# Patient Record
Sex: FEMALE | Race: WHITE | NOT HISPANIC OR LATINO | Employment: FULL TIME | URBAN - METROPOLITAN AREA
[De-identification: names, ages, dates, MRNs, and addresses within clinical notes are randomized per-mention and may not be internally consistent; named-entity substitution may affect disease eponyms.]

---

## 2017-05-30 ENCOUNTER — ALLSCRIPTS OFFICE VISIT (OUTPATIENT)
Dept: OTHER | Facility: OTHER | Age: 57
End: 2017-05-30

## 2017-07-12 ENCOUNTER — APPOINTMENT (OUTPATIENT)
Dept: LAB | Facility: MEDICAL CENTER | Age: 57
End: 2017-07-12
Payer: COMMERCIAL

## 2017-07-12 DIAGNOSIS — F90.0 ATTENTION-DEFICIT HYPERACTIVITY DISORDER, PREDOMINANTLY INATTENTIVE TYPE: ICD-10-CM

## 2017-07-12 DIAGNOSIS — E03.9 HYPOTHYROIDISM: ICD-10-CM

## 2017-07-12 DIAGNOSIS — E83.52 HYPERCALCEMIA: ICD-10-CM

## 2017-07-12 DIAGNOSIS — E04.1 NONTOXIC SINGLE THYROID NODULE: ICD-10-CM

## 2017-07-12 DIAGNOSIS — Z12.31 ENCOUNTER FOR SCREENING MAMMOGRAM FOR MALIGNANT NEOPLASM OF BREAST: ICD-10-CM

## 2017-07-12 DIAGNOSIS — E78.00 PURE HYPERCHOLESTEROLEMIA: ICD-10-CM

## 2017-07-12 LAB
ALBUMIN SERPL BCP-MCNC: 4.2 G/DL (ref 3.5–5)
ALP SERPL-CCNC: 85 U/L (ref 46–116)
ALT SERPL W P-5'-P-CCNC: 63 U/L (ref 12–78)
ANION GAP SERPL CALCULATED.3IONS-SCNC: 9 MMOL/L (ref 4–13)
AST SERPL W P-5'-P-CCNC: 37 U/L (ref 5–45)
BILIRUB SERPL-MCNC: 0.57 MG/DL (ref 0.2–1)
BUN SERPL-MCNC: 15 MG/DL (ref 5–25)
CALCIUM ALBUM COR SERPL-MCNC: 10.3 MG/DL (ref 8.3–10.1)
CALCIUM SERPL-MCNC: 10.5 MG/DL (ref 8.3–10.1)
CHLORIDE SERPL-SCNC: 106 MMOL/L (ref 100–108)
CHOLEST SERPL-MCNC: 253 MG/DL (ref 50–200)
CO2 SERPL-SCNC: 26 MMOL/L (ref 21–32)
CREAT SERPL-MCNC: 0.79 MG/DL (ref 0.6–1.3)
ERYTHROCYTE [DISTWIDTH] IN BLOOD BY AUTOMATED COUNT: 13.9 % (ref 11.6–15.1)
GFR SERPL CREATININE-BSD FRML MDRD: >60 ML/MIN/1.73SQ M
GLUCOSE P FAST SERPL-MCNC: 82 MG/DL (ref 65–99)
HCT VFR BLD AUTO: 43.1 % (ref 34.8–46.1)
HDLC SERPL-MCNC: 61 MG/DL (ref 40–60)
HGB BLD-MCNC: 15.1 G/DL (ref 11.5–15.4)
LDLC SERPL CALC-MCNC: 154 MG/DL (ref 0–100)
MCH RBC QN AUTO: 31.4 PG (ref 26.8–34.3)
MCHC RBC AUTO-ENTMCNC: 35 G/DL (ref 31.4–37.4)
MCV RBC AUTO: 90 FL (ref 82–98)
PLATELET # BLD AUTO: 331 THOUSANDS/UL (ref 149–390)
PMV BLD AUTO: 11.6 FL (ref 8.9–12.7)
POTASSIUM SERPL-SCNC: 4 MMOL/L (ref 3.5–5.3)
PROT SERPL-MCNC: 7.9 G/DL (ref 6.4–8.2)
RBC # BLD AUTO: 4.81 MILLION/UL (ref 3.81–5.12)
SODIUM SERPL-SCNC: 141 MMOL/L (ref 136–145)
T3 SERPL-MCNC: 0.8 NG/ML (ref 0.6–1.8)
T4 FREE SERPL-MCNC: 0.7 NG/DL (ref 0.76–1.46)
TRIGL SERPL-MCNC: 192 MG/DL
TSH SERPL DL<=0.05 MIU/L-ACNC: 10.5 UIU/ML (ref 0.36–3.74)
WBC # BLD AUTO: 6.39 THOUSAND/UL (ref 4.31–10.16)

## 2017-07-12 PROCEDURE — 84439 ASSAY OF FREE THYROXINE: CPT

## 2017-07-12 PROCEDURE — 80061 LIPID PANEL: CPT

## 2017-07-12 PROCEDURE — 80053 COMPREHEN METABOLIC PANEL: CPT

## 2017-07-12 PROCEDURE — 84443 ASSAY THYROID STIM HORMONE: CPT

## 2017-07-12 PROCEDURE — 85027 COMPLETE CBC AUTOMATED: CPT

## 2017-07-12 PROCEDURE — 36415 COLL VENOUS BLD VENIPUNCTURE: CPT

## 2017-07-12 PROCEDURE — 84480 ASSAY TRIIODOTHYRONINE (T3): CPT

## 2017-07-18 ENCOUNTER — TRANSCRIBE ORDERS (OUTPATIENT)
Dept: ADMINISTRATIVE | Facility: HOSPITAL | Age: 57
End: 2017-07-18

## 2017-07-18 DIAGNOSIS — R06.83 SNORING: Primary | ICD-10-CM

## 2017-07-19 ENCOUNTER — ALLSCRIPTS OFFICE VISIT (OUTPATIENT)
Dept: OTHER | Facility: OTHER | Age: 57
End: 2017-07-19

## 2017-07-25 ENCOUNTER — ALLSCRIPTS OFFICE VISIT (OUTPATIENT)
Dept: OTHER | Facility: OTHER | Age: 57
End: 2017-07-25

## 2017-08-17 ENCOUNTER — HOSPITAL ENCOUNTER (OUTPATIENT)
Dept: RADIOLOGY | Facility: MEDICAL CENTER | Age: 57
Discharge: HOME | End: 2017-08-17
Payer: COMMERCIAL

## 2017-08-17 DIAGNOSIS — Z12.31 ENCOUNTER FOR SCREENING MAMMOGRAM FOR MALIGNANT NEOPLASM OF BREAST: ICD-10-CM

## 2017-08-17 PROCEDURE — G0202 SCR MAMMO BI INCL CAD: HCPCS

## 2017-08-17 PROCEDURE — 77063 BREAST TOMOSYNTHESIS BI: CPT

## 2017-08-30 DIAGNOSIS — E83.52 HYPERCALCEMIA: ICD-10-CM

## 2017-08-30 DIAGNOSIS — E21.3 HYPERPARATHYROIDISM (HCC): ICD-10-CM

## 2017-08-30 DIAGNOSIS — E03.9 HYPOTHYROIDISM: ICD-10-CM

## 2017-08-30 DIAGNOSIS — E55.9 VITAMIN D DEFICIENCY: ICD-10-CM

## 2017-09-13 ENCOUNTER — APPOINTMENT (OUTPATIENT)
Dept: LAB | Age: 57
End: 2017-09-13
Payer: COMMERCIAL

## 2017-09-13 ENCOUNTER — TRANSCRIBE ORDERS (OUTPATIENT)
Dept: ADMINISTRATIVE | Age: 57
End: 2017-09-13

## 2017-09-13 DIAGNOSIS — E83.52 HYPERCALCEMIA: ICD-10-CM

## 2017-09-13 DIAGNOSIS — E03.9 HYPOTHYROIDISM: ICD-10-CM

## 2017-09-13 LAB
25(OH)D3 SERPL-MCNC: 18.5 NG/ML (ref 30–100)
ALBUMIN SERPL BCP-MCNC: 3.9 G/DL (ref 3.5–5)
CA-I BLD-SCNC: 1.44 MMOL/L (ref 1.12–1.32)
CALCIUM ALBUM COR SERPL-MCNC: 10.9 MG/DL (ref 8.3–10.1)
CALCIUM SERPL-MCNC: 10.8 MD/DL (ref 8.3–10.1)
CALCIUM SERPL-MCNC: 10.8 MG/DL (ref 8.3–10.1)
PTH-INTACT SERPL-MCNC: 110 PG/ML (ref 14–72)
TSH SERPL DL<=0.05 MIU/L-ACNC: 3.64 UIU/ML (ref 0.36–3.74)

## 2017-09-13 PROCEDURE — 82310 ASSAY OF CALCIUM: CPT

## 2017-09-13 PROCEDURE — 82040 ASSAY OF SERUM ALBUMIN: CPT

## 2017-09-13 PROCEDURE — 82330 ASSAY OF CALCIUM: CPT

## 2017-09-13 PROCEDURE — 84443 ASSAY THYROID STIM HORMONE: CPT

## 2017-09-13 PROCEDURE — 82306 VITAMIN D 25 HYDROXY: CPT

## 2017-09-13 PROCEDURE — 83970 ASSAY OF PARATHORMONE: CPT

## 2017-09-13 PROCEDURE — 36415 COLL VENOUS BLD VENIPUNCTURE: CPT

## 2017-09-15 ENCOUNTER — ALLSCRIPTS OFFICE VISIT (OUTPATIENT)
Dept: OTHER | Facility: OTHER | Age: 57
End: 2017-09-15

## 2017-09-22 ENCOUNTER — HOSPITAL ENCOUNTER (OUTPATIENT)
Dept: SLEEP CENTER | Facility: CLINIC | Age: 57
Discharge: HOME | End: 2017-09-22
Payer: COMMERCIAL

## 2017-09-22 DIAGNOSIS — R06.83 SNORING: ICD-10-CM

## 2017-09-22 DIAGNOSIS — G47.33 OSA (OBSTRUCTIVE SLEEP APNEA): ICD-10-CM

## 2017-09-22 PROCEDURE — 95810 POLYSOM 6/> YRS 4/> PARAM: CPT

## 2017-10-24 ENCOUNTER — TRANSCRIBE ORDERS (OUTPATIENT)
Dept: SLEEP CENTER | Facility: CLINIC | Age: 57
End: 2017-10-24

## 2017-10-24 DIAGNOSIS — G47.33 OSA (OBSTRUCTIVE SLEEP APNEA): Primary | ICD-10-CM

## 2017-11-13 ENCOUNTER — APPOINTMENT (OUTPATIENT)
Dept: LAB | Facility: HOSPITAL | Age: 57
End: 2017-11-13
Attending: INTERNAL MEDICINE
Payer: COMMERCIAL

## 2017-11-13 ENCOUNTER — ALLSCRIPTS OFFICE VISIT (OUTPATIENT)
Dept: OTHER | Facility: OTHER | Age: 57
End: 2017-11-13

## 2017-11-13 DIAGNOSIS — E21.3 HYPERPARATHYROIDISM (HCC): ICD-10-CM

## 2017-11-13 LAB
ALBUMIN SERPL BCP-MCNC: 4.1 G/DL (ref 3.5–5)
ALBUMIN SERPL BCP-MCNC: 4.3 G/DL (ref 3.5–5)
CALCIUM ALBUM COR SERPL-MCNC: 11.1 MG/DL (ref 8.3–10.1)
CALCIUM SERPL-MCNC: 11.3 MG/DL (ref 8.3–10.1)
CALCIUM SERPL-MCNC: 11.3 MG/DL (ref 8.3–10.1)
PHOSPHATE SERPL-MCNC: 2.6 MG/DL (ref 2.7–4.5)
PTH-INTACT SERPL-MCNC: 106.5 PG/ML (ref 14–72)

## 2017-11-13 PROCEDURE — 82310 ASSAY OF CALCIUM: CPT

## 2017-11-13 PROCEDURE — 36415 COLL VENOUS BLD VENIPUNCTURE: CPT

## 2017-11-13 PROCEDURE — 84100 ASSAY OF PHOSPHORUS: CPT

## 2017-11-13 PROCEDURE — 82040 ASSAY OF SERUM ALBUMIN: CPT

## 2017-11-13 PROCEDURE — 83970 ASSAY OF PARATHORMONE: CPT

## 2017-11-14 ENCOUNTER — GENERIC CONVERSION - ENCOUNTER (OUTPATIENT)
Dept: OTHER | Facility: OTHER | Age: 57
End: 2017-11-14

## 2017-11-14 NOTE — CONSULTS
Assessment    1  Hyperparathyroidism (252 00) (E21 3)   2  Right thyroid nodule (241 0) (E04 1)   3  Vitamin D deficiency (268 9) (E55 9)   4  Hypothyroidism (244 9) (E03 9)    Plan  Hyperparathyroidism    · (1) ALBUMIN; Status:Active; Requested for:13Nov2017;    Perform:[facility]; WPF:[id number]; Ordered;Ordered By:Mita Castellano;   · (1) CALCIUM, URINE 24HR; Status:Active; Requested for:13Nov2017;    Perform:[facility]; ANB:37TOO0276; Ordered;Ordered By:Mita Castellano;   · (1) CALCIUM; Status:Active; Requested for:13Nov2017;    Perform:[facility]; DPE:89IHY2226; Ordered;Ordered By:Mita Castellano;   · (1) PHOSPHORUS; Status:Active; Requested for:13Nov2017;    Perform:[facility]; WKM:42HEI4033; Ordered;Ordered By:Mita Castellano;   · (1) PTH N-TERMINAL (INTACT); Status:Active; Requested for:13Nov2017;    Perform:[facility]; NMK:21AQP5303; Ordered;Ordered By:Mita Castellano;   · Follow-up visit in 2 months Evaluation and Treatment  Follow-up  Status: Hold For -Scheduling  Requested for: 89FEH6563   Ordered; Hyperparathyroidism; Ordered By: Shannan Carson Performed:  Due: 95QKX1775   · Follow-Up With Advanced Practitioner Evaluation and Treatment  Follow-up  Status: HoldFor - Scheduling  Requested for: 82STO8316   Ordered; Hyperparathyroidism; Ordered By: Shannan Carson Performed:  Due: 74DZH5684  Hyperparathyroidism, Right thyroid nodule    · US THYROID; Status:Active; Requested for:64Ebn4653;    Perform:[facility]; OUK:49JUY1121; Ordered;Right thyroid nodule; Ordered By:Mita Castellano; Discussion/Summary  Discussion Summary:   1  Hyperparathyroidism with worsening hypercalcemia-I have ordered an intact PTH, calcium, albumin, phosphorus and 24 urine calcium  I suspect she will likely need a sestamibi scan   I have encouraged her to have  Severe vitamin-D deficiency-at this time, I have asked her to stop taking ergo calciferol as sudden increases in vitamin-D can worsen calcium levels  I did ask her to start taking lower dose of vitamin-D at 1000 units per day  Hypothyroidism-continue thyroid hormone replacement  Thyroid nodule-she does have a slip for ultrasound of the thyroid  Counseling Documentation With Imm: The patient was counseled regarding diagnostic results,-- instructions for management,-- impressions  Medication SE Review and Pt Understands Tx: The treatment plan was reviewed with the patient/guardian  The patient/guardian understands and agrees with the treatment plan      Chief Complaint  Chief Complaint Free Text Note Form: Consult      History of Present Illness  Thyroid Nodule: The patient is being seen for an initial evaluation of a thyroid nodule  Nodule characteristics: 1 5 cm in size, located in the right lobe, cold  The patient is currently asymptomatic  Hyperparathyroidism, Primary: The patient is being seen for a consultation regarding primary hyperparathyroidism  Disease type: undetermined  Disease involvement includes hypercalcemia  The patient is not currently being treated for this problem  Symptoms:  polyuria,-- polydipsia,-- fatigue-- and-- depression  Onset was gradual 4 year(s) ago  The symptoms occur constantly  She describes this as moderate in severity-- and-- worsening  No relieving factors are noted  Vitamin D Deficiency: The patient is being seen for an initial evaluation of vitamin D deficiency  Disease type: vitamin D deficiency  Current treatment includes vitamin D2 (ergocalciferol)  The patient is currently asymptomatic  Review of Systems  Endo Adult ROS Female New Patient:  Constitutional/General: no change in ring size,-- no change in shoe size,-- no chills,-- no dizziness,-- no fainting,-- fatigue,-- no fever,-- no forgetfulness,-- headache,-- no loss of sleep,-- no recent weight loss,-- no nervousness,-- no numbness,-- no temperature intolerance,-- no excessive sweating-- and-- no weight gain    Muscle/Joint/Bone: neck pain-- and-- shoulder pain, but-- no arm pain,-- no back pain,-- no hip pain,-- no leg pain,-- no foot pain,-- no hand pain,-- no arm weakness,-- no back weakness,-- no hip weakness,-- no leg weakness,-- no foot weakness,-- no neck weakness,-- no hand weakness,-- no shoulder weakness,-- no arm numbness,-- no back numbness,-- no hip numbness,-- no leg numbness,-- no foot numbness,-- no neck numbness,-- no hand numbness-- and-- no shoulder numbness  Gastrointestinal: diarrhea, but-- no constipation,-- no excessive hunger,-- no excessive thirst,-- no nausea,-- no poor appetite,-- no rectal bleeding,-- no stomach pain,-- no vomiting-- and-- no vomiting blood  Cardiovascular: no chest pain,-- no hypertension,-- no irregular heart beat,-- no hypotension,-- no poor circulation,-- no rapid heart beat-- and-- no ankle swelling  Eye/Ear/Nose/Throat: sinus problems, but-- no bleeding gums,-- no blurred vision,-- no difficulty swallowing,-- no double vision,-- no gritty eyes,-- no hoarseness,-- no hearing loss,-- no persistent cough,-- not seeing flashes-- and-- not seeing halos  Skin: no easy bruising,-- no hives,-- no itching,-- no change in a mole,-- no rashes,-- no scar-- and-- no slow healing sores  Genitourinary no blood in urine,-- no frequent urination,-- no night time urination-- and-- no painful urination  Genitourinary - Reproductive normal period,-- no bleeding between periods,-- no breast lump,-- no hot flashes,-- no nipple discharge,-- no vaginal discharge,-- not pregnant-- and-- no children  date of LMP is not applicable  LMP intervals not applicable  the interval length of the last menstruation is not applicable   ROS Reviewed:   ROS reviewed  Active Problems    1  ADHD, predominantly inattentive type (314 00) (F90 0)   2  Allergic (995 3) (T78 40XA)   3  Depression (311) (F32 9)   4  GERD without esophagitis (530 81) (K21 9)   5  Hypercalcemia (275 42) (E83 52)   6   Hypercholesteremia (272 0) (E78 00)   7  Hyperparathyroidism (252 00) (E21 3)   8  Hypertension, essential, benign (401 1) (I10)   9  Hypothyroidism (244 9) (E03 9)   10  Lumbar strain (847 2) (S39 012A)   11  Obstructive sleep apnea (327 23) (G47 33)   12  PPD screening test (V74 1) (Z11 1)   13  Right thyroid nodule (241 0) (E04 1)   14  Snoring (786 09) (R06 83)   15  Vitamin D deficiency (268 9) (E55 9)    Past Medical History  1  History of Abrasion of axilla (912 0) (S40 819A)   2  History of Acute maxillary sinusitis (461 0) (J01 00)   3  History of Acute pain of both knees (338 19,719 46) (M25 561,M25 562)   4  History of Acute pharyngitis (462) (J02 9)   5  History of Acute URI (465 9) (J06 9)   6  History of Acute UTI (599 0) (N39 0)   7  Denied: History of Carrier Of STD   8  History of Fracture of plateau of left tibia (823 00) (S82 142A)   9  History of acute sinusitis (V12 69) (Z87 09)   10  History of allergic rhinitis (V12 69) (Z87 09)   11  History of asthma (V12 69) (Z87 09)   12  History of headache (V13 89) (Z87 898)   13  History of insect bite (V15 59) (Z87 828)   14  History of urinary frequency (V13 09) (Z87 898)   15  History of Left ankle sprain (845 00) (S93 402A)   16  History of Left knee pain (719 46) (M25 562)   17  Denied: History of Mental Status Change   18  History of Pain in joint of left shoulder (719 41) (M25 512)   19  History of Vaginal bleeding, abnormal (623 8) (N93 9)  Active Problems And Past Medical History Reviewed: The active problems and past medical history were reviewed and updated today  Surgical History  1  History of Tonsillectomy With Adenoidectomy  Surgical History Reviewed: The surgical history was reviewed and updated today  Family History  Mother    1  Family history of glaucoma (V19 11) (Z83 511)   2  Family history of rickets (V17 89) (Z82 69)   3  Family history of stroke (V17 1) (Z82 3)  Father    4  Family history of neuropathy (V17 2) (Z82 0)  Brother    5   Family history of Paget's bone disease  Family History    6  Family history of Benign essential HTN   7  Family history of cardiac disorder (V17 49) (Z82 49)  Family History Reviewed: The family history was reviewed and updated today  Social History     · Being A Social Drinker   · Caffeine Use   · Marital History - Single   · Never A Smoker   · Never Used Drugs   · Occupation:   · Travel history  Social History Reviewed: The social history was reviewed and updated today  Current Meds   1  Advair Diskus 100-50 MCG/DOSE Inhalation Aerosol Powder Breath Activated; INHALE 1 PUFF TWICE DAILY; Therapy: 00BIR8861 to (Last SS:37YAA5760)  Requested for: 07LWU1683 Ordered   2  Amphetamine-Dextroamphetamine 5 MG Oral Tablet; TAKE 1 1/2 tab in the morning and 1 tab in evening; Therapy: 97Wjs2372 to (Evaluate:18Nov2017); Last Rx:69Pvr5174 Ordered   3  Cetirizine HCl - 10 MG Oral Tablet; take 2 tablet daily; Therapy: (Recorded:02Gtq4238) to Recorded   4  Cyclobenzaprine HCl - 7 5 MG Oral Tablet; Take 1 tablet twice daily as needed; Therapy: 45KSD2540 to (78 220 82 17)  Requested for: 44Ber1713 Recorded   5  Levothyroxine Sodium 50 MCG Oral Tablet; TAKE 1 TABLET DAILY; Therapy: 22RWS5601 to (Evaluate:15Jan2018)  Requested for: 34UOX8164; Last Rx:75Drq4654 Ordered   6  Multiple Vitamin TABS; TAKE 1 TABLET DAILY; Therapy: (Recorded:42Xbf7550) to Recorded   7  Nasacort AQ 55 MCG/ACT AERS; INSTILL 2 SQUIRT Daily in am; Therapy: (Recorded:16Iob7007) to Recorded   8  Venlafaxine HCl  MG Oral Capsule Extended Release 24 Hour; TAKE 1 CAPSULE DAILY  Requested for: 47BHV3484; Last Rx:42Cin1590 Ordered   9  Vitamin D (Ergocalciferol) 38591 UNIT Oral Capsule; TAKE 1 CAPSULE WEEKLY; Therapy: 89Zbi3298 to (Last Rx:98Spr6612)  Requested for: 17Bet2826 Ordered  Medication List Reviewed: The medication list was reviewed and updated today  Allergies  1  Bactrim TABS   2  Compazine TABS   3   Erythromycin TABS    Vitals  Vital Signs    Recorded: 41IOJ2335 09:22AM   Heart Rate 88   Systolic 585   Diastolic 78   Height 5 ft 1 5 in   Weight 160 lb 4 96 oz   BMI Calculated 29 8   BSA Calculated 1 73       Physical Exam   Constitutional  General appearance: No acute distress, well appearing and well nourished  Eyes  Conjunctiva and lids: No swelling, erythema, or discharge  Pupils: Equal, round and reactive to light  The sclera are anicteric  Extraocular movements are intact  Ears, Nose, Mouth, and Throat  External inspection of ears, nose and lips: Normal    Oropharynx: Normal with no erythema, edema, exudate or lesions  Exam of Head: The head is atraumatic and normocephalic  Neck: Abnormal  -- Right upper pole thyroid nodule approximately 1 5 cm in size  Pulmonary  Auscultation of lungs: Clear to auscultation bilaterally with normal chest expansion  Cardiovascular  Auscultation of heart: Normal rate and rhythm with no murmurs, gallops or rubs  Examination of pulses: Dorsalis pedal pulses are +2 and equal bilaterally  Examination of carotids: No bruit   Abdomen  Abdomen: Abdomen is soft, non-tender with normal bowel sounds  Lymphatic  Palpation of lymph nodes: No supraclavicular or suboccipital lymphadenopathy  Musculoskeletal  Inspection/palpation of joints, bones, and muscles: Muscle bulk and tone is normal    Skin  Skin and subcutaneous tissue: Normal skin temperature and color  Neurologic  Reflexes: 2+ and symmetric  Motor Strength: Strength is 5/5 bilaterally  Psychiatric  Orientation to person, place and time: Normal    Mood and affect: Affect and attention span are normal        Results/Data  Office Record Review: I have reviewed the office records and my summary is as follows: Notes from the primary care provided talks about the hyperparathyroidism, hypothyroidism and thyroid nodule   (1) VITAMIN D 25-HYDROXY 94Qwi7260 09:40AM [facility] Order Number: [id number] Test Name Result Flag Reference   VIT D 25-HYDROX 18 5 ng/mL L 30 0-100 0     This assay is a certified procedure of the CDC Vitamin D Standardization Certification Program (VDSCP)   Deficiency <20ng/ml  Insufficiency 20-30ng/ml  Sufficient  ng/ml   *Patients undergoing fluorescein dye angiography may retain small amounts of fluorescein in the body for 48-72 hours post procedure  Samples containing fluorescein can produce falsely elevated Vitamin D values  If the patient had this procedure, a specimen should be resubmitted post fluorescein clearance  (1) PTH N-TERMINAL (INTACT) 25Fzs2794 09:40AM [facility] Order Number: [id number]     Test Name Result Flag Reference   PARATHYROID HORMONE INTACT 110 0 pg/mL H 14 0-72 0     (1) CALCIUM 98Fgw9072 09:40AM [facility] Order Number: [id number]     Test Name Result Flag Reference   CALCIUM 10 8 mg/dL H 8 3-10 1   CORRECTED CALCIUM 10 9 mg/dL H 8 3-10 1   ALBUMIN 3 9 g/dL  3 5-5 0   CALCIUM FOR CA/ALB 10 8 md/dL H 8 3-10 1     (1) CALCIUM IONIZED 83Erx6417 09:40AM [facility] Order Number: [id number]     Test Name Result Flag Reference   CALCIUM IONIZED 1 44 mmol/L H 1 12-1 32     (1) TSH 99Dus3541 09:40AM [facility] Order Number: [id number]     Test Name Result Flag Reference   TSH 3 640 uIU/mL  0 358-3 740     Patients undergoing fluorescein dye angiography may retain small amounts of fluorescein in the body for 48-72 hours post procedure  Samples containing fluorescein can produce falsely depressed TSH values  If the patient had this procedure,a specimen should be resubmitted post fluorescein clearance       The recommended reference ranges for TSH during pregnancy are as follows: First trimester 0 1 to 2 5 uIU/mL Second trimester  0 2 to 3 0 uIU/mL Third trimester 0 3 to 3 0 uIU/m       Signatures   Electronically signed by : SETH Palumbo ; Nov 13 2017  9:48AM EST (Author)

## 2017-12-04 ENCOUNTER — TRANSCRIBE ORDERS (OUTPATIENT)
Dept: ADMINISTRATIVE | Facility: HOSPITAL | Age: 57
End: 2017-12-04

## 2017-12-04 ENCOUNTER — GENERIC CONVERSION - ENCOUNTER (OUTPATIENT)
Dept: OTHER | Facility: OTHER | Age: 57
End: 2017-12-04

## 2017-12-04 ENCOUNTER — APPOINTMENT (OUTPATIENT)
Dept: LAB | Facility: MEDICAL CENTER | Age: 57
End: 2017-12-04
Payer: COMMERCIAL

## 2017-12-04 DIAGNOSIS — E21.3 HYPERPARATHYROIDISM (HCC): Primary | ICD-10-CM

## 2017-12-04 LAB
CALCIUM 24H UR-MCNC: 261.3 MG/24 HRS (ref 42–353)
SPECIMEN VOL UR: 1300 ML

## 2017-12-04 PROCEDURE — 82340 ASSAY OF CALCIUM IN URINE: CPT | Performed by: INTERNAL MEDICINE

## 2017-12-11 ENCOUNTER — TRANSCRIBE ORDERS (OUTPATIENT)
Dept: SLEEP CENTER | Facility: CLINIC | Age: 57
End: 2017-12-11

## 2017-12-11 ENCOUNTER — HOSPITAL ENCOUNTER (OUTPATIENT)
Dept: SLEEP CENTER | Facility: CLINIC | Age: 57
Discharge: HOME | End: 2017-12-11
Payer: COMMERCIAL

## 2017-12-11 DIAGNOSIS — G47.33 OSA (OBSTRUCTIVE SLEEP APNEA): Primary | ICD-10-CM

## 2017-12-11 NOTE — PROGRESS NOTES
Consultation - [facility] Peng  56 y o  female  :1960  HWI:[id number]    Physician Requesting Consult: Wilma EDWARD     Reason for Consult : At your kind request I saw this patient for initial sleep evaluation today  A diagnostic sleep study was undertaken and patient is here to review results and treatment options  The study demonstrated an AHI of 5 4 per hour  Minimum oxygen saturation was 85% and she spent 72 2% of time asleep with saturations less than 90%  Mild-to-moderate intensity snoring was noted  There were also periodic limb movements of sleep for an index of 14 per hour and frequent spontaneous arousals for an index of 47 per hour  Medications, Past, Family and Social Histories were reviewed  Comorbidities include:  ADHD, depression, hypothyroidism, hyperparathyroidism, is scoliosis, environmental allergies and asthma  Herewith findings in summary  Full details are available from our records  HPI:  She reports snoring of around 5 years duration that is loud enough to disturb others  She sleeps alone and is not aware of breathing difficulties during sleep or modifying factors  She reported no restless leg symptoms  She tosses and turns and sleep talks but reported no other features of parasomnia  Sleep Routine:  She is spending 7 hours in bed  She typically falls asleep within 10 minutes  Sleep is interrupted 4 apart times a night  She awakens with the aid of an alarm and is never rested  She has excessive date this rated herself at her computer or whenever sedentary  Habits:  She has never smoked  , she uses alcohol occasionally  ,  has no drug history on file  , she uses limited caffeine  ROS:  She has had approximately 20 lb weight gain in the past year and a half  She feels allergies and asthma are controlled  A 10 point review of systems was otherwise unremarkable  Physical Exam: Salient findings on exam, are a body mass index 28 6   Vitals are stable  Mental state    appears normal   Craniofacial anatomy is normal  Neck Circumference is 37 cm  There is excess fatty tissue and neck is thick  There are no abnormal neck masses  Nasal airway is patent  Mucous membranes appeared normal  The oral airway is crowded  Base of tongue is at Modified Mallampati class III  She has asymmetry of her spine and torso  Heart sounds are regular, there are no murmurs, no JVD or pedal edema  Respiratory effort is normal  Air entry is good bilaterally  There are no wheezes or rales  She has truncal obesity  The rest of her exam was unremarkable  Impression:   1  Mild-to-moderate obstructive sleep apnea  2  Sleep-related hypoxia  3  Periodic limb movements of sleep  4  Severe sleep fragmentation   5  Hypersomnolence secondary to the above  6  Sleep talking  7  Overweight  8  Comorbidities as outlined above    Plan:   1  I reviewed results of the Sleep study with the patient  2  With respect to above conditions, I counseled on pathophysiology, diagnosis, treatment options, risks and benefits; inter-relationship and effects on symptoms and comorbidities; risks of no treatment; costs and insurance aspects  3  I also advised on weight reduction  4  Patient elected positive airway pressure therapy and is to be scheduled for a titration study  5   She may need review of her psychiatric medications  5  Follow-up to be scheduled after the study to review results and to initiate therapy      Sincerely,    Mendoza Freire MD  Board Certified Specialist

## 2018-01-10 NOTE — RESULT NOTES
Discussion/Summary   Appears to primary hyperparathyroidism  Await 24 hour urine collection       Verified Results  (1) PHOSPHORUS 04GND8180 04:49PM [facility] Order Number: [id number]     Test Name Result Flag Reference   PHOSPHORUS 2 6 mg/dL L 2 7-4 5     (1) PTH N-TERMINAL (INTACT) 00IPE2277 04:49PM [facility] Order Number: [id number]     Test Name Result Flag Reference   PARATHYROID HORMONE INTACT 106 5 pg/mL H 14 0-72 0     (1) ALBUMIN 62SFF5027 04:49PM [facility] Order Number: [id number]     Test Name Result Flag Reference   ALBUMIN 4 1 g/dL  3 5-5 0     (1) CALCIUM 92EVG3698 04:49PM [facility] Order Number: [id number]     Test Name Result Flag Reference   CALCIUM 11 3 mg/dL H 8 3-10 1   CORRECTED CALCIUM 11 1 mg/dL H 8 3-10 1   ALBUMIN 4 3 g/dL  3 5-5 0   CALCIUM FOR CA/ALB 11 3 mg/dL H 8 3-10 1

## 2018-01-11 ENCOUNTER — TRANSCRIBE ORDERS (OUTPATIENT)
Dept: ADMINISTRATIVE | Facility: HOSPITAL | Age: 58
End: 2018-01-11

## 2018-01-11 DIAGNOSIS — Z86.39 HISTORY OF UNDERACTIVE THYROID: Primary | ICD-10-CM

## 2018-01-11 NOTE — RESULT NOTES
Verified Results  (1) URINE CULTURE 59Sbj8037 02:32PM [facility] No   [facility] Order Number: [id number]     Test Name Result Flag Reference   CLINICAL REPORT (Report)     Test:        Urine culture  Specimen Source:  Urine, Unspecified Source  Specimen Type:   Urine  Specimen Date:   8/25/2016 2:32 PM  Result Date:    8/26/2016 6:25 PM  Result Status:   Final result  Resulting Lab:   [facility] [address] [place] [address]            Tel: [phone]      CULTURE                                       ------------------                                   No Growth <1000 cfu/mL       Discussion/Summary   LVM with results to d/c abx since no true UTI  F/u with PCP if no improvement

## 2018-01-11 NOTE — PROGRESS NOTES
Assessment    1  Screening for hyperlipidemia (V77 91) (Z13 220)   2  Encounter for preventive health examination (V70 0) (Z00 00)   3  Screening for malignant neoplasm of breast (V76 10) (Z12 39)    Plan  Health Maintenance    · (1) CBC/PLT/DIFF; Status:Active; Requested HRK:[id number];    · (1) COMPREHENSIVE METABOLIC PANEL; Status:Active; Requested CKS:[id number];   Hypothyroidism    · (1) TSH WITH FT4 REFLEX; Status:Active; Requested EYV:[id number];   Screening for hyperlipidemia    · (1) LIPID PANEL, FASTING; Status:Active; Requested OLJ:48RZG0478;   Screening for malignant neoplasm of breast    · * MAMMO SCREENING BILATERAL W CAD; Status:Hold For - Scheduling; Requested  ZSH:88UPX5202;   Screening for malignant neoplasm of colon    · COLONOSCOPY; Status:Active; Requested VRW:90GTO3779;   Vaginal bleeding, abnormal    · *1 - [facility] (Obstetrics/ Gynecology) Physician Referral  Consult  Status:  Hold For - Scheduling  Requested for: 17NZR0228  Care Summary provided  : Yes    Discussion/Summary  health maintenance visit Currently, she eats a poor diet and has an inadequate exercise regimen  the risks and benefits of cervical cancer screening were discussed needs to f/u for PAP Breast cancer screening: the risks and benefits of breast cancer screening were discussed and mammogram has been ordered  Colorectal cancer screening: the risks and benefits of colorectal cancer screening were discussed and colonoscopy has been ordered  Patient discussion: discussed with the patient  As for GERD, continue taking tums as that is relieving your heart burn  As for B/L knee pain  You state pain is only with bending  Recommend OTC antiinflammatory medication as needed  As for irregular, post menopausal vaginal bleeidng - referral to GYN  Additionally, pt is over due for: labs, mammo, PAP, colonscopy - slips given and pt to f/u in 1 month     Possible side effects of new medications were reviewed with the patient/guardian today  The patient was counseled regarding instructions for management, prognosis, patient and family education, risks and benefits of treatment options, importance of compliance with treatment  Chief Complaint    1  Heartburn   2  Knee Pain  Pt is here for her yearly physical  No recent labs  History of Present Illness  HM, Adult Female: The patient is being seen for a health maintenance and Last Pap > 7 years ago evaluation  The last health maintenance visit was 1 year(s) ago  Social History: Household members include Brother  She is unmarried  Work status: working full time and occupation: Day Care Worker  The patient has never smoked cigarettes  General Health: The patient's health since the last visit is described as good  She does not have regular dental visits  She complains of vision problems  Vision care includes previous LASIK eye surgery  She denies hearing loss  Immunizations status: up to date  Lifestyle:  She does not have a healthy diet  She has weight concerns  She does not exercise regularly  She does not use tobacco  She consumes alcohol  (social)   She denies drug use  Reproductive health: the patient is postmenopausal   irregular post menopausal vaginal bleeding  Screening: Cervical cancer screening includes a pap smear performed > 7 years  Breast cancer screening includes a mammogram performed > 5 years  Colorectal cancer screening includes no previous colonoscopy  She hasn't been previously screened for colorectal cancer  Metabolic screening includes uncertain timing of her last lipid profile, uncertain timing of her last glucose screening, uncertain timing of her last thyroid function test and no previous DEXA  Cardiovascular risk factors: obesity and sedentary lifestyle, but no hypertension, no diabetes, no stress, no tobacco use and no illicit drug use     HPI: Pt here for annual exam  Pt c/o B/L knee pain, GERD and irregular post menopausal vaginal bleeding      Review of Systems    Constitutional: no fever, not feeling poorly, no chills and not feeling tired  Eyes: no eye pain, no eyesight problems, eyes not red and no purulent discharge from the eyes  ENT: no earache, no nosebleeds, no sore throat, no hearing loss and no nasal discharge  Cardiovascular: the heart rate was not slow, no chest pain, no intermittent leg claudication, the heart rate was not fast, no palpitations and no lower extremity edema  Respiratory: no shortness of breath, no cough, no orthopnea, no wheezing and no shortness of breath during exertion  Gastrointestinal: no abdominal pain, no nausea, no vomiting, no constipation, no diarrhea and no blood in stools  Genitourinary: unexplained vaginal bleeding, but no dysuria, no pelvic pain and no vaginal discharge  Musculoskeletal: no joint swelling, no limb pain, no myalgias, no joint stiffness and no limb swelling    The patient presents with complaints of arthralgias (B/L knee pain)  Integumentary: no rashes, no skin lesions and no skin wound  Neurological: no headache, no numbness, no tingling, no confusion, no dizziness and no limb weakness  Psychiatric: not suicidal, no anxiety and no depression  Active Problems    1  ADHD, predominantly inattentive type (314 00) (F90 0)   2  Depression (311) (F32 9)   3   Hypothyroidism (244 9) (E03 9)    Past Medical History    · History of Abrasion of axilla (912 0) (S40 819A)   · History of Acute maxillary sinusitis (461 0) (J01 00)   · History of Acute pharyngitis (462) (J02 9)   · History of Acute URI (465 9) (J06 9)   · History of Asthma (493 90) (J45 909)   · History of Asthma (493 90) (J45 909)   · Denied: History of Carrier Of STD   · History of Encounter for PPD test (V74 1) (Z11 1)   · History of Encounter for screening mammogram for malignant neoplasm of breast  (V76 12) (Z12 31)   · History of Encounter for screening mammogram for malignant neoplasm of breast  (V76 12) (Z12 31)   · History of Fracture of plateau of left tibia (823 00) (G82 232Y)   · History of allergic rhinitis (V12 69) (Z87 09)   · History of headache (V13 89) (O46 435)   · History of insect bite (V15 59) (B93 236)   · History of Left ankle sprain (845 00) (S93 402A)   · History of Left knee pain (719 46) (M25 562)   · Denied: History of Mental Status Change   · History of Need for immunization against influenza (V04 81) (Z23)   · History of Other screening mammogram (V76 12) (Z12 31)   · History of Pain in joint of left shoulder (719 41) (M25 512)   · History of Screening for colon cancer (V76 51) (Z12 11)    Surgical History    · History of Tonsillectomy With Adenoidectomy    Family History  Mother    · Family history of Glaucoma (V19 11)   · Family history of Stroke Syndrome (V17 1)  Father    · Family history of neuropathy (V17 2) (Z82 0)   · Family history of Prostate Cancer (V16 42)  Brother    · Family history of Prostate Cancer (V16 42)  Family History    · Family history of Heart Disease (V17 49)   · Family history of Hypertension (V17 49)    Social History    · Being A Social Drinker   · DRINKS ALCOHOL VERY INFREQUENCY   · Caffeine Use   · SHE ADMITS TO CONSUMING CAFFEINE VIA TEA ( 3 SERVINGS PER DAY)   · Marital History - Single   · Never A Smoker   · Never Used Drugs   · Occupation:   · UMEMPLOYED   · Travel history   · Pt was in [facility] July/Aug 2014    Current Meds   1  Amphetamine-Dextroamphetamine 5 MG Oral Tablet; TAKE 1 TABLET TWICE DAILY; Therapy: 21Apr2015 to (Evaluate:43Qhw0969); Last MY:11MZS2295 Ordered   2  Cetirizine HCl - 10 MG Oral Tablet; TAKE 1 TABLET AT BEDTIME; Therapy: (Recorded:57Emv9049) to Recorded   3  Multiple Vitamin Oral Tablet; TAKE 1 TABLET DAILY; Therapy: (Recorded:97Sdm6339) to Recorded   4  Nasacort AQ 55 MCG/ACT AERS; INSTILL 2 SQUIRT Daily in am;   Therapy: (Recorded:93Nno8177) to Recorded   5   Symbicort 80-4 5 MCG/ACT Inhalation Aerosol; INHALE 2 PUFFS TWICE DAILY  RINSE   MOUTH AFTER USE; Therapy: 06ACL0122 to (Last Rx:29Apr2016)  Requested for: 29Apr2016 Ordered   6  Venlafaxine HCl  MG Oral Capsule Extended Release 24 Hour; TAKE 1   CAPSULE DAILY  Requested for: 68OSJ8980; Last Rx:28Mhr1613 Ordered    Allergies    1  Bactrim TABS   2  Compazine TABS   3  Erythromycin TABS    Vitals   Recorded: 06Jun2016 04:40PM   Temperature 97 7 F, Tympanic   Heart Rate 83   Systolic 112, LUE, Sitting   Diastolic 88, LUE, Sitting   Height 5 ft 0 5 in   Weight 155 lb    BMI Calculated 29 77   BSA Calculated 1 69   O2 Saturation 98     Physical Exam    Constitutional   General appearance: No acute distress, well appearing and well nourished  Head and Face   Head and face: Normal     Palpation of the face and sinuses: No sinus tenderness  Eyes   Conjunctiva and lids: No swelling, erythema or discharge  Pupils and irises: Equal, round, reactive to light  Ears, Nose, Mouth, and Throat   External inspection of ears and nose: Normal     Otoscopic examination: Tympanic membranes translucent with normal light reflex  Canals patent without erythema  Gross hearing intact  Nasal mucosa, septum, and turbinates: Normal without edema or erythema  Lips, teeth, and gums: Normal, good dentition  Oropharynx: Normal with no erythema, edema, exudate or lesions  MMM  Neck   Neck: Supple, symmetric, trachea midline, no masses  Thyroid: Normal, no thyromegaly  Pulmonary   Respiratory effort: No increased work of breathing or signs of respiratory distress  Auscultation of lungs: Clear to auscultation  No rhonchi or wheezing  Cardiovascular   Auscultation of heart: Normal rate and rhythm, normal S1 and S2, no murmurs  Examination of extremities for edema and/or varicosities: Normal     Abdomen   Abdomen: Non-tender, no masses  soft, + BS  Liver and spleen: No hepatomegaly or splenomegaly     Lymphatic   Palpation of lymph nodes in neck: No lymphadenopathy  Musculoskeletal   Gait and station: Normal     Joints, bones, and muscles: Normal     Range of motion: Normal   B/L knee without swelling, no ecchymosis  No crepitus  Full ROM  No TTP  Stability: Normal     Muscle strength/tone: Normal     Skin   Skin and subcutaneous tissue: Normal without rashes or lesions  Palpation of skin and subcutaneous tissue: Normal turgor  Neurologic   Cranial nerves: Cranial nerves II-XII intact  Cortical function: Normal mental status  Sensation: No sensory loss  Coordination: Normal finger to nose and heel to shin  Psychiatric   Judgment and insight: Normal   pleasant, cooperative  Orientation to person, place, and time: Normal     Recent and remote memory: Intact  Mood and affect: Normal        Health Management  History of Other screening mammogram   Digital Bilateral Screening Mammogram With CAD; every 1 year; Next Due: 31Ohc1249; Overdue  History of Screening for colon cancer   COLONOSCOPY; every 10 years; Next Due: 23Fqp8259; Overdue    Future Appointments    Date/Time Provider Specialty Site   07/06/2016 05:30 PM Raj Nayak [facility] PRIMARY CARE     Signatures   Electronically signed by :  Raj Fields [facility]; Jun 6 2016  5:31PM EST                       (Author)    Electronically signed by : Keren Gee MD; Jun 7 2016  1:49PM EST

## 2018-01-12 VITALS
BODY MASS INDEX: 29.5 KG/M2 | HEIGHT: 62 IN | HEART RATE: 88 BPM | DIASTOLIC BLOOD PRESSURE: 78 MMHG | SYSTOLIC BLOOD PRESSURE: 122 MMHG | WEIGHT: 160.31 LBS

## 2018-01-12 NOTE — MISCELLANEOUS
DEAR BACILIO,     WE HAVE ATTEMPTED TO REACH YOU SEVERAL TIMES WITH NO SUCCESS  PLEASE CONTACT THE OFFICE AT YOUR EARLIEST CONVENIENCE      [facility]      Electronically signed by:Lula Black   Apr 18 2016  9:03AM EST

## 2018-01-13 VITALS
BODY MASS INDEX: 30.02 KG/M2 | SYSTOLIC BLOOD PRESSURE: 134 MMHG | DIASTOLIC BLOOD PRESSURE: 88 MMHG | TEMPERATURE: 98.1 F | OXYGEN SATURATION: 95 % | HEIGHT: 62 IN | HEART RATE: 92 BPM | WEIGHT: 163.13 LBS

## 2018-01-14 VITALS
WEIGHT: 165.13 LBS | DIASTOLIC BLOOD PRESSURE: 82 MMHG | HEART RATE: 90 BPM | OXYGEN SATURATION: 96 % | HEIGHT: 61 IN | TEMPERATURE: 98.4 F | BODY MASS INDEX: 31.18 KG/M2 | SYSTOLIC BLOOD PRESSURE: 135 MMHG

## 2018-01-15 ENCOUNTER — ALLSCRIPTS OFFICE VISIT (OUTPATIENT)
Dept: OTHER | Facility: OTHER | Age: 58
End: 2018-01-15

## 2018-01-15 VITALS
HEART RATE: 86 BPM | WEIGHT: 164 LBS | HEIGHT: 62 IN | TEMPERATURE: 97.9 F | SYSTOLIC BLOOD PRESSURE: 124 MMHG | DIASTOLIC BLOOD PRESSURE: 82 MMHG | BODY MASS INDEX: 30.18 KG/M2 | OXYGEN SATURATION: 96 %

## 2018-01-15 NOTE — RESULT NOTES
Discussion/Summary   APT SCHED THIS WEEK TO DISCUSS     Verified Results  (1) VITAMIN D 25-HYDROXY 36Jwq8697 09:40AM [facility] Order Number: [id number]     Test Name Result Flag Reference   VIT D 25-HYDROX 18 5 ng/mL L 30 0-100 0   This assay is a certified procedure of the CDC Vitamin D Standardization Certification Program (VDSCP)     Deficiency <20ng/ml   Insufficiency 20-30ng/ml   Sufficient  ng/ml     *Patients undergoing fluorescein dye angiography may retain small amounts of fluorescein in the body for 48-72 hours post procedure  Samples containing fluorescein can produce falsely elevated Vitamin D values  If the patient had this procedure, a specimen should be resubmitted post fluorescein clearance  (1) PTH N-TERMINAL (INTACT) 92Ojh5408 09:40AM [facility] Order Number: [id number]     Test Name Result Flag Reference   PARATHYROID HORMONE INTACT 110 0 pg/mL H 14 0-72 0     (1) CALCIUM 56Cut5602 09:40AM [facility] Order Number: [id number]     Test Name Result Flag Reference   CALCIUM 10 8 mg/dL H 8 3-10 1   CORRECTED CALCIUM 10 9 mg/dL H 8 3-10 1   ALBUMIN 3 9 g/dL  3 5-5 0   CALCIUM FOR CA/ALB 10 8 md/dL H 8 3-10 1     (1) CALCIUM IONIZED 38Dul7243 09:40AM [facility] Order Number: [id number]     Test Name Result Flag Reference   CALCIUM IONIZED 1 44 mmol/L H 1 12-1 32     (1) TSH 05Vyr6426 09:40AM [facility] Order Number: [id number]     Test Name Result Flag Reference   TSH 3 640 uIU/mL  0 358-3 740   Patients undergoing fluorescein dye angiography may retain small amounts of fluorescein in the body for 48-72 hours post procedure  Samples containing fluorescein can produce falsely depressed TSH values  If the patient had this procedure,a specimen should be resubmitted post fluorescein clearance            The recommended reference ranges for TSH during pregnancy are as follows:  First trimester 0 1 to 2 5 uIU/mL  Second trimester  0 2 to 3 0 uIU/mL  Third trimester 0 3 to 3 0 uIU/m

## 2018-01-15 NOTE — PROGRESS NOTES
Assessment    1  Screening for hyperlipidemia (V77 91) (Z13 220)   2  Encounter for preventive health examination (V70 0) (Z00 00)   3  Screening for malignant neoplasm of breast (V76 10) (Z12 39)   4  Vaginal bleeding, abnormal (623 8) (N93 9)   5  GERD without esophagitis (530 81) (K21 9)    Plan  Health Maintenance    · (1) CBC/PLT/DIFF; Status:Active; Requested [id number];    · (1) COMPREHENSIVE METABOLIC PANEL; Status:Active; Requested TRA:57RPQ8825;   Hypothyroidism    · (1) TSH WITH FT4 REFLEX; Status:Active; Requested JET:[id number];   Screening for hyperlipidemia    · (1) LIPID PANEL, FASTING; Status:Active; Requested ZIM:83XVR8030;   Screening for malignant neoplasm of breast    · * MAMMO SCREENING BILATERAL W CAD; Status:Hold For - Scheduling; Requested  SON:13WVR3850;   Screening for malignant neoplasm of colon    · COLONOSCOPY; Status:Active; Requested MKE:84JGU2784;   Vaginal bleeding, abnormal    · *1 - [facility] (Obstetrics/ Gynecology) Physician Referral  Consult  Status:  Hold For - Scheduling  Requested for: 48ZYS2916  Care Summary provided  : Yes    Discussion/Summary    Pt with intermittent vaginal bleeding approx once a year for the past 4-5 years  However states she is menopausal for 4-5 years  She is over due for her PAP > 7 years  She denies vaginal bleeding @ this time  Recommend f/u with GYN for eval of vaginal bleeding and PAP  As for GERD, improves with antacids  Continue taking as needed  Try and track foods that aggrevate  As for B/L knee pain  It is only associated with bending then the pain is resolved on its own  exam is unremarkable  recommend OTC antiinflammatory  Will be updating annual testing as well  The patient was counseled regarding instructions for management, risk factor reductions, prognosis, patient and family education, impressions, risks and benefits of treatment options, importance of compliance with treatment     Possible side effects of new medications were reviewed with the patient/guardian today  The treatment plan was reviewed with the patient/guardian  The patient/guardian understands and agrees with the treatment plan      Chief Complaint    1  Heartburn   2  Knee Pain  Pt is here today c/o heartburn and bilateral knee pain      History of Present Illness  HPI: Pt here for annual exam with multiple c/o  Pt c/o increasing heartburn, however relieved with tums  Additionally c/o B/L knee pain with bneding  Pain is relieved after she stands to normal posture  Additionally c/o post menopausal vaginal bleeding  Pt states she is menopausal for 4-5 years, however states every year she has some light bleeding  She no longer has menopausal symptoms of hot flashes or hormonal    Dysfunctional Uterine Bleeding: The patient is being seen for an initial evaluation of dysfunctional uterine bleeding  Symptoms:  no irregular bleeding  The patient is currently asymptomatic  The patient is currently experiencing symptoms  The patient describes the bleeding as spotting  Onset was gradual  The symptoms occur intermittently and occasionally  She describes this as mild and unchanged  No exacerbating factors are noted  No relieving factors are noted  No associated symptoms are reported  The patient is not currently being treated for this problem  Pertinent medical history:  no hormone therapy  She is postmenopausal    Heartburn:   Ashwin Foster presents with complaints of gradual onset of occasional episodes of mild heartburn, described as burning, non-radiating  Episodes months ago  Symptoms are improved by antacids  Symptoms are worsening  Risk Factors: no smoking and no alcohol use  Associated symptoms include acid taste in the mouth, but no chest discomfort, no dysphagia, no weight loss, no pain, no regurgitation, no sore throat, no odynophagia, no nausea, no vomiting, no hematemesis, no melena and no diarrhea     Knee Pain:   Ashwin Foster presents with complaints of gradual onset of intermittent episodes of mild bilateral knee pain, described as aching, non-radiating  Episodes months ago  Symptoms are unchanged  Associated symptoms include no swelling, no warmth, no redness, no ecchymosis, no stiffness, no decreased range of motion, no locking, no instability, no difficulty bearing weight, no difficulty ambulating, no fever and no chills  Review of Systems    Constitutional: no fever, not feeling poorly, no chills and not feeling tired  ENT: no earache, no nosebleeds, no sore throat, no hearing loss and no nasal discharge  Cardiovascular: the heart rate was not slow, no chest pain, no intermittent leg claudication, the heart rate was not fast, no palpitations and no lower extremity edema  Respiratory: no shortness of breath, no cough, no wheezing and no shortness of breath during exertion  Breasts: no breast pain, no breast swelling, no reddening of the breast, no breast lump and no breast itching  Gastrointestinal: no abdominal pain, no nausea, no vomiting, no constipation and no diarrhea  Genitourinary: unexplained vaginal bleeding, but no dysuria, no pelvic pain and no vaginal discharge  Musculoskeletal: arthralgias, but no joint swelling, no limb pain, no myalgias and no limb swelling  Integumentary: no rashes and no skin lesions  Neurological: no headache, no numbness, no confusion and no dizziness  Active Problems    1  ADHD, predominantly inattentive type (314 00) (F90 0)   2  Depression (311) (F32 9)   3  Hypothyroidism (244 9) (E03 9)    Past Medical History  Active Problems And Past Medical History Reviewed: The active problems and past medical history were reviewed and updated today  Surgical History  Surgical History Reviewed: The surgical history was reviewed and updated today         Social History    · Being A Social Drinker   · DRINKS ALCOHOL VERY INFREQUENCY   · Caffeine Use   · SHE ADMITS TO CONSUMING CAFFEINE VIA TEA ( 3 SERVINGS PER DAY)   · Marital History - Single   · Never A Smoker   · Never Used Drugs   · Occupation:   · [employer]   · Travel history   · Pt was in [facility] July/Aug 2014  The social history was reviewed and updated today  The social history was reviewed and is unchanged  Family History  Family History Reviewed: The family history was reviewed and updated today  Current Meds   1  Amphetamine-Dextroamphetamine 5 MG Oral Tablet; TAKE 1 TABLET TWICE DAILY; Therapy: 21Apr2015 to (Evaluate:19Jun2016); Last EM:[id number] Ordered   2  Cetirizine HCl - 10 MG Oral Tablet; TAKE 1 TABLET AT BEDTIME; Therapy: (Recorded:21May2015) to Recorded   3  Multiple Vitamin Oral Tablet; TAKE 1 TABLET DAILY; Therapy: (Recorded:58Ugm7682) to Recorded   4  Nasacort AQ 55 MCG/ACT AERS; INSTILL 2 SQUIRT Daily in am;   Therapy: (Recorded:16Apr2015) to Recorded   5  Symbicort 80-4 5 MCG/ACT Inhalation Aerosol; INHALE 2 PUFFS TWICE DAILY  RINSE   MOUTH AFTER USE; Therapy: 19OYQ4761 to (Last Rx:29Apr2016)  Requested for: 29Apr2016 Ordered   6  Venlafaxine HCl  MG Oral Capsule Extended Release 24 Hour; TAKE 1 CAPSULE   DAILY  Requested for: 13BOY3868; Last Rx:24Lar1705 Ordered    The medication list was reviewed and updated today  Allergies    1  Bactrim TABS   2  Compazine TABS   3  Erythromycin TABS    Vitals   Recorded: 06Jun2016 04:40PM   Temperature 97 7 F, Tympanic   Heart Rate 83   Systolic 091, LUE, Sitting   Diastolic 88, LUE, Sitting   Height 5 ft 0 5 in   Weight 155 lb    BMI Calculated 29 77   BSA Calculated 1 69   O2 Saturation 98     Physical Exam    Constitutional   General appearance: No acute distress, well appearing and well nourished  Eyes   Conjunctiva and lids: No swelling, erythema or discharge  Pupils and irises: Equal, round and reactive to light      Ears, Nose, Mouth, and Throat   External inspection of ears and nose: Normal     Otoscopic examination: Tympanic membranes translucent with normal light reflex  Canals patent without erythema  Nasal mucosa, septum, and turbinates: Normal without edema or erythema  Oropharynx: Normal with no erythema, edema, exudate or lesions  MMM  Pulmonary   Respiratory effort: No increased work of breathing or signs of respiratory distress  Auscultation of lungs: Clear to auscultation  No rhonchi or wheezing  Cardiovascular   Auscultation of heart: Normal rate and rhythm, normal S1 and S2, without murmurs  Examination of extremities for edema and/or varicosities: Normal     Abdomen   Abdomen: Non-tender, no masses  soft, + BS  Lymphatic   Palpation of lymph nodes in neck: No lymphadenopathy  Musculoskeletal   Gait and station: Normal     Digits and nails: Normal without clubbing or cyanosis  Inspection/palpation of joints, bones, and muscles: Normal   B/L knee without swelling  No ecchymosis, no TTP  No crepitus  Full ROM  Skin   Skin and subcutaneous tissue: Normal without rashes or lesions  Neurologic   Cranial nerves: Cranial nerves 2-12 intact  Sensation: No sensory loss  Psychiatric   Orientation to person, place, and time: Normal     Mood and affect: Normal          Future Appointments    Date/Time Provider Specialty Site   07/06/2016 05:30 PM Douglas Alpers, 10 [facility] [address] [facility] PRIMARY CARE     Signatures   Electronically signed by :  Raj Lua [facility]; Jun 6 2016  5:42PM EST                       (Author)    Electronically signed by : Catherine Enriquez MD; Jun 7 2016  1:49PM EST

## 2018-01-16 NOTE — PROGRESS NOTES
Assessment    1  ADHD, predominantly inattentive type (314 00) (F90 0)    Plan  PMH: Encounter for screening mammogram for malignant neoplasm of breast    · * MAMMO SCREENING BILATERAL W CAD; Status:Hold For - Scheduling; Requested for:28Apr2016;    PMH: History of allergic rhinitis    · Symbicort 80-4 5 MCG/ACT Inhalation Aerosol; INHALE 2 PUFFS TWICE DAILY  RINSE  MOUTH AFTER USE    Discussion/Summary  Discussion Summary:   As for your ADHD you symptoms are under control, continue taking your medication as prescribed  You just refilled your medication and are not due @ this time  You are also due for an annual exam  Please schedule this @ f/u  Counseling Documentation With Imm: The patient was counseled regarding diagnostic results, instructions for management, risk factor reductions, prognosis, patient and family education, risks and benefits of treatment options, importance of compliance with treatment  Medication SE Review and Pt Understands Tx: Possible side effects of new medications were reviewed with the patient/guardian today  Chief Complaint  Chief Complaint Free Text Note Form: PT  presents for follow up for ADHD  PT  needs Med refills      History of Present Illness  HPI: Pt here for f/u ADHD  She is doing well, with no c/o  She does not need med refill as was just refilled 4/13/16   ADHD (Follow-Up): The patient's ADHD subtype is the combined type  Her ADHD has been well controlled since the last visit  Comorbid Illnesses: learning disability  She has no comorbid illnesses  She has had no significant interval events  Target Symptoms:   1  Hyperactive behavior has resolved  2  Impulsive behavior has resolved  3  Difficulty concentrating has resolved  Medications: Adderall  The patient takes her medications all of the time  Medication side effects include no anorexia, no headache, no irritability, no problems sleeping, no abdominal pain and no nausea        Review of Systems  Complete-Female:   Constitutional: no fever, not feeling poorly, no chills and not feeling tired  Eyes: no eye pain and no eyesight problems  ENT: no earache, no nosebleeds, no sore throat and no nasal discharge  Cardiovascular: the heart rate was not slow, no chest pain, no intermittent leg claudication, the heart rate was not fast, no palpitations and no lower extremity edema  Respiratory: no shortness of breath, no cough, no wheezing and no shortness of breath during exertion  Gastrointestinal: no abdominal pain, no nausea and no vomiting  Musculoskeletal: no arthralgias and no myalgias  Integumentary: no rashes and no skin lesions  Neurological: no headache, no numbness, no tingling, no confusion and no dizziness  Psychiatric: not suicidal, no anxiety, no sleep disturbances and no depression  Active Problems    1  ADHD, predominantly inattentive type (314 00) (F90 0)   2  Depression (311) (F32 9)   3  Hypothyroidism (244 9) (E03 9)    Past Medical History    1  History of Abrasion of axilla (912 0) (S40 819A)   2  History of Acute maxillary sinusitis (461 0) (J01 00)   3  History of Acute pharyngitis (462) (J02 9)   4  History of Acute URI (465 9) (J06 9)   5  History of Asthma (493 90) (J45 909)   6  History of Asthma (493 90) (J45 909)   7  Denied: History of Carrier Of STD   8  History of Encounter for PPD test (V74 1) (Z11 1)   9  History of Encounter for screening mammogram for malignant neoplasm of breast (V76 12) (Z12 31)   10  History of Encounter for screening mammogram for malignant neoplasm of breast (V76 12) (Z12 31)   11  History of Fracture of plateau of left tibia (823 00) (S82 142A)   12  History of allergic rhinitis (V12 69) (Z87 09)   13  History of headache (V13 89) (Z87 898)   14  History of insect bite (V15 59) (Z87 828)   15  History of Left ankle sprain (845 00) (S93 402A)   16  History of Left knee pain (719 46) (M25 562)   17   Denied: History of Mental Status Change   18  History of Need for immunization against influenza (V04 81) (Z23)   19  History of Other screening mammogram (V76 12) (Z12 31)   20  History of Pain in joint of left shoulder (719 41) (M25 512)   21  History of Screening for colon cancer (V76 51) (Z12 11)  Active Problems And Past Medical History Reviewed: The active problems and past medical history were reviewed and updated today  Surgical History    1  History of Tonsillectomy With Adenoidectomy  Surgical History Reviewed: The surgical history was reviewed and updated today  Family History  Mother    1  Family history of Glaucoma (V19 11)   2  Family history of Stroke Syndrome (V17 1)  Father    3  Family history of Prostate Cancer (V16 42)  Family History    4  Family history of Heart Disease (V17 49)   5  Family history of Hypertension (V17 49)  Family History Reviewed: The family history was reviewed and updated today  Social History    · Being A Social Drinker   · Caffeine Use   · Marital History - Single   · Never A Smoker   · Never Used Drugs   · Occupation:   · Travel history  Social History Reviewed: The social history was reviewed and updated today  The social history was reviewed and is unchanged  Current Meds   1  Amphetamine-Dextroamphetamine 5 MG Oral Tablet; TAKE 1 TABLET TWICE DAILY; Therapy: 21Apr2015 to (Evaluate:05Vgv1511); Last Rx:13Apr2016 Ordered   2  Cetirizine HCl - 10 MG Oral Tablet; TAKE 1 TABLET AT BEDTIME; Therapy: (Recorded:50Ame2484) to Recorded   3  Multiple Vitamin Oral Tablet; TAKE 1 TABLET DAILY; Therapy: (Recorded:23Uuv1218) to Recorded   4  Nasacort AQ 55 MCG/ACT AERS; INSTILL 2 SQUIRT Daily in am;   Therapy: (Recorded:68Nac5526) to Recorded   5  Symbicort 80-4 5 MCG/ACT Inhalation Aerosol; INHALE 2 PUFFS TWICE DAILY  RINSE MOUTH AFTER   USE; Therapy: 95LGT9876 to (Last Rx:20Apr2015)  Requested for: 20Apr2015 Ordered   6   Venlafaxine HCl  MG Oral Capsule Extended Release 24 Hour; TAKE 1 CAPSULE DAILY    Requested for: 21Apr2016; Last Rx:21Apr2016 Ordered  Medication List Reviewed: The medication list was reviewed and updated today  Allergies    1  Bactrim TABS   2  Compazine TABS   3  Erythromycin TABS    Vitals  Vital Signs [Data Includes: Current Encounter]    Recorded: 29Apr2016 04:46PM   Temperature 97 4 F, Tympanic   Heart Rate 82   Systolic 293, LUE, Sitting   Diastolic 90, LUE, Sitting   Height 5 ft 10 in   Weight 155 lb 6 oz   BMI Calculated 22 29   BSA Calculated 1 87   O2 Saturation 98     Physical Exam    Constitutional   General appearance: No acute distress, well appearing and well nourished  Pleasant, cooperative  Eyes   Conjunctiva and lids: No swelling, erythema or discharge  Pupils and irises: Equal, round and reactive to light  Ears, Nose, Mouth, and Throat   External inspection of ears and nose: Normal     Otoscopic examination: Tympanic membranes translucent with normal light reflex  Canals patent without erythema  Nasal mucosa, septum, and turbinates: Normal without edema or erythema  Oropharynx: Normal with no erythema, edema, exudate or lesions  MMM  Pulmonary   Respiratory effort: No increased work of breathing or signs of respiratory distress  Auscultation of lungs: Clear to auscultation  No rhonchi or wheezing  Cardiovascular   Auscultation of heart: Normal rate and rhythm, normal S1 and S2, without murmurs  Examination of extremities for edema and/or varicosities: Normal     Musculoskeletal   Gait and station: Normal     Digits and nails: Normal without clubbing or cyanosis  Inspection/palpation of joints, bones, and muscles: Normal     Skin   Skin and subcutaneous tissue: Normal without rashes or lesions      Psychiatric   Orientation to person, place, and time: Normal     Mood and affect: Normal          Health Management  History of Other screening mammogram   Digital Bilateral Screening Mammogram With CAD; every 1 year; Next Due: 43Qzz0263; Overdue  History of Screening for colon cancer   COLONOSCOPY; every 10 years; Next Due: 13Zos7749; Overdue    Signatures   Electronically signed by : [employer], 10 Aldo ; Apr 29 2016  5:09PM EST                       (Author)    Electronically signed by : Robert Hayes DO;  Apr 29 2016  6:01PM EST

## 2018-01-16 NOTE — PROGRESS NOTES
Assessment   1  ADHD, predominantly inattentive type (314 00) (F90 0)   2  Hypothyroidism (244 9) (E03 9)   3  Hyperparathyroidism (252 00) (E21 3)   4  Depression (311) (F32 9)   5  Vitamin D deficiency (268 9) (E55 9)   6  Right thyroid nodule (241 0) (E04 1)    Plan   Hypothyroidism    · Levothyroxine Sodium 50 MCG Oral Tablet; TAKE 1 TABLET DAILY  Lumbar strain    · Cyclobenzaprine HCl - 7 5 MG Oral Tablet  PMH: History of asthma    · Advair Diskus 100-50 MCG/DOSE Inhalation Aerosol Powder Breath Activated; INHALE 1    PUFF TWICE DAILY    Discussion/Summary   Discussion Summary:    No changes are made to the patient's current medications  We will defer any follow-up blood testing to Endocrinology regarding her hyper parathyroidism  Levothyroxine an Advair Diskus were renewed  Cyclobenzaprine was discontinued  A follow-up exam is recommended in 6 months  Chief Complaint   Chief Complaint Free Text Note Form: Pt is here for a f/u appt   had BW done through Dr Diane Ga  states that she is having an u/s of her thyroid and a dexa scan this wk  will also be having a NM parathyroid scan on 1/29   states that she is starting to have a lot of dry skin patches on her face, and is wondering if it is her meds  She would like to know what she can do to remedy that  Chief Complaint Chronic Condition [facility]: Patient is here today for follow up of chronic conditions described in HPI  History of Present Illness   HPI: Patient presents for a follow-up visit for hypothyroidism, hyperparathyroidism with associated mild hypercalcemia and vitamin-D deficiency, mild obstructive sleep apnea discovered on polysomnography with associated limb movement disorder, depression and ADHD  General she feels well and denies any specific problems   She has a battery of testing scheduled, which are primarily screening examinations; Pap smear, colonoscopy and follow-up CPAP titration study along with sestamibi scan of her parathyroid glands and thyroid ultrasound  Review of Systems   PHQ-9 Depression Scale: Over the past 2 weeks, how often have you been bothered by the following problems? 1 ) Little interest or pleasure in doing things? Not at all       2 ) Feeling down, depressed or hopeless? Not at all       3 ) Trouble falling asleep or sleeping too much? Not at all       4 ) Feeling tired or having little energy? Not at all       5 ) Poor appetite or overeating? Half the days or more  6 ) Feeling bad about yourself, or that you are a failure, or have let yourself or your family down? Not at all       7 ) Trouble concentrating on things, such as reading a newspaper or watching television? Not at all  How difficult have these problems made it for you to do your work, take care of things at home, or get along with people? Not at all  Score 2             Complete-Female:      Constitutional: No fever, no chills, feels well, no tiredness, no recent weight gain or weight loss  Eyes: No complaints of eye pain, no red eyes, no eyesight problems, no discharge, no dry eyes, no itching of eyes  ENT: no complaints of earache, no loss of hearing, no nose bleeds, no nasal discharge, no sore throat, no hoarseness  Cardiovascular: No complaints of slow heart rate, no fast heart rate, no chest pain, no palpitations, no leg claudication, no lower extremity edema  Respiratory: No complaints of shortness of breath, no wheezing, no cough, no SOB on exertion, no orthopnea, no PND  Gastrointestinal: No complaints of abdominal pain, no constipation, no nausea or vomiting, no diarrhea, no bloody stools  Genitourinary: No complaints of dysuria, no incontinence, no pelvic pain, no dysmenorrhea, no vaginal discharge or bleeding  Musculoskeletal: No complaints of arthralgias, no myalgias, no joint swelling or stiffness, no limb pain or swelling        Integumentary: No complaints of skin rash or lesions, no itching, no skin wounds, no breast pain or lump  Neurological: No complaints of headache, no confusion, no convulsions, no numbness, no dizziness or fainting, no tingling, no limb weakness, no difficulty walking  Psychiatric: Not suicidal, no sleep disturbance, no anxiety or depression, no change in personality, no emotional problems  Endocrine: No complaints of proptosis, no hot flashes, no muscle weakness, no deepening of the voice, no feelings of weakness  Hematologic/Lymphatic: No complaints of swollen glands, no swollen glands in the neck, does not bleed easily, does not bruise easily  Active Problems   1  ADHD, predominantly inattentive type (314 00) (F90 0)   2  Allergic (995 3) (T78 40XA)   3  Depression (311) (F32 9)   4  GERD without esophagitis (530 81) (K21 9)   5  Hypercalcemia (275 42) (E83 52)   6  Hypercholesteremia (272 0) (E78 00)   7  Hyperparathyroidism (252 00) (E21 3)   8  Hypertension, essential, benign (401 1) (I10)   9  Hypothyroidism (244 9) (E03 9)   10  Lumbar strain (847 2) (S39 012A)   11  Obstructive sleep apnea (327 23) (G47 33)   12  PPD screening test (V74 1) (Z11 1)   13  Right thyroid nodule (241 0) (E04 1)   14  Snoring (786 09) (R06 83)   15  Vitamin D deficiency (268 9) (E55 9)    Past Medical History   1  History of Abrasion of axilla (912 0) (S40 819A)   2  History of Acute maxillary sinusitis (461 0) (J01 00)   3  History of Acute pain of both knees (338 19,719 46) (M25 561,M25 562)   4  History of Acute pharyngitis (462) (J02 9)   5  History of Acute URI (465 9) (J06 9)   6  History of Acute UTI (599 0) (N39 0)   7  Denied: History of Carrier Of STD   8  History of Fracture of plateau of left tibia (823 00) (S82 142A)   9  History of acute sinusitis (V12 69) (Z87 09)   10  History of allergic rhinitis (V12 69) (Z87 09)   11  History of asthma (V12 69) (Z87 09)   12  History of headache (V13 89) (Z87 898)   13   History of insect bite (V15 59) (Z87 828)   14  History of urinary frequency (V13 09) (Z87 898)   15  History of Left ankle sprain (845 00) (S93 402A)   16  History of Left knee pain (719 46) (M25 562)   17  Denied: History of Mental Status Change   18  History of Pain in joint of left shoulder (719 41) (M25 512)   19  History of Vaginal bleeding, abnormal (623 8) (N93 9)  Active Problems And Past Medical History Reviewed: The active problems and past medical history were reviewed and updated today  Surgical History   1  History of Tonsillectomy With Adenoidectomy    Family History   Mother    1  Family history of glaucoma (V19 11) (Z83 511)   2  Family history of rickets (V17 89) (Z82 69)   3  Family history of stroke (V17 1) (Z82 3)  Father    4  Family history of neuropathy (V17 2) (Z82 0)  Brother    5  Family history of Paget's bone disease  Family History    6  Family history of Benign essential HTN   7  Family history of cardiac disorder (V17 49) (Z80 55)    Social History    · Being A Social Drinker   · Caffeine Use   · Marital History - Single   · Never A Smoker   · Never Used Drugs   · Occupation:   · Travel history  Social History Reviewed: The social history was reviewed and updated today  The social history was reviewed and is unchanged  Current Meds    1  Advair Diskus 100-50 MCG/DOSE Inhalation Aerosol Powder Breath Activated; INHALE 1 PUFF     TWICE DAILY; Therapy: 19VVO3757 to (Last FU:32ZVT4558)  Requested for: 05ZOO8564 Ordered   2  Amphetamine-Dextroamphetamine 5 MG Oral Tablet; TAKE 1 1/2 tab in the morning and 1 tab in     evening; Therapy: 21Apr2015 to (Evaluate:00Lld6071); Last Rx:09Jan2018 Ordered   3  Cetirizine HCl - 10 MG Oral Tablet; take 2 tablet daily; Therapy: (Recorded:50Wya4466) to Recorded   4  Cyclobenzaprine HCl - 7 5 MG Oral Tablet; Take 1 tablet twice daily as needed; Therapy: 49MGI5762 to (Chris Bhandari)  Requested for: 61Zpu5498 Recorded   5   Levothyroxine Sodium 50 MCG Oral Tablet; TAKE 1 TABLET DAILY; Therapy: 48PIH3334 to (Evaluate:15Jan2018)  Requested for: 26RHZ1596; Last Rx:44Psf7109;     Status: ACTIVE - Renewal Denied Ordered   6  Multiple Vitamin TABS; TAKE 1 TABLET DAILY; Therapy: (Recorded:45Etn3486) to Recorded   7  Nasacort AQ 55 MCG/ACT AERS; INSTILL 2 SQUIRT Daily in am;     Therapy: (Recorded:16Apr2015) to Recorded   8  Venlafaxine HCl  MG Oral Capsule Extended Release 24 Hour; TAKE 1 CAPSULE DAILY      Requested for: 12SGM4972; Last Rx:27Nov2017; Status: ACTIVE - Renewal Denied Ordered   9  Vitamin D-3 1000 UNIT Oral Capsule; TAKE 1000 UNIT Daily; Therapy: 10WKC8875 to Recorded  Medication List Reviewed: The medication list was reviewed and updated today  Allergies   1  Bactrim TABS   2  Compazine TABS   3  Erythromycin TABS    Vitals   Vital Signs    Recorded: 30ZMN5579 09:02AM   Temperature 98 2 F, Oral   Heart Rate 90   Systolic 767, RUE, Sitting   Diastolic 90, RUE, Sitting   Height 5 ft 0 5 in   Weight 156 lb 8 oz   BMI Calculated 30 06   BSA Calculated 1 69   O2 Saturation 98, RA     Physical Exam        Constitutional      General appearance: No acute distress, well appearing and well nourished  Eyes      Conjunctiva and lids: No swelling, erythema or discharge  Pupils and irises: Equal, round and reactive to light  Ears, Nose, Mouth, and Throat      External inspection of ears and nose: Normal        Pulmonary      Respiratory effort: No increased work of breathing or signs of respiratory distress  Auscultation of lungs: Clear to auscultation  Cardiovascular      Palpation of heart: Normal PMI, no thrills  Auscultation of heart: Normal rate and rhythm, normal S1 and S2, without murmurs  Examination of extremities for edema and/or varicosities: Normal        Carotid pulses: Normal        Abdomen      Abdomen: Non-tender, no masses         Lymphatic      Palpation of lymph nodes in neck: No lymphadenopathy  -- Suspicion of a right thyroid nodule  Musculoskeletal      Gait and station: Normal        Digits and nails: Normal without clubbing or cyanosis  Inspection/palpation of joints, bones, and muscles: Normal        Skin      Skin and subcutaneous tissue: Normal without rashes or lesions  Neurologic Grossly, no focal neurological       Psychiatric      Orientation to person, place, and time: Normal        Mood and affect: Normal           Results/Data   (1) CALCIUM, URINE 24HR 80HLI0281 02:51PM Lenchofatuma Suárez      Test Name Result Flag Reference   24 HR URINE VOLUME 1300 mL     CALCIUM 24 HOUR URINE 261 3 mg/24 hrs        (1) PHOSPHORUS [id number] 04:49PM Myna Cord Order Number: [id number]      Test Name Result Flag Reference   PHOSPHORUS 2 6 mg/dL L 2 7-4 5      (1) PTH N-TERMINAL (INTACT) 32AMU7883 04:49PM Myna Cord Order Number: [id number]      Test Name Result Flag Reference   PARATHYROID HORMONE INTACT 106 5 pg/mL H 14 0-72 0      (1) ALBUMIN [id number] 04:49PM M[facility] Cord Order Number: [id number]      Test Name Result Flag Reference   ALBUMIN 4 1 g/dL  3 5-5 0      (1) CALCIUM [id number] 04:49PM M[facility] Cord Order Number: [id number]      Test Name Result Flag Reference   CALCIUM 11 3 mg/dL H 8 3-10 1   CORRECTED CALCIUM 11 1 mg/dL H 8 3-10 1   ALBUMIN 4 3 g/dL  3 5-5 0   CALCIUM FOR CA/ALB 11 3 mg/dL H 8 3-10 1      Health Management   History of Other screening mammogram   Digital Bilateral Screening Mammogram With CAD; every 1 year; Next Due: 01Rus7700; Overdue  History of Screening for colon cancer   COLONOSCOPY; every 10 years; Next Due: 45Yfq0452;  Overdue    Future Appointments      Date/Time Provider Specialty Site   01/15/2018 03:00 PM Oleksandr Hernández, [facility]     Signatures    Electronically signed by : Jigna Murrieta MD; Phong 15 2018 10:05AM EST (Author)

## 2018-01-18 ENCOUNTER — HOSPITAL ENCOUNTER (OUTPATIENT)
Dept: RADIOLOGY | Age: 58
Discharge: HOME | End: 2018-01-18
Payer: COMMERCIAL

## 2018-01-18 ENCOUNTER — APPOINTMENT (OUTPATIENT)
Dept: RADIOLOGY | Age: 58
End: 2018-01-18
Payer: COMMERCIAL

## 2018-01-18 DIAGNOSIS — E04.1 NONTOXIC SINGLE THYROID NODULE: ICD-10-CM

## 2018-01-18 DIAGNOSIS — E21.3 HYPERPARATHYROIDISM (HCC): ICD-10-CM

## 2018-01-18 PROCEDURE — 76536 US EXAM OF HEAD AND NECK: CPT

## 2018-01-18 NOTE — MISCELLANEOUS
Message  Return to work or school:   Jose Crowley is under my professional care  She was seen in my office on 8/9/2016   She is able to return to work on  8/10/2016 - pt has been sick for the past few days            Signatures   Electronically signed by : CHEYANNE Chua; Aug  9 2016  6:37PM EST                       (Author)    Electronically signed by : CHEYANNE Chua;  Aug  9 2016  9:46PM EST

## 2018-01-22 ENCOUNTER — GENERIC CONVERSION - ENCOUNTER (OUTPATIENT)
Dept: OTHER | Facility: OTHER | Age: 58
End: 2018-01-22

## 2018-01-22 VITALS
HEART RATE: 93 BPM | SYSTOLIC BLOOD PRESSURE: 142 MMHG | OXYGEN SATURATION: 97 % | BODY MASS INDEX: 29.83 KG/M2 | DIASTOLIC BLOOD PRESSURE: 90 MMHG | HEIGHT: 62 IN | TEMPERATURE: 98.3 F | WEIGHT: 162.13 LBS

## 2018-01-23 VITALS
TEMPERATURE: 98.2 F | BODY MASS INDEX: 29.55 KG/M2 | HEIGHT: 61 IN | DIASTOLIC BLOOD PRESSURE: 90 MMHG | WEIGHT: 156.5 LBS | HEART RATE: 90 BPM | SYSTOLIC BLOOD PRESSURE: 142 MMHG | OXYGEN SATURATION: 98 %

## 2018-01-23 NOTE — RESULT NOTES
Discussion/Summary   Normal 24 urine calcium  Would recommend doing a sestamibi scan  Verified Results  (1) CALCIUM, URINE 24HR [id number] 02:51PM Mita Castellano     Test Name Result Flag Reference   24 HR URINE VOLUME 1300 mL     CALCIUM 24 HOUR URINE 261 3 mg/24 hrs         Plan  Hyperparathyroidism    · (1) CALCIUM, URINE 24HR; Status:Active;  Requested for:60Eaz6516;

## 2018-01-23 NOTE — MISCELLANEOUS
Provider Comments  Provider Comments:   PATIENT NO SHOWED FOR 1- APPT  Signatures   Electronically signed by :  Mustapha Brar; Phong 15 2018  3:13PM EST                       (Author)

## 2018-01-24 NOTE — RESULT NOTES
Discussion/Summary   Less than 1 5 cm thyroid nodule was present  Repeat ultrasound in one year  Verified Results  US THYROID [id number] 04:47PM Ines Lezama Order Number: [id number]    - Patient Instructions: To schedule this appointment, please contact Central Scheduling at [phone]  Test Name Result Flag Reference   US THYROID (Report)     THYROID ULTRASOUND     INDICATION: E04 1: Nontoxic single thyroid nodule   E21 3: Hyperparathyroidism, unspecified  History taken directly from the electronic ordering system  COMPARISON: None  TECHNIQUE:  Ultrasound of the thyroid was performed with a high frequency linear transducer in transverse and sagittal planes including volumetric imaging sweeps as well as traditional still imaging technique  FINDINGS: Heterogeneous echotexture  Right lobe: 4 8 x 1 5 x 1 7 cm  Left lobe: 3 9 x 1 8 x 1 8 cm  Isthmus: 0 4 cm  Nodule #1   LEFT posterior midgland nodule measuring 1 4 x 0 8 x 1 cm  No priors for comparison  COMPOSITION: 2 points, solid or almost completely solid   ECHOGENICITY: 2 points, hypoechoic  SHAPE: 0 points, wider-than-tall  MARGIN: 0 points, smooth  ECHOGENIC FOCI: 0 points, none or large comet-tail artifacts  TI-RADS Score: TR 4 (4-6 points), Moderately suspicious  FNA if > 1 5 cm  Follow if > 1cm  Solid nodule inferior to the right thyroid measuring 1 3 x 0 7 x 0 8 cm may represent a lymph node or possibly parathyroid   IMPRESSION:     1  Solid nodule inferior to the right thyroid measuring 1 3 x 0 7 x 0 8 cm may represent a lymph node or possibly parathyroid   If there is a clinical concern for a parathyroid adenoma, a nuclear medicine parathyroid scan may be considered  2  Ultrasound follow-up in 12 months may be considered for the left mid posterior thyroid nodule  Reference: ACR Thyroid Imaging, Reporting and Data System (TI-RADS):  White Paper of the [employer] Committee   J AM Mario Radiol 2195;70:153-584  (additional recommendations based on American Thyroid Association 2015 guidelines )       Workstation performed: [id number]     Signed by:   Milka Harp MD   1/19/18

## 2018-01-25 ENCOUNTER — HOSPITAL ENCOUNTER (OUTPATIENT)
Dept: RADIOLOGY | Age: 58
Discharge: HOME | End: 2018-01-25
Payer: COMMERCIAL

## 2018-01-25 DIAGNOSIS — E55.9 VITAMIN D DEFICIENCY: ICD-10-CM

## 2018-01-25 DIAGNOSIS — E21.3 HYPERPARATHYROIDISM (HCC): ICD-10-CM

## 2018-01-25 PROCEDURE — 77080 DXA BONE DENSITY AXIAL: CPT

## 2018-01-26 PROBLEM — E04.1 RIGHT THYROID NODULE: Status: ACTIVE | Noted: 2017-07-19

## 2018-01-26 PROBLEM — M85.89 OSTEOPENIA OF MULTIPLE SITES: Status: ACTIVE | Noted: 2018-01-26

## 2018-01-26 PROBLEM — I10 HYPERTENSION, ESSENTIAL, BENIGN: Status: ACTIVE | Noted: 2017-07-19

## 2018-01-26 PROBLEM — E83.52 HYPERCALCEMIA: Status: ACTIVE | Noted: 2017-07-19

## 2018-01-26 PROBLEM — E21.3 HYPERPARATHYROIDISM (HCC): Status: ACTIVE | Noted: 2017-09-14

## 2018-01-26 PROBLEM — S39.012A LUMBAR STRAIN: Status: ACTIVE | Noted: 2017-07-25

## 2018-01-26 PROBLEM — R06.83 SNORING: Status: ACTIVE | Noted: 2017-05-30

## 2018-01-26 PROBLEM — G47.33 OBSTRUCTIVE SLEEP APNEA: Status: ACTIVE | Noted: 2017-10-12

## 2018-01-26 PROBLEM — E55.9 VITAMIN D DEFICIENCY: Status: ACTIVE | Noted: 2017-09-14

## 2018-01-29 ENCOUNTER — HOSPITAL ENCOUNTER (OUTPATIENT)
Dept: RADIOLOGY | Age: 58
Discharge: HOME | End: 2018-01-29
Payer: COMMERCIAL

## 2018-01-29 DIAGNOSIS — Z86.39 HISTORY OF UNDERACTIVE THYROID: ICD-10-CM

## 2018-01-29 PROCEDURE — 78071 PARATHYRD PLANAR W/WO SUBTRJ: CPT

## 2018-01-29 PROCEDURE — A9500 TC99M SESTAMIBI: HCPCS

## 2018-01-30 NOTE — PROGRESS NOTES
Please call the patient regarding her abnormal result  Has an overactive parathyroid gland  Refer to Dr Eulalia Powell for possible surgical intervention

## 2018-02-09 ENCOUNTER — HOSPITAL ENCOUNTER (OUTPATIENT)
Dept: SLEEP CENTER | Facility: CLINIC | Age: 58
Discharge: HOME | End: 2018-02-09
Payer: COMMERCIAL

## 2018-02-09 DIAGNOSIS — G47.33 OSA (OBSTRUCTIVE SLEEP APNEA): ICD-10-CM

## 2018-02-09 PROCEDURE — 95811 POLYSOM 6/>YRS CPAP 4/> PARM: CPT

## 2018-02-10 NOTE — PROGRESS NOTES
Sleep Study Documentation    Pre-Sleep Study       Sleep testing procedure explained to patient:YES    Patient napped prior to study:NO    Caffeine:Dayshift worker after 12PM   Caffeine use:YES- soda  12 to 26 ounces    Alcohol:Dayshift workers after 5PM: Alcohol use:NO    Typical day for patient:YES       Study Documentation    Treatment   Optimal PAP pressure: 9 cm   Leak:None  Snore:Eliminated  REM Obtained:no  Minimum SaO2 92%  Baseline SaO2 95%  PAP mask tried (list all)ResMed Airfit N20 nasal mask, Resmed Airfit P10 nasal pillows   PAP mask choice (final)ResMed Airfit N20 Nasal Mask size medium   Mode of Therapy:CPAP          Post-Sleep Study    Medication used at bedtime or during sleep study:NO    Patient reports time it took to fall asleep:less than 20 minutes    Patient reports waking up during study:3 or more times  Patient reports returning to sleep without difficulty  Patient reports sleeping 4 to 6 hours without dreaming  Patient reports sleep during study:worse than usual    Patient rated sleepiness: Very sleepy or tired    PAP treatment:yes: Post PAP treatment patient reports feeling unsure if a change is noted and unsure if would wear PAP mask at home

## 2018-02-12 ENCOUNTER — TELEPHONE (OUTPATIENT)
Dept: SLEEP CENTER | Facility: CLINIC | Age: 58
End: 2018-02-12

## 2018-02-12 DIAGNOSIS — G47.33 OSA (OBSTRUCTIVE SLEEP APNEA): Primary | ICD-10-CM

## 2018-02-12 NOTE — TELEPHONE ENCOUNTER
Spoke with patient and scheduled follow up with CPAP set up 4/2 @ 12:45  Instructed to bring mask    RX to [place] [id number]

## 2018-02-13 ENCOUNTER — OFFICE VISIT (OUTPATIENT)
Dept: INTERNAL MEDICINE CLINIC | Facility: CLINIC | Age: 58
End: 2018-02-13
Payer: COMMERCIAL

## 2018-02-13 VITALS
TEMPERATURE: 98.2 F | OXYGEN SATURATION: 97 % | WEIGHT: 160.6 LBS | SYSTOLIC BLOOD PRESSURE: 134 MMHG | HEART RATE: 94 BPM | BODY MASS INDEX: 31.53 KG/M2 | HEIGHT: 60 IN | DIASTOLIC BLOOD PRESSURE: 82 MMHG

## 2018-02-13 DIAGNOSIS — Z12.11 SCREENING FOR COLON CANCER: ICD-10-CM

## 2018-02-13 DIAGNOSIS — F90.0 ADHD, PREDOMINANTLY INATTENTIVE TYPE: Primary | ICD-10-CM

## 2018-02-13 DIAGNOSIS — Z01.419 ENCOUNTER FOR GYNECOLOGICAL EXAMINATION WITHOUT ABNORMAL FINDING: ICD-10-CM

## 2018-02-13 PROBLEM — Z12.12 SCREENING FOR COLORECTAL CANCER: Status: ACTIVE | Noted: 2018-02-13

## 2018-02-13 LAB — SL AMB POCT FECES OCC BLD: NEGATIVE

## 2018-02-13 PROCEDURE — 82270 OCCULT BLOOD FECES: CPT | Performed by: NURSE PRACTITIONER

## 2018-02-13 PROCEDURE — 99396 PREV VISIT EST AGE 40-64: CPT | Performed by: NURSE PRACTITIONER

## 2018-02-13 RX ORDER — BIOTIN 1 MG
1 TABLET ORAL DAILY
COMMUNITY
Start: 2018-01-15

## 2018-02-13 RX ORDER — DEXTROAMPHETAMINE SACCHARATE, AMPHETAMINE ASPARTATE, DEXTROAMPHETAMINE SULFATE AND AMPHETAMINE SULFATE 1.25; 1.25; 1.25; 1.25 MG/1; MG/1; MG/1; MG/1
TABLET ORAL
Qty: 75 TABLET | Refills: 0 | Status: SHIPPED | OUTPATIENT
Start: 2018-02-13 | End: 2018-03-21 | Stop reason: SDUPTHER

## 2018-02-13 RX ORDER — LEVOTHYROXINE SODIUM 0.05 MG/1
1 TABLET ORAL DAILY
COMMUNITY
Start: 2017-07-19 | End: 2018-07-09 | Stop reason: SDUPTHER

## 2018-02-13 RX ORDER — TRIAMCINOLONE ACETONIDE 55 UG/1
1 SPRAY, METERED NASAL DAILY
COMMUNITY

## 2018-02-13 RX ORDER — DEXTROAMPHETAMINE SACCHARATE, AMPHETAMINE ASPARTATE, DEXTROAMPHETAMINE SULFATE AND AMPHETAMINE SULFATE 1.25; 1.25; 1.25; 1.25 MG/1; MG/1; MG/1; MG/1
TABLET ORAL
COMMUNITY
Start: 2015-04-21 | End: 2018-02-13 | Stop reason: SDUPTHER

## 2018-02-13 RX ORDER — VENLAFAXINE HYDROCHLORIDE 150 MG/1
1 CAPSULE, EXTENDED RELEASE ORAL DAILY
COMMUNITY
End: 2018-02-23 | Stop reason: SDUPTHER

## 2018-02-13 RX ORDER — CETIRIZINE HYDROCHLORIDE 10 MG/1
10 TABLET ORAL 2 TIMES DAILY
COMMUNITY

## 2018-02-13 NOTE — PROGRESS NOTES
Routine Gyn Exam  Patient here for routine exam  Current complaints: Complains of "hot flashes" for the past 6 years  Unsure if this is related to her thyroid, parathyroid or menopause  Gynecologic History  LMP about 3-4 years ago  Contraception: postmenopausal  Last Pap: ~10 years or more  Results were: normal  Last mammogram: 8/17/2017  Results were: normal    Obstetric History  OB History   No data available   Nulliparous     History of abnormal Pap smear: no  Family history of uterine or ovarian cancer: no  Regular self breast exam: yes  History of abnormal mammogram: no  Family history of breast cancer: no  History of abnormal lipids: yes   Menstrual History:  OB History     No data available         Menarche age: 17       The following portions of the patient's history were reviewed and updated as appropriate: allergies, current medications, past family history, past medical history, past social history, past surgical history and problem list     Ilene Coronado is a 56 y o  female who presents for annual exam  The patient reports that there is not domestic violence in her life  Past stopped menstruating about 3-4 years ago  Not sexually active        Review of Systems  Respiratory: negative for cough, wheezing and shortness of breath  Cardiovascular: negative for chest pain, chest pressure/discomfort, dyspnea, fatigue, irregular heart beat, lower extremity edema, palpitations and syncope  Genitourinary:negative for vaginal discharge, dysuria, frequency, hematuria and urinary incontinence  Integument/breast: negative for breast lump, breast tenderness, nipple discharge, rash and skin color change  Behavioral/Psych: negative for anxiety and depression     +hot flashes     Past Medical History:   Diagnosis Date    ADHD     Allergic     Asthma     Depression     Disease of thyroid gland     Scoliosis      Past Surgical History:   Procedure Laterality Date    TONSILLECTOMY       Social History   Substance Use Topics    Smoking status: Never Smoker    Smokeless tobacco: Never Used    Alcohol use Yes      Comment: social     History reviewed  No pertinent family history  Erythromycin; Prochlorperazine; Sulfa antibiotics; and Sulfamethoxazole-trimethoprim  Current Outpatient Prescriptions   Medication Sig Dispense Refill    amphetamine-dextroamphetamine (ADDERALL) 5 MG tablet Take by mouth      cetirizine (ZyrTEC) 10 mg tablet Take 2 tablets by mouth daily      Cholecalciferol (VITAMIN D3) 1000 units CAPS Take by mouth Daily      fluticasone-salmeterol (ADVAIR DISKUS) 100-50 mcg/dose Inhale 1 puff 2 (two) times a day      levothyroxine 50 mcg tablet Take 1 tablet by mouth daily      Multiple Vitamin-Folic Acid TABS Take 1 tablet by mouth daily      Triamcinolone Acetonide (NASACORT AQ) 55 MCG/ACT AERO into each nostril Daily      venlafaxine (EFFEXOR-XR) 150 mg 24 hr capsule Take 1 capsule by mouth daily       No current facility-administered medications for this visit  Objective     /82 (BP Location: Left arm, Patient Position: Sitting, Cuff Size: Adult)   Pulse 94   Temp 98 2 °F (36 8 °C) (Oral)   Ht 5' 0 25" (1 53 m)   Wt 72 8 kg (160 lb 9 6 oz)   SpO2 97%   BMI 31 11 kg/m²     General:   alert and oriented, in no acute distress, cooperative, appears stated age and cooperative   Heart: regular rate and rhythm, S1, S2 normal, no murmur, click, rub or gallop   Lungs: clear to auscultation bilaterally   Abdomen: soft, non-tender, without masses or organomegaly   Vulva: normal   Vagina: normal mucosa   Cervix: no bleeding following Pap, no cervical motion tenderness and no lesions   Uterus: normal size, non-tender, normal shape and consistency   Adnexa: normal adnexa and no mass, fullness, tenderness   Breasts: breasts appear normal, no suspicious masses, no skin or nipple changes or axillary nodes    Rectal exam: negative without mass, lesions or tenderness, stool guaiac negative  Medical assistant Génesis French in room throughout exam           Assessment     Healthy female exam       Plan   Await pap smear results  Breast self exam technique reviewed and patient encouraged to perform self-exam monthly  Discussed healthy lifestyle modifications  Follow up as needed  Education reviewed: self breast exams  Contraception: post menopausal status  Mammogram up to date  Due August 2018  Follow up in: 1 year       Colonoscopy ordered - never done

## 2018-02-13 NOTE — TELEPHONE ENCOUNTER
Prescription refill request for Adderall aborted as patient has an appointment today in the [facility] office  I contacted her to request a refill at that time

## 2018-02-14 PROCEDURE — G0145 SCR C/V CYTO,THINLAYER,RESCR: HCPCS | Performed by: NURSE PRACTITIONER

## 2018-02-19 ENCOUNTER — TELEPHONE (OUTPATIENT)
Dept: ENDOCRINOLOGY | Facility: CLINIC | Age: 58
End: 2018-02-19

## 2018-02-19 LAB
LAB AP GYN PRIMARY INTERPRETATION: NORMAL
Lab: NORMAL

## 2018-02-19 NOTE — TELEPHONE ENCOUNTER
Left message with test results and that Dr Ginger Carrillo is referring her to Shawna BRIGGS  [phone]  For surgical intervention

## 2018-02-19 NOTE — TELEPHONE ENCOUNTER
----- Message from Caro Puga MD sent at 1/30/2018  7:38 AM EST -----  Please call the patient regarding her abnormal result  Has an overactive parathyroid gland  Refer to Dr Saulo Moffett for possible surgical intervention

## 2018-02-23 DIAGNOSIS — F32.A DEPRESSION, UNSPECIFIED DEPRESSION TYPE: Primary | ICD-10-CM

## 2018-02-23 RX ORDER — VENLAFAXINE HYDROCHLORIDE 150 MG/1
150 CAPSULE, EXTENDED RELEASE ORAL DAILY
Qty: 90 CAPSULE | Refills: 0 | Status: SHIPPED | OUTPATIENT
Start: 2018-02-23 | End: 2018-05-29 | Stop reason: SDUPTHER

## 2018-03-19 ENCOUNTER — TELEPHONE (OUTPATIENT)
Dept: INTERNAL MEDICINE CLINIC | Facility: CLINIC | Age: 58
End: 2018-03-19

## 2018-03-19 NOTE — TELEPHONE ENCOUNTER
Pt had US thyroid on 1/18/18  Dr Jiles Bumpers wanted pt to f/u in 1 year  I called pt to schedule US in 1 year  Pt stated that Dr Jiles Bumpers referred her to Dr Shelly Rodríguez and pt may need possible surgery  Pt stated that Dr Tanisha Atkinson  Is deciding and she does not need to to schedule f/u at this time

## 2018-03-21 DIAGNOSIS — F90.0 ADHD, PREDOMINANTLY INATTENTIVE TYPE: ICD-10-CM

## 2018-03-21 RX ORDER — DEXTROAMPHETAMINE SACCHARATE, AMPHETAMINE ASPARTATE, DEXTROAMPHETAMINE SULFATE AND AMPHETAMINE SULFATE 1.25; 1.25; 1.25; 1.25 MG/1; MG/1; MG/1; MG/1
TABLET ORAL
Qty: 75 TABLET | Refills: 0 | Status: SHIPPED | OUTPATIENT
Start: 2018-03-21 | End: 2018-03-23 | Stop reason: SDUPTHER

## 2018-03-23 DIAGNOSIS — F90.0 ADHD, PREDOMINANTLY INATTENTIVE TYPE: ICD-10-CM

## 2018-03-23 RX ORDER — DEXTROAMPHETAMINE SACCHARATE, AMPHETAMINE ASPARTATE, DEXTROAMPHETAMINE SULFATE AND AMPHETAMINE SULFATE 1.25; 1.25; 1.25; 1.25 MG/1; MG/1; MG/1; MG/1
TABLET ORAL
Qty: 75 TABLET | Refills: 0 | Status: SHIPPED | OUTPATIENT
Start: 2018-03-23 | End: 2018-05-03 | Stop reason: SDUPTHER

## 2018-03-29 PROBLEM — T78.40XA ALLERGIC: Status: ACTIVE | Noted: 2017-07-25

## 2018-03-29 RX ORDER — DIPHENOXYLATE HYDROCHLORIDE AND ATROPINE SULFATE 2.5; .025 MG/1; MG/1
1 TABLET ORAL DAILY
COMMUNITY

## 2018-03-29 RX ORDER — BUDESONIDE AND FORMOTEROL FUMARATE DIHYDRATE 80; 4.5 UG/1; UG/1
2 AEROSOL RESPIRATORY (INHALATION) DAILY
COMMUNITY
End: 2018-05-07

## 2018-04-02 ENCOUNTER — OFFICE VISIT (OUTPATIENT)
Dept: SLEEP CENTER | Facility: CLINIC | Age: 58
End: 2018-04-02

## 2018-04-02 ENCOUNTER — HOSPITAL ENCOUNTER (OUTPATIENT)
Dept: SLEEP CENTER | Facility: CLINIC | Age: 58
Discharge: HOME | End: 2018-04-02
Payer: COMMERCIAL

## 2018-04-02 ENCOUNTER — OFFICE VISIT (OUTPATIENT)
Dept: LAB | Age: 58
End: 2018-04-02
Payer: COMMERCIAL

## 2018-04-02 ENCOUNTER — APPOINTMENT (OUTPATIENT)
Dept: LAB | Age: 58
End: 2018-04-02
Payer: COMMERCIAL

## 2018-04-02 ENCOUNTER — OFFICE VISIT (OUTPATIENT)
Dept: SURGICAL ONCOLOGY | Facility: CLINIC | Age: 58
End: 2018-04-02
Payer: COMMERCIAL

## 2018-04-02 ENCOUNTER — APPOINTMENT (OUTPATIENT)
Dept: RADIOLOGY | Age: 58
End: 2018-04-02
Payer: COMMERCIAL

## 2018-04-02 VITALS
SYSTOLIC BLOOD PRESSURE: 120 MMHG | BODY MASS INDEX: 31.25 KG/M2 | DIASTOLIC BLOOD PRESSURE: 90 MMHG | WEIGHT: 159.2 LBS | HEART RATE: 89 BPM | HEIGHT: 60 IN

## 2018-04-02 VITALS
RESPIRATION RATE: 12 BRPM | DIASTOLIC BLOOD PRESSURE: 88 MMHG | TEMPERATURE: 98.2 F | HEART RATE: 92 BPM | WEIGHT: 160 LBS | SYSTOLIC BLOOD PRESSURE: 130 MMHG | HEIGHT: 60 IN | BODY MASS INDEX: 31.41 KG/M2

## 2018-04-02 DIAGNOSIS — E21.3 HYPERPARATHYROIDISM (HCC): ICD-10-CM

## 2018-04-02 DIAGNOSIS — J31.0 CHRONIC RHINITIS: ICD-10-CM

## 2018-04-02 DIAGNOSIS — F32.0 MILD SINGLE CURRENT EPISODE OF MAJOR DEPRESSIVE DISORDER (HCC): ICD-10-CM

## 2018-04-02 DIAGNOSIS — E66.9 OBESITY (BMI 30-39.9): ICD-10-CM

## 2018-04-02 DIAGNOSIS — I10 HYPERTENSION, ESSENTIAL, BENIGN: ICD-10-CM

## 2018-04-02 DIAGNOSIS — E21.3 HYPERPARATHYROIDISM (HCC): Primary | ICD-10-CM

## 2018-04-02 DIAGNOSIS — F90.0 ADHD, PREDOMINANTLY INATTENTIVE TYPE: ICD-10-CM

## 2018-04-02 DIAGNOSIS — G47.33 OSA (OBSTRUCTIVE SLEEP APNEA): Primary | ICD-10-CM

## 2018-04-02 LAB
ALBUMIN SERPL BCP-MCNC: 4.1 G/DL (ref 3.5–5)
ANION GAP SERPL CALCULATED.3IONS-SCNC: 5 MMOL/L (ref 4–13)
APTT PPP: 27 SECONDS (ref 23–35)
ATRIAL RATE: 70 BPM
BUN SERPL-MCNC: 17 MG/DL (ref 5–25)
CALCIUM ALBUM COR SERPL-MCNC: 10.9 MG/DL (ref 8.3–10.1)
CALCIUM SERPL-MCNC: 11 MG/DL (ref 8.3–10.1)
CALCIUM SERPL-MCNC: 11 MG/DL (ref 8.3–10.1)
CHLORIDE SERPL-SCNC: 107 MMOL/L (ref 100–108)
CO2 SERPL-SCNC: 31 MMOL/L (ref 21–32)
CREAT SERPL-MCNC: 0.84 MG/DL (ref 0.6–1.3)
ERYTHROCYTE [DISTWIDTH] IN BLOOD BY AUTOMATED COUNT: 13.7 % (ref 11.6–15.1)
GFR SERPL CREATININE-BSD FRML MDRD: 78 ML/MIN/1.73SQ M
GLUCOSE SERPL-MCNC: 98 MG/DL (ref 65–140)
HCT VFR BLD AUTO: 39.6 % (ref 34.8–46.1)
HGB BLD-MCNC: 14.1 G/DL (ref 11.5–15.4)
INR PPP: 0.97 (ref 0.86–1.16)
MCH RBC QN AUTO: 31 PG (ref 26.8–34.3)
MCHC RBC AUTO-ENTMCNC: 35.6 G/DL (ref 31.4–37.4)
MCV RBC AUTO: 87 FL (ref 82–98)
P AXIS: 36 DEGREES
PLATELET # BLD AUTO: 265 THOUSANDS/UL (ref 149–390)
PMV BLD AUTO: 11.3 FL (ref 8.9–12.7)
POTASSIUM SERPL-SCNC: 3.5 MMOL/L (ref 3.5–5.3)
PR INTERVAL: 130 MS
PROTHROMBIN TIME: 12.9 SECONDS (ref 12.1–14.4)
QRS AXIS: 19 DEGREES
QRSD INTERVAL: 96 MS
QT INTERVAL: 408 MS
QTC INTERVAL: 440 MS
RBC # BLD AUTO: 4.55 MILLION/UL (ref 3.81–5.12)
SODIUM SERPL-SCNC: 143 MMOL/L (ref 136–145)
T WAVE AXIS: 21 DEGREES
VENTRICULAR RATE: 70 BPM
WBC # BLD AUTO: 7.43 THOUSAND/UL (ref 4.31–10.16)

## 2018-04-02 PROCEDURE — 80048 BASIC METABOLIC PNL TOTAL CA: CPT

## 2018-04-02 PROCEDURE — 36415 COLL VENOUS BLD VENIPUNCTURE: CPT

## 2018-04-02 PROCEDURE — 85730 THROMBOPLASTIN TIME PARTIAL: CPT

## 2018-04-02 PROCEDURE — 93005 ELECTROCARDIOGRAM TRACING: CPT

## 2018-04-02 PROCEDURE — 99245 OFF/OP CONSLTJ NEW/EST HI 55: CPT | Performed by: SURGERY

## 2018-04-02 PROCEDURE — 82040 ASSAY OF SERUM ALBUMIN: CPT

## 2018-04-02 PROCEDURE — 71046 X-RAY EXAM CHEST 2 VIEWS: CPT

## 2018-04-02 PROCEDURE — 85027 COMPLETE CBC AUTOMATED: CPT

## 2018-04-02 PROCEDURE — 93010 ELECTROCARDIOGRAM REPORT: CPT | Performed by: INTERNAL MEDICINE

## 2018-04-02 PROCEDURE — 85610 PROTHROMBIN TIME: CPT

## 2018-04-02 PROCEDURE — 82310 ASSAY OF CALCIUM: CPT

## 2018-04-02 RX ORDER — TRAMADOL HYDROCHLORIDE 50 MG/1
50 TABLET ORAL EVERY 6 HOURS PRN
Status: CANCELLED | OUTPATIENT
Start: 2018-04-02

## 2018-04-02 NOTE — LETTER
April 2, 2018     Alana Crouch MD  [address]    Patient: Edgardo Hu   YOB: 1960   Date of Visit: 4/2/2018       Dear Dr Alex Tyson:    Thank you for referring Edgardo Hu to me for evaluation  Below are my notes for this consultation  If you have questions, please do not hesitate to call me  I look forward to following your patient along with you  Sincerely,        Mihai Meadows MD        CC: MD Kareem Brooks Cea, CRNP Amber Lanning Pea, MD  4/2/2018  9:17 AM  Sign at close encounter               Surgical Oncology Follow Up       [facility] [address]  [address]  [place] [id number] [phone]    Edgardo Hu  1960  [id number]  [id number] [facility] [place]  [address]  [place] [address] [place] [address]  [phone]    No chief complaint on file  Assessment/Plan:    No problem-specific Assessment & Plan notes found for this encounter  Diagnoses and all orders for this visit:    Hyperparathyroidism ([facility] [address] )  -     Case request operating room: PARATHYROIDECTOMY, MINIMALLY INVASIVE with intraop pth monitoring; Standing  -     Basic metabolic panel; Future  -     APTT; Future  -     CBC and Platelet; Future  -     Protime-INR; Future  -     EKG 12 lead; Future  -     XR chest pa & lateral; Future  -     Case request operating room: PARATHYROIDECTOMY, MINIMALLY INVASIVE with intraop pth monitoring    Other orders  -     budesonide-formoterol (SYMBICORT) 80-4 5 MCG/ACT inhaler;  Inhale 2 puffs  -     multivitamin (THERAGRAN) TABS; Take 1 tablet by mouth  -     Incentive spirometry; Standing  -     Insert and maintain IV line; Standing  -     Void On-Call to O R ; Standing  -     Place sequential compression device; Standing  -     PTH, intact; Standing  - traMADol (ULTRAM) tablet 50 mg; Take 1 tablet (50 mg total) by mouth every 6 (six) hours as needed for moderate pain         Advance Care Planning/Advance Directives:  Discussed disease status, cancer treatment plans and/or cancer treatment goals with the patient  Referred patient to social work for additional information and/or counseling regarding advance care planning  No history exists  History of Present Illness:   Patient is 46-year-old woman who initially saw Dr Martin Colindres for workup of a thyroid nodule  The nodule itself did not merit additional treatment, though she was found the have evidence of primary hyperparathyroidism based on elevated calcium and PTH levels  Sestamibi scan was subsequently ordered which was suggestive of a right-sided process  She has been referred for evaluation and treatment  She has chronic fatigue which in part she attributes to sleep apnea  Minimal achiness in both joints  No depressive type symptoms, though she is on an antidepressant and has been for while  Review of Systems   Constitutional: Positive for fatigue  Negative for activity change and appetite change  The patient has sleep apnea   HENT: Negative  Eyes: Negative  Respiratory: Negative  Cardiovascular: Negative  Gastrointestinal: Negative  Endocrine: Negative  Musculoskeletal: Positive for arthralgias and myalgias  Skin: Negative  Allergic/Immunologic: Negative  Neurological:        Issues with short-term memory   Hematological: Negative  Psychiatric/Behavioral: Negative            Patient Active Problem List   Diagnosis    Osteopenia of multiple sites    ADHD, predominantly inattentive type    GERD without esophagitis    Hypercalcemia    Hypercholesteremia    Hyperparathyroidism ([address] )    Hypertension, essential, benign    Hypothyroidism    Obstructive sleep apnea    Right thyroid nodule    Snoring    Vitamin D deficiency    Lumbar strain   Katie Dunbar Depression    Visit for routine gyn exam    Screening for colorectal cancer    Encounter for gynecological examination without abnormal finding    Allergic     Past Medical History:   Diagnosis Date    ADHD     Allergic     Asthma     Depression     Disease of thyroid gland     Scoliosis      Past Surgical History:   Procedure Laterality Date    TONSILLECTOMY       No family history on file  Social History     Social History    Marital status: Single     Spouse name: N/A    Number of children: N/A    Years of education: N/A     Occupational History    Not on file       Social History Main Topics    Smoking status: Never Smoker    Smokeless tobacco: Never Used    Alcohol use Yes      Comment: social    Drug use: No    Sexual activity: Not on file     Other Topics Concern    Not on file     Social History Narrative    No narrative on file       Current Outpatient Prescriptions:     amphetamine-dextroamphetamine (ADDERALL) 5 MG tablet, Earliest Fill Date: 3/23/18 Take 1 5 tab in the morning and 1 tab in the evening (Patient taking differently: 5 mg daily Take 1 5 tab in the morning and 1 tab in the evening ), Disp: 75 tablet, Rfl: 0    budesonide-formoterol (SYMBICORT) 80-4 5 MCG/ACT inhaler, Inhale 2 puffs, Disp: , Rfl:     cetirizine (ZyrTEC) 10 mg tablet, Take 2 tablets by mouth daily, Disp: , Rfl:     Cholecalciferol (VITAMIN D3) 1000 units CAPS, Take by mouth Daily, Disp: , Rfl:     cyclobenzaprine (FEXMID) 7 5 MG tablet, Take 1 tablet by mouth 2 (two) times a day as needed, Disp: , Rfl:     fluticasone-salmeterol (ADVAIR DISKUS) 100-50 mcg/dose, Inhale 1 puff 2 (two) times a day, Disp: , Rfl:     levothyroxine 50 mcg tablet, Take 1 tablet by mouth daily, Disp: , Rfl:     multivitamin (THERAGRAN) TABS, Take 1 tablet by mouth, Disp: , Rfl:     Triamcinolone Acetonide (NASACORT AQ) 55 MCG/ACT AERO, into each nostril Daily, Disp: , Rfl:     venlafaxine (EFFEXOR-XR) 150 mg 24 hr capsule, Take 1 capsule (150 mg total) by mouth daily, Disp: 90 capsule, Rfl: 0    Multiple Vitamin-Folic Acid TABS, Take 1 tablet by mouth daily, Disp: , Rfl:   Allergies   Allergen Reactions    Prochlorperazine Anaphylaxis     Category: Allergy; Annotation - [id number]: ALL FORMS    Erythromycin GI Intolerance     Category: Allergy; Annotation - [id number]: ALL FORMS    Sulfa Antibiotics GI Intolerance     Patient states that it is like when you are drunk, dizziness and confusion    Sulfamethoxazole-Trimethoprim      Category: Allergy; Annotation - [id number]: ALL FORMS     Vitals:    04/02/18 0829   BP: 130/88   Pulse: 92   Resp: 12   Temp: 98 2 °F (36 8 °C)       Physical Exam   Constitutional: She is oriented to person, place, and time  She appears well-developed and well-nourished  No distress  HENT:   Head: Atraumatic  Eyes: Pupils are equal, round, and reactive to light  Neck: Normal range of motion  Neck supple  No thyromegaly present  Cardiovascular: Normal rate and regular rhythm  Pulmonary/Chest: Effort normal and breath sounds normal    Abdominal: Soft  She exhibits no distension  There is tenderness  There is guarding  There is no rebound  Musculoskeletal: Normal range of motion  Lymphadenopathy:     She has no cervical adenopathy  Neurological: She is alert and oriented to person, place, and time  Skin: Skin is warm and dry  Psychiatric: She has a normal mood and affect   Her behavior is normal  Judgment and thought content normal        Pathology:  none    Labs:   Ref Range & Units 11/13/17  4:49 PM 11/13/17  4:49 PM 9/13/17  9:40 AM     Albumin 3 5 - 5 0 g/dL 4 3  4 1  3 9     Corrected Calcium 8 3 - 10 1 mg/dL 11 1    10 9      CALCIUM FOR CA/ALB 8 3 - 10 1 mg/dL 11 3    10 8R        Ref Range & Units 11/13/17  4:49 PM 9/13/17  9:40 AM     PTH 14 0 - 72 0 pg/mL 106 5   110 0           Imaging  CENTRAL  DXA SCAN     CLINICAL HISTORY:   56year old post-menopausal  female risk factors include asthma  Hypothyroidism  Osteoporosis screening      TECHNIQUE: Bone densitometry was performed using a Horizon A bone densitometer  Regions of interest appear properly placed  There are no obvious fractures or other confounding variables which could limit the study  Mild scoliotic curvature, concave   right      COMPARISON:  None      RESULTS:   LUMBAR SPINE:  L1-L4:  BMD 0 908 gm/cm2  T-score -1 3  Z-score -0 1     LEFT TOTAL HIP:  BMD 0 932 gm/cm2  T-score -0 1  Z-score 0 6     LEFT FEMORAL NECK:  BMD 0 723 gm/cm2  T-score -1 1  Z-score 0 0             IMPRESSION:  1  Based on the [facility] classification, the T-score of -1 3 in the lumbar spine is consistent with low bone mineral density  2   The 10 year risk of hip fracture is 0 3 % with the 10 year risk of major osteoporotic fracture being 6  % as calculated by the WHO fracture risk assessment tool (FRAX)  The current NOF guidelines recommend treating patients with FRAX 10 year risk   score of >3% for hip fracture and >20% for major osteoporotic fracture  3   Any secondary causes of low bone mineral density should be excluded prior to treatment, if clinically indicated  4   A daily intake of at least 1200 mg calcium and 800 - 1000 IU of Vitamin D, as well as weight bearing and muscle strengthening exercise, fall prevention and avoidance of tobacco and excessive alcohol intake as basic preventive measures are suggested                           The 10 year risk of hip fracture is    %, with the 10 year risk of major osteoporotic fracture being    %, as calculated by the WHO fracture risk assessment tool (FRAX)    The current NOF guidelines recommend treating patients with FRAX 10 year risk score   of >3% for hip fracture and >20% for major osteoporotic fracture       WHO CLASSIFICATION:  Normal (a T-score of -1 0 or higher)  Low bone mineral density (a T-score of less than -1 0 but higher than -2 5)  Osteoporosis (a T-score of -2 5 or less)  Severe osteoporosis (a T-score of -2 5 or less with a fragility fracture)     PARATHYROID SCAN     INDICATION:  Hyperparathyroidism Z86 39: Personal history of other endocrine, nutritional and metabolic disease  History taken directly from the electronic ordering system          COMPARISON:  Thyroid ultrasound 1/18/2018     TECHNIQUE:   Following the intravenous administration of 26 9 mCi Tc-99m Cardiolite, anterior and bilateral anterior oblique projection images of the neck and mediastinum were obtained at approximately 10 minutes post injection followed at 2 hours post   injection by static anterior and bilateral oblique projections as well as SPECT CT images in coronal, sagittal and axial projections       FINDINGS:     Immediate planar images demonstrate an asymmetric focus of activity along the inferior pole of the right thyroid  Radiotracer uptake in the thyroid gland is otherwise homogeneous  Delayed planar and SPECT-CT images demonstrate a persistent focus of   activity along the inferior pole of the right thyroid  This is most concerning for a parathyroid adenoma  This likely corresponds with the nodule seen inferior to the right thyroid on prior ultrasound      IMPRESSION:     1  Persistent focus of radiotracer activity along the inferior border of the right thyroid, most concerning for a parathyroid adenoma         Workstation performed: [id number]         I reviewed the above laboratory and imaging data  Discussion/Summary:  61-year-old woman with osteopenia, as well as clinical, by chemical, and radiographic evidence of parathyroid adenoma, specifically on the right side  She is a candidate for mm of right parathyroidectomy, possible open 4 gland exploration with intraoperative PTH monitoring  Risks and benefits of surgery including infection, bleeding, need for additional surgery, were discussed with her  She understands the plan wishes to proceed as outlined

## 2018-04-02 NOTE — PROGRESS NOTES
Surgical Oncology Follow Up       [address] [facility] CANCER CARE SURGICAL ONCOLOGY [facility]  [address]  [place] [address] [facility] [address] [phone]    Christine Townsend  1960  [id number]  [address] [facility] SURGICAL ONCOLOGY [facility]  [address]  [place] [address] [facility] [address] [phone]    No chief complaint on file  Assessment/Plan:    No problem-specific Assessment & Plan notes found for this encounter  Diagnoses and all orders for this visit:    Hyperparathyroidism ([facility] [address] )  -     Case request operating room: PARATHYROIDECTOMY, MINIMALLY INVASIVE with intraop pth monitoring; Standing  -     Basic metabolic panel; Future  -     APTT; Future  -     CBC and Platelet; Future  -     Protime-INR; Future  -     EKG 12 lead; Future  -     XR chest pa & lateral; Future  -     Case request operating room: PARATHYROIDECTOMY, MINIMALLY INVASIVE with intraop pth monitoring    Other orders  -     budesonide-formoterol (SYMBICORT) 80-4 5 MCG/ACT inhaler; Inhale 2 puffs  -     multivitamin (THERAGRAN) TABS; Take 1 tablet by mouth  -     Incentive spirometry; Standing  -     Insert and maintain IV line; Standing  -     Void On-Call to O R ; Standing  -     Place sequential compression device; Standing  -     PTH, intact; Standing  -     traMADol (ULTRAM) tablet 50 mg; Take 1 tablet (50 mg total) by mouth every 6 (six) hours as needed for moderate pain         Advance Care Planning/Advance Directives:  Discussed disease status, cancer treatment plans and/or cancer treatment goals with the patient  Referred patient to social work for additional information and/or counseling regarding advance care planning  No history exists  History of Present Illness:   Patient is 72-year-old woman who initially saw Dr Gricelda Barba for workup of a thyroid nodule    The nodule itself did not merit additional treatment, though she was found the have evidence of primary hyperparathyroidism based on elevated calcium and PTH levels  Sestamibi scan was subsequently ordered which was suggestive of a right-sided process  She has been referred for evaluation and treatment  She has chronic fatigue which in part she attributes to sleep apnea  Minimal achiness in both joints  No depressive type symptoms, though she is on an antidepressant and has been for while  Review of Systems   Constitutional: Positive for fatigue  Negative for activity change and appetite change  The patient has sleep apnea   HENT: Negative  Eyes: Negative  Respiratory: Negative  Cardiovascular: Negative  Gastrointestinal: Negative  Endocrine: Negative  Musculoskeletal: Positive for arthralgias and myalgias  Skin: Negative  Allergic/Immunologic: Negative  Neurological:        Issues with short-term memory   Hematological: Negative  Psychiatric/Behavioral: Negative  Patient Active Problem List   Diagnosis    Osteopenia of multiple sites    ADHD, predominantly inattentive type    GERD without esophagitis    Hypercalcemia    Hypercholesteremia    Hyperparathyroidism ([address] )    Hypertension, essential, benign    Hypothyroidism    Obstructive sleep apnea    Right thyroid nodule    Snoring    Vitamin D deficiency    Lumbar strain    Depression    Visit for routine gyn exam    Screening for colorectal cancer    Encounter for gynecological examination without abnormal finding    Allergic     Past Medical History:   Diagnosis Date    ADHD     Allergic     Asthma     Depression     Disease of thyroid gland     Scoliosis      Past Surgical History:   Procedure Laterality Date    TONSILLECTOMY       No family history on file  Social History     Social History    Marital status: Single     Spouse name: N/A    Number of children: N/A    Years of education: N/A     Occupational History    Not on file       Social History Main Topics    Smoking status: Never Smoker    Smokeless tobacco: Never Used    Alcohol use Yes      Comment: social    Drug use: No    Sexual activity: Not on file     Other Topics Concern    Not on file     Social History Narrative    No narrative on file       Current Outpatient Prescriptions:     amphetamine-dextroamphetamine (ADDERALL) 5 MG tablet, Earliest Fill Date: 3/23/18 Take 1 5 tab in the morning and 1 tab in the evening (Patient taking differently: 5 mg daily Take 1 5 tab in the morning and 1 tab in the evening ), Disp: 75 tablet, Rfl: 0    budesonide-formoterol (SYMBICORT) 80-4 5 MCG/ACT inhaler, Inhale 2 puffs, Disp: , Rfl:     cetirizine (ZyrTEC) 10 mg tablet, Take 2 tablets by mouth daily, Disp: , Rfl:     Cholecalciferol (VITAMIN D3) 1000 units CAPS, Take by mouth Daily, Disp: , Rfl:     cyclobenzaprine (FEXMID) 7 5 MG tablet, Take 1 tablet by mouth 2 (two) times a day as needed, Disp: , Rfl:     fluticasone-salmeterol (ADVAIR DISKUS) 100-50 mcg/dose, Inhale 1 puff 2 (two) times a day, Disp: , Rfl:     levothyroxine 50 mcg tablet, Take 1 tablet by mouth daily, Disp: , Rfl:     multivitamin (THERAGRAN) TABS, Take 1 tablet by mouth, Disp: , Rfl:     Triamcinolone Acetonide (NASACORT AQ) 55 MCG/ACT AERO, into each nostril Daily, Disp: , Rfl:     venlafaxine (EFFEXOR-XR) 150 mg 24 hr capsule, Take 1 capsule (150 mg total) by mouth daily, Disp: 90 capsule, Rfl: 0    Multiple Vitamin-Folic Acid TABS, Take 1 tablet by mouth daily, Disp: , Rfl:   Allergies   Allergen Reactions    Prochlorperazine Anaphylaxis     Category: Allergy; Annotation - [id number]: ALL FORMS    Erythromycin GI Intolerance     Category: Allergy; Annotation - [id number]: ALL FORMS    Sulfa Antibiotics GI Intolerance     Patient states that it is like when you are drunk, dizziness and confusion    Sulfamethoxazole-Trimethoprim      Category: Allergy;  Annotation - [id number]: ALL FORMS     Vitals:    04/02/18 0829   BP: 130/88   Pulse: 92   Resp: 12   Temp: 98 2 °F (36 8 °C)       Physical Exam   Constitutional: She is oriented to person, place, and time  She appears well-developed and well-nourished  No distress  HENT:   Head: Atraumatic  Eyes: Pupils are equal, round, and reactive to light  Neck: Normal range of motion  Neck supple  No thyromegaly present  Cardiovascular: Normal rate and regular rhythm  Pulmonary/Chest: Effort normal and breath sounds normal    Abdominal: Soft  She exhibits no distension  There is tenderness  There is guarding  There is no rebound  Musculoskeletal: Normal range of motion  Lymphadenopathy:     She has no cervical adenopathy  Neurological: She is alert and oriented to person, place, and time  Skin: Skin is warm and dry  Psychiatric: She has a normal mood and affect  Her behavior is normal  Judgment and thought content normal        Pathology:  none    Labs:   Ref Range & Units 11/13/17  4:49 PM 11/13/17  4:49 PM 9/13/17  9:40 AM     Albumin 3 5 - 5 0 g/dL 4 3  4 1  3 9     Corrected Calcium 8 3 - 10 1 mg/dL 11 1    10 9      CALCIUM FOR CA/ALB 8 3 - 10 1 mg/dL 11 3    10 8R        Ref Range & Units 11/13/17  4:49 PM 9/13/17  9:40 AM     PTH 14 0 - 72 0 pg/mL 106 5   110 0           Imaging  CENTRAL  DXA SCAN     CLINICAL HISTORY:   56year old post-menopausal  female risk factors include asthma  Hypothyroidism  Osteoporosis screening      TECHNIQUE: Bone densitometry was performed using a Horizon A bone densitometer  Regions of interest appear properly placed  There are no obvious fractures or other confounding variables which could limit the study  Mild scoliotic curvature, concave   right      COMPARISON:  None      RESULTS:   LUMBAR SPINE:  L1-L4:  BMD 0 908 gm/cm2  T-score -1 3  Z-score -0 1     LEFT TOTAL HIP:  BMD 0 932 gm/cm2  T-score -0 1  Z-score 0 6     LEFT FEMORAL NECK:  BMD 0 723 gm/cm2  T-score -1 1  Z-score 0 0             IMPRESSION:  1    Based on the WHO classification, the T-score of -1 3 in the lumbar spine is consistent with low bone mineral density  2   The 10 year risk of hip fracture is 0 3 % with the 10 year risk of major osteoporotic fracture being 6  % as calculated by the WHO fracture risk assessment tool (FRAX)  The current NOF guidelines recommend treating patients with FRAX 10 year risk   score of >3% for hip fracture and >20% for major osteoporotic fracture  3   Any secondary causes of low bone mineral density should be excluded prior to treatment, if clinically indicated  4   A daily intake of at least 1200 mg calcium and 800 - 1000 IU of Vitamin D, as well as weight bearing and muscle strengthening exercise, fall prevention and avoidance of tobacco and excessive alcohol intake as basic preventive measures are suggested                           The 10 year risk of hip fracture is    %, with the 10 year risk of major osteoporotic fracture being    %, as calculated by the WHO fracture risk assessment tool (FRAX)  The current NOF guidelines recommend treating patients with FRAX 10 year risk score   of >3% for hip fracture and >20% for major osteoporotic fracture       WHO CLASSIFICATION:  Normal (a T-score of -1 0 or higher)  Low bone mineral density (a T-score of less than -1 0 but higher than -2 5)  Osteoporosis (a T-score of -2 5 or less)  Severe osteoporosis (a T-score of -2 5 or less with a fragility fracture)     PARATHYROID SCAN     INDICATION:  Hyperparathyroidism Z86 39: Personal history of other endocrine, nutritional and metabolic disease   History taken directly from the electronic ordering system          COMPARISON:  Thyroid ultrasound 1/18/2018     TECHNIQUE:   Following the intravenous administration of 26 9 mCi Tc-99m Cardiolite, anterior and bilateral anterior oblique projection images of the neck and mediastinum were obtained at approximately 10 minutes post injection followed at 2 hours post   injection by static anterior and bilateral oblique projections as well as SPECT CT images in coronal, sagittal and axial projections       FINDINGS:     Immediate planar images demonstrate an asymmetric focus of activity along the inferior pole of the right thyroid  Radiotracer uptake in the thyroid gland is otherwise homogeneous  Delayed planar and SPECT-CT images demonstrate a persistent focus of   activity along the inferior pole of the right thyroid  This is most concerning for a parathyroid adenoma  This likely corresponds with the nodule seen inferior to the right thyroid on prior ultrasound      IMPRESSION:     1  Persistent focus of radiotracer activity along the inferior border of the right thyroid, most concerning for a parathyroid adenoma         Workstation performed: [id number]         I reviewed the above laboratory and imaging data  Discussion/Summary:  68-year-old woman with osteopenia, as well as clinical, by chemical, and radiographic evidence of parathyroid adenoma, specifically on the right side  She is a candidate for mm of right parathyroidectomy, possible open 4 gland exploration with intraoperative PTH monitoring  Risks and benefits of surgery including infection, bleeding, need for additional surgery, were discussed with her  She understands the plan wishes to proceed as outlined

## 2018-04-02 NOTE — PROGRESS NOTES
Follow-up Note - [facility]  66 y o  female  :1960  XIS:[id number]    I saw Josee Newell in sleep clinic today for her sleep disordered breathing, coexisting sleep complaints & medical conditions  A sleep study to titrate Positive airway pressure therapy was undertaken and patient is here to review results and to initiate therapy  The study demonstrated sleep disordered breathing was [successfully] remediated with PAP at 9 cm H2O  The periodic limb movements of sleep were virtually eliminated and she had much less sleep fragmentation  Medications, Past, Family, Social History were reviewed  [Interval changes notable for none reported ]     She  has a past medical history of ADHD; Allergic; Asthma; Depression; Disease of thyroid gland; and Scoliosis  She has a current medication list which includes the following prescription(s): amphetamine-dextroamphetamine, budesonide-formoterol, cetirizine, vitamin d3, cyclobenzaprine, fluticasone-salmeterol, levothyroxine, multiple vitamin-folic acid, multivitamin, triamcinolone acetonide, and venlafaxine  ROS: reviewed see attached  She feels mood is stable and ADHD symptoms are controlled on current medication  Her allergies are also stable on treatment  HPI:  Patient had difficulty tolerating positive airway pressure therapy on the titration study night  She struggled to adjust because she felt smothered  Reported [no] nasal congestion, [no] mucosal dryness, [no] chest or abdominal discomfort  [He] tolerated positive airway pressure therapy [but] sleep was [not] better than usual      Sleep Routine: [No interval changes ]  She is only getting around 6 hr sleep  She continues to awakens feeling under refreshed and has excessive daytime drowsiness  [She rated herself at Total score: 14 /24 on the [place] sleepiness scale ]  She is not dozing off inadvertently but would nap if she had the opportunity        On Exam: Vitals are stable: Blood pressure 120/90, pulse 89, height 5' (1 524 m), weight 72 2 kg (159 lb 3 2 oz)  Body mass index is 31 09 kg/m²  [place] Circumference: 36cm  Patient is [well groomed] alert, orientated, cooperative [and in no distress]  Mental state [appeared normal]  Physical findings are essentially unchanged from previous  IMPRESSION:    1  ERICA (obstructive sleep apnea)  Cpap DME   2  Hypertension, essential, benign     3  Chronic rhinitis     4  ADHD, predominantly inattentive type     5  Mild single current episode of major depressive disorder (HCC)     6  Obesity (BMI 30-39  9)         PLAN:    1  I reviewed results of the Sleep studies with the patient  2  With respect to above conditions, I counseled on pathophysiology, diagnosis, treatment options, risks and benefits; inter-relationship and effects on symptoms and comorbidities; risks of no treatment; costs and insurance aspects  3  Patient elected positive airway pressure therapy but somewhat reluctantly  Care was coordinated with the DME provider for set up in clinic today  4  Need for compliance with therapy and weight reduction were emphasized  5  I also advised weight reduction and allowing sufficient opportunity for sleep  6  Follow-up to be scheduled in 2 months to monitor compliance, progress and to adjust therapy  Thank you for allowing me to participate in the care of this patient  I will keep you apprised of developments      Sincerely,    Sam Rankin MD  Board Certified Specialist

## 2018-04-02 NOTE — PROGRESS NOTES
Review of Systems      Genitourinary hot flashes and post menopausal   Cardiology none   Gastrointestinal none   Neurology none   Constitutional none   Integumentary none   Psychiatry anxiety and depression   Musculoskeletal none   Pulmonary none   ENT nasal or sinus congestion, post nasal drip and throat clearing   Endocrine excessive thirst   Hematological blood donor

## 2018-04-06 ENCOUNTER — OFFICE VISIT (OUTPATIENT)
Dept: GASTROENTEROLOGY | Facility: CLINIC | Age: 58
End: 2018-04-06
Payer: COMMERCIAL

## 2018-04-06 VITALS
BODY MASS INDEX: 31.45 KG/M2 | HEART RATE: 82 BPM | WEIGHT: 160.2 LBS | TEMPERATURE: 98 F | SYSTOLIC BLOOD PRESSURE: 159 MMHG | DIASTOLIC BLOOD PRESSURE: 97 MMHG | HEIGHT: 60 IN

## 2018-04-06 DIAGNOSIS — K58.0 IRRITABLE BOWEL SYNDROME WITH DIARRHEA: ICD-10-CM

## 2018-04-06 DIAGNOSIS — E73.9 LACTOSE INTOLERANCE: Primary | ICD-10-CM

## 2018-04-06 DIAGNOSIS — Z12.11 SCREENING FOR COLON CANCER: ICD-10-CM

## 2018-04-06 PROCEDURE — 99243 OFF/OP CNSLTJ NEW/EST LOW 30: CPT | Performed by: INTERNAL MEDICINE

## 2018-04-06 NOTE — PATIENT INSTRUCTIONS
Colonoscopy   AMBULATORY CARE:   What you need to know about a colonoscopy:  A colonoscopy is a procedure to examine the inside of your colon (intestine) with a scope  A scope is a flexible tube with a small light and camera on the end  Polyps or tissue growths may be removed during your colonoscopy  What you need to do the week before your colonoscopy: You will need to stop taking medicines that contain aspirin or iron for 7 days before your colonoscopy  If you take anticoagulants, such as warfarin, ask when you should stop taking it  Make plans for someone to drive you home after your procedure  How to prepare for your colonoscopy: Your healthcare provider will have you prepare your bowels before your procedure  Your bowels will need to be empty before your procedure to allow him to clearly see your colon  You will need to do the following the day before your procedure:  · Have only clear liquids  for the entire day before your colonoscopy  Clear liquid diet includes clear fruit juices and broths, clear flavored gelatin, and hard candy  It also includes coffee, tea, carbonated beverages, and clear sports drinks  · Follow your bowel prep as directed  There are many different preparations that can be given before a colonoscopy  Some are given over 2 hours and others over 6 hours  Some are given earlier in the afternoon the day before the colonoscopy  Others are given the day before and then the morning of the colonoscopy  With any bowel prep, stay close to the bathroom  This liquid will cause your bowels to move frequently  · An enema  may be needed  Your healthcare provider may tell you to use an enema to help clean out your bowels  · Do not eat or drink anything after midnight  This will help prevent problems that can happen if you vomit while under anesthesia  What will happen during your colonoscopy:   · You will be given medicine to help you relax   You will lie on your left side and raise one or both knees toward your chest  Your healthcare provider will examine your anus and use a finger to check your rectum  You may need another enema if your bowel is not empty  The scope will be lubricated and gently placed into your anus  It will then be passed through your rectum and into your colon  Water or air will be put into your colon to help clean or expand it  This is done so your healthcare provider can see your colon clearly  · Tissue samples may be taken from the walls of your bowel and sent to a lab for tests  If you have a polyp, your healthcare provider will pass a wire loop through the scope and use it to hold the polyp  The polyp is then burned or cut off the wall of your colon  Removed polyps are sent to a lab for tests  Pictures of your colon may be taken during the procedure  The scope will be removed when the procedure is done  What will happen after your colonoscopy:   · Rest after your procedure  You may feel bloated, have some gas and abdominal discomfort  You may need to lie on your right side with a heating pad on your abdomen  You may need to take short walks to help move the gas out  Eat small meals, if you feel bloated  Do not drive or make important decisions until the day after your procedure  · You may have polyps removed  Do not take aspirin or go on long car trips for 7 days after your procedure  Ask your healthcare provider about any other limits after your procedure  Risks of a colonoscopy: You may have pain or bleeding after the scope or polyps are removed  You may also have a slow heartbeat, decreased blood pressure, or increased sweating  Your colon may tear due to the increased pressure from the scope and other instruments  This may cause bowel contents to leak out of your colon and into your abdomen  If this happens, you will need to stay in the hospital and have surgery on your colon     Seek care immediately if:   · You have a large amount of bright red blood in your bowel movements  · Your abdomen is hard and firm and you have severe pain  · You have sudden trouble breathing  Contact your healthcare provider if:   · You develop a rash or hives  · You have a fever within 24 hours of your procedure  · You have not had a bowel movement for 3 days after your procedure  · You have questions or concerns about your condition or care  Activity:   · Do not lift, strain, or run  for 3 days after your procedure  · Rest after your procedure  You have been given medicine to relax you  Do not  drive or make important decisions until the day after your procedure  Return to your normal activity as directed  · Relieve gas and discomfort from bloating  by lying on your right side with a heating pad on your abdomen  You may need to take short walks to help the gas move out  Eat small meals until bloating is relieved  If you had polyps removed: For 7 days after your procedure:  · Do not  take aspirin  · Do not  go on long car rides  Help prevent constipation:   · Eat a variety of healthy foods  Healthy foods include fruit, vegetables, whole-grain breads, low-fat dairy products, beans, lean meat, and fish  Ask if you need to be on a special diet  Your healthcare provider may recommend that you eat high-fiber foods such as cooked beans  Fiber helps you have regular bowel movements  · Drink liquids as directed  Adults should drink between 9 and 13 eight-ounce cups of liquid every day  Ask what amount is best for you  For most people, good liquids to drink are water, juice, and milk  · Exercise as directed  Talk to your healthcare provider about the best exercise plan for you  Exercise can help prevent constipation, decrease your blood pressure and improve your health  Follow up with your healthcare provider as directed:  Write down your questions so you remember to ask them during your visits     © 2017 [employer] Information is for End User's use only and may not be sold, redistributed or otherwise used for commercial purposes  All illustrations and images included in CareNotes® are the copyrighted property of [employer]  or Marshall Navarro  The above information is an  only  It is not intended as medical advice for individual conditions or treatments  Talk to your doctor, nurse or pharmacist before following any medical regimen to see if it is safe and effective for you

## 2018-04-06 NOTE — PROGRESS NOTES
[facility] - Outpatient Consultation  Aileen Leonardo 66 y o  female MRN: [id number]  Encounter: [id number]          ASSESSMENT AND PLAN:      1  Screening for colon cancer-she presents here for her index colonoscopy  She is average risk with no family history  She does have rare symptoms of IBS D associated common triggers such as dairy  Colonoscopy will be planned for evaluation of colorectal cancer  Will rule out microscopic colitis in the procedure  - Case request operating room: COLONOSCOPY; Standing  - Na Sulfate-K Sulfate-Mg Sulf (SUPREP BOWEL PREP KIT) 17 5-3 13-1 6 GM/180ML SOLN; Take 177 mL by mouth once for 1 dose  Dispense: 2 Bottle; Refill: 0  - Case request operating room: COLONOSCOPY    2  Lactose intolerance  -discuss FODMAP diet  -discussed avoidance of triggers by keeping a diary  -probiotics    3  Irritable bowel syndrome with diarrhea-will rule out underlying cause such as celiac disease and H pylori  - Celiac Disease Antibody Profile; Future  - H  pylori antigen, stool; Future    ______________________________________________________________________    HPI:      Patient is a 62-year-old female presents here for index colonoscopy  No alarming symptoms at this time  She does have symptoms of IBS D with irritation and diarrhea which turned triggers such as dairy  The symptoms are not ongoing and intermittent in nature associated with specific triggers  No other alarm symptoms  Denies any nausea, vomiting, fevers, chills  REVIEW OF SYSTEMS:    CONSTITUTIONAL: Denies any fever, chills, rigors, and weight loss  HEENT: No earache or tinnitus  Denies hearing loss or visual disturbances  CARDIOVASCULAR: No chest pain or palpitations  RESPIRATORY: Denies any cough, hemoptysis, shortness of breath or dyspnea on exertion  GASTROINTESTINAL: As noted in the History of Present Illness  GENITOURINARY: No problems with urination   Denies any hematuria or dysuria  NEUROLOGIC: No dizziness or vertigo, denies headaches  MUSCULOSKELETAL: Denies any muscle or joint pain  SKIN: Denies skin rashes or itching  ENDOCRINE: Denies excessive thirst  Denies intolerance to heat or cold  PSYCHOSOCIAL: Denies depression or anxiety  Denies any recent memory loss  Historical Information   Past Medical History:   Diagnosis Date    ADHD     Allergic     Asthma     Depression     Disease of thyroid gland     Scoliosis      Past Surgical History:   Procedure Laterality Date    TONSILLECTOMY      TONSILLECTOMY       Social History   History   Alcohol Use    Yes     Comment: social     History   Drug Use No     History   Smoking Status    Never Smoker   Smokeless Tobacco    Never Used     Family History   Problem Relation Age of Onset    Prostate cancer Father     Anemia Father     Prostate cancer Brother        Meds/Allergies       Current Outpatient Prescriptions:     amphetamine-dextroamphetamine (ADDERALL) 5 MG tablet    cetirizine (ZyrTEC) 10 mg tablet    Cholecalciferol (VITAMIN D3) 1000 units CAPS    fluticasone-salmeterol (ADVAIR DISKUS) 100-50 mcg/dose    levothyroxine 50 mcg tablet    Multiple Vitamin-Folic Acid TABS    Triamcinolone Acetonide (NASACORT AQ) 55 MCG/ACT AERO    venlafaxine (EFFEXOR-XR) 150 mg 24 hr capsule    budesonide-formoterol (SYMBICORT) 80-4 5 MCG/ACT inhaler    cyclobenzaprine (FEXMID) 7 5 MG tablet    multivitamin (THERAGRAN) TABS    Na Sulfate-K Sulfate-Mg Sulf (SUPREP BOWEL PREP KIT) 17 5-3 13-1 6 GM/180ML SOLN    Allergies   Allergen Reactions    Prochlorperazine Anaphylaxis     Category: Allergy; Annotation - [id number]: ALL FORMS    Erythromycin GI Intolerance     Category: Allergy; Annotation - [id number]: ALL FORMS    Sulfa Antibiotics GI Intolerance     Patient states that it is like when you are drunk, dizziness and confusion    Sulfamethoxazole-Trimethoprim      Category: Allergy;  Annotation - [id number]: ALL FORMS           Objective     Blood pressure 159/97, pulse 82, temperature 98 °F (36 7 °C), temperature source Tympanic, height 5' (1 524 m), weight 72 7 kg (160 lb 3 2 oz)  PHYSICAL EXAM:      General Appearance:   Alert, cooperative, no distress   HEENT:   Normocephalic, atraumatic, anicteric      Neck:  Supple, symmetrical, trachea midline   Lungs:   Clear to auscultation bilaterally; no rales, rhonchi or wheezing; respirations unlabored    Heart[de-identified]   Regular rate and rhythm; no murmur, rub, or gallop  Abdomen:   Soft, non-tender, non-distended; normal bowel sounds; no masses   Genitalia:   Deferred    Rectal:   Deferred    Extremities:  No cyanosis, clubbing or edema    Pulses:  2+ and symmetric    Skin:  No jaundice, rashes, or lesions    Lymph nodes:  No palpable cervical lymphadenopathy        Lab Results:   No visits with results within 1 Day(s) from this visit  Latest known visit with results is:   Appointment on 04/02/2018   Component Date Value    Ventricular Rate 04/02/2018 70     Atrial Rate 04/02/2018 70     DE Interval 04/02/2018 130     QRSD Interval 04/02/2018 96     QT Interval 04/02/2018 408     QTC Interval 04/02/2018 440     P Axis 04/02/2018 36     QRS Axis 04/02/2018 19     T Wave Axis 04/02/2018 21          Radiology Results:   Xr Chest Pa & Lateral    Result Date: 4/3/2018  Narrative: CHEST INDICATION:   E21 3: Hyperparathyroidism, unspecified  Preoperative evaluation  COMPARISON:  1/9/2016 EXAM PERFORMED/VIEWS:  XR CHEST PA & LATERAL FINDINGS: Cardiomediastinal silhouette appears stable  The lungs are clear  No pneumothorax or pleural effusion  Osseous structures appear stable with severe dextroscoliosis of the thoracic spine  Impression: No acute cardiopulmonary disease   Workstation performed: [id number]

## 2018-04-06 NOTE — LETTER
April 6, 2018     Veliz Ashish, [address] [address]    Patient: Nkechi Adams   YOB: 1960   Date of Visit: 4/6/2018       Dear Dr Parvin Uriostegui: Thank you for referring Nkechi Adams to me for evaluation  Below are my notes for this consultation  If you have questions, please do not hesitate to call me  I look forward to following your patient along with you  Sincerely,        Lilia Alvarez MD        CC: MD Lilia Macias MD  4/6/2018 12:17 PM  Sign at close encounter  [facility] - Outpatient Consultation  Nkechi Adams 66 y o  female MRN: [id number]  Encounter: [id number]          ASSESSMENT AND PLAN:      1  Screening for colon cancer-she presents here for her index colonoscopy  She is average risk with no family history  She does have rare symptoms of IBS D associated common triggers such as dairy  Colonoscopy will be planned for evaluation of colorectal cancer  Will rule out microscopic colitis in the procedure  - Case request operating room: COLONOSCOPY; Standing  - Na Sulfate-K Sulfate-Mg Sulf (SUPREP BOWEL PREP KIT) 17 5-3 13-1 6 GM/180ML SOLN; Take 177 mL by mouth once for 1 dose  Dispense: 2 Bottle; Refill: 0  - Case request operating room: COLONOSCOPY    2  Lactose intolerance  -discuss FODMAP diet  -discussed avoidance of triggers by keeping a diary  -probiotics    3  Irritable bowel syndrome with diarrhea-will rule out underlying cause such as celiac disease and H pylori  - Celiac Disease Antibody Profile; Future  - H  pylori antigen, stool; Future    ______________________________________________________________________    HPI:      Patient is a 28-year-old female presents here for index colonoscopy  No alarming symptoms at this time  She does have symptoms of IBS D with irritation and diarrhea which turned triggers such as dairy    The symptoms are not ongoing and intermittent in nature associated with specific triggers  No other alarm symptoms  Denies any nausea, vomiting, fevers, chills  REVIEW OF SYSTEMS:    CONSTITUTIONAL: Denies any fever, chills, rigors, and weight loss  HEENT: No earache or tinnitus  Denies hearing loss or visual disturbances  CARDIOVASCULAR: No chest pain or palpitations  RESPIRATORY: Denies any cough, hemoptysis, shortness of breath or dyspnea on exertion  GASTROINTESTINAL: As noted in the History of Present Illness  GENITOURINARY: No problems with urination  Denies any hematuria or dysuria  NEUROLOGIC: No dizziness or vertigo, denies headaches  MUSCULOSKELETAL: Denies any muscle or joint pain  SKIN: Denies skin rashes or itching  ENDOCRINE: Denies excessive thirst  Denies intolerance to heat or cold  PSYCHOSOCIAL: Denies depression or anxiety  Denies any recent memory loss         Historical Information   Past Medical History:   Diagnosis Date    ADHD     Allergic     Asthma     Depression     Disease of thyroid gland     Scoliosis      Past Surgical History:   Procedure Laterality Date    TONSILLECTOMY      TONSILLECTOMY       Social History   History   Alcohol Use    Yes     Comment: social     History   Drug Use No     History   Smoking Status    Never Smoker   Smokeless Tobacco    Never Used     Family History   Problem Relation Age of Onset    Prostate cancer Father     Anemia Father     Prostate cancer Brother        Meds/Allergies       Current Outpatient Prescriptions:     amphetamine-dextroamphetamine (ADDERALL) 5 MG tablet    cetirizine (ZyrTEC) 10 mg tablet    Cholecalciferol (VITAMIN D3) 1000 units CAPS    fluticasone-salmeterol (ADVAIR DISKUS) 100-50 mcg/dose    levothyroxine 50 mcg tablet    Multiple Vitamin-Folic Acid TABS    Triamcinolone Acetonide (NASACORT AQ) 55 MCG/ACT AERO    venlafaxine (EFFEXOR-XR) 150 mg 24 hr capsule    budesonide-formoterol (SYMBICORT) 80-4 5 MCG/ACT inhaler    cyclobenzaprine (FEXMID) 7 5 MG tablet    multivitamin (THERAGRAN) TABS    Na Sulfate-K Sulfate-Mg Sulf (SUPREP BOWEL PREP KIT) 17 5-3 13-1 6 GM/180ML SOLN    Allergies   Allergen Reactions    Prochlorperazine Anaphylaxis     Category: Allergy; Annotation - [id number]: ALL FORMS    Erythromycin GI Intolerance     Category: Allergy; Annotation - [id number]: ALL FORMS    Sulfa Antibiotics GI Intolerance     Patient states that it is like when you are drunk, dizziness and confusion    Sulfamethoxazole-Trimethoprim      Category: Allergy; Annotation - [id number]: ALL FORMS           Objective     Blood pressure 159/97, pulse 82, temperature 98 °F (36 7 °C), temperature source Tympanic, height 5' (1 524 m), weight 72 7 kg (160 lb 3 2 oz)  PHYSICAL EXAM:      General Appearance:   Alert, cooperative, no distress   HEENT:   Normocephalic, atraumatic, anicteric      Neck:  Supple, symmetrical, trachea midline   Lungs:   Clear to auscultation bilaterally; no rales, rhonchi or wheezing; respirations unlabored    Heart[de-identified]   Regular rate and rhythm; no murmur, rub, or gallop  Abdomen:   Soft, non-tender, non-distended; normal bowel sounds; no masses   Genitalia:   Deferred    Rectal:   Deferred    Extremities:  No cyanosis, clubbing or edema    Pulses:  2+ and symmetric    Skin:  No jaundice, rashes, or lesions    Lymph nodes:  No palpable cervical lymphadenopathy        Lab Results:   No visits with results within 1 Day(s) from this visit     Latest known visit with results is:   Appointment on 04/02/2018   Component Date Value    Ventricular Rate 04/02/2018 70     Atrial Rate 04/02/2018 70     [place] Interval 04/02/2018 130     QRSD Interval 04/02/2018 96     QT Interval 04/02/2018 408     QTC Interval 04/02/2018 440     P Axis 04/02/2018 36     QRS Axis 04/02/2018 19     T Wave Axis 04/02/2018 21          Radiology Results:   Xr Chest Pa & Lateral    Result Date: 4/3/2018  Narrative: CHEST INDICATION:   E21 3: Hyperparathyroidism, unspecified  Preoperative evaluation  COMPARISON:  1/9/2016 EXAM PERFORMED/VIEWS:  XR CHEST PA & LATERAL FINDINGS: Cardiomediastinal silhouette appears stable  The lungs are clear  No pneumothorax or pleural effusion  Osseous structures appear stable with severe dextroscoliosis of the thoracic spine  Impression: No acute cardiopulmonary disease   Workstation performed: [id number]

## 2018-05-03 DIAGNOSIS — F90.0 ADHD, PREDOMINANTLY INATTENTIVE TYPE: ICD-10-CM

## 2018-05-03 RX ORDER — DEXTROAMPHETAMINE SACCHARATE, AMPHETAMINE ASPARTATE, DEXTROAMPHETAMINE SULFATE AND AMPHETAMINE SULFATE 1.25; 1.25; 1.25; 1.25 MG/1; MG/1; MG/1; MG/1
TABLET ORAL
Qty: 75 TABLET | Refills: 0 | Status: SHIPPED | OUTPATIENT
Start: 2018-05-03 | End: 2018-06-11 | Stop reason: SDUPTHER

## 2018-05-07 ENCOUNTER — OFFICE VISIT (OUTPATIENT)
Dept: INTERNAL MEDICINE CLINIC | Age: 58
End: 2018-05-07
Payer: COMMERCIAL

## 2018-05-07 VITALS
SYSTOLIC BLOOD PRESSURE: 130 MMHG | TEMPERATURE: 98.2 F | RESPIRATION RATE: 20 BRPM | BODY MASS INDEX: 30.89 KG/M2 | WEIGHT: 163.6 LBS | HEART RATE: 82 BPM | OXYGEN SATURATION: 98 % | DIASTOLIC BLOOD PRESSURE: 84 MMHG | HEIGHT: 61 IN

## 2018-05-07 DIAGNOSIS — R31.0 GROSS HEMATURIA: Primary | ICD-10-CM

## 2018-05-07 DIAGNOSIS — Z12.39 SCREENING FOR BREAST CANCER: ICD-10-CM

## 2018-05-07 LAB
SL AMB  POCT GLUCOSE, UA: NEGATIVE
SL AMB LEUKOCYTE ESTERASE,UA: ABNORMAL
SL AMB POCT BILIRUBIN,UA: NEGATIVE
SL AMB POCT BLOOD,UA: ABNORMAL
SL AMB POCT CLARITY,UA: ABNORMAL
SL AMB POCT COLOR,UA: YELLOW
SL AMB POCT KETONES,UA: NEGATIVE
SL AMB POCT NITRITE,UA: NEGATIVE
SL AMB POCT PH,UA: 7
SL AMB POCT SPECIFIC GRAVITY,UA: 1.01
SL AMB POCT URINE PROTEIN: NEGATIVE
SL AMB POCT UROBILINOGEN: 0.2

## 2018-05-07 PROCEDURE — 87086 URINE CULTURE/COLONY COUNT: CPT | Performed by: NURSE PRACTITIONER

## 2018-05-07 PROCEDURE — 99213 OFFICE O/P EST LOW 20 MIN: CPT | Performed by: NURSE PRACTITIONER

## 2018-05-07 PROCEDURE — 81003 URINALYSIS AUTO W/O SCOPE: CPT | Performed by: NURSE PRACTITIONER

## 2018-05-07 RX ORDER — DEXTROAMPHETAMINE SACCHARATE, AMPHETAMINE ASPARTATE, DEXTROAMPHETAMINE SULFATE AND AMPHETAMINE SULFATE 1.25; 1.25; 1.25; 1.25 MG/1; MG/1; MG/1; MG/1
TABLET ORAL
Qty: 75 TABLET | Refills: 0 | Status: CANCELLED | OUTPATIENT
Start: 2018-05-07

## 2018-05-07 NOTE — PROGRESS NOTES
Assessment/Plan:    Asymptomatic hematuria:  Pt with gross hematuria starting today  Udip in office completed  No other s/sx of UTI  Large blood, Small leuk, nitrate negative  Will send for a culture, unlikely UTI and will hold on abx as no other s/x  Our office will contact if abx needed  No concerns for kidney stone at this time - given warning signs  Will refer to urology for possible cystoscopy  Diagnoses and all orders for this visit:    Gross hematuria  -     POCT urine dip  -     Ambulatory referral to Urology; Future  -     Urine culture; Future  -     Urine culture    Screening for breast cancer  -     Mammo screening bilateral w cad; Future    Other orders  -     Cancel: amphetamine-dextroamphetamine (ADDERALL) 5 MG tablet; Take 1 5 tab in the morning and 1 tab in the evening        Subjective:      Patient ID: Sergey Storey is a 66 y o  female  HPI     Hematuria  Pt c/o blood in her urine, starting this morning  Denies clots  Denies dsyuria, Urgency, frequency, back pain, suprapubic pain, vaginal bleeding, vaginal discharge  No past medical history kidney stones  Nonsmoker  Patient is postmenopausal   She has never seen a urologist   She is due for her first colonoscopy June 21st     The following portions of the patient's history were reviewed and updated as appropriate: allergies, current medications, past family history, past medical history, past social history, past surgical history and problem list     Review of Systems   Constitutional: Negative for chills, fatigue and fever  Respiratory: Negative for cough, shortness of breath and wheezing  Cardiovascular: Negative for chest pain  Gastrointestinal: Negative for abdominal pain, constipation, diarrhea, nausea and vomiting  Genitourinary: Positive for hematuria  Negative for difficulty urinating, dysuria, flank pain, frequency, pelvic pain, urgency, vaginal bleeding, vaginal discharge and vaginal pain          Denies flank pain   Musculoskeletal: Negative for back pain  Neurological: Negative for dizziness, light-headedness and headaches  Past Medical History:   Diagnosis Date    ADHD     Allergic     Asthma     Depression     Disease of thyroid gland     Scoliosis          Current Outpatient Prescriptions:     amphetamine-dextroamphetamine (ADDERALL) 5 MG tablet, Earliest Fill Date: 5/3/18 Take 1 5 tab in the morning and 1 tab in the evening, Disp: 75 tablet, Rfl: 0    cetirizine (ZyrTEC) 10 mg tablet, Take 2 tablets by mouth daily, Disp: , Rfl:     Cholecalciferol (VITAMIN D3) 1000 units CAPS, Take 1 capsule by mouth Daily  , Disp: , Rfl:     fluticasone-salmeterol (ADVAIR DISKUS) 100-50 mcg/dose, Inhale 1 puff 2 (two) times a day, Disp: , Rfl:     levothyroxine 50 mcg tablet, Take 1 tablet by mouth daily, Disp: , Rfl:     multivitamin (THERAGRAN) TABS, Take 1 tablet by mouth, Disp: , Rfl:     Triamcinolone Acetonide (NASACORT AQ) 55 MCG/ACT AERO, 55 mcg into each nostril Daily 2 puffs in each nostril , Disp: , Rfl:     venlafaxine (EFFEXOR-XR) 150 mg 24 hr capsule, Take 1 capsule (150 mg total) by mouth daily, Disp: 90 capsule, Rfl: 0    budesonide-formoterol (SYMBICORT) 80-4 5 MCG/ACT inhaler, Inhale 2 puffs daily  , Disp: , Rfl:     cyclobenzaprine (FEXMID) 7 5 MG tablet, Take 1 tablet by mouth 2 (two) times a day as needed, Disp: , Rfl:     Multiple Vitamin-Folic Acid TABS, Take 1 tablet by mouth daily, Disp: , Rfl:     Na Sulfate-K Sulfate-Mg Sulf (SUPREP BOWEL PREP KIT) 17 5-3 13-1 6 GM/180ML SOLN, Take 177 mL by mouth once for 1 dose, Disp: 2 Bottle, Rfl: 0    Allergies   Allergen Reactions    Prochlorperazine Anaphylaxis     Category: Allergy; Annotation - [id number]: ALL FORMS    Erythromycin GI Intolerance     Category: Allergy;  Annotation - [id number]: ALL FORMS    Sulfa Antibiotics GI Intolerance     Patient states that it is like when you are drunk, dizziness and confusion    Sulfamethoxazole-Trimethoprim      Category: Allergy; Annotation - [id number]: ALL FORMS       Social History   Past Surgical History:   Procedure Laterality Date    PARATHYROID GLAND SURGERY  5/17/2018    TONSILLECTOMY      TONSILLECTOMY       Family History   Problem Relation Age of Onset    Prostate cancer Father     Anemia Father     Prostate cancer Brother        Objective:  /84 (BP Location: Right arm, Patient Position: Sitting, Cuff Size: Adult)   Pulse 82   Temp 98 2 °F (36 8 °C) (Tympanic)   Resp 20   Ht 5' 0 83" (1 545 m)   Wt 74 2 kg (163 lb 9 6 oz)   SpO2 98%   BMI 31 09 kg/m²      Physical Exam   Constitutional: She is oriented to person, place, and time  She appears well-developed and well-nourished  No distress  Cardiovascular: Normal rate, regular rhythm and normal heart sounds  Pulmonary/Chest: Effort normal and breath sounds normal  No respiratory distress  She has no wheezes  Abdominal: Soft  Bowel sounds are normal  She exhibits no distension and no mass  There is no tenderness  There is no rebound and no guarding  Genitourinary:   Genitourinary Comments: No CVA tenderness   Neurological: She is alert and oriented to person, place, and time  Skin: Skin is warm and dry  Psychiatric: She has a normal mood and affect  Her behavior is normal  Judgment and thought content normal    Nursing note and vitals reviewed

## 2018-05-07 NOTE — PATIENT INSTRUCTIONS
Asymptomatic hematuria:  Your UA in office did show blood  No concerns for a UTI  Will send for a culture  Will will call if abx are needed    Will refer to urology for possible cystoscopy

## 2018-05-08 LAB — BACTERIA UR CULT: NORMAL

## 2018-05-16 ENCOUNTER — ANESTHESIA EVENT (OUTPATIENT)
Dept: PERIOP | Facility: HOSPITAL | Age: 58
End: 2018-05-16
Payer: COMMERCIAL

## 2018-05-16 DIAGNOSIS — E21.3 HYPERPARATHYROIDISM (HCC): Primary | ICD-10-CM

## 2018-05-16 NOTE — ANESTHESIA PREPROCEDURE EVALUATION
Review of Systems/Medical History  Patient summary reviewed        Cardiovascular  Hyperlipidemia, Hypertension ,    Pulmonary  Asthma , Sleep apnea ,        GI/Hepatic    GERD ,             Endo/Other  History of thyroid disease ,      GYN       Hematology   Musculoskeletal       Neurology   Psychology   Depression ,                   Anesthesia Plan  ASA Score- 2     Anesthesia Type- general with ASA Monitors  Additional Monitors: arterial line  Airway Plan: ETT  Plan Factors-    Induction- intravenous  Postoperative Plan- Plan for postoperative opioid use  Informed Consent- Anesthetic plan and risks discussed with patient

## 2018-05-17 ENCOUNTER — ANESTHESIA (OUTPATIENT)
Dept: PERIOP | Facility: HOSPITAL | Age: 58
End: 2018-05-17
Payer: COMMERCIAL

## 2018-05-17 ENCOUNTER — HOSPITAL ENCOUNTER (OUTPATIENT)
Facility: HOSPITAL | Age: 58
Setting detail: OUTPATIENT SURGERY
Discharge: HOME | End: 2018-05-17
Attending: SURGERY | Admitting: SURGERY
Payer: COMMERCIAL

## 2018-05-17 VITALS
DIASTOLIC BLOOD PRESSURE: 71 MMHG | WEIGHT: 160 LBS | TEMPERATURE: 99.5 F | RESPIRATION RATE: 16 BRPM | HEART RATE: 110 BPM | OXYGEN SATURATION: 95 % | SYSTOLIC BLOOD PRESSURE: 118 MMHG | BODY MASS INDEX: 30.21 KG/M2 | HEIGHT: 61 IN

## 2018-05-17 DIAGNOSIS — E21.3 HYPERPARATHYROIDISM (HCC): ICD-10-CM

## 2018-05-17 LAB
CALCIUM SERPL-MCNC: 10 MG/DL (ref 8.3–10.1)
CALCIUM SERPL-MCNC: 9.6 MG/DL (ref 8.3–10.1)
PTH-INTACT SERPL-MCNC: 129.7 PG/ML (ref 18.4–80.1)
PTH-INTACT SERPL-MCNC: 178.9 PG/ML (ref 18.4–80.1)
PTH-INTACT SERPL-MCNC: 43.8 PG/ML (ref 18.4–80.1)
PTH-INTACT SERPL-MCNC: 55.3 PG/ML (ref 18.4–80.1)

## 2018-05-17 PROCEDURE — 82310 ASSAY OF CALCIUM: CPT | Performed by: SURGERY

## 2018-05-17 PROCEDURE — 88331 PATH CONSLTJ SURG 1 BLK 1SPC: CPT | Performed by: PATHOLOGY

## 2018-05-17 PROCEDURE — 83970 ASSAY OF PARATHORMONE: CPT | Performed by: SURGERY

## 2018-05-17 PROCEDURE — 88305 TISSUE EXAM BY PATHOLOGIST: CPT | Performed by: PATHOLOGY

## 2018-05-17 PROCEDURE — 99024 POSTOP FOLLOW-UP VISIT: CPT | Performed by: SURGERY

## 2018-05-17 PROCEDURE — 60500 EXPLORE PARATHYROID GLANDS: CPT | Performed by: SURGERY

## 2018-05-17 RX ORDER — ACETAMINOPHEN 325 MG/1
650 TABLET ORAL EVERY 6 HOURS PRN
Status: DISCONTINUED | OUTPATIENT
Start: 2018-05-17 | End: 2018-05-17 | Stop reason: HOSPADM

## 2018-05-17 RX ORDER — MIDAZOLAM HYDROCHLORIDE 1 MG/ML
INJECTION INTRAMUSCULAR; INTRAVENOUS AS NEEDED
Status: DISCONTINUED | OUTPATIENT
Start: 2018-05-17 | End: 2018-05-17 | Stop reason: SURG

## 2018-05-17 RX ORDER — OXYCODONE HYDROCHLORIDE 5 MG/1
5 TABLET ORAL EVERY 4 HOURS PRN
Status: DISCONTINUED | OUTPATIENT
Start: 2018-05-17 | End: 2018-05-17 | Stop reason: HOSPADM

## 2018-05-17 RX ORDER — ONDANSETRON 2 MG/ML
4 INJECTION INTRAMUSCULAR; INTRAVENOUS EVERY 4 HOURS PRN
Status: DISCONTINUED | OUTPATIENT
Start: 2018-05-17 | End: 2018-05-17 | Stop reason: HOSPADM

## 2018-05-17 RX ORDER — FENTANYL CITRATE 50 UG/ML
INJECTION, SOLUTION INTRAMUSCULAR; INTRAVENOUS AS NEEDED
Status: DISCONTINUED | OUTPATIENT
Start: 2018-05-17 | End: 2018-05-17 | Stop reason: SURG

## 2018-05-17 RX ORDER — KETOROLAC TROMETHAMINE 30 MG/ML
INJECTION, SOLUTION INTRAMUSCULAR; INTRAVENOUS AS NEEDED
Status: DISCONTINUED | OUTPATIENT
Start: 2018-05-17 | End: 2018-05-17 | Stop reason: SURG

## 2018-05-17 RX ORDER — BUPIVACAINE HYDROCHLORIDE 2.5 MG/ML
INJECTION, SOLUTION EPIDURAL; INFILTRATION; INTRACAUDAL AS NEEDED
Status: DISCONTINUED | OUTPATIENT
Start: 2018-05-17 | End: 2018-05-17 | Stop reason: HOSPADM

## 2018-05-17 RX ORDER — ROCURONIUM BROMIDE 10 MG/ML
INJECTION, SOLUTION INTRAVENOUS AS NEEDED
Status: DISCONTINUED | OUTPATIENT
Start: 2018-05-17 | End: 2018-05-17 | Stop reason: SURG

## 2018-05-17 RX ORDER — GLYCOPYRROLATE 0.2 MG/ML
INJECTION INTRAMUSCULAR; INTRAVENOUS AS NEEDED
Status: DISCONTINUED | OUTPATIENT
Start: 2018-05-17 | End: 2018-05-17 | Stop reason: SURG

## 2018-05-17 RX ORDER — EPHEDRINE SULFATE 50 MG/ML
INJECTION, SOLUTION INTRAVENOUS AS NEEDED
Status: DISCONTINUED | OUTPATIENT
Start: 2018-05-17 | End: 2018-05-17 | Stop reason: SURG

## 2018-05-17 RX ORDER — OXYCODONE HYDROCHLORIDE 5 MG/1
10 TABLET ORAL EVERY 4 HOURS PRN
Status: DISCONTINUED | OUTPATIENT
Start: 2018-05-17 | End: 2018-05-17 | Stop reason: HOSPADM

## 2018-05-17 RX ORDER — PROPOFOL 10 MG/ML
INJECTION, EMULSION INTRAVENOUS AS NEEDED
Status: DISCONTINUED | OUTPATIENT
Start: 2018-05-17 | End: 2018-05-17 | Stop reason: SURG

## 2018-05-17 RX ORDER — SODIUM CHLORIDE, SODIUM LACTATE, POTASSIUM CHLORIDE, CALCIUM CHLORIDE 600; 310; 30; 20 MG/100ML; MG/100ML; MG/100ML; MG/100ML
20 INJECTION, SOLUTION INTRAVENOUS CONTINUOUS
Status: DISCONTINUED | OUTPATIENT
Start: 2018-05-17 | End: 2018-05-17 | Stop reason: HOSPADM

## 2018-05-17 RX ORDER — FENTANYL CITRATE/PF 50 MCG/ML
25 SYRINGE (ML) INJECTION
Status: DISCONTINUED | OUTPATIENT
Start: 2018-05-17 | End: 2018-05-17 | Stop reason: HOSPADM

## 2018-05-17 RX ORDER — ONDANSETRON 2 MG/ML
INJECTION INTRAMUSCULAR; INTRAVENOUS AS NEEDED
Status: DISCONTINUED | OUTPATIENT
Start: 2018-05-17 | End: 2018-05-17 | Stop reason: SURG

## 2018-05-17 RX ADMIN — ONDANSETRON 4 MG: 2 INJECTION INTRAMUSCULAR; INTRAVENOUS at 15:11

## 2018-05-17 RX ADMIN — OXYCODONE HYDROCHLORIDE 10 MG: 5 TABLET ORAL at 17:45

## 2018-05-17 RX ADMIN — EPHEDRINE SULFATE 10 MG: 50 INJECTION, SOLUTION INTRAMUSCULAR; INTRAVENOUS; SUBCUTANEOUS at 14:03

## 2018-05-17 RX ADMIN — MIDAZOLAM 2 MG: 1 INJECTION INTRAMUSCULAR; INTRAVENOUS at 13:45

## 2018-05-17 RX ADMIN — NEOSTIGMINE METHYLSULFATE 3 MG: 1 INJECTION, SOLUTION INTRAMUSCULAR; INTRAVENOUS; SUBCUTANEOUS at 15:42

## 2018-05-17 RX ADMIN — DEXAMETHASONE SODIUM PHOSPHATE 4 MG: 10 INJECTION INTRAMUSCULAR; INTRAVENOUS at 14:11

## 2018-05-17 RX ADMIN — PROPOFOL 200 MG: 10 INJECTION, EMULSION INTRAVENOUS at 13:50

## 2018-05-17 RX ADMIN — KETOROLAC TROMETHAMINE 30 MG: 30 INJECTION, SOLUTION INTRAMUSCULAR at 15:47

## 2018-05-17 RX ADMIN — FENTANYL CITRATE 100 MCG: 50 INJECTION, SOLUTION INTRAMUSCULAR; INTRAVENOUS at 13:50

## 2018-05-17 RX ADMIN — LIDOCAINE HYDROCHLORIDE 100 MG: 20 INJECTION, SOLUTION INTRAVENOUS at 13:50

## 2018-05-17 RX ADMIN — SODIUM CHLORIDE, SODIUM LACTATE, POTASSIUM CHLORIDE, AND CALCIUM CHLORIDE 20 ML/HR: .6; .31; .03; .02 INJECTION, SOLUTION INTRAVENOUS at 11:13

## 2018-05-17 RX ADMIN — GLYCOPYRROLATE 0.4 MG: 0.2 INJECTION, SOLUTION INTRAMUSCULAR; INTRAVENOUS at 15:42

## 2018-05-17 RX ADMIN — ROCURONIUM BROMIDE 40 MG: 10 INJECTION INTRAVENOUS at 13:50

## 2018-05-17 NOTE — H&P
H&P - Vascular Surgery   Alberto Haynes 66 y o  female MRN: [id number]  Unit/Bed#: GARRICK LUNA Encounter: [id number]        Assessment/ Plan:    Hyperparathyroidism/ Parathyroid adenoma  Osteopenia  --proceed w/ planned R minimally invasive parathryoidecomy, possible 4-gland exploration    Obesity - BMI 30 23    Hematuria (Gross x1, Microscopic)  --proceed w/ planned outpt f/u Dr Inocente Hodges 2018    Dyslipidemia  --recomm outpt PCP f/u    Hypothyroidism  --Levothyroxine  ADHD  --Adderrall  Depression  --Effexor  Allergies/ Asthma  --Advair, Zyrtec, Nasocort  ERICA- CPAP        ____________________________________________________________________  Chief Complaint:  · (periop H &P out of date) --please refer to Dr Payal Elizalde office visit 18  · Planned for R parathyroid surgery    HPI: Alberto Haynes is a 66 y o  female who presents for planned parathyroid surgery-- R minimally invasive parathyroidectomy, possible 4-gland exploration w/ Dr Payal Elizalde  [facility]/ [facility] staff noted H &P to be   Phongjenniaddison Watson affirms last visit w/ Dr Payal Elizalde likely in 2018  She notes history of routine physical exam w/ laboratory data revealing elevated Ca levels  Further workup ensued to include U/S &  Nuclear test (Sestamibi) revealing thyroid nodule and "overactive parathyroids" for which surgery has been recommended  She does nto feel sympotmatic  She denies arthralgias/ myalgias except as may be related to arthritis  She denies muscle/ facial spasms, numbness/ tingling  She has add'l medical problems to include Sleep apnea & ADHD  Changes to her medical history since Dr Payal Elizalde evaluation in April= episode of gross hematuria  She had PCP f/u noting microscopic hematuria  She is planned for Urology- Dr Inocente Hodges evaluation in   She denies dysuria, frequency, urgency  She has had no add'l episodes of voiding gross blood  She does note some pelvic pressure     + Thirst    Other than noted above, she denies c/o    She has no swallowing difficulties  She denies CP/ SOB  She denies changes to bowel habits-- No constipation/ diarrhea/ bloody nor black stools    Review of Systems: as noted in HPI  General: positive for  - as noted in HPI  Cardiovascular: no chest pain or dyspnea on exertion  Respiratory: no cough, shortness of breath, or wheezing  Gastrointestinal: no abdominal pain, change in bowel habits, or black or bloody stools  Genitourinary: no dysuria, trouble voiding,   Musculoskeletal: negative  Neurological: no TIA or stroke symptoms  Hematological and Lymphatic: negative  Dermatological: negative  Psychological: negative  Ophthalmic: negative  ENT: negative    Past Medical History:  Past Medical History:   Diagnosis Date    ADHD     Allergic     Asthma     Depression     Hematuria     Hyperparathyroidism ([address] )     Hypothyroidism     Scoliosis     Thyroid nodule        Past Surgical History:  Past Surgical History:   Procedure Laterality Date    FOOT SURGERY Right     "Nerve removal"    PARATHYROID GLAND SURGERY  5/17/2018    TONSILLECTOMY         Social History:  History   Alcohol Use    Yes     Comment: socially     History   Drug Use No     History   Smoking Status    Former Smoker    Types: Cigarettes   Smokeless Tobacco    Never Used       Family History:  Family History   Problem Relation Age of Onset    Prostate cancer Father     Anemia Father     Prostate cancer Brother     Heart Valve Disease Mother      s/p MVR       Allergies: Allergies   Allergen Reactions    Prochlorperazine Anaphylaxis     Category: Allergy; Annotation - [id number]: ALL FORMS    Erythromycin GI Intolerance     Category: Allergy; Annotation - [id number]: ALL FORMS    Sulfa Antibiotics GI Intolerance     Patient states that it is like when you are drunk, dizziness and confusion    Sulfamethoxazole-Trimethoprim      Category: Allergy;  Annotation - [id number]: ALL FORMS       Medications:  Home meds:   Prior to Admission medications    Medication Sig Start Date End Date Taking? Authorizing Provider   amphetamine-dextroamphetamine (ADDERALL) 5 MG tablet Earliest Fill Date: 5/3/18 Take 1 5 tab in the morning and 1 tab in the evening 5/3/18  Yes CHEYANNE Don   cetirizine (ZyrTEC) 10 mg tablet Take 2 tablets by mouth daily   Yes Historical Provider, MD   Cholecalciferol (VITAMIN D3) 1000 units CAPS Take 1 capsule by mouth Daily   1/15/18  Yes Historical Provider, MD   fluticasone-salmeterol (ADVAIR DISKUS) 100-50 mcg/dose Inhale 1 puff 2 (two) times a day 6/23/17  Yes Historical Provider, MD   levothyroxine 50 mcg tablet Take 1 tablet by mouth daily 7/19/17  Yes Historical Provider, MD   multivitamin (THERAGRAN) TABS Take 1 tablet by mouth   Yes Historical Provider, MD   Triamcinolone Acetonide (NASACORT AQ) 55 MCG/ACT AERO 55 mcg into each nostril Daily 2 puffs in each nostril    Yes Historical Provider, MD   venlafaxine (EFFEXOR-XR) 150 mg 24 hr capsule Take 1 capsule (150 mg total) by mouth daily 2/23/18  Yes CHEYANNE Don         Vitals:  /93   Pulse 81   Temp 98 9 °F (37 2 °C) (Tympanic Core)   Resp 16   Ht 5' 1" (1 549 m)   Wt 72 6 kg (160 lb)   SpO2 98%   BMI 30 23 kg/m²   Body mass index is 30 23 kg/m²  Weight (last 2 days)     Date/Time   Weight    05/17/18 1101  72 6 (160)              I/Os:  No intake or output data in the 24 hours ending 05/17/18 1214    Physical Exam:    General appearance: alert, appears stated age and cooperative  Head: Normocephalic, without obvious abnormality, atraumatic  Eyes: conjunctivae/corneas clear  PERRL, EOM's intact  Throat: lips, mucosa, and tongue normal; teeth and gums normal  Neck: no adenopathy, no carotid bruit, no JVD, supple, symmetrical, trachea midline and thyroid not enlarged, symmetric, no tenderness/mass/nodules  Back: symmetric, no curvature  ROM normal  No CVA tenderness    Lungs: clear to auscultation bilaterally  Chest wall: no tenderness  Heart: regular rate and rhythm, S1, S2 normal, no murmur, click, rub or gallop  Abdomen: soft, non-tender; bowel sounds normal; no masses,  no organomegaly  Genitalia: deferred  Rectal: deferred  Extremities: extremities normal, atraumatic, no cyanosis or edema   R foot dorsal surgical scar  Skin: Skin color, texture, turgor normal  No rashes or lesions  Neurologic: Grossly normal    Pulse exam:  Radial: Right: 2+               Left: 2+  Femoral: Right: 2+                   Left: 2+  DP: Right: 2+          Left: 2+  PT: Right: 2+         Left: 2+      Lab Results and Cultures:    PTH: 178 9      CBC with diff:   Lab Results   Component Value Date    WBC 7 43 2018    HGB 14 1 2018    HCT 39 6 2018    MCV 87 2018     2018    MCH 31 0 2018    MCHC 35 6 2018    RDW 13 7 2018    MPV 11 3 2018    NRBC 0 2016      BMP/CMP:  Lab Results   Component Value Date     2018    K 3 5 2018     2018    CO2 31 2018    ANIONGAP 5 2018    BUN 17 2018    CREATININE 0 84 2018    GLUCOSE 98 2018    CALCIUM 11 0 (H) 2018    EGFR 78 2018   ,     Coags:   Lab Results   Component Value Date    PTT 27 2018    INR 0 97 2018   ,          Lipid Panel:   Lab Results   Component Value Date    CHOL 253 (H) 2017     Lab Results   Component Value Date    HDL 61 (H) 2017     Lab Results   Component Value Date    HDL 61 (H) 2017     Lab Results   Component Value Date    LDLCALC 154 (H) 2017     Lab Results   Component Value Date    TRIG 192 (H) 2017       HgbA1c: No results found for: HGBA1C    Urine Culture:   Lab Results   Component Value Date    URINECX No Growth <1000 cfu/mL 2018       Imagin/2 CXR- neg      DEXA- Based on the [facility] classification, the T-score of -1 3 in the lumbar spine is consistent with low bone mineral density     2   The 10 year risk of hip fracture is 0 3 % with the 10 year risk of major osteoporotic fracture being 6  % as calculated by the [facility] fracture risk assessment tool (FRAX)  The current NOF guidelines recommend treating patients with FRAX 10 year risk   score of >3% for hip fracture and >20% for major osteoporotic fracture  3   Any secondary causes of low bone mineral density should be excluded prior to treatment, if clinically indicated  4   A daily intake of at least 1200 mg calcium and 800 - 1000 IU of Vitamin D, as well as weight bearing and muscle strengthening exercise, fall prevention and avoidance of tobacco and excessive alcohol intake as basic preventive measures are suggested  1/29 Parathyroid scan- Persistent focus of radiotracer activity along the inferior border of the right thyroid, most concerning for a parathyroid adenoma  1/18 Thyroid u/s- Solid nodule inferior to R thyroid (1 3 x 0 7 x 0 8 cm) --LN vs possibly parathyroid           EKG, Pathology, and Other Studies:   VTE Prophylaxis:      Code Status: No Order  Advance Directive and Living Will:      Power of :    POLST:      Ishan Lama PA-C  5/17/2018

## 2018-05-17 NOTE — PROGRESS NOTES
Dr Gertrudis Andres and Dr Isabelle Troncoso to [address] to re-evaluate patient  Dr Gertrudis Adnres removed pt's sterile neck towel  Patient tolerated light snack PO  Pt's friend and designated  Louann Sarmiento and her grandson Dayne Healy to bedside

## 2018-05-17 NOTE — ANESTHESIA POSTPROCEDURE EVALUATION
Post-Op Assessment Note      CV Status:  Stable    Mental Status:  Alert and awake    Hydration Status:  Euvolemic    PONV Controlled:  Controlled    Airway Patency:  Patent    Post Op Vitals Reviewed: Yes          Staff: CRNA, Anesthesiologist           BP   140/75   Temp   97 5   Pulse  102   Resp   12   SpO2   100

## 2018-05-17 NOTE — PROGRESS NOTES
Medicated with Roxicodone 10 mg PO for c/o surgical pain  Pt's prescription tubed to [facility] tube # 11  Pt's friend Rossy Yeung to  when ready

## 2018-05-17 NOTE — OP NOTE
OPERATIVE REPORT  PATIENT NAME: Henrique Reyes    :  1960  MRN: [id number]  Pt Location: [facility] OR ROOM 07    SURGERY DATE: 2018    Surgeon(s) and Role:     * Eve Piper MD - Primary     * Michelle Sequeira - Assisting    Preop Diagnosis:  Hyperparathyroidism ([facility] [address] ) [E21 3]    Post-Op Diagnosis Codes:     * Hyperparathyroidism ([address] ) [E21 3]    Procedure(s) (LRB):  MINIMALLY INVASIVE PARATHYROIDECTOMY;   PTH MONITORING (Right)    Specimen(s):  ID Type Source Tests Collected by Time Destination   1 :  Tissue Parathyroid TISSUE EXAM Eve Piper MD 2018 1449        Estimated Blood Loss:   Minimal    Drains:       Anesthesia Type:   General    Operative Indications:  Hyperparathyroidism ([address] ) [E21 3]  Suspected right sided parathyroid adenoma    Operative Findings:  690 mg right inferior parathyroid adenoma, with PTH starting from baseline of 178 9, T0 minues 129 7, T5 minutes 55 3, T10 miutes 55 6    Complications:   None    Procedure and Technique:  Patient is a 65-year-old woman with presumed primary hyperparathyroidism  She has a sestamibi scan suggestive of right-sided process  She comes in for right-sided MIP with intraoperative PTH monitoring  He is brought to the OR and identified by a proper time-out  Following this she was intubated by the anesthesia team she was then prepped and draped with the neck exposed using a shoulder roll to help with exposure  Following is a 3 cm incision was made 2 fingerbreadths above the sternal notch  Cautery was used to dissect through the dermis and subcutaneous tissue down to the platysma  Platysma then transected  Retractors then placed  Strap muscles were divided the midline  This exposed the thyroid underneath  Strap muscles were mobilized off the anterolateral aspect of the right thyroid lobe particularly the along the inferior pole    This revealed what appeared to be a large parathyroid gland which was dissected out from surrounding tissue in hemostatic fashion  Vascular pedicle was visualized and clipped  PTH level drawn at the start of the case were 178 9  At the time the gland was resected, this came back at 129 7; Levels came back at 55 3 and 43 8 at  5 minutes and 10 minutes afterwards, these came back at 55 3 and 43 8 respectively  Given that they have normalized, and given that we had sent a frozen section off of the resected specimen, which confirmed hyperplastic parathyroid tissue, the procedure was terminated  Once we are sure hemostasis, closure was performed using 3 0 Vicryl to close the strap muscles the midline, followed by 3 0 Vicryl to close the platysma, followed by 4 0 Vicryl close the dermis, followed by 5 0 Monocryl to close the skin in subcuticular fashion  Benzoin and Steri-Strips were applied for the patient was awakened and transferred to the recovery room in stable condition  Sponge and instrument counts were correct at the end of the case     I was present for the entire procedure    Patient Disposition:  PACU     SIGNATURE: Glenis Smith MD  DATE: May 17, 2018  TIME: 4:00 PM

## 2018-05-17 NOTE — DISCHARGE INSTRUCTIONS
POST-OPERATIVE WOUND CARE INSTRUCTIONS    Your wound is closed with:   Sterile strips-white pieces of tape that hold your incision together    Wound care: You may remove your dressing after 24 hours   You may shower using soap and water to clean your wound  Gently pat it dry  You may redress your wound for comfort as needed  Activity:   Did not perform any heavy lifting or strenuous physical activity for 7 days  Your activity restrictions will be reevaluated at your postoperative visit  Medications: You may resume all your preoperative medications and diet  Pain medication as directed on the prescription given in the office  Other:   May use ice on the wound for 24-48 hours as needed for comfort  Call the office at [phone] if you have any of the followin  Redness, swelling, heat, unusual drainage or heavy bleeding from the wound     2  Fever greater than 101°F    3  Pain not relieved by the prescribed pain medication

## 2018-05-17 NOTE — PERIOPERATIVE NURSING NOTE
VSS, pt denies pain or nausea, assessment unchanged, report called, no questions, receiving RN aware of pending lab work,  pt transferred to [facility]

## 2018-05-18 NOTE — PERIOPERATIVE NURSING NOTE
Calcium level resulted at 10 0  Spoke with surgical resident Iline Babinski, notified of results, ok to discharge patient home

## 2018-05-20 ENCOUNTER — OFFICE VISIT (OUTPATIENT)
Dept: URGENT CARE | Facility: MEDICAL CENTER | Age: 58
End: 2018-05-20
Payer: COMMERCIAL

## 2018-05-20 VITALS
HEIGHT: 60 IN | HEART RATE: 83 BPM | OXYGEN SATURATION: 95 % | SYSTOLIC BLOOD PRESSURE: 126 MMHG | TEMPERATURE: 98 F | BODY MASS INDEX: 32.2 KG/M2 | DIASTOLIC BLOOD PRESSURE: 84 MMHG | RESPIRATION RATE: 18 BRPM | WEIGHT: 164 LBS

## 2018-05-20 DIAGNOSIS — N30.01 ACUTE CYSTITIS WITH HEMATURIA: Primary | ICD-10-CM

## 2018-05-20 LAB
BACTERIA UR QL AUTO: ABNORMAL /HPF
BILIRUB UR QL STRIP: NEGATIVE
CLARITY UR: CLEAR
COLOR UR: ABNORMAL
GLUCOSE UR STRIP-MCNC: NEGATIVE MG/DL
HGB UR QL STRIP.AUTO: ABNORMAL
HYALINE CASTS #/AREA URNS LPF: ABNORMAL /LPF
KETONES UR STRIP-MCNC: NEGATIVE MG/DL
LEUKOCYTE ESTERASE UR QL STRIP: ABNORMAL
NITRITE UR QL STRIP: POSITIVE
NON-SQ EPI CELLS URNS QL MICRO: ABNORMAL /HPF
PH UR STRIP.AUTO: 7 [PH] (ref 4.5–8)
PROT UR STRIP-MCNC: NEGATIVE MG/DL
RBC #/AREA URNS AUTO: ABNORMAL /HPF
SP GR UR STRIP.AUTO: 1.01 (ref 1–1.03)
UROBILINOGEN UR QL STRIP.AUTO: 1 E.U./DL
WBC #/AREA URNS AUTO: ABNORMAL /HPF

## 2018-05-20 PROCEDURE — 81001 URINALYSIS AUTO W/SCOPE: CPT | Performed by: PHYSICIAN ASSISTANT

## 2018-05-20 PROCEDURE — S9088 SERVICES PROVIDED IN URGENT: HCPCS | Performed by: PHYSICIAN ASSISTANT

## 2018-05-20 PROCEDURE — 99213 OFFICE O/P EST LOW 20 MIN: CPT | Performed by: PHYSICIAN ASSISTANT

## 2018-05-20 PROCEDURE — 87086 URINE CULTURE/COLONY COUNT: CPT | Performed by: PHYSICIAN ASSISTANT

## 2018-05-20 RX ORDER — AMOXICILLIN 500 MG/1
500 CAPSULE ORAL EVERY 12 HOURS SCHEDULED
Qty: 14 CAPSULE | Refills: 0 | Status: SHIPPED | OUTPATIENT
Start: 2018-05-20 | End: 2018-05-27

## 2018-05-20 RX ORDER — TRAMADOL HYDROCHLORIDE 50 MG/1
50 TABLET ORAL EVERY 6 HOURS PRN
Status: ON HOLD | COMMUNITY
End: 2018-06-21 | Stop reason: ALTCHOICE

## 2018-05-20 NOTE — PROGRESS NOTES
Presents with pelvic discomfort and urinary frequency for about 3 days  Is supposed to see Urologist for gross hematuria on June 7th  Has been taking AZO for the last 3 days

## 2018-05-20 NOTE — PROGRESS NOTES
[address] [employer] [facility]        NAME: Henrique Reyes is a 66 y o  female  : 1960    MRN: [id number]  DATE: May 20, 2018  TIME: 4:14 PM    Assessment and Plan   Acute cystitis with hematuria [N30 01]  1  Acute cystitis with hematuria  amoxicillin (AMOXIL) 500 mg capsule    Urine culture    UA (URINE) with reflex to Microscopic         Patient Instructions     Will treat for UTI given typical symptoms  Unable to dip urine due to AZO use  Will send urine for culture and microscopy  Take amoxicillin twice daily for 7 days  Take with food and eat yogurt or a probiotic to decrease GI upset  Follow up with your urologist   Follow up with PCP in 3-5 days  Proceed to  ER if symptoms worsen  Chief Complaint     Chief Complaint   Patient presents with    Pelvic Pain    Urinary Frequency         History of Present Illness        This is a 29-year-old female presenting for UTI symptoms x2 days  Symptoms include urinary frequency, urgency, dysuria, suprapubic pressure and pain  She denies any flank pain, back pain, fevers  She does feel little bit nauseous at times but denies any vomiting or diarrhea  She recently had an episode of gross hematuria, was seen by her PCP and referred to Urology, she has an appointment with Urology but has not been able to see them yet  She was not treated for UTI times gross hematuria due to lack of typical symptoms  Urinary Frequency    This is a new problem  The current episode started in the past 7 days  The problem occurs every urination  The problem has been gradually worsening  Associated symptoms include frequency, hematuria, nausea and urgency  Pertinent negatives include no chills, discharge, flank pain, hesitancy, possible pregnancy, sweats or vomiting  Review of Systems   Review of Systems   Constitutional: Negative for chills, fatigue and fever  Respiratory: Negative  Cardiovascular: Negative  Gastrointestinal: Positive for nausea   Negative for diarrhea and vomiting  Genitourinary: Positive for dysuria, frequency, hematuria, pelvic pain and urgency  Negative for decreased urine volume, difficulty urinating, dyspareunia, enuresis, flank pain, genital sores, hesitancy and menstrual problem  Skin: Negative for rash  Neurological: Negative for dizziness           Current Medications       Current Outpatient Prescriptions:     traMADol (ULTRAM) 50 mg tablet, Take 50 mg by mouth every 6 (six) hours as needed for moderate pain, Disp: , Rfl:     amoxicillin (AMOXIL) 500 mg capsule, Take 1 capsule (500 mg total) by mouth every 12 (twelve) hours for 7 days, Disp: 14 capsule, Rfl: 0    amphetamine-dextroamphetamine (ADDERALL) 5 MG tablet, Earliest Fill Date: 5/3/18 Take 1 5 tab in the morning and 1 tab in the evening, Disp: 75 tablet, Rfl: 0    cetirizine (ZyrTEC) 10 mg tablet, Take 2 tablets by mouth daily, Disp: , Rfl:     Cholecalciferol (VITAMIN D3) 1000 units CAPS, Take 1 capsule by mouth Daily  , Disp: , Rfl:     fluticasone-salmeterol (ADVAIR DISKUS) 100-50 mcg/dose, Inhale 1 puff 2 (two) times a day, Disp: , Rfl:     levothyroxine 50 mcg tablet, Take 1 tablet by mouth daily, Disp: , Rfl:     multivitamin (THERAGRAN) TABS, Take 1 tablet by mouth, Disp: , Rfl:     Triamcinolone Acetonide (NASACORT AQ) 55 MCG/ACT AERO, 55 mcg into each nostril Daily 2 puffs in each nostril , Disp: , Rfl:     venlafaxine (EFFEXOR-XR) 150 mg 24 hr capsule, Take 1 capsule (150 mg total) by mouth daily, Disp: 90 capsule, Rfl: 0    Current Allergies     Allergies as of 05/20/2018 - Reviewed 05/20/2018   Allergen Reaction Noted    Prochlorperazine Anaphylaxis 10/22/2013    Erythromycin GI Intolerance 10/22/2013    Sulfa antibiotics GI Intolerance 03/08/2016    Sulfamethoxazole-trimethoprim  10/22/2013            The following portions of the patient's history were reviewed and updated as appropriate: allergies, current medications, past family history, past medical history, past social history, past surgical history and problem list      Past Medical History:   Diagnosis Date    ADHD     Allergic     Asthma     Depression     Hematuria     Hyperparathyroidism ([address] )     Hypothyroidism     Obstructive sleep apnea on CPAP     Scoliosis     Thyroid nodule        Past Surgical History:   Procedure Laterality Date    FOOT SURGERY Right     "Nerve removal"    PARATHYROID GLAND SURGERY  5/17/2018    ND EXPLORE PARATHYROID GLANDS Right 5/17/2018    Procedure: MINIMALLY INVASIVE PARATHYROIDECTOMY;   PTH MONITORING;  Surgeon: Kaylee Reyes MD;  Location: [facility];  Service: Surgical Oncology    TONSILLECTOMY         Family History   Problem Relation Age of Onset    Prostate cancer Father     Anemia Father     Prostate cancer Brother     Heart Valve Disease Mother      s/p MVR         Medications have been verified  Objective   /84   Pulse 83   Temp 98 °F (36 7 °C) (Temporal)   Resp 18   Ht 5' (1 524 m)   Wt 74 4 kg (164 lb)   SpO2 95%   BMI 32 03 kg/m²        Physical Exam     Physical Exam   Constitutional: She appears well-developed and well-nourished  No distress  Cardiovascular: Normal rate, regular rhythm and normal heart sounds  Pulmonary/Chest: Effort normal and breath sounds normal  No respiratory distress  She has no wheezes  She has no rales  Abdominal: Soft  Normal appearance and bowel sounds are normal  She exhibits no distension and no mass  There is tenderness (Suprapubic tenderness, - CVA tenderness b/l)  There is no rebound, no guarding and no CVA tenderness  Neurological: She is alert  Skin: Skin is warm and dry  She is not diaphoretic  Nursing note and vitals reviewed

## 2018-05-20 NOTE — PATIENT INSTRUCTIONS
Will treat for UTI given typical symptoms  Unable to dip urine due to AZO use  Will send urine for culture and microscopy  Take amoxicillin twice daily for 7 days  Take with food and eat yogurt or a probiotic to decrease GI upset  Follow up with your urologist   Yocasta Wells to the ER for any distress

## 2018-05-21 LAB — BACTERIA UR CULT: NORMAL

## 2018-05-29 DIAGNOSIS — F32.A DEPRESSION, UNSPECIFIED DEPRESSION TYPE: ICD-10-CM

## 2018-05-29 RX ORDER — VENLAFAXINE HYDROCHLORIDE 150 MG/1
150 CAPSULE, EXTENDED RELEASE ORAL DAILY
Qty: 90 CAPSULE | Refills: 0 | Status: SHIPPED | OUTPATIENT
Start: 2018-05-29 | End: 2018-08-27 | Stop reason: SDUPTHER

## 2018-06-04 ENCOUNTER — OFFICE VISIT (OUTPATIENT)
Dept: SURGICAL ONCOLOGY | Facility: CLINIC | Age: 58
End: 2018-06-04

## 2018-06-04 VITALS
SYSTOLIC BLOOD PRESSURE: 130 MMHG | DIASTOLIC BLOOD PRESSURE: 76 MMHG | RESPIRATION RATE: 18 BRPM | TEMPERATURE: 98.6 F | BODY MASS INDEX: 32.2 KG/M2 | HEART RATE: 88 BPM | HEIGHT: 60 IN | WEIGHT: 164 LBS

## 2018-06-04 DIAGNOSIS — E21.3 HYPERPARATHYROIDISM (HCC): Primary | ICD-10-CM

## 2018-06-04 PROCEDURE — 99024 POSTOP FOLLOW-UP VISIT: CPT | Performed by: SURGERY

## 2018-06-04 NOTE — LETTER
June 4, 2018     Bryant Mcburney, MD  [address]    Patient: Bianca Piedra   YOB: 1960   Date of Visit: 6/4/2018       Dear Dr Justine Malone: Thank you for referring Bianca Piedra to me for evaluation  Below are my notes for this consultation  If you have questions, please do not hesitate to call me  I look forward to following your patient along with you  Sincerely,        Shannen Penn MD        CC: MD Robyn Cuello CRNP Amber Lonia Nevin, MD  6/4/2018  9:40 AM  Sign at close encounter     Surgical Oncology Follow Up       [facility]  [address]  [facility] [address] [address] [phone]    Bianca Piedra  1960  [id number]  [address] [facility] [address] [facility]  [address]  [facility] [address] [facility]  [phone]    Chief Complaint   Patient presents with    Post-op     Patient is here following parathyroidectomy       Assessment/Plan:    No problem-specific Assessment & Plan notes found for this encounter  Diagnoses and all orders for this visit:    Hyperparathyroidism ([address] )  -     Calcium; Future  -     PTH, intact; Future        Advance Care Planning/Advance Directives:  Discussed disease status, cancer treatment plans and/or cancer treatment goals with the patient  No history exists  History of Present Illness:  Patient is here for postop check status post right MIP     -Interval History: She is doing well with no complaints after surgery  She states that she has more energy after surgery, though this may in part be due to CPAP which he has recently started using at night  Review of Systems:  Review of Systems   Constitutional: Negative  All other systems reviewed and are negative        Patient Active Problem List   Diagnosis    Osteopenia of multiple sites    ADHD, predominantly inattentive type    GERD without esophagitis    Hypercalcemia    Hypercholesteremia    Hyperparathyroidism ([address] )    Hypertension, essential, benign    Hypothyroidism    ERICA (obstructive sleep apnea)    Right thyroid nodule    Snoring    Vitamin D deficiency    Lumbar strain    Depression    Visit for routine gyn exam    Screening for colon cancer    Encounter for gynecological examination without abnormal finding    Allergic    Obesity (BMI 30-39  9)    Chronic rhinitis    Lactose intolerance    Gross hematuria     Past Medical History:   Diagnosis Date    ADHD     Allergic     Asthma     Depression     Hematuria     Hyperpar[address]roidism ([facility] [address] )     Hypothyroidism     Obstructive sleep apnea on CPAP     Scoliosis     Thyroid nodule      Past Surgical History:   Procedure Laterality Date    FOOT SURGERY Right     "Nerve removal"    PARATHYROID GLAND SURGERY  5/17/2018    [place] EXPLORE PARATHYROID GLANDS Right 5/17/2018    Procedure: MINIMALLY INVASIVE PARATHYROIDECTOMY;   PTH MONITORING;  Surgeon: Glenis Smith MD;  [facility];  Service: Surgical Oncology    TONSILLECTOMY       Family History   Problem Relation Age of Onset    Prostate cancer Father     Anemia Father     Prostate cancer Brother     Heart Valve Disease Mother      s/p MVR     Social History     Social History    Marital status: Single     Spouse name: N/A    Number of children: N/A    Years of education: N/A     Occupational History    Not on file       Social History Main Topics    Smoking status: Former Smoker     Types: Cigarettes    Smokeless tobacco: Never Used    Alcohol use Yes      Comment: socially    Drug use: No    Sexual activity: Not on file     Other Topics Concern    Not on file     Social History Narrative    No narrative on file       Current Outpatient Prescriptions:     amphetamine-dextroamphetamine (ADDERALL) 5 MG tablet, Earliest Fill Date: 5/3/18 Take 1 5 tab in the morning and 1 tab in the evening, Disp: 75 tablet, Rfl: 0    cetirizine (ZyrTEC) 10 mg tablet, Take 2 tablets by mouth daily, Disp: , Rfl:     Cholecalciferol (VITAMIN D3) 1000 units CAPS, Take 1 capsule by mouth Daily  , Disp: , Rfl:     fluticasone-salmeterol (ADVAIR DISKUS) 100-50 mcg/dose, Inhale 1 puff 2 (two) times a day, Disp: , Rfl:     levothyroxine 50 mcg tablet, Take 1 tablet by mouth daily, Disp: , Rfl:     multivitamin (THERAGRAN) TABS, Take 1 tablet by mouth, Disp: , Rfl:     Triamcinolone Acetonide (NASACORT AQ) 55 MCG/ACT AERO, 55 mcg into each nostril Daily 2 puffs in each nostril , Disp: , Rfl:     venlafaxine (EFFEXOR-XR) 150 mg 24 hr capsule, Take 1 capsule (150 mg total) by mouth daily, Disp: 90 capsule, Rfl: 0    traMADol (ULTRAM) 50 mg tablet, Take 50 mg by mouth every 6 (six) hours as needed for moderate pain, Disp: , Rfl:   Allergies   Allergen Reactions    Compazine [Prochlorperazine] Anaphylaxis     Category: Allergy; Annotation - [id number]: ALL FORMS    Erythromycin GI Intolerance     Category: Allergy; Annotation - [id number]: ALL FORMS    Sulfa Antibiotics GI Intolerance     Patient states that it is like when you are drunk, dizziness and confusion    Sulfamethoxazole-Trimethoprim      Category: Allergy; Annotation - [id number]: ALL FORMS     Vitals:    06/04/18 0915   BP: 130/76   Pulse: 88   Resp: 18   Temp: 98 6 °F (37 °C)       Physical Exam   Neck:   Incision clean dry and intact           Results:  Labs:  Case Report   Surgical Pathology Report                         Case: [id number]                                    Authorizing Provider: Hardeep Sexton MD        Collected:           05/17/2018 2431               Ordering Location:     [address]      Received:            05/17/2018 [address] [place] [address] Operating Room                                                       Pathologist:           Rah Walker MD                                                         Intraop:               Rah Walker MD                                                         Specimen:    Parathyroid                     Imaging  No results found  I reviewed the above laboratory and imaging data  Discussion/Summary:  Status post been waist of right parathyroidectomy, doing well  Plan on 6 month follow-up with PTH and calcium levels to assess for durability

## 2018-06-04 NOTE — PROGRESS NOTES
Surgical Oncology Follow Up       [address] [facility] [address] [place] [address] [phone]    Joseluis Simpson  1960  [id number]  [address] [facility] [address] [place] [address] [phone]    Chief Complaint   Patient presents with    Post-op     Patient is here following parathyroidectomy       Assessment/Plan:    No problem-specific Assessment & Plan notes found for this encounter  Diagnoses and all orders for this visit:    Hyperparathyroidism ([address] )  -     Calcium; Future  -     PTH, intact; Future        Advance Care Planning/Advance Directives:  Discussed disease status, cancer treatment plans and/or cancer treatment goals with the patient  No history exists  History of Present Illness:  Patient is here for postop check status post right MIP     -Interval History: She is doing well with no complaints after surgery  She states that she has more energy after surgery, though this may in part be due to CPAP which he has recently started using at night  Review of Systems:  Review of Systems   Constitutional: Negative  All other systems reviewed and are negative  Patient Active Problem List   Diagnosis    Osteopenia of multiple sites    ADHD, predominantly inattentive type    GERD without esophagitis    Hypercalcemia    Hypercholesteremia    Hyperparathyroidism ([address] )    Hypertension, essential, benign    Hypothyroidism    ERICA (obstructive sleep apnea)    Right thyroid nodule    Snoring    Vitamin D deficiency    Lumbar strain    Depression    Visit for routine gyn exam    Screening for colon cancer    Encounter for gynecological examination without abnormal finding    Allergic    Obesity (BMI 30-39  9)    Chronic rhinitis    Lactose intolerance    Gross hematuria     Past Medical History: Diagnosis Date    ADHD     Allergic     Asthma     Depression     Hematuria     Hyperparathyroidism ([address] )     Hypothyroidism     Obstructive sleep apnea on CPAP     Scoliosis     Thyroid nodule      Past Surgical History:   Procedure Laterality Date    FOOT SURGERY Right     "Nerve removal"    PARATHYROID GLAND SURGERY  5/17/2018    [place] EXPLORE PARATHYROID GLANDS Right 5/17/2018    Procedure: MINIMALLY INVASIVE PARATHYROIDECTOMY;   PTH MONITORING;  Surgeon: Rogerio Tinsley MD;  [facility];  Service: Surgical Oncology    TONSILLECTOMY       Family History   Problem Relation Age of Onset    Prostate cancer Father     Anemia Father     Prostate cancer Brother     Heart Valve Disease Mother      s/p MVR     Social History     Social History    Marital status: Single     Spouse name: N/A    Number of children: N/A    Years of education: N/A     Occupational History    Not on file       Social History Main Topics    Smoking status: Former Smoker     Types: Cigarettes    Smokeless tobacco: Never Used    Alcohol use Yes      Comment: socially    Drug use: No    Sexual activity: Not on file     Other Topics Concern    Not on file     Social History Narrative    No narrative on file       Current Outpatient Prescriptions:     amphetamine-dextroamphetamine (ADDERALL) 5 MG tablet, Earliest Fill Date: 5/3/18 Take 1 5 tab in the morning and 1 tab in the evening, Disp: 75 tablet, Rfl: 0    cetirizine (ZyrTEC) 10 mg tablet, Take 2 tablets by mouth daily, Disp: , Rfl:     Cholecalciferol (VITAMIN D3) 1000 units CAPS, Take 1 capsule by mouth Daily  , Disp: , Rfl:     fluticasone-salmeterol (ADVAIR DISKUS) 100-50 mcg/dose, Inhale 1 puff 2 (two) times a day, Disp: , Rfl:     levothyroxine 50 mcg tablet, Take 1 tablet by mouth daily, Disp: , Rfl:     multivitamin (THERAGRAN) TABS, Take 1 tablet by mouth, Disp: , Rfl:     Triamcinolone Acetonide (NASACORT AQ) 55 MCG/ACT AERO, 55 mcg into each nostril Daily 2 puffs in each nostril , Disp: , Rfl:     venlafaxine (EFFEXOR-XR) 150 mg 24 hr capsule, Take 1 capsule (150 mg total) by mouth daily, Disp: 90 capsule, Rfl: 0    traMADol (ULTRAM) 50 mg tablet, Take 50 mg by mouth every 6 (six) hours as needed for moderate pain, Disp: , Rfl:   Allergies   Allergen Reactions    Compazine [Prochlorperazine] Anaphylaxis     Category: Allergy; Annotation - [id number]: ALL FORMS    Erythromycin GI Intolerance     Category: Allergy; Annotation - [id number]: ALL FORMS    Sulfa Antibiotics GI Intolerance     Patient states that it is like when you are drunk, dizziness and confusion    Sulfamethoxazole-Trimethoprim      Category: Allergy; Annotation - [id number]: ALL FORMS     Vitals:    06/04/18 0915   BP: 130/76   Pulse: 88   Resp: 18   Temp: 98 6 °F (37 °C)       Physical Exam   Neck:   Incision clean dry and intact  Results:  Labs:  Case Report   Surgical Pathology Report                         Case: [id number]                                    Authorizing Provider: Solo Amato MD        Collected:           05/17/2018 1449               Ordering Location:     [address]      Received:            05/17/2018 [address] [place] [address] Operating Room                                                       Pathologist:           [id number], MD                                                         Intraop:               Deline M [address] Armand Kwok MD                                                         Specimen:    Parathyroid                     Imaging  No results found  I reviewed the above laboratory and imaging data  Discussion/Summary:  Status post been waist of right parathyroidectomy, doing well  Plan on 6 month follow-up with PTH and calcium levels to assess for durability

## 2018-06-07 ENCOUNTER — OFFICE VISIT (OUTPATIENT)
Dept: UROLOGY | Facility: CLINIC | Age: 58
End: 2018-06-07
Payer: COMMERCIAL

## 2018-06-07 VITALS
WEIGHT: 163 LBS | BODY MASS INDEX: 32 KG/M2 | HEIGHT: 60 IN | SYSTOLIC BLOOD PRESSURE: 145 MMHG | DIASTOLIC BLOOD PRESSURE: 72 MMHG | HEART RATE: 72 BPM

## 2018-06-07 DIAGNOSIS — R31.0 GROSS HEMATURIA: Primary | ICD-10-CM

## 2018-06-07 LAB
SL AMB  POCT GLUCOSE, UA: NORMAL
SL AMB LEUKOCYTE ESTERASE,UA: NORMAL
SL AMB POCT BLOOD,UA: NORMAL
SL AMB POCT CLARITY,UA: CLEAR
SL AMB POCT COLOR,UA: YELLOW
SL AMB POCT KETONES,UA: NORMAL
SL AMB POCT NITRITE,UA: NORMAL
SL AMB POCT PH,UA: 7
SL AMB POCT URINE PROTEIN: NORMAL

## 2018-06-07 PROCEDURE — 81002 URINALYSIS NONAUTO W/O SCOPE: CPT | Performed by: PHYSICIAN ASSISTANT

## 2018-06-07 PROCEDURE — 99203 OFFICE O/P NEW LOW 30 MIN: CPT | Performed by: PHYSICIAN ASSISTANT

## 2018-06-08 NOTE — PROGRESS NOTES
UROLOGY NEW PATIENT NOTE     Patient Identifiers: Bismark Moraes (MRN: [id number])    Service Providing Consultation:  Urology, Jessica Sanchez PA-C  Consults  Date of Service: 6/8/2018    History of Present Illness:     Bismark Moraes is a 66 y o  female with no prior urologic history  She had an episode of gross hematuria  Her subsequent culture was no growth  She had follow up microscopic urinalysis showing trace blood  Her urine today is clear  She did have some lower abdominal discomfort during the initial episode  She gets an occasional UTI  No history of kidney stones  She has no children  She does wear a lite pad for occasional CECIL       Past Medical, Past Surgical History:     Past Medical History:   Diagnosis Date    ADHD     Allergic     Asthma     Depression     Hematuria     Hyperparathyroidism ([address] )     Hypothyroidism     Obstructive sleep apnea on CPAP     Scoliosis     Thyroid nodule    :    Past Surgical History:   Procedure Laterality Date    FOOT SURGERY Right     "Nerve removal"    PARATHYROID GLAND SURGERY  5/17/2018    [place] EXPLORE PARATHYROID GLANDS Right 5/17/2018    Procedure: MINIMALLY INVASIVE PARATHYROIDECTOMY;   PTH MONITORING;  Surgeon: Penny Keys MD;  [facility];  Service: Surgical Oncology    TONSILLECTOMY     :    Medications, Allergies:     Current Outpatient Prescriptions:     amphetamine-dextroamphetamine (ADDERALL) 5 MG tablet, Earliest Fill Date: 5/3/18 Take 1 5 tab in the morning and 1 tab in the evening, Disp: 75 tablet, Rfl: 0    cetirizine (ZyrTEC) 10 mg tablet, Take 2 tablets by mouth daily, Disp: , Rfl:     Cholecalciferol (VITAMIN D3) 1000 units CAPS, Take 1 capsule by mouth Daily  , Disp: , Rfl:     fluticasone-salmeterol (ADVAIR DISKUS) 100-50 mcg/dose, Inhale 1 puff 2 (two) times a day, Disp: , Rfl:     levothyroxine 50 mcg tablet, Take 1 tablet by mouth daily, Disp: , Rfl:     multivitamin (THERAGRAN) TABS, Take 1 tablet by mouth, Disp: , Rfl:     Triamcinolone Acetonide (NASACORT AQ) 55 MCG/ACT AERO, 55 mcg into each nostril Daily 2 puffs in each nostril , Disp: , Rfl:     venlafaxine (EFFEXOR-XR) 150 mg 24 hr capsule, Take 1 capsule (150 mg total) by mouth daily, Disp: 90 capsule, Rfl: 0    traMADol (ULTRAM) 50 mg tablet, Take 50 mg by mouth every 6 (six) hours as needed for moderate pain, Disp: , Rfl:     Allergies: Allergies   Allergen Reactions    Compazine [Prochlorperazine] Anaphylaxis     Category: Allergy; Annotation - [id number]: ALL FORMS    Erythromycin GI Intolerance     Category: Allergy; Annotation - [id number]: ALL FORMS    Sulfa Antibiotics GI Intolerance     Patient states that it is like when you are drunk, dizziness and confusion    Sulfamethoxazole-Trimethoprim      Category: Allergy; Annotation - [id number]: ALL FORMS   :    Social and Family History:   Social History:   Social History   Substance Use Topics    Smoking status: Never Smoker    Smokeless tobacco: Never Used      Comment: One year of occasional smoking    Alcohol use Yes      Comment: socially     History   Smoking Status    Never Smoker   Smokeless Tobacco    Never Used     Comment: One year of occasional smoking       Family History:  Family History   Problem Relation Age of Onset    Prostate cancer Father     Anemia Father     Prostate cancer Brother     Heart Valve Disease Mother      s/p MVR   :     Review of Systems:     General: Fever, chills, or night sweats: negative  Cardiac: Negative for chest pain  Pulmonary: Negative for shortness of breath  Gastrointestinal: Abdominal pain negative  Nausea, vomiting, or diarrhea negative,  Genitourinary: See HPI above  Patient does not have hematuria  All other systems queried were negative  Physical Exam:   General: Patient is pleasant and in NAD   Awake and alert  /72 (BP Location: Right arm, Patient Position: Sitting, Cuff Size: Adult)   Pulse 72   Ht 5' (1 524 m)   Wt 73 9 kg (163 lb)   LMP  (LMP Unknown)   Breastfeeding? No   BMI 31 83 kg/m²   Cardiac: Peripheral edema: negative  Pulmonary: Non-labored breathing  Abdomen: Soft, non-tender, non-distended  No surgical scars  No masses, tenderness, hernias noted  Genitourinary: Negative CVA tenderness, negative suprapubic tenderness  (FeMale):  Patient declined exam until the time of the procedure    Labs:     Lab Results   Component Value Date    HGB 14 1 04/02/2018    HCT 39 6 04/02/2018    WBC 7 43 04/02/2018     04/02/2018   ]    Lab Results   Component Value Date     04/02/2018    K 3 5 04/02/2018     04/02/2018    CO2 31 04/02/2018    BUN 17 04/02/2018    CREATININE 0 84 04/02/2018    CALCIUM 10 0 05/17/2018    GLUCOSE 98 04/02/2018   ]    Imaging:   I personally reviewed the images and report of the following studies, and reviewed them with the patient:  Appropriate imaging is ordered      ASSESSMENT:     1  Gross hematuria     PLAN:     - schedule a cat scan of the abdomen and pelvis for hematuria followed by a cysto either in the office or hospital depending on the findings  - she will call for the results    Thank you for allowing me to participate in this patients care  Please do not hesitate to call with any additional questions    Jessica Sanchez PA-C

## 2018-06-11 ENCOUNTER — ANESTHESIA EVENT (OUTPATIENT)
Dept: PERIOP | Facility: AMBULARY SURGERY CENTER | Age: 58
End: 2018-06-11
Payer: COMMERCIAL

## 2018-06-11 DIAGNOSIS — F90.0 ADHD, PREDOMINANTLY INATTENTIVE TYPE: ICD-10-CM

## 2018-06-12 RX ORDER — DEXTROAMPHETAMINE SACCHARATE, AMPHETAMINE ASPARTATE, DEXTROAMPHETAMINE SULFATE AND AMPHETAMINE SULFATE 1.25; 1.25; 1.25; 1.25 MG/1; MG/1; MG/1; MG/1
TABLET ORAL
Qty: 75 TABLET | Refills: 0 | Status: SHIPPED | OUTPATIENT
Start: 2018-06-12 | End: 2018-07-18 | Stop reason: SDUPTHER

## 2018-06-18 ENCOUNTER — TELEPHONE (OUTPATIENT)
Dept: GASTROENTEROLOGY | Facility: CLINIC | Age: 58
End: 2018-06-18

## 2018-06-18 NOTE — TELEPHONE ENCOUNTER
Dr Bonny Méndez pt    Pt called in requesting her prep  Also, she would like to know if it is ok for her to take her multivitamin  Please advise   She is scheduled on 6/21  [phone]

## 2018-06-21 ENCOUNTER — HOSPITAL ENCOUNTER (OUTPATIENT)
Facility: AMBULARY SURGERY CENTER | Age: 58
Setting detail: OUTPATIENT SURGERY
Discharge: HOME | End: 2018-06-21
Attending: INTERNAL MEDICINE | Admitting: INTERNAL MEDICINE
Payer: COMMERCIAL

## 2018-06-21 ENCOUNTER — ANESTHESIA (OUTPATIENT)
Dept: PERIOP | Facility: AMBULARY SURGERY CENTER | Age: 58
End: 2018-06-21
Payer: COMMERCIAL

## 2018-06-21 VITALS
OXYGEN SATURATION: 100 % | DIASTOLIC BLOOD PRESSURE: 83 MMHG | TEMPERATURE: 96.5 F | BODY MASS INDEX: 30.21 KG/M2 | HEIGHT: 61 IN | WEIGHT: 160 LBS | HEART RATE: 71 BPM | SYSTOLIC BLOOD PRESSURE: 152 MMHG | RESPIRATION RATE: 20 BRPM

## 2018-06-21 DIAGNOSIS — Z12.11 SCREENING FOR COLON CANCER: ICD-10-CM

## 2018-06-21 PROCEDURE — 88305 TISSUE EXAM BY PATHOLOGIST: CPT | Performed by: PATHOLOGY

## 2018-06-21 PROCEDURE — 45380 COLONOSCOPY AND BIOPSY: CPT | Performed by: INTERNAL MEDICINE

## 2018-06-21 RX ORDER — PROPOFOL 10 MG/ML
INJECTION, EMULSION INTRAVENOUS AS NEEDED
Status: DISCONTINUED | OUTPATIENT
Start: 2018-06-21 | End: 2018-06-21 | Stop reason: SURG

## 2018-06-21 RX ORDER — LIDOCAINE HYDROCHLORIDE 10 MG/ML
INJECTION, SOLUTION INFILTRATION; PERINEURAL AS NEEDED
Status: DISCONTINUED | OUTPATIENT
Start: 2018-06-21 | End: 2018-06-21 | Stop reason: SURG

## 2018-06-21 RX ORDER — SODIUM CHLORIDE 9 MG/ML
125 INJECTION, SOLUTION INTRAVENOUS CONTINUOUS
Status: DISCONTINUED | OUTPATIENT
Start: 2018-06-21 | End: 2018-06-21 | Stop reason: HOSPADM

## 2018-06-21 RX ADMIN — PROPOFOL 100 MG: 10 INJECTION, EMULSION INTRAVENOUS at 09:11

## 2018-06-21 RX ADMIN — PROPOFOL 100 MG: 10 INJECTION, EMULSION INTRAVENOUS at 09:15

## 2018-06-21 RX ADMIN — PROPOFOL 100 MG: 10 INJECTION, EMULSION INTRAVENOUS at 09:19

## 2018-06-21 RX ADMIN — PROPOFOL 100 MG: 10 INJECTION, EMULSION INTRAVENOUS at 09:07

## 2018-06-21 RX ADMIN — SODIUM CHLORIDE: 0.9 INJECTION, SOLUTION INTRAVENOUS at 08:58

## 2018-06-21 RX ADMIN — LIDOCAINE HYDROCHLORIDE 50 MG: 10 INJECTION, SOLUTION INFILTRATION; PERINEURAL at 09:07

## 2018-06-21 NOTE — PROCEDURES
Colonoscopy Procedure Note    Procedure: Colonoscopy    Sedation: Monitored anesthesia care, check anesthesia records      ASA Class: 2    INDICATIONS: Screening for Colon Cancer    POST-OP DIAGNOSIS: See the impression below    Procedure Details     Prior colonoscopy: No prior colonoscopy  Informed consent was obtained for the procedure, including sedation  Risks of perforation, hemorrhage, adverse drug reaction and aspiration were discussed  The patient was placed in the left lateral decubitus position  Based on the pre-procedure assessment, including review of the patient's medical history, medications, allergies, and review of systems, she had been deemed to be an appropriate candidate for conscious sedation; she was therefore sedated with the medications listed below  The patient was monitored continuously with telemetry, pulse oximetry, blood pressure monitoring, and direct observations  A rectal examination was performed  The colonoscope was inserted into the rectum and advanced under direct vision to the cecum, which was identified by the ileocecal valve and appendiceal orifice  The quality of the colonic preparation was excellent  A careful inspection was made as the colonoscope was withdrawn, including a retroflexed view of the rectum; findings and interventions are described below  Findings:  4 mm colonic polyp removed by cold biopsy polypectomy in the splenic flexure  Otherwise normal colon  Small internal nonbleeding hemorrhoids           Complications:  None; patient tolerated the procedure well  Impression:    Colonic polyp removed from the splenic flexure with cold biopsy polypectomy  Small internal hemorrhoids    Recommendations:  Repeat colonoscopy in 5 years if polyps are adenomas    If polyp is benign repeat colonoscopy in 10 years

## 2018-06-21 NOTE — H&P
History and Physical -  [facility]  Samantha Casanova 66 y o  female MRN: [id number]                  HPI: Samantha Casanova is a 66y o  year old female who presents for index screening colonoscopy    REVIEW OF SYSTEMS: Per the HPI, and otherwise unremarkable      Historical Information   Past Medical History:   Diagnosis Date    Abrasion of axilla     LAST ASSESSED: 11/21/14    ADHD     Allergic     Allergic rhinitis     LAST ASSESSED: 5/15/15    Asthma     TRANSITIONED FROM: ASTHMA; RESOLVED: 11/9/16    Depression     Fracture of plateau of left tibia     RESOLVED: 4/14/15    Headache     Hematuria     Hyperparathyroidism ([address] )     Hypothyroidism     Left ankle sprain     LAST ASSESSED: 10/21/14    No known health problems     DENIED MENTAL STATUS CHANGE AS PER ALL[facility]CR[facility]; CONFLICTED WITH ADHD AND DEPRESSION IN MED HX SO IT COULD NOT BE ADDED IN PERT NEGS    Obstructive sleep apnea on CPAP     Scoliosis     Thyroid nodule     Urinary frequency     RESOLVED: 10/27/16    Vaginal bleeding, abnormal     LAST ASSESSED: 6/6/16     Past Surgical History:   Procedure Laterality Date    FOOT SURGERY Right     "Nerve removal"    PARATHYROID GLAND SURGERY  5/17/2018    [place] EXPLORE PARATHYROID GLANDS Right 5/17/2018    Procedure: MINIMALLY INVASIVE PARATHYROIDECTOMY;   PTH MONITORING;  Surgeon: Desmond Austin MD;  [facility];  Service: Surgical Oncology    TONSILLECTOMY      TONSILLECTOMY AND ADENOIDECTOMY       Social History   History   Alcohol Use    Yes     Comment: socially; DRINKS ALCOHOL VERY INFREQUENTLY     History   Drug Use No     History   Smoking Status    Never Smoker   Smokeless Tobacco    Never Used     Comment: One year of occasional smoking     Family History   Problem Relation Age of Onset    Prostate cancer Father     Anemia Father     Neuropathy Father     Prostate cancer Brother     Paget's disease of bone Brother     Heart Valve Disease Mother s/p MVR    Glaucoma Mother     Rickets Mother     Stroke Mother     Hypertension Family         BENIGN ESSENTIAL    Heart disease Family         CARDIAC DISORDER       Meds/Allergies     Prescriptions Prior to Admission   Medication    amphetamine-dextroamphetamine (ADDERALL) 5 MG tablet    cetirizine (ZyrTEC) 10 mg tablet    Cholecalciferol (VITAMIN D3) 1000 units CAPS    fluticasone-salmeterol (ADVAIR DISKUS) 100-50 mcg/dose    levothyroxine 50 mcg tablet    multivitamin (THERAGRAN) TABS    Triamcinolone Acetonide (NASACORT AQ) 55 MCG/ACT AERO    venlafaxine (EFFEXOR-XR) 150 mg 24 hr capsule       Allergies   Allergen Reactions    Compazine [Prochlorperazine] Anaphylaxis     Category: Allergy; Annotation - [id number]: ALL FORMS    Erythromycin GI Intolerance     Category: Allergy; Annotation - [id number]: ALL FORMS    Sulfa Antibiotics GI Intolerance     Patient states that it is like when you are drunk, dizziness and confusion    Sulfamethoxazole-Trimethoprim      Category: Allergy; Annotation - [id number]: ALL FORMS       Objective     Blood pressure 169/83, pulse 78, temperature (!) 96 6 °F (35 9 °C), temperature source Temporal, resp  rate 18, height 5' 1" (1 549 m), weight 72 6 kg (160 lb), SpO2 97 %, not currently breastfeeding        PHYSICAL EXAM    Gen: NAD  CV: RRR  CHEST: Clear  ABD: soft, NT/ND  EXT: no edema      ASSESSMENT/PLAN:  This is a 66y o  year old female here for index screening colonoscopy

## 2018-06-21 NOTE — DISCHARGE INSTRUCTIONS
Colonoscopy   WHAT YOU NEED TO KNOW:   A colonoscopy is a procedure to examine the inside of your colon (intestine) with a scope  Polyps or tissue growths may have been removed during your colonoscopy  It is normal to feel bloated and to have some abdominal discomfort  You should be passing gas  If you have hemorrhoids or you had polyps removed, you may have a small amount of bleeding  DISCHARGE INSTRUCTIONS:   Seek care immediately if:   · You have a large amount of bright red blood in your bowel movements  · Your abdomen is hard and firm and you have severe pain  · You have sudden trouble breathing  Contact your healthcare provider if:   · You develop a rash or hives  · You have a fever within 24 hours of your procedure       · You have not had a bowel movement for 3 days after your procedure  · You have questions or concerns about your condition or care  Activity:   · Do not lift, strain, or run  for 3 days after your procedure  · Rest after your procedure  You have been given medicine to relax you  Do not  drive or make important decisions until the day after your procedure  Return to your normal activity as directed  · Relieve gas and discomfort from bloating  by lying on your right side with a heating pad on your abdomen  You may need to take short walks to help the gas move out  Eat small meals until bloating is relieved  If you had polyps removed: For 7 days after your procedure:  · Do not  take aspirin  · Do not  go on long car rides  Follow up with your healthcare provider as directed:  Write down your questions so you remember to ask them during your visits  © 2017 [address] [employer] is for End User's use only and may not be sold, redistributed or otherwise used for commercial purposes  All illustrations and images included in CareNotes® are the copyrighted property of [employer]  or Marshall Navarro    The above information is an  only  It is not intended as medical advice for individual conditions or treatments  Talk to your doctor, nurse or pharmacist before following any medical regimen to see if it is safe and effective for you  Colorectal Polyps   WHAT YOU NEED TO KNOW:   Colorectal polyps are small growths of tissue in the lining of the colon and rectum  Most polyps are hyperplastic polyps and are usually benign (noncancerous)  Certain types of polyps, called adenomatous polyps, may turn into cancer  DISCHARGE INSTRUCTIONS:   Follow up with your healthcare provider or gastroenterologist as directed: You may need to return for more tests, such as another colonoscopy  Write down your questions so you remember to ask them during your visits  Reduce your risk for colorectal polyps:   · Eat a variety of healthy foods:  Healthy foods include fruit, vegetables, whole-grain breads, low-fat dairy products, beans, lean meat, and fish  Ask if you need to be on a special diet  · Maintain a healthy weight:  Ask your healthcare provider if you need to lose weight and how much you need to lose  Ask for help with a weight loss program     · Exercise:  Begin to exercise slowly and do more as you get stronger  Talk with your healthcare provider before you start an exercise program      · Limit alcohol:  Your risk for polyps increases the more you drink  · Do not smoke: If you smoke, it is never too late to quit  Ask for information about how to stop  For support and more information:   · Alisha Clarke ([facility]) [address], [place] [address]  Phone: 5- 288 - [phone]  Web Address: www digestive  niddk nih gov  Contact your healthcare provider or gastroenterologist if:   · You have a fever  · You have chills, a cough, or feel weak and achy  · You have abdominal pain that does not go away or gets worse after you take medicine  · Your abdomen is swollen  · You are losing weight without trying  · You have questions or concerns about your condition or care  Seek care immediately or call 911 if:   · You have sudden shortness of breath  · You have a fast heart rate, fast breathing, or are too dizzy to stand up  · You have severe abdominal pain  · You see blood in your bowel movement  © 2017 [address] Information is for End User's use only and may not be sold, redistributed or otherwise used for commercial purposes  All illustrations and images included in CareNotes® are the copyrighted property of [employer]  or Marshall Navarro  The above information is an  only  It is not intended as medical advice for individual conditions or treatments  Talk to your doctor, nurse or pharmacist before following any medical regimen to see if it is safe and effective for you

## 2018-06-21 NOTE — ANESTHESIA PREPROCEDURE EVALUATION
Review of Systems/Medical History  Patient summary reviewed  Chart reviewed      Cardiovascular  Exercise tolerance (METS): >4,  Hyperlipidemia, Hypertension controlled,    Pulmonary  Asthma , well controlled/ stable Last rescue: today , Sleep apnea ,        GI/Hepatic    GERD well controlled, Bowel prep       Negative  ROS        Endo/Other  History of thyroid disease , hypothyroidism, Parathyroid disease hyperparathyroidism,      GYN  Negative gynecology ROS          Hematology  Negative hematology ROS      Musculoskeletal  Negative musculoskeletal ROS        Neurology    Headaches,    Psychology   Depression ,              Physical Exam    Airway    Mallampati score: II  TM Distance: <3 FB  Neck ROM: full     Dental   No notable dental hx     Cardiovascular  Cardiovascular exam normal    Pulmonary  Pulmonary exam normal     Other Findings        Anesthesia Plan  ASA Score- 2     Anesthesia Type- IV sedation with anesthesia with ASA Monitors  Additional Monitors:   Airway Plan:         Plan Factors-  Patient did not smoke on day of surgery  Induction- intravenous  Postoperative Plan-     Informed Consent- Anesthetic plan and risks discussed with patient  I personally reviewed this patient with the CRNA  Discussed and agreed on the Anesthesia Plan with the CRNA  Matt Roth

## 2018-06-21 NOTE — ANESTHESIA POSTPROCEDURE EVALUATION
Post-Op Assessment Note      CV Status:  Stable    Mental Status:  Alert and awake    Hydration Status:  Euvolemic    PONV Controlled:  Controlled    Airway Patency:  Patent    Post Op Vitals Reviewed: Yes          Staff: Anesthesiologist, CRNA           BP   115/71   Temp     Pulse  83   Resp   16   SpO2   95%

## 2018-06-22 ENCOUNTER — HOSPITAL ENCOUNTER (OUTPATIENT)
Dept: RADIOLOGY | Age: 58
Discharge: HOME | End: 2018-06-22
Payer: COMMERCIAL

## 2018-06-22 DIAGNOSIS — R31.0 GROSS HEMATURIA: ICD-10-CM

## 2018-06-22 PROCEDURE — 74178 CT ABD&PLV WO CNTR FLWD CNTR: CPT

## 2018-06-22 RX ADMIN — IOHEXOL 100 ML: 350 INJECTION, SOLUTION INTRAVENOUS at 11:11

## 2018-06-29 ENCOUNTER — PROCEDURE VISIT (OUTPATIENT)
Dept: UROLOGY | Facility: CLINIC | Age: 58
End: 2018-06-29
Payer: COMMERCIAL

## 2018-06-29 VITALS — BODY MASS INDEX: 30.78 KG/M2 | WEIGHT: 163 LBS | HEIGHT: 61 IN

## 2018-06-29 DIAGNOSIS — N20.0 RENAL LITHIASIS: Primary | ICD-10-CM

## 2018-06-29 DIAGNOSIS — R31.0 GROSS HEMATURIA: ICD-10-CM

## 2018-06-29 LAB
SL AMB  POCT GLUCOSE, UA: ABNORMAL
SL AMB LEUKOCYTE ESTERASE,UA: ABNORMAL
SL AMB POCT BILIRUBIN,UA: ABNORMAL
SL AMB POCT BLOOD,UA: ABNORMAL
SL AMB POCT CLARITY,UA: CLEAR
SL AMB POCT COLOR,UA: YELLOW
SL AMB POCT KETONES,UA: ABNORMAL
SL AMB POCT NITRITE,UA: ABNORMAL
SL AMB POCT PH,UA: 8
SL AMB POCT SPECIFIC GRAVITY,UA: ABNORMAL
SL AMB POCT URINE PROTEIN: ABNORMAL
SL AMB POCT UROBILINOGEN: 1

## 2018-06-29 PROCEDURE — 87086 URINE CULTURE/COLONY COUNT: CPT | Performed by: UROLOGY

## 2018-06-29 PROCEDURE — 81002 URINALYSIS NONAUTO W/O SCOPE: CPT | Performed by: UROLOGY

## 2018-06-29 PROCEDURE — 52000 CYSTOURETHROSCOPY: CPT | Performed by: UROLOGY

## 2018-06-30 LAB — BACTERIA UR CULT: NORMAL

## 2018-07-02 NOTE — PROGRESS NOTES
Cystoscopy    Patient: Anisha Tay                       :    1960                                    Date:    2018    Surgeon:  Bonifacio Young MD    Preoperative Diagnosis:  Micro hematuria    Postoperative Diagnosis:  Micro hematuria, urethritis, bilateral renal calculi    Anesthesia: 2% lidocaine gel    Operative Procedure: Cystoscopy  Complications: None  Procedure: The patient was ID on the table  The penis was prepped and draped in sterile fashion  2% Local Lidocaine was placed  Five minutes passed before inspection of the bladder  A 14 fr flexible cystoscope was passed per meatus  Bladder was entered  The specimen obtained for culture  The bladder was inspected the mucosa was unremarkable  No evidence of tumor  No polyp  No stone  The ureteral orifices were unremarkable  There was moderate urethritis and very slight trigonitis  Scope was removed  Patient tolerated the procedure well and was given antibiotics prophylactically  CT scan reviewed with the patient she has bilateral stones  We discussed options including conservative management  She will take this under consideration  She is apprehensive about the 8 mm stone on the left side  IMPRESSION:     Nonobstructing intrarenal calculi noted bilaterally with the largest nonobstructing intrarenal calculus noted at the upper pole the left kidney measure up to 8 mm      Left upper pole calyceal diverticulum with a 7 mm calculus in its lumen        No suspicious urinary tract mass    No obstructive uropathy      Hepatomegaly and hepatic steatosis      Workstation performed: [id number]

## 2018-07-09 DIAGNOSIS — E03.9 ACQUIRED HYPOTHYROIDISM: Primary | ICD-10-CM

## 2018-07-11 RX ORDER — LEVOTHYROXINE SODIUM 0.05 MG/1
TABLET ORAL
Qty: 90 TABLET | Refills: 1 | Status: SHIPPED | OUTPATIENT
Start: 2018-07-11 | End: 2019-01-07 | Stop reason: SDUPTHER

## 2018-07-13 ENCOUNTER — OFFICE VISIT (OUTPATIENT)
Dept: SLEEP CENTER | Facility: CLINIC | Age: 58
End: 2018-07-13
Payer: COMMERCIAL

## 2018-07-13 ENCOUNTER — TELEPHONE (OUTPATIENT)
Dept: SLEEP CENTER | Facility: CLINIC | Age: 58
End: 2018-07-13

## 2018-07-13 VITALS
HEART RATE: 82 BPM | DIASTOLIC BLOOD PRESSURE: 64 MMHG | HEIGHT: 61 IN | SYSTOLIC BLOOD PRESSURE: 148 MMHG | BODY MASS INDEX: 30.93 KG/M2 | WEIGHT: 163.8 LBS

## 2018-07-13 DIAGNOSIS — Z72.821 INADEQUATE SLEEP HYGIENE: ICD-10-CM

## 2018-07-13 DIAGNOSIS — J31.0 CHRONIC RHINITIS: ICD-10-CM

## 2018-07-13 DIAGNOSIS — I10 HYPERTENSION, ESSENTIAL, BENIGN: ICD-10-CM

## 2018-07-13 DIAGNOSIS — G47.33 OSA (OBSTRUCTIVE SLEEP APNEA): Primary | ICD-10-CM

## 2018-07-13 DIAGNOSIS — E66.9 OBESITY (BMI 30-39.9): ICD-10-CM

## 2018-07-13 PROCEDURE — 99214 OFFICE O/P EST MOD 30 MIN: CPT | Performed by: INTERNAL MEDICINE

## 2018-07-13 NOTE — PROGRESS NOTES
Review of Systems      Genitourinary none   Cardiology none   Gastrointestinal none   Neurology none   Constitutional none   Integumentary none   Psychiatry none   Musculoskeletal none   Pulmonary shortness of breath with activity   ENT throat clearing   Endocrine excessive thirst   Hematological none

## 2018-07-13 NOTE — PROGRESS NOTES
Follow-Up Note - Yvon Khoury  66 y o  female  :1960  PRY:[id number]    CC: I saw this patient for follow-up in clinic today for her Sleep Disordered Breathing, Coexisting Sleep and Medical Problems  PFSH, Problem List, Medications & Allergies were reviewed in EMR  Interval changes:  She had recent partial parathyroidectomy   She  has a past medical history of Abrasion of axilla; ADHD; Allergic; Allergic rhinitis; Asthma; Depression; Fracture of plateau of left tibia; Headache; Hematuria; Hyperparathyroidism ([address] ); Hypothyroidism; Left ankle sprain; No known health problems; Obstructive sleep apnea on CPAP; Scoliosis; Thyroid nodule; Urinary frequency; and Vaginal bleeding, abnormal     She has a current medication list which includes the following prescription(s): amphetamine-dextroamphetamine, cetirizine, vitamin d3, fluticasone-salmeterol, levothyroxine, multivitamin, triamcinolone acetonide, and venlafaxine  ROS: Reviewed (see attached)  She has environmental allergies that are controlled  She reports some dyspnea on effort that she attributes to deconditioning  She denies coughing or wheezing  She feels mood is stable on current medication  HPI:  With respect to sleep disordered breathing, compliance data download shows:  using PAP > 4 hours per night 93% of the time  ANSELMO (estimated) 6 7/hour at  pressure of 9cm H2O   Reyna Hale reports  · no  difficulty tolerating PAP;   · significant adverse effects: mask leaks , mask causes redness / facial marks  and mask dislodges during sleep  · Benefitting from use: more alert      Sleep Routine:  She reports getting 10-12 hours sleep; she  has no difficulty initiating or maintaining sleep   She awakens spontaneously feeling refreshed  She denies excessive drowsiness or napping  [She rated herself at Total score: 6 /24 on the [place] sleepiness scale ]    Habits:[ reports that she has never smoked   She has never used smokeless tobacco ], [ reports that she drinks alcohol ], [ reports that she does not use drugs  ], Caffeine use: moderate [ ], Exercise routine: none[ ]  EXAM: /64   Pulse 82   Ht 5' 1" (1 549 m)   Wt 74 3 kg (163 lb 12 8 oz)   LMP  (LMP Unknown)   BMI 30 95 kg/m²      Patient is alert, orientated, cooperative [and in no distress]  Mental state [appears normal]  Craniofacial anatomy normal  There are [no] facial pressure marks or rashes  There is a well-healed neck scar  There are no abnormal neck masses  Nasal airway is [patent ]  Mucous membranes appeared normal  The oral airway [is crowded ] Base of tongue is at Mallampati class IV (only hard palate visible)  [Apart from truncal obesity,] the rest of exam (Heart, Lungs, Abdomen, CNS and Musculoskeletal systems) was unremarkable   IMPRESSION:     1  ERICA (obstructive sleep apnea)     2  Inadequate sleep hygiene     3  Hypertension, essential, benign     4  Obesity (BMI 30-39 9)     5  Chronic rhinitis         PLAN:  1  Treatment with  PAP is medically necessary and Negro Sherita is agreable to continue use  2  Instruction on care of equipment and strategies to improve comfort with use of PAP were discussed  3  Care coordinated with DME provider and Rx to replace supplies provided  4  Pressure setting:  Increase to 10 cm H2O     5  Compliance with therapy was emphasizedcand strategies for weight reduction discussed  6  Advised starting an exercise routine, limiting her time in bed to 8-1/2 hours or less and considering reviewing her psychiatric medications that may be contributing to her elevated blood pressure  7  With your consent, follow-up is advised in [1 Rachael Us [or sooner if needed] [to monitor progress, compliance and to adjust therapy]  Thank you for allowing me to participate in the care of this patient      Sincerely,    Authenticated electronically by Luis Eduardo Lynch MD on 44/27/93   Board Certified Specialist

## 2018-07-18 DIAGNOSIS — F90.0 ADHD, PREDOMINANTLY INATTENTIVE TYPE: ICD-10-CM

## 2018-07-19 RX ORDER — DEXTROAMPHETAMINE SACCHARATE, AMPHETAMINE ASPARTATE, DEXTROAMPHETAMINE SULFATE AND AMPHETAMINE SULFATE 1.25; 1.25; 1.25; 1.25 MG/1; MG/1; MG/1; MG/1
TABLET ORAL
Qty: 75 TABLET | Refills: 0 | Status: SHIPPED | OUTPATIENT
Start: 2018-07-19 | End: 2018-10-18 | Stop reason: SDUPTHER

## 2018-07-25 ENCOUNTER — OFFICE VISIT (OUTPATIENT)
Dept: INTERNAL MEDICINE CLINIC | Facility: CLINIC | Age: 58
End: 2018-07-25
Payer: COMMERCIAL

## 2018-07-25 VITALS
SYSTOLIC BLOOD PRESSURE: 120 MMHG | TEMPERATURE: 98.5 F | DIASTOLIC BLOOD PRESSURE: 90 MMHG | WEIGHT: 163 LBS | HEART RATE: 83 BPM | OXYGEN SATURATION: 98 % | HEIGHT: 61 IN | BODY MASS INDEX: 30.78 KG/M2

## 2018-07-25 DIAGNOSIS — I10 HYPERTENSION, ESSENTIAL, BENIGN: ICD-10-CM

## 2018-07-25 DIAGNOSIS — E03.9 ACQUIRED HYPOTHYROIDISM: ICD-10-CM

## 2018-07-25 DIAGNOSIS — E04.1 THYROID NODULE: ICD-10-CM

## 2018-07-25 DIAGNOSIS — J31.0 CHRONIC RHINITIS: ICD-10-CM

## 2018-07-25 DIAGNOSIS — N20.0 BILATERAL NEPHROLITHIASIS: ICD-10-CM

## 2018-07-25 DIAGNOSIS — E21.3 HYPERPARATHYROIDISM (HCC): Primary | ICD-10-CM

## 2018-07-25 PROBLEM — R31.0 GROSS HEMATURIA: Status: RESOLVED | Noted: 2018-05-07 | Resolved: 2018-07-25

## 2018-07-25 PROCEDURE — 99214 OFFICE O/P EST MOD 30 MIN: CPT | Performed by: INTERNAL MEDICINE

## 2018-07-25 NOTE — ASSESSMENT & PLAN NOTE
Stable no changes are necessary at this time  Will continue to monitor her diastolic blood pressures she may require some mild antihypertensive in the future
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
## 2018-07-25 NOTE — ASSESSMENT & PLAN NOTE
Currently asymptomatic    Calcium levels and PTH levels are now normal   She will arrange for lithotripsy with  urology

## 2018-08-27 DIAGNOSIS — F32.A DEPRESSION, UNSPECIFIED DEPRESSION TYPE: ICD-10-CM

## 2018-08-27 RX ORDER — VENLAFAXINE HYDROCHLORIDE 150 MG/1
150 CAPSULE, EXTENDED RELEASE ORAL DAILY
Qty: 90 CAPSULE | Refills: 0 | Status: SHIPPED | OUTPATIENT
Start: 2018-08-27 | End: 2018-11-26 | Stop reason: SDUPTHER

## 2018-08-27 RX ORDER — VENLAFAXINE HYDROCHLORIDE 150 MG/1
CAPSULE, EXTENDED RELEASE ORAL
Qty: 90 CAPSULE | Refills: 0 | OUTPATIENT
Start: 2018-08-27

## 2018-08-29 ENCOUNTER — OFFICE VISIT (OUTPATIENT)
Dept: INTERNAL MEDICINE CLINIC | Age: 58
End: 2018-08-29
Payer: COMMERCIAL

## 2018-08-29 VITALS
SYSTOLIC BLOOD PRESSURE: 122 MMHG | HEIGHT: 61 IN | BODY MASS INDEX: 30.51 KG/M2 | DIASTOLIC BLOOD PRESSURE: 86 MMHG | WEIGHT: 161.6 LBS | OXYGEN SATURATION: 98 % | TEMPERATURE: 98.9 F | HEART RATE: 97 BPM

## 2018-08-29 DIAGNOSIS — Z11.1 SCREENING FOR TUBERCULOSIS: ICD-10-CM

## 2018-08-29 DIAGNOSIS — Z00.00 ENCOUNTER FOR WELL ADULT EXAM WITHOUT ABNORMAL FINDINGS: Primary | ICD-10-CM

## 2018-08-29 PROCEDURE — 86580 TB INTRADERMAL TEST: CPT

## 2018-08-29 PROCEDURE — 99396 PREV VISIT EST AGE 40-64: CPT | Performed by: PHYSICIAN ASSISTANT

## 2018-08-29 NOTE — PROGRESS NOTES
Assessment/Plan:       Diagnoses and all orders for this visit:    Encounter for well adult exam without abnormal findings  Comments:  discussed goal of daily aerobic exercise - 30 min / day 5 x / week   discussed healthy diet, avoid prepared and fried foods     Screening for tuberculosis  -     TB Skin Test    Other orders  -     Acetaminophen (TYLENOL PO); Take by mouth as needed        PE completed for patient, ppd placed today  Discussed need for tdap, pt unable to afford this at this time, will check with school to see if this is offered    Discussed bp, avoid salt and caffiene in diet, may check bp at home - if above 140/90 would recommend f/u for this   Subjective:      Patient ID: Christine Townsend is a 66 y o  female  65 y/o female here for physical for work, starting to work at school as , denies complaints today     Pt had lasix eye surgery approx 8-9 yrs ago, pt states her surgery was not completely successful and she never went back to f/u for this   Pt uses otc glasses for reading, pt does get headaches at times but does not relate this to her vision         The following portions of the patient's history were reviewed and updated as appropriate: allergies, current medications, past family history, past medical history, past social history, past surgical history and problem list     Review of Systems   Constitutional: Negative for activity change, appetite change, chills, fatigue and fever  HENT: Positive for sinus pressure  Negative for congestion, ear pain, facial swelling, hearing loss and sore throat  Respiratory: Negative for cough and shortness of breath  Cardiovascular: Negative for chest pain, palpitations and leg swelling  Gastrointestinal: Negative for abdominal pain, constipation, diarrhea and nausea  Genitourinary: Negative for difficulty urinating  Musculoskeletal: Negative for arthralgias, back pain, gait problem and myalgias  Skin: Negative for rash  Allergic/Immunologic: Positive for environmental allergies  Neurological: Positive for headaches  Negative for dizziness, light-headedness and numbness  Hematological: Does not bruise/bleed easily  Psychiatric/Behavioral: Negative for sleep disturbance  The patient is not nervous/anxious            Past Medical History:   Diagnosis Date    Abrasion of axilla     LAST ASSESSED: 11/21/14    ADHD     Allergic     Allergic rhinitis     LAST ASSESSED: 5/15/15    Asthma     TRANSITIONED FROM: ASTHMA; RESOLVED: 11/9/16    Bilateral nephrolithiasis 7/25/2018    Depression     Fracture of plateau of left tibia     RESOLVED: 4/14/15    Headache     Hematuria     Hyperparathyroidism ([address] )     Hypothyroidism     Left ankle sprain     LAST ASSESSED: 10/21/14    No known health problems     DENIED MENTAL STATUS CHANGE AS PER ALLSCRIPTS; CONFLICTED WITH ADHD AND DEPRESSION IN MED HX SO IT COULD NOT BE ADDED IN PERT NEGS    Obstructive sleep apnea on CPAP     Scoliosis     Thyroid nodule     Urinary frequency     RESOLVED: 10/27/16    Vaginal bleeding, abnormal     LAST ASSESSED: 6/6/16         Current Outpatient Prescriptions:     Acetaminophen (TYLENOL PO), Take by mouth as needed, Disp: , Rfl:     amphetamine-dextroamphetamine (ADDERALL) 5 MG tablet, Take 1 5 tablets (7 5 mg) in the AM & 1 tablet (5 mg) in the PM, Disp: 75 tablet, Rfl: 0    cetirizine (ZyrTEC) 10 mg tablet, Take 2 tablets by mouth daily  , Disp: , Rfl:     Cholecalciferol (VITAMIN D3) 1000 units CAPS, Take 1 capsule by mouth Daily  , Disp: , Rfl:     fluticasone-salmeterol (ADVAIR DISKUS) 100-50 mcg/dose, Inhale 1 puff daily  , Disp: , Rfl:     levothyroxine 50 mcg tablet, take 1 tablet by mouth once daily, Disp: 90 tablet, Rfl: 1    multivitamin (THERAGRAN) TABS, Take 1 tablet by mouth daily  , Disp: , Rfl:     Triamcinolone Acetonide (NASACORT AQ) 55 MCG/ACT AERO, 55 mcg into each nostril Daily 2 puffs in each nostril , Disp: , Rfl:     venlafaxine (EFFEXOR-XR) 150 mg 24 hr capsule, Take 1 capsule (150 mg total) by mouth daily, Disp: 90 capsule, Rfl: 0    Allergies   Allergen Reactions    Compazine [Prochlorperazine] Anaphylaxis     Category: Allergy; Annotation - [id number]: ALL FORMS    Erythromycin GI Intolerance     Category: Allergy; Annotation - [id number]: ALL FORMS    Sulfa Antibiotics GI Intolerance     Patient states that it is like when you are drunk, dizziness and confusion    Sulfamethoxazole-Trimethoprim Nausea Only and Dizziness     Category: Allergy; Annotation - [id number]: ALL FORMS       Social History   Past Surgical History:   Procedure Laterality Date    FOOT SURGERY Right     "Nerve removal"    PARATHYROID GLAND SURGERY  5/17/2018    MD COLONOSCOPY FLX DX W/COLLJ SPEC WHEN PFRMD N/A 6/21/2018    Procedure: COLONOSCOPY;  Surgeon: Richard Lai MD;  [facility]; Service: Gastroenterology    MD EXPLORE PARATHYROID GLANDS Right 5/17/2018    Procedure: MINIMALLY INVASIVE PARATHYROIDECTOMY;   PTH MONITORING;  Surgeon: Shannen Penn MD;  [facility]: [facility];  Service: Surgical Oncology    TONSILLECTOMY      TONSILLECTOMY AND ADENOIDECTOMY       Family History   Problem Relation Age of Onset    Prostate cancer Father     Anemia Father     Neuropathy Father     Prostate cancer Brother     Paget's disease of bone Brother     Heart Valve Disease Mother         s/p MVR    Glaucoma Mother     Rickets Mother     Stroke Mother     Hypertension Family         BENIGN ESSENTIAL    Heart disease Family         CARDIAC DISORDER       Objective:  /86 (BP Location: Left arm, Patient Position: Sitting, Cuff Size: Standard)   Pulse 97   Temp 98 9 °F (37 2 °C) (Tympanic)   Ht 5' 0 67" (1 541 m)   Wt 73 3 kg (161 lb 9 6 oz)   LMP  (LMP Unknown)   SpO2 98%   BMI 30 87 kg/m²        Physical Exam   Constitutional: She is oriented to person, place, and time   She appears well-developed and well-nourished  No distress  HENT:   Head: Normocephalic and atraumatic  Right Ear: External ear normal    Left Ear: External ear normal    Mouth/Throat: Oropharynx is clear and moist    Eyes: Conjunctivae and EOM are normal  Pupils are equal, round, and reactive to light  No scleral icterus  Neck: Normal range of motion  Neck supple  Cardiovascular: Normal rate, regular rhythm, normal heart sounds and intact distal pulses  No murmur heard  Pulmonary/Chest: Effort normal and breath sounds normal  No respiratory distress  She has no wheezes  She has no rales  Abdominal: Soft  Bowel sounds are normal  She exhibits no distension  There is no tenderness  There is no rebound  Musculoskeletal: Normal range of motion  She exhibits no edema or deformity  Neurological: She is alert and oriented to person, place, and time  No cranial nerve deficit  Coordination normal    Skin: Skin is warm and dry  No rash noted  She is not diaphoretic  Psychiatric: She has a normal mood and affect  Her behavior is normal  Judgment and thought content normal    Vitals reviewed

## 2018-08-31 ENCOUNTER — CLINICAL SUPPORT (OUTPATIENT)
Dept: INTERNAL MEDICINE CLINIC | Age: 58
End: 2018-08-31

## 2018-08-31 DIAGNOSIS — Z11.1 SCREENING FOR TUBERCULOSIS: Primary | ICD-10-CM

## 2018-08-31 LAB
INDURATION: 0 MM
TB SKIN TEST: NEGATIVE

## 2018-10-18 DIAGNOSIS — Z87.09 HISTORY OF ASTHMA: Primary | ICD-10-CM

## 2018-10-18 DIAGNOSIS — F90.0 ADHD, PREDOMINANTLY INATTENTIVE TYPE: ICD-10-CM

## 2018-10-18 RX ORDER — DEXTROAMPHETAMINE SACCHARATE, AMPHETAMINE ASPARTATE, DEXTROAMPHETAMINE SULFATE AND AMPHETAMINE SULFATE 1.25; 1.25; 1.25; 1.25 MG/1; MG/1; MG/1; MG/1
TABLET ORAL
Qty: 75 TABLET | Refills: 0 | Status: SHIPPED | OUTPATIENT
Start: 2018-10-18 | End: 2018-11-26 | Stop reason: SDUPTHER

## 2018-11-25 DIAGNOSIS — F32.A DEPRESSION, UNSPECIFIED DEPRESSION TYPE: ICD-10-CM

## 2018-11-25 RX ORDER — VENLAFAXINE HYDROCHLORIDE 150 MG/1
CAPSULE, EXTENDED RELEASE ORAL
Qty: 90 CAPSULE | Refills: 0 | Status: CANCELLED | OUTPATIENT
Start: 2018-11-25

## 2018-11-26 DIAGNOSIS — F32.A DEPRESSION, UNSPECIFIED DEPRESSION TYPE: ICD-10-CM

## 2018-11-26 DIAGNOSIS — F90.0 ADHD, PREDOMINANTLY INATTENTIVE TYPE: ICD-10-CM

## 2018-11-26 RX ORDER — DEXTROAMPHETAMINE SACCHARATE, AMPHETAMINE ASPARTATE, DEXTROAMPHETAMINE SULFATE AND AMPHETAMINE SULFATE 1.25; 1.25; 1.25; 1.25 MG/1; MG/1; MG/1; MG/1
TABLET ORAL
Qty: 75 TABLET | Refills: 0 | Status: SHIPPED | OUTPATIENT
Start: 2018-11-26 | End: 2018-11-28 | Stop reason: SDUPTHER

## 2018-11-26 RX ORDER — VENLAFAXINE HYDROCHLORIDE 150 MG/1
150 CAPSULE, EXTENDED RELEASE ORAL DAILY
Qty: 90 CAPSULE | Refills: 1 | Status: SHIPPED | OUTPATIENT
Start: 2018-11-26 | End: 2018-11-28 | Stop reason: SDUPTHER

## 2018-11-26 NOTE — TELEPHONE ENCOUNTER
It does not appear that we have reviewed patient ADHD and anxiety for a while  Will forward to Dr Jesica Burton to see if refill are  Appropriate

## 2018-11-28 RX ORDER — DEXTROAMPHETAMINE SACCHARATE, AMPHETAMINE ASPARTATE, DEXTROAMPHETAMINE SULFATE AND AMPHETAMINE SULFATE 1.25; 1.25; 1.25; 1.25 MG/1; MG/1; MG/1; MG/1
TABLET ORAL
Qty: 75 TABLET | Refills: 0 | Status: SHIPPED | OUTPATIENT
Start: 2018-11-28 | End: 2019-01-07 | Stop reason: SDUPTHER

## 2018-11-28 RX ORDER — VENLAFAXINE HYDROCHLORIDE 150 MG/1
150 CAPSULE, EXTENDED RELEASE ORAL DAILY
Qty: 90 CAPSULE | Refills: 1 | Status: SHIPPED | OUTPATIENT
Start: 2018-11-28 | End: 2019-02-22 | Stop reason: SDUPTHER

## 2018-11-28 NOTE — TELEPHONE ENCOUNTER
RX's for Effexor and Adderall sent to wrong pharmacy  Called [facility] in [place] and cancelled both RXs  Need to be sent to [facility] in [place]  [facility]oved [facility] from demographics today      Last ov 8/29/18  Next ov 1/25/19  PDMP - checked again today - last refill of Adderall #75 on 10/18/18

## 2018-11-29 ENCOUNTER — TELEPHONE (OUTPATIENT)
Dept: SURGICAL ONCOLOGY | Facility: CLINIC | Age: 58
End: 2018-11-29

## 2018-12-07 ENCOUNTER — TELEPHONE (OUTPATIENT)
Dept: SURGICAL ONCOLOGY | Facility: CLINIC | Age: 58
End: 2018-12-07

## 2018-12-10 ENCOUNTER — TELEPHONE (OUTPATIENT)
Dept: SURGICAL ONCOLOGY | Facility: CLINIC | Age: 58
End: 2018-12-10

## 2018-12-10 NOTE — TELEPHONE ENCOUNTER
Patient did not show up for her 8:15 appointment  Called and left a message for patient to reschedule

## 2018-12-15 ENCOUNTER — OFFICE VISIT (OUTPATIENT)
Dept: URGENT CARE | Facility: MEDICAL CENTER | Age: 58
End: 2018-12-15
Payer: COMMERCIAL

## 2018-12-15 VITALS
HEART RATE: 81 BPM | WEIGHT: 158 LBS | BODY MASS INDEX: 29.83 KG/M2 | TEMPERATURE: 97.7 F | RESPIRATION RATE: 18 BRPM | OXYGEN SATURATION: 98 % | DIASTOLIC BLOOD PRESSURE: 86 MMHG | SYSTOLIC BLOOD PRESSURE: 128 MMHG | HEIGHT: 61 IN

## 2018-12-15 DIAGNOSIS — J45.21 MILD INTERMITTENT ASTHMA WITH ACUTE EXACERBATION: Primary | ICD-10-CM

## 2018-12-15 PROCEDURE — 99213 OFFICE O/P EST LOW 20 MIN: CPT | Performed by: PHYSICIAN ASSISTANT

## 2018-12-15 RX ORDER — DOXYCYCLINE 100 MG/1
100 TABLET ORAL 2 TIMES DAILY
Qty: 14 TABLET | Refills: 0 | Status: SHIPPED | OUTPATIENT
Start: 2018-12-15 | End: 2018-12-22

## 2018-12-15 RX ORDER — PREDNISONE 20 MG/1
40 TABLET ORAL DAILY
Qty: 10 TABLET | Refills: 0 | Status: SHIPPED | OUTPATIENT
Start: 2018-12-15 | End: 2018-12-20

## 2018-12-15 RX ORDER — ALBUTEROL SULFATE 90 UG/1
2 AEROSOL, METERED RESPIRATORY (INHALATION) EVERY 6 HOURS PRN
Qty: 8.5 G | Refills: 0 | Status: SHIPPED | OUTPATIENT
Start: 2018-12-15

## 2018-12-15 NOTE — PROGRESS NOTES
[address]        NAME: Juliet Amador is a 66 y o  female  : 1960    MRN: [id number]  DATE: December 15, 2018  TIME: 5:36 PM    Assessment and Plan   Mild intermittent asthma with acute exacerbation [J45 21]  1  Mild intermittent asthma with acute exacerbation  predniSONE 20 mg tablet    albuterol (PROAIR HFA) 90 mcg/act inhaler    doxycycline (ADOXA) 100 MG tablet         Patient Instructions     Asthma exacerbation  Doxycycline twice daily x 7 days  Prednisone 40mg once daily x 5 days  Follow up with PCP in 3-5 days  Proceed to  ER if symptoms worsen  Chief Complaint     Chief Complaint   Patient presents with    Nasal Congestion     x3 weeks of nasal congestion, sinus pressure and headaches    Cough     x1 week of productive cough, wheezing and sob (denies fevers)         History of Present Illness       67 y/o female c/o non productive cough x 1 week  States she has tried over the counter medications with no relief  Denies chest pain, SOB, n/v, fever, chills        Review of Systems   Review of Systems   Constitutional: Negative for activity change, appetite change, chills, diaphoresis, fatigue and fever  HENT: Positive for congestion  Negative for ear discharge, ear pain, facial swelling, hearing loss, mouth sores, nosebleeds, postnasal drip, rhinorrhea, sinus pain, sinus pressure, sneezing, sore throat and voice change  Respiratory: Positive for cough  Negative for apnea, choking, chest tightness, shortness of breath, wheezing and stridor  Cardiovascular: Negative            Current Medications       Current Outpatient Prescriptions:     Acetaminophen (TYLENOL PO), Take by mouth as needed, Disp: , Rfl:     amphetamine-dextroamphetamine (ADDERALL) 5 MG tablet, Take 1 5 tablets (7 5 mg) in the AM & 1 tablet (5 mg) in the PM, Disp: 75 tablet, Rfl: 0    cetirizine (ZyrTEC) 10 mg tablet, Take 2 tablets by mouth daily  , Disp: , Rfl:     Cholecalciferol (VITAMIN D3) 1000 units CAPS, Take 1 capsule by mouth Daily  , Disp: , Rfl:     fluticasone-salmeterol (ADVAIR DISKUS) 100-50 mcg/dose inhaler, Inhale 1 puff daily, Disp: 60 each, Rfl: 0    levothyroxine 50 mcg tablet, take 1 tablet by mouth once daily, Disp: 90 tablet, Rfl: 1    multivitamin (THERAGRAN) TABS, Take 1 tablet by mouth daily  , Disp: , Rfl:     Triamcinolone Acetonide (NASACORT AQ) 55 MCG/ACT AERO, 55 mcg into each nostril Daily 2 puffs in each nostril , Disp: , Rfl:     venlafaxine (EFFEXOR-XR) 150 mg 24 hr capsule, Take 1 capsule (150 mg total) by mouth daily, Disp: 90 capsule, Rfl: 1    albuterol (PROAIR HFA) 90 mcg/act inhaler, Inhale 2 puffs every 6 (six) hours as needed for wheezing, Disp: 8 5 g, Rfl: 0    doxycycline (ADOXA) 100 MG tablet, Take 1 tablet (100 mg total) by mouth 2 (two) times a day for 7 days, Disp: 14 tablet, Rfl: 0    predniSONE 20 mg tablet, Take 2 tablets (40 mg total) by mouth daily for 5 days, Disp: 10 tablet, Rfl: 0    Current Allergies     Allergies as of 12/15/2018 - Reviewed 12/15/2018   Allergen Reaction Noted    Compazine [prochlorperazine] Anaphylaxis 10/22/2013    Erythromycin GI Intolerance 10/22/2013    Sulfa antibiotics GI Intolerance 03/08/2016    Sulfamethoxazole-trimethoprim Nausea Only and Dizziness 10/22/2013            The following portions of the patient's history were reviewed and updated as appropriate: allergies, current medications, past family history, past medical history, past social history, past surgical history and problem list      Past Medical History:   Diagnosis Date    Abrasion of axilla     LAST ASSESSED: 11/21/14    ADHD     Allergic     Allergic rhinitis     LAST ASSESSED: 5/15/15    Asthma     TRANSITIONED FROM: ASTHMA; RESOLVED: 11/9/16    Bilateral nephrolithiasis 7/25/2018    Depression     Fracture of plateau of left tibia     RESOLVED: 4/14/15    Headache     Hematuria     Hyperparathyroidism ([address] )     Hypothyroidism     Left ankle sprain     LAST ASSESSED: 10/21/14    No known health problems     DENIED MENTAL STATUS CHANGE AS PER ALLSCRIPTS; CONFLICTED WITH ADHD AND DEPRESSION IN MED HX SO IT COULD NOT BE ADDED IN PERT NEGS    Obstructive sleep apnea on CPAP     Scoliosis     Thyroid nodule     Urinary frequency     RESOLVED: 10/27/16    Vaginal bleeding, abnormal     LAST ASSESSED: 6/6/16       Past Surgical History:   Procedure Laterality Date    FOOT SURGERY Right     "Nerve removal"    PARATHYROID GLAND SURGERY  5/17/2018    KS COLONOSCOPY FLX DX W/COLLJ SPEC WHEN PFRMD N/A 6/21/2018    Procedure: COLONOSCOPY;  Surgeon: Leopold Ewings, MD;  [facility]; Service: Gastroenterology    KS EXPLORE PARATHYROID GLANDS Right 5/17/2018    Procedure: MINIMALLY INVASIVE PARATHYROIDECTOMY;   PTH MONITORING;  Surgeon: Joss Peck MD;  [facility];  Service: Surgical Oncology    TONSILLECTOMY      TONSILLECTOMY AND ADENOIDECTOMY         Family History   Problem Relation Age of Onset    Prostate cancer Father     Anemia Father     Neuropathy Father     Prostate cancer Brother     Paget's disease of bone Brother     Heart Valve Disease Mother         s/p MVR    Glaucoma Mother     Rickets Mother     Stroke Mother     Hypertension Family         BENIGN ESSENTIAL    Heart disease Family         CARDIAC DISORDER         Medications have been verified  Objective   /86   Pulse 81   Temp 97 7 °F (36 5 °C) (Temporal)   Resp 18   Ht 5' 1" (1 549 m)   Wt 71 7 kg (158 lb)   LMP  (LMP Unknown)   SpO2 98%   BMI 29 85 kg/m²        Physical Exam     Physical Exam   Constitutional: She appears well-developed and well-nourished  HENT:   Head: Normocephalic and atraumatic  Right Ear: External ear normal    Left Ear: External ear normal    Nose: Nose normal    Mouth/Throat: Oropharynx is clear and moist  No oropharyngeal exudate  Neck: Normal range of motion  Neck supple     Cardiovascular: Normal rate, regular rhythm, normal heart sounds and intact distal pulses  Pulmonary/Chest: Effort normal and breath sounds normal  No respiratory distress  She has no wheezes  She has no rales  She exhibits no tenderness  Abdominal: Soft  Bowel sounds are normal  She exhibits no distension and no mass  There is no tenderness  There is no rebound and no guarding  Lymphadenopathy:     She has no cervical adenopathy

## 2018-12-15 NOTE — PATIENT INSTRUCTIONS
Asthma exacerbation  Doxycycline twice daily x 7 days  Prednisone 40mg once daily x 5 days  Follow up with PCP in 3-5 days  Proceed to  ER if symptoms worsen  Asthma   WHAT YOU NEED TO KNOW:   Asthma is a lung disease that makes breathing difficult  Chronic inflammation and reactions to triggers narrow the airways in the lungs  Asthma can become life-threatening if it is not managed  DISCHARGE INSTRUCTIONS:   Return to the emergency department if:   · You have severe shortness of breath  · Your lips or nails turn blue or gray  · The skin around your neck and ribs pulls in with each breath  · You have shortness of breath, even after you take your short-term medicine as directed  · Your peak flow numbers are in the red zone of your AAP  Contact your healthcare provider if:   · You run out of medicine before your next refill is due  · Your symptoms get worse  · You need to take more medicine than usual to control your symptoms  · You have questions or concerns about your condition or care  Medicines:   · Medicines  decrease inflammation, open airways, and make it easier to breathe  Medicines may be inhaled, taken as a pill, or injected  Short-term medicines relieve your symptoms quickly  Long-term medicines are used to prevent future attacks  You may also need medicine to help control your allergies  Ask your healthcare provider for more information about the medicine you are given and how to take it safely  · Take your medicine as directed  Contact your healthcare provider if you think your medicine is not helping or if you have side effects  Tell him of her if you are allergic to any medicine  Keep a list of the medicines, vitamins, and herbs you take  Include the amounts, and when and why you take them  Bring the list or the pill bottles to follow-up visits  Carry your medicine list with you in case of an emergency  Follow up with your healthcare provider as directed:   You will need to return to make sure your medicine is working and your symptoms are controlled  You may be referred to an asthma specialist  Jose Alejandro Wai may be asked to keep a record of your peak flow values and bring it with you to your appointments  Write down your questions so you remember to ask them during your visits  Manage your symptoms and prevent future attacks:   · Follow your Asthma Action Plan (AAP)  This is a written plan that you and your healthcare provider create  It explains which medicine you need and when to change doses if necessary  It also explains how you can monitor symptoms and use a peak flow meter  The meter measures how well your lungs are working  · Manage other health conditions , such as allergies, acid reflux, and sleep apnea  · Identify and avoid triggers  These may include pets, dust mites, mold, and cockroaches  · Do not smoke or be around others who smoke  Nicotine and other chemicals in cigarettes and cigars can cause lung damage  Ask your healthcare provider for information if you currently smoke and need help to quit  E-cigarettes or smokeless tobacco still contain nicotine  Talk to your healthcare provider before you use these products  · Ask about the flu vaccine  The flu can make your asthma worse  You may need a yearly flu shot  © 2017 [employer]  Information is for End User's use only and may not be sold, redistributed or otherwise used for commercial purposes  All illustrations and images included in CareNotes® are the copyrighted property of [employer]  or Marshall Navarro  The above information is an  only  It is not intended as medical advice for individual conditions or treatments  Talk to your doctor, nurse or pharmacist before following any medical regimen to see if it is safe and effective for you

## 2018-12-22 ENCOUNTER — OFFICE VISIT (OUTPATIENT)
Dept: URGENT CARE | Age: 58
End: 2018-12-22
Payer: COMMERCIAL

## 2018-12-22 VITALS
OXYGEN SATURATION: 96 % | TEMPERATURE: 98.7 F | BODY MASS INDEX: 29.64 KG/M2 | HEIGHT: 61 IN | SYSTOLIC BLOOD PRESSURE: 155 MMHG | HEART RATE: 89 BPM | DIASTOLIC BLOOD PRESSURE: 84 MMHG | WEIGHT: 157 LBS | RESPIRATION RATE: 18 BRPM

## 2018-12-22 DIAGNOSIS — J45.901 ASTHMATIC BRONCHITIS WITH ACUTE EXACERBATION, UNSPECIFIED ASTHMA SEVERITY, UNSPECIFIED WHETHER PERSISTENT: Primary | ICD-10-CM

## 2018-12-22 PROCEDURE — 99213 OFFICE O/P EST LOW 20 MIN: CPT | Performed by: FAMILY MEDICINE

## 2018-12-22 PROCEDURE — 94640 AIRWAY INHALATION TREATMENT: CPT | Performed by: FAMILY MEDICINE

## 2018-12-22 RX ORDER — AZITHROMYCIN 250 MG/1
TABLET, FILM COATED ORAL
Qty: 6 TABLET | Refills: 0 | Status: SHIPPED | OUTPATIENT
Start: 2018-12-22 | End: 2018-12-26

## 2018-12-22 RX ORDER — BENZONATATE 100 MG/1
100 CAPSULE ORAL 3 TIMES DAILY PRN
Qty: 20 CAPSULE | Refills: 0 | Status: SHIPPED | OUTPATIENT
Start: 2018-12-22 | End: 2019-01-01

## 2018-12-22 RX ORDER — ALBUTEROL SULFATE 2.5 MG/3ML
2.5 SOLUTION RESPIRATORY (INHALATION) ONCE
Status: COMPLETED | OUTPATIENT
Start: 2018-12-22 | End: 2018-12-22

## 2018-12-22 RX ORDER — A/SINGAPORE/GP1908/2015 IVR-180 (H1N1) (AN A/MICHIGAN/45/2015-LIKE VIRUS), A/SINGAPORE/GP2050/2015 (H3N2) (AN A/HONG KONG/4801/2014 - LIKE VIRUS), B/UTAH/9/2014 (A B/PHUKET/3073/2013-LIKE VIRUS), B/HONG KONG/259/2010 (A B/BRISBANE/60/08-LIKE VIRUS) 15; 15; 15; 15 UG/.5ML; UG/.5ML; UG/.5ML; UG/.5ML
INJECTION, SUSPENSION INTRAMUSCULAR
Refills: 0 | COMMUNITY
Start: 2018-11-11 | End: 2018-12-28 | Stop reason: HOSPADM

## 2018-12-22 RX ADMIN — ALBUTEROL SULFATE 2.5 MG: 2.5 SOLUTION RESPIRATORY (INHALATION) at 17:53

## 2018-12-22 NOTE — PROGRESS NOTES
[facility]    NAME: Yasmani Chisholm is a 66 y o  female  : 1960    MRN: [id number]  DATE: 2018  TIME: 6:13 PM    Assessment and Plan   Asthmatic bronchitis with acute exacerbation, unspecified asthma severity, unspecified whether persistent [J45 901]  1  Asthmatic bronchitis with acute exacerbation, unspecified asthma severity, unspecified whether persistent  albuterol inhalation solution 2 5 mg    azithromycin (ZITHROMAX) 250 mg tablet    benzonatate (TESSALON PERLES) 100 mg capsule       Patient Instructions     Patient Instructions   Take antibiotic as directed  Fluids  Rest   Steamed air  Continue Advair, Albuterol inhaler  Call PCP office ASAP to make follow up appt as soon as possible  If worsening symptoms, proceed to ER for further evaluation  Chief Complaint     Chief Complaint   Patient presents with    Cold Like Symptoms     pt c/o cough, chest congestion and difficulty breathing for the past 3 weeks, States she went to [place] urgent care last week and was treated but has finished tx and hasn't gotten any better  History of Present Illness   Yasmani Chisholm presents to the clinic c/o  77-year-old female with history of asthma comes in with increased coughing nasal congestion drainage  She was seen at the [facility] office around [facility] and was treated with prednisone, doxycycline and did not seem to get any better  Erythromycin causes GI upset but she is able to take Zithromax  She has been using her Advair daily as well as her rescue inhaler  Review of Systems   Review of Systems   Constitutional: Positive for chills, fatigue and fever  Negative for activity change and appetite change  HENT: Positive for congestion, rhinorrhea and sinus pain  Respiratory: Positive for cough, chest tightness and wheezing  Negative for shortness of breath  Cardiovascular: Negative          Current Medications     Long-Term Prescriptions   Medication Sig Dispense Refill    amphetamine-dextroamphetamine (ADDERALL) 5 MG tablet Take 1 5 tablets (7 5 mg) in the AM & 1 tablet (5 mg) in the PM 75 tablet 0    cetirizine (ZyrTEC) 10 mg tablet Take 2 tablets by mouth daily        Cholecalciferol (VITAMIN D3) 1000 units CAPS Take 1 capsule by mouth Daily        fluticasone-salmeterol (ADVAIR DISKUS) 100-50 mcg/dose inhaler Inhale 1 puff daily 60 each 0    levothyroxine 50 mcg tablet take 1 tablet by mouth once daily 90 tablet 1    multivitamin (THERAGRAN) TABS Take 1 tablet by mouth daily        Triamcinolone Acetonide (NASACORT AQ) 55 MCG/ACT AERO 55 mcg into each nostril Daily 2 puffs in each nostril       venlafaxine (EFFEXOR-XR) 150 mg 24 hr capsule Take 1 capsule (150 mg total) by mouth daily 90 capsule 1       Current Allergies     Allergies as of 12/22/2018 - Reviewed 12/22/2018   Allergen Reaction Noted    Compazine [prochlorperazine] Anaphylaxis 10/22/2013    Erythromycin GI Intolerance 10/22/2013    Sulfa antibiotics GI Intolerance 03/08/2016    Sulfamethoxazole-trimethoprim Nausea Only and Dizziness 10/22/2013          The following portions of the patient's history were reviewed and updated as appropriate: allergies, current medications, past family history, past medical history, past social history, past surgical history and problem list   Past Medical History:   Diagnosis Date    Abrasion of axilla     LAST ASSESSED: 11/21/14    ADHD     Allergic     Allergic rhinitis     LAST ASSESSED: 5/15/15    Asthma     TRANSITIONED FROM: ASTHMA; RESOLVED: 11/9/16    Bilateral nephrolithiasis 7/25/2018    Depression     Fracture of plateau of left tibia     RESOLVED: 4/14/15    Headache     Hematuria     Hyperparathyroidism ([facility] [address] )     Hypothyroidism     Left ankle sprain     LAST ASSESSED: 10/21/14    No known health problems     DENIED MENTAL STATUS CHANGE AS PER ALLSCRIPTS; CONFLICTED WITH ADHD AND DEPRESSION IN MED HX SO IT COULD NOT BE ADDED IN PERT NEGS    Obstructive sleep apnea on CPAP     Scoliosis     Thyroid nodule     Urinary frequency     RESOLVED: 10/27/16    Vaginal bleeding, abnormal     LAST ASSESSED: 6/6/16     Past Surgical History:   Procedure Laterality Date    FOOT SURGERY Right     "Nerve removal"    PARATHYROID GLAND SURGERY  5/17/2018    WI COLONOSCOPY FLX DX W/COLLJ SPEC WHEN PFRMD N/A 6/21/2018    Procedure: COLONOSCOPY;  Surgeon: Estephanie Mirza MD;  [facility]: [facility]; Service: Gastroenterology    WI EXPLORE PARATHYROID GLANDS Right 5/17/2018    Procedure: MINIMALLY INVASIVE PARATHYROIDECTOMY;   PTH MONITORING;  Surgeon: Teddy Caputo MD;  Location: [facility];  Service: Surgical Oncology    TONSILLECTOMY      TONSILLECTOMY AND ADENOIDECTOMY       Family History   Problem Relation Age of Onset    Prostate cancer Father     Anemia Father     Neuropathy Father     Prostate cancer Brother     Paget's disease of bone Brother     Heart Valve Disease Mother         s/p MVR    Glaucoma Mother     Rickets Mother     Stroke Mother     Hypertension Family         BENIGN ESSENTIAL    Heart disease Family         CARDIAC DISORDER       Objective   /84 (BP Location: Right arm, Patient Position: Sitting, Cuff Size: Standard)   Pulse 89   Temp 98 7 °F (37 1 °C) (Temporal)   Resp 18   Ht 5' 1" (1 549 m)   Wt 71 2 kg (157 lb)   LMP  (LMP Unknown)   SpO2 96%   Breastfeeding? No   BMI 29 66 kg/m²        Physical Exam     Physical Exam   Constitutional: She is oriented to person, place, and time  She appears well-developed and well-nourished  No distress  Appears mildly ill but in no acute distress   HENT:   Head: Normocephalic and atraumatic  Right Ear: External ear normal    Left Ear: External ear normal    Mouth/Throat: No oropharyngeal exudate  Cobblestoning of posterior pharynx with patchy redness  No exudate or fetid breath  Nasal turbinates red and edematous    No purulent mucus   Eyes: Pupils are equal, round, and reactive to light  Conjunctivae and EOM are normal  Right eye exhibits no discharge  Left eye exhibits no discharge  Neck: Normal range of motion  Neck supple  No tracheal deviation present  Cardiovascular: Normal rate, regular rhythm and normal heart sounds  Exam reveals no gallop and no friction rub  No murmur heard  Pulmonary/Chest: Effort normal  No respiratory distress  She has wheezes  She has no rales  Expiratory wheezes in the upper lung fields that clear after nebulizer treatment  Lymphadenopathy:     She has no cervical adenopathy  Neurological: She is alert and oriented to person, place, and time  Skin: Skin is warm and dry  No rash noted  She is not diaphoretic  Psychiatric: She has a normal mood and affect  Nursing note and vitals reviewed

## 2018-12-22 NOTE — PATIENT INSTRUCTIONS
Take antibiotic as directed  Fluids  Rest   Steamed air  Continue Advair, Albuterol inhaler  Call PCP office ASAP to make follow up appt as soon as possible  If worsening symptoms, proceed to ER for further evaluation

## 2018-12-27 ENCOUNTER — APPOINTMENT (OUTPATIENT)
Dept: LAB | Facility: MEDICAL CENTER | Age: 58
End: 2018-12-27
Payer: COMMERCIAL

## 2018-12-27 ENCOUNTER — TELEPHONE (OUTPATIENT)
Dept: SURGICAL ONCOLOGY | Facility: CLINIC | Age: 58
End: 2018-12-27

## 2018-12-27 DIAGNOSIS — E03.9 ACQUIRED HYPOTHYROIDISM: ICD-10-CM

## 2018-12-27 DIAGNOSIS — E21.3 HYPERPARATHYROIDISM (HCC): ICD-10-CM

## 2018-12-27 DIAGNOSIS — E04.1 THYROID NODULE: ICD-10-CM

## 2018-12-27 PROCEDURE — 36415 COLL VENOUS BLD VENIPUNCTURE: CPT

## 2018-12-27 PROCEDURE — 84439 ASSAY OF FREE THYROXINE: CPT

## 2018-12-27 PROCEDURE — 82310 ASSAY OF CALCIUM: CPT

## 2018-12-27 PROCEDURE — 84480 ASSAY TRIIODOTHYRONINE (T3): CPT

## 2018-12-27 PROCEDURE — 83970 ASSAY OF PARATHORMONE: CPT

## 2018-12-27 PROCEDURE — 84443 ASSAY THYROID STIM HORMONE: CPT

## 2018-12-28 ENCOUNTER — OFFICE VISIT (OUTPATIENT)
Dept: SURGICAL ONCOLOGY | Facility: CLINIC | Age: 58
End: 2018-12-28
Payer: COMMERCIAL

## 2018-12-28 ENCOUNTER — OFFICE VISIT (OUTPATIENT)
Dept: INTERNAL MEDICINE CLINIC | Age: 58
End: 2018-12-28
Payer: COMMERCIAL

## 2018-12-28 VITALS
OXYGEN SATURATION: 95 % | SYSTOLIC BLOOD PRESSURE: 134 MMHG | WEIGHT: 162.2 LBS | BODY MASS INDEX: 30.62 KG/M2 | TEMPERATURE: 98.1 F | HEART RATE: 89 BPM | DIASTOLIC BLOOD PRESSURE: 82 MMHG | HEIGHT: 61 IN

## 2018-12-28 VITALS
SYSTOLIC BLOOD PRESSURE: 130 MMHG | HEART RATE: 92 BPM | TEMPERATURE: 97.6 F | DIASTOLIC BLOOD PRESSURE: 82 MMHG | RESPIRATION RATE: 16 BRPM | BODY MASS INDEX: 30.21 KG/M2 | HEIGHT: 61 IN | WEIGHT: 160 LBS

## 2018-12-28 DIAGNOSIS — J45.20 MILD INTERMITTENT ASTHMATIC BRONCHITIS WITHOUT COMPLICATION: Primary | ICD-10-CM

## 2018-12-28 DIAGNOSIS — J32.9 RHINOSINUSITIS: ICD-10-CM

## 2018-12-28 DIAGNOSIS — E21.3 HYPERPARATHYROIDISM (HCC): Primary | ICD-10-CM

## 2018-12-28 DIAGNOSIS — J31.0 RHINOSINUSITIS: ICD-10-CM

## 2018-12-28 PROBLEM — J45.909 ASTHMATIC BRONCHITIS WITHOUT COMPLICATION: Status: ACTIVE | Noted: 2018-12-28

## 2018-12-28 LAB
CALCIUM SERPL-MCNC: 9.4 MG/DL (ref 8.3–10.1)
PTH-INTACT SERPL-MCNC: 37.7 PG/ML (ref 18.4–80.1)
T3 SERPL-MCNC: 1 NG/ML (ref 0.6–1.8)
T4 FREE SERPL-MCNC: 0.84 NG/DL (ref 0.76–1.46)
TSH SERPL DL<=0.05 MIU/L-ACNC: 3.53 UIU/ML (ref 0.36–3.74)

## 2018-12-28 PROCEDURE — 99213 OFFICE O/P EST LOW 20 MIN: CPT | Performed by: SURGERY

## 2018-12-28 PROCEDURE — 99213 OFFICE O/P EST LOW 20 MIN: CPT | Performed by: NURSE PRACTITIONER

## 2018-12-28 NOTE — PROGRESS NOTES
Assessment/Plan:    Seen urgent care 12/15:  Doxy and prednisone for bronchitis  Re-seen urgent care 12/22:  z-pack and tessalon    Her asthma has improved, but sinus are worse  Hold on additional abx, z-pack still working and both good coverage for sinus  If symtoms persist would check imaging or send to ENT prior to further abx  Conservative management:  Rest  Warm compress to sinus  Stay well hydrated  Continue with allergy medication OTC and flonase  Recommend netti pt  If persist should follow-up with ENT or have imaging       Diagnoses and all orders for this visit:    Mild intermittent asthmatic bronchitis without complication    Rhinosinusitis        Subjective:      Patient ID: Madalyn Irwin is a 66 y o  female  URI    This is a new problem  Associated symptoms include congestion, coughing (yellow mucous), diarrhea (losser stool), sinus pain (R maxillary primarily) and wheezing  Pertinent negatives include no chest pain, ear pain, headaches, nausea, sore throat or vomiting  Pt was seen at urgent care on 12/15 was prescribed doxy x 7 days and prednisone 40mg x 5 days  Then seen again at urgent care on 12/22 and given z-pack, tessalon  Her asthma symptoms have improved, but now her sinuses are worse  The following portions of the patient's history were reviewed and updated as appropriate: allergies, current medications, past family history, past medical history, past social history, past surgical history and problem list     Review of Systems   Constitutional: Positive for fatigue  Negative for chills and fever  HENT: Positive for congestion, postnasal drip, sinus pain (R maxillary primarily) and sinus pressure  Negative for ear pain and sore throat  Respiratory: Positive for cough (yellow mucous) and wheezing  Negative for shortness of breath  Cardiovascular: Negative for chest pain and palpitations  Gastrointestinal: Positive for diarrhea (losser stool)   Negative for constipation, nausea and vomiting  Musculoskeletal: Negative for arthralgias and myalgias  Neurological: Negative for dizziness, light-headedness and headaches           Past Medical History:   Diagnosis Date    Abrasion of axilla     LAST ASSESSED: 11/21/14    ADHD     Allergic     Allergic rhinitis     LAST ASSESSED: 5/15/15    Asthma     TRANSITIONED FROM: ASTHMA; RESOLVED: 11/9/16    Bilateral nephrolithiasis 7/25/2018    Depression     Fracture of plateau of left tibia     RESOLVED: 4/14/15    Headache     Hematuria     Hyperparathyroidism ([address] )     Hypothyroidism     Left ankle sprain     LAST ASSESSED: 10/21/14    No known health problems     DENIED MENTAL STATUS CHANGE AS PER ALLSCRIPTS; CONFLICTED WITH ADHD AND DEPRESSION IN MED HX SO IT COULD NOT BE ADDED IN PERT NEGS    Obstructive sleep apnea on CPAP     Scoliosis     Thyroid nodule     Urinary frequency     RESOLVED: 10/27/16    Vaginal bleeding, abnormal     LAST ASSESSED: 6/6/16         Current Outpatient Prescriptions:     Acetaminophen (TYLENOL PO), Take by mouth as needed, Disp: , Rfl:     albuterol (PROAIR HFA) 90 mcg/act inhaler, Inhale 2 puffs every 6 (six) hours as needed for wheezing, Disp: 8 5 g, Rfl: 0    amphetamine-dextroamphetamine (ADDERALL) 5 MG tablet, Take 1 5 tablets (7 5 mg) in the AM & 1 tablet (5 mg) in the PM, Disp: 75 tablet, Rfl: 0    benzonatate (TESSALON PERLES) 100 mg capsule, Take 1 capsule (100 mg total) by mouth 3 (three) times a day as needed for cough for up to 10 days, Disp: 20 capsule, Rfl: 0    cetirizine (ZyrTEC) 10 mg tablet, Take 2 tablets by mouth daily  , Disp: , Rfl:     Cholecalciferol (VITAMIN D3) 1000 units CAPS, Take 1 capsule by mouth Daily  , Disp: , Rfl:     fluticasone-salmeterol (ADVAIR DISKUS) 100-50 mcg/dose inhaler, Inhale 1 puff daily, Disp: 60 each, Rfl: 0    levothyroxine 50 mcg tablet, take 1 tablet by mouth once daily, Disp: 90 tablet, Rfl: 1    multivitamin (THERAGRAN) TABS, Take 1 tablet by mouth daily  , Disp: , Rfl:     Triamcinolone Acetonide (NASACORT AQ) 55 MCG/ACT AERO, 55 mcg into each nostril Daily 2 puffs in each nostril , Disp: , Rfl:     venlafaxine (EFFEXOR-XR) 150 mg 24 hr capsule, Take 1 capsule (150 mg total) by mouth daily, Disp: 90 capsule, Rfl: 1    Allergies   Allergen Reactions    Compazine [Prochlorperazine] Anaphylaxis     Category: Allergy; Annotation - [id number]: ALL FORMS    Erythromycin GI Intolerance     Category: Allergy; Annotation - [id number]: ALL FORMS    Sulfa Antibiotics GI Intolerance     Patient states that it is like when you are drunk, dizziness and confusion    Sulfamethoxazole-Trimethoprim Nausea Only and Dizziness     Category: Allergy; Annotation - [id number]: ALL FORMS       Social History   Past Surgical History:   Procedure Laterality Date    FOOT SURGERY Right     "Nerve removal"    PARATHYROID GLAND SURGERY  5/17/2018    AR COLONOSCOPY FLX DX W/COLLJ SPEC WHEN PFRMD N/A 6/21/2018    Procedure: COLONOSCOPY;  Surgeon: Yessenia Thomason MD;  [facility];   Service: Gastroenterology    AR EXPLORE PARATHYROID GLANDS Right 5/17/2018    Procedure: MINIMALLY INVASIVE PARATHYROIDECTOMY;   PTH MONITORING;  Surgeon: Eber Cabezas MD;  Location: [facility];  Service: Surgical Oncology    TONSILLECTOMY      TONSILLECTOMY AND ADENOIDECTOMY       Family History   Problem Relation Age of Onset    Prostate cancer Father     Anemia Father     Neuropathy Father     Prostate cancer Brother     Paget's disease of bone Brother     Heart Valve Disease Mother         s/p MVR    Glaucoma Mother     Rickets Mother     Stroke Mother     Hypertension Family         BENIGN ESSENTIAL    Heart disease Family         CARDIAC DISORDER       Objective:  /82 (BP Location: Left arm, Patient Position: Sitting, Cuff Size: Adult)   Pulse 89   Temp 98 1 °F (36 7 °C) (Tympanic)   Ht 5' 1 42" (1 56 m)   Wt 73 6 kg (162 lb 3 2 oz)   LMP  (LMP Unknown)   SpO2 95%   BMI 30 23 kg/m²      Physical Exam   Constitutional: She is oriented to person, place, and time  She appears well-developed and well-nourished  No distress  HENT:   Head: Normocephalic and atraumatic  Right Ear: Hearing, tympanic membrane, external ear and ear canal normal    Left Ear: Hearing, tympanic membrane, external ear and ear canal normal    Nose: Rhinorrhea present  No mucosal edema  Right sinus exhibits maxillary sinus tenderness  Right sinus exhibits no frontal sinus tenderness  Left sinus exhibits maxillary sinus tenderness  Left sinus exhibits no frontal sinus tenderness  Mouth/Throat: Uvula is midline, oropharynx is clear and moist and mucous membranes are normal    Mild post nasal gtt   Neck: Neck supple  Cardiovascular: Normal rate and regular rhythm  Pulmonary/Chest: Effort normal and breath sounds normal  No respiratory distress  She has no wheezes  Lymphadenopathy:     She has no cervical adenopathy  Neurological: She is alert and oriented to person, place, and time  Skin: Skin is warm and dry  Psychiatric: She has a normal mood and affect  Her behavior is normal  Judgment and thought content normal    Nursing note and vitals reviewed

## 2018-12-28 NOTE — LETTER
December 28, 2018     Ponce Holter, MD  [address]    Patient: Dorothy Love   YOB: 1960   Date of Visit: 12/28/2018       Dear Dr Franck Bernal: Thank you for referring Dorothy Love to me for evaluation  Below are my notes for this consultation  If you have questions, please do not hesitate to call me  I look forward to following your patient along with you  Sincerely,        Shelly Rodríguez MD        CC: MD Shayy Li CRNP Amber Ulysees Hilda, MD  12/28/2018  9:18 AM  Sign at close encounter     Surgical Oncology Follow Up       [address] SURGICAL ONCOLOGY [address] [address]    Dorothy Love  1960  [id number]  [facility] [address]    Chief Complaint   Patient presents with    Follow-up     Patient is here for a 6 month parathyroid follow up  Assessment/Plan:    No problem-specific Assessment & Plan notes found for this encounter  Diagnoses and all orders for this visit:    Hyperparathyroidism [facility])        Advance Care Planning/Advance Directives:  Discussed disease status, cancer treatment plans and/or cancer treatment goals with the patient  No history exists  History of Present Illness:   28-year-old woman here for follow-up  She had a parathyroidectomy in 6 months ago   -Interval History:  She is asymptomatic in terms of hypercalcemia at time of initial diagnosis  She feels about the same compared to before surgery  Review of Systems:  Review of Systems   Constitutional: Negative  HENT: Negative  Eyes: Negative  Respiratory: Negative  Cardiovascular: Negative  Gastrointestinal: Negative  Endocrine: Negative  Genitourinary: Negative  Musculoskeletal: Negative  Skin: Negative      Allergic/Immunologic: Negative  Neurological: Negative  Hematological: Negative  Psychiatric/Behavioral: Negative  Patient Active Problem List   Diagnosis    Osteopenia of multiple sites    ADHD, predominantly inattentive type    GERD without esophagitis    Hypercalcemia    Hypercholesteremia    Hyperparathyroidism ([address]    Hypertension, essential, benign    Hypothyroidism    ERICA (obstructive sleep apnea)    Snoring    Vitamin D deficiency    Lumbar strain    Depression    Visit for routine gyn exam    Screening for colon cancer    Encounter for gynecological examination without abnormal finding    Allergic    Obesity (BMI 30-39  9)    Chronic rhinitis    Lactose intolerance    Inadequate sleep hygiene    Bilateral nephrolithiasis    Thyroid nodule     Past Medical History:   Diagnosis Date    Abrasion of axilla     LAST ASSESSED: 11/21/14    ADHD     Allergic     Allergic rhinitis     LAST ASSESSED: 5/15/15    Asthma     TRANSITIONED FROM: ASTHMA; RESOLVED: 11/9/16    Bilateral nephrolithiasis 7/25/2018    Depression     Fracture of plateau of left tibia     RESOLVED: 4/14/15    Headache     Hematuria     Hyperpar[facility]roidism ([facility] [address] )     Hypothyroidism     Left ankle sprain     LAST ASSESSED: 10/21/14    No known health problems     DENIED MENTAL STATUS CHANGE AS PER ALLSCRIPTS; CONFLICTED WITH ADHD AND DEPRESSION IN MED HX SO IT COULD NOT BE ADDED IN PERT NEGS    Obstructive sleep apnea on CPAP     Scoliosis     Thyroid nodule     Urinary frequency     RESOLVED: 10/27/16    Vaginal bleeding, abnormal     LAST ASSESSED: 6/6/16     Past Surgical History:   Procedure Laterality Date    FOOT SURGERY Right     "Nerve removal"    PARATHYROID GLAND SURGERY  5/17/2018    VA COLONOSCOPY FLX DX W/COLLJ SPEC WHEN PFRMD N/A 6/21/2018    Procedure: COLONOSCOPY;  Surgeon: Sarah Sanderson MD;  [facility]: [facility];   Service: Gastroenterology    VA EXPLORE PARATHYROID GLANDS Right 5/17/2018 Procedure: MINIMALLY INVASIVE PARATHYROIDECTOMY;   PTH MONITORING;  Surgeon: Rafat Puga MD;  [facility];  Service: Surgical Oncology    TONSILLECTOMY      TONSILLECTOMY AND ADENOIDECTOMY       Family History   Problem Relation Age of Onset    Prostate cancer Father     Anemia Father     Neuropathy Father     Prostate cancer Brother     Paget's disease of bone Brother     Heart Valve Disease Mother         s/p MVR    Glaucoma Mother     Rickets Mother     Stroke Mother     Hypertension Family         BENIGN ESSENTIAL    Heart disease Family         CARDIAC DISORDER     Social History     Social History    Marital status: Single     Spouse name: N/A    Number of children: N/A    Years of education: N/A     Occupational History    UNEMPLOYED      Social History Main Topics    Smoking status: Never Smoker    Smokeless tobacco: Never Used      Comment: One year of occasional smoking    Alcohol use Yes      Comment: socially; DRINKS ALCOHOL VERY INFREQUENTLY    Drug use: No    Sexual activity: Not on file     Other Topics Concern    Not on file     Social History Narrative    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)    [place]EL HX: [place] JUL[place]G 2014           Current Outpatient Prescriptions:     Acetaminophen (TYLENOL PO), Take by mouth as needed, Disp: , Rfl:     albuterol (PROAIR HFA) 90 mcg/act inhaler, Inhale 2 puffs every 6 (six) hours as needed for wheezing, Disp: 8 5 g, Rfl: 0    amphetamine-dextroamphetamine (ADDERALL) 5 MG tablet, Take 1 5 tablets (7 5 mg) in the AM & 1 tablet (5 mg) in the PM, Disp: 75 tablet, Rfl: 0    benzonatate (TESSALON PERLES) 100 mg capsule, Take 1 capsule (100 mg total) by mouth 3 (three) times a day as needed for cough for up to 10 days, Disp: 20 capsule, Rfl: 0    cetirizine (ZyrTEC) 10 mg tablet, Take 2 tablets by mouth daily  , Disp: , Rfl:     Cholecalciferol (VITAMIN D3) 1000 units CAPS, Take 1 capsule by mouth Daily  , Disp: , Rfl:     fluticasone-salmeterol (ADVAIR DISKUS) 100-50 mcg/dose inhaler, Inhale 1 puff daily, Disp: 60 each, Rfl: 0    levothyroxine 50 mcg tablet, take 1 tablet by mouth once daily, Disp: 90 tablet, Rfl: 1    multivitamin (THERAGRAN) TABS, Take 1 tablet by mouth daily  , Disp: , Rfl:     Triamcinolone Acetonide (NASACORT AQ) 55 MCG/ACT AERO, 55 mcg into each nostril Daily 2 puffs in each nostril , Disp: , Rfl:     venlafaxine (EFFEXOR-XR) 150 mg 24 hr capsule, Take 1 capsule (150 mg total) by mouth daily, Disp: 90 capsule, Rfl: 1  Allergies   Allergen Reactions    Compazine [Prochlorperazine] Anaphylaxis     Category: Allergy; Annotation - [id number]: ALL FORMS    Erythromycin GI Intolerance     Category: Allergy; Annotation - [id number]: ALL FORMS    Sulfa Antibiotics GI Intolerance     Patient states that it is like when you are drunk, dizziness and confusion    Sulfamethoxazole-Trimethoprim Nausea Only and Dizziness     Category: Allergy; Annotation - [id number]: ALL FORMS     Vitals:    12/28/18 0854   BP: 130/82   Pulse: 92   Resp: 16   Temp: 97 6 °F (36 4 °C)       Physical Exam   Constitutional: She is oriented to person, place, and time  HENT:   Head: Normocephalic and atraumatic  Eyes: Pupils are equal, round, and reactive to light  Conjunctivae and EOM are normal    Neck: Normal range of motion  Neck supple  Incision well healed at this point  Cardiovascular: Normal rate, regular rhythm and normal heart sounds  Pulmonary/Chest: Effort normal and breath sounds normal    Abdominal: Soft  Bowel sounds are normal    Musculoskeletal: Normal range of motion  Neurological: She is alert and oriented to person, place, and time  Skin: Skin is warm and dry           Results:  Labs:     Ref Range & Units 12/27/18  4:20 PM   Calcium 8 3 - 10 1 mg/dL 9 4       Specimen Collected: 12/27/18  4:20 PM   Last Resulted: 12/28/18 12:54 AM            Imaging  No results found   I reviewed the above laboratory and imaging data  Discussion/Summary:  Status post parathyroidectomy, 6 months ago  Calcium levels are normal   PTH levels is still pending as it was drawn last night  Assuming all is well, will plan on follow-up on a PRN basis

## 2018-12-28 NOTE — PATIENT INSTRUCTIONS
No further antibiotics needed  Rest  Warm compress to sinus  Stay well hydrated  Continue with allergy medication OTC and flonase  Recommend netti pt  If persist should follow-up with ENT or have imaging

## 2018-12-28 NOTE — PROGRESS NOTES
Surgical Oncology Follow Up       [facility] [place]  [address] [facility]  [facility] [address]Sandra Araujo  1960  [id number]  [facility] [place]  [address] [place] [address]    Chief Complaint   Patient presents with    Follow-up     Patient is here for a 6 month parathyroid follow up  Assessment/Plan:    No problem-specific Assessment & Plan notes found for this encounter  Diagnoses and all orders for this visit:    Hyperparathyroidism [facility])        Advance Care Planning/Advance Directives:  Discussed disease status, cancer treatment plans and/or cancer treatment goals with the patient  No history exists  History of Present Illness:   61-year-old woman here for follow-up  She had a parathyroidectomy in 6 months ago   -Interval History:  She is asymptomatic in terms of hypercalcemia at time of initial diagnosis  She feels about the same compared to before surgery  Review of Systems:  Review of Systems   Constitutional: Negative  HENT: Negative  Eyes: Negative  Respiratory: Negative  Cardiovascular: Negative  Gastrointestinal: Negative  Endocrine: Negative  Genitourinary: Negative  Musculoskeletal: Negative  Skin: Negative  Allergic/Immunologic: Negative  Neurological: Negative  Hematological: Negative  Psychiatric/Behavioral: Negative          Patient Active Problem List   Diagnosis    Osteopenia of multiple sites    ADHD, predominantly inattentive type    GERD without esophagitis    Hypercalcemia    Hypercholesteremia    Hyperparathyroidism ([address] )    Hypertension, essential, benign    Hypothyroidism    ERICA (obstructive sleep apnea)    Snoring    Vitamin D deficiency    Lumbar strain    Depression    Visit for routine gyn exam    Screening for colon cancer    Encounter for gynecological examination without abnormal finding  Allergic    Obesity (BMI 30-39  9)    Chronic rhinitis    Lactose intolerance    Inadequate sleep hygiene    Bilateral nephrolithiasis    Thyroid nodule     Past Medical History:   Diagnosis Date    Abrasion of axilla     LAST ASSESSED: 11/21/14    ADHD     Allergic     Allergic rhinitis     LAST ASSESSED: 5/15/15    Asthma     TRANSITIONED FROM: ASTHMA; RESOLVED: 11/9/16    Bilateral nephrolithiasis 7/25/2018    Depression     Fracture of plateau of left tibia     RESOLVED: 4/14/15    Headache     Hematuria     Hyperparathyroidism ([address] )     Hypothyroidism     Left ankle sprain     LAST ASSESSED: 10/21/14    No known health problems     DENIED MENTAL STATUS CHANGE AS PER ALLSCRIPTS; CONFLICTED WITH ADHD AND DEPRESSION IN MED HX SO IT COULD NOT BE ADDED IN PERT NEGS    Obstructive sleep apnea on CPAP     Scoliosis     Thyroid nodule     Urinary frequency     RESOLVED: 10/27/16    Vaginal bleeding, abnormal     LAST ASSESSED: 6/6/16     Past Surgical History:   Procedure Laterality Date    FOOT SURGERY Right     "Nerve removal"    PARATHYROID GLAND SURGERY  5/17/2018    IN COLONOSCOPY FLX DX W/COLLJ SPEC WHEN PFRMD N/A 6/21/2018    Procedure: COLONOSCOPY;  Surgeon: Jake Quinn MD;  [facility];   Service: Gastroenterology    IN EXPLORE PARATHYROID GLANDS Right 5/17/2018    Procedure: MINIMALLY INVASIVE PARATHYROIDECTOMY;   PTH MONITORING;  Surgeon: Wilmer Camarena MD;  [facility];  Service: Surgical Oncology    TONSILLECTOMY      TONSILLECTOMY AND ADENOIDECTOMY       Family History   Problem Relation Age of Onset    Prostate cancer Father     Anemia Father     Neuropathy Father     Prostate cancer Brother     Paget's disease of bone Brother     Heart Valve Disease Mother         s/p MVR    Glaucoma Mother     Rickets Mother     Stroke Mother     Hypertension Family         BENIGN ESSENTIAL    Heart disease Family         CARDIAC DISORDER     Social History     Social History    Marital status: Single     Spouse name: N/A    Number of children: N/A    Years of education: N/A     Occupational History    UNEMPLOYED      Social History Main Topics    Smoking status: Never Smoker    Smokeless tobacco: Never Used      Comment: One year of occasional smoking    Alcohol use Yes      Comment: socially; DRINKS ALCOHOL VERY INFREQUENTLY    Drug use: No    Sexual activity: Not on file     Other Topics Concern    Not on file     Social History Narrative    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)    [place]EL HX: [place] JUL[place]G 2014           Current Outpatient Prescriptions:     Acetaminophen (TYLENOL PO), Take by mouth as needed, Disp: , Rfl:     albuterol (PROAIR HFA) 90 mcg/act inhaler, Inhale 2 puffs every 6 (six) hours as needed for wheezing, Disp: 8 5 g, Rfl: 0    amphetamine-dextroamphetamine (ADDERALL) 5 MG tablet, Take 1 5 tablets (7 5 mg) in the AM & 1 tablet (5 mg) in the PM, Disp: 75 tablet, Rfl: 0    benzonatate (TESSALON PERLES) 100 mg capsule, Take 1 capsule (100 mg total) by mouth 3 (three) times a day as needed for cough for up to 10 days, Disp: 20 capsule, Rfl: 0    cetirizine (ZyrTEC) 10 mg tablet, Take 2 tablets by mouth daily  , Disp: , Rfl:     Cholecalciferol (VITAMIN D3) 1000 units CAPS, Take 1 capsule by mouth Daily  , Disp: , Rfl:     fluticasone-salmeterol (ADVAIR DISKUS) 100-50 mcg/dose inhaler, Inhale 1 puff daily, Disp: 60 each, Rfl: 0    levothyroxine 50 mcg tablet, take 1 tablet by mouth once daily, Disp: 90 tablet, Rfl: 1    multivitamin (THERAGRAN) TABS, Take 1 tablet by mouth daily  , Disp: , Rfl:     Triamcinolone Acetonide (NASACORT AQ) 55 MCG/ACT AERO, 55 mcg into each nostril Daily 2 puffs in each nostril , Disp: , Rfl:     venlafaxine (EFFEXOR-XR) 150 mg 24 hr capsule, Take 1 capsule (150 mg total) by mouth daily, Disp: 90 capsule, Rfl: 1  Allergies   Allergen Reactions  Compazine [Prochlorperazine] Anaphylaxis     Category: Allergy; Annotation - [id number]: ALL FORMS    Erythromycin GI Intolerance     Category: Allergy; Annotation - [id number]: ALL FORMS    Sulfa Antibiotics GI Intolerance     Patient states that it is like when you are drunk, dizziness and confusion    Sulfamethoxazole-Trimethoprim Nausea Only and Dizziness     Category: Allergy; Annotation - [id number]: ALL FORMS     Vitals:    12/28/18 0854   BP: 130/82   Pulse: 92   Resp: 16   Temp: 97 6 °F (36 4 °C)       Physical Exam   Constitutional: She is oriented to person, place, and time  HENT:   Head: Normocephalic and atraumatic  Eyes: Pupils are equal, round, and reactive to light  Conjunctivae and EOM are normal    Neck: Normal range of motion  Neck supple  Incision well healed at this point  Cardiovascular: Normal rate, regular rhythm and normal heart sounds  Pulmonary/Chest: Effort normal and breath sounds normal    Abdominal: Soft  Bowel sounds are normal    Musculoskeletal: Normal range of motion  Neurological: She is alert and oriented to person, place, and time  Skin: Skin is warm and dry  Results:  Labs:     Ref Range & Units 12/27/18  4:20 PM   Calcium 8 3 - 10 1 mg/dL 9 4       Specimen Collected: 12/27/18  4:20 PM   Last Resulted: 12/28/18 12:54 AM            Imaging  No results found  I reviewed the above laboratory and imaging data  Discussion/Summary:  Status post parathyroidectomy, 6 months ago  Calcium levels are normal   PTH levels is still pending as it was drawn last night  Assuming all is well, will plan on follow-up on a PRN basis

## 2019-01-07 DIAGNOSIS — F90.0 ADHD, PREDOMINANTLY INATTENTIVE TYPE: ICD-10-CM

## 2019-01-07 DIAGNOSIS — E03.9 ACQUIRED HYPOTHYROIDISM: ICD-10-CM

## 2019-01-07 RX ORDER — DEXTROAMPHETAMINE SACCHARATE, AMPHETAMINE ASPARTATE, DEXTROAMPHETAMINE SULFATE AND AMPHETAMINE SULFATE 1.25; 1.25; 1.25; 1.25 MG/1; MG/1; MG/1; MG/1
TABLET ORAL
Qty: 75 TABLET | Refills: 0 | Status: SHIPPED | OUTPATIENT
Start: 2019-01-07 | End: 2019-02-22 | Stop reason: SDUPTHER

## 2019-01-07 RX ORDER — LEVOTHYROXINE SODIUM 0.05 MG/1
TABLET ORAL
Qty: 90 TABLET | Refills: 1 | Status: SHIPPED | OUTPATIENT
Start: 2019-01-07 | End: 2019-04-04 | Stop reason: SDUPTHER

## 2019-01-09 DIAGNOSIS — Z87.09 HISTORY OF ASTHMA: ICD-10-CM

## 2019-01-16 DIAGNOSIS — Z87.09 HISTORY OF ASTHMA: ICD-10-CM

## 2019-01-21 ENCOUNTER — TELEPHONE (OUTPATIENT)
Dept: INTERNAL MEDICINE CLINIC | Age: 59
End: 2019-01-21

## 2019-01-21 NOTE — TELEPHONE ENCOUNTER
(Dr Yesica Hargrove patient)     Pt called to ask if a generic asthma medication can be prescribed for her because the Advair she has been taking has an $80 copay now  She is aware there is no generic for Advair but would like to get an rx for another asthma med (generic) that would be appropriate for her  Ok to contact pt for any questions

## 2019-01-23 NOTE — TELEPHONE ENCOUNTER
All of these long acting asthma medications are expensive  There is no getting around it  Patient should check her insurance coverage for the most inexpensive asthma medication and we can prescribe that

## 2019-02-18 ENCOUNTER — TELEPHONE (OUTPATIENT)
Dept: INTERNAL MEDICINE CLINIC | Age: 59
End: 2019-02-18

## 2019-02-18 NOTE — TELEPHONE ENCOUNTER
Pt called for refill of Adderall & Effexor  Effexor was filled for 90 + 1 refill on 11/28  Regarding Adderall: Pt was supposed to come to [facility] on 1/25 w Dr Renee Avila to discuss Adderall & get refills  Called pt  She was on the phone and wanted to call back  I let her know that she needs to make & keep an appt for further Adderall refills  She agreed and stated she would call back to schedule the appt

## 2019-02-22 ENCOUNTER — OFFICE VISIT (OUTPATIENT)
Dept: INTERNAL MEDICINE CLINIC | Facility: CLINIC | Age: 59
End: 2019-02-22
Payer: COMMERCIAL

## 2019-02-22 VITALS
BODY MASS INDEX: 30.4 KG/M2 | HEART RATE: 85 BPM | WEIGHT: 161 LBS | SYSTOLIC BLOOD PRESSURE: 132 MMHG | DIASTOLIC BLOOD PRESSURE: 78 MMHG | HEIGHT: 61 IN | TEMPERATURE: 99 F | OXYGEN SATURATION: 98 %

## 2019-02-22 DIAGNOSIS — J31.0 RHINOSINUSITIS: ICD-10-CM

## 2019-02-22 DIAGNOSIS — J32.9 RHINOSINUSITIS: ICD-10-CM

## 2019-02-22 DIAGNOSIS — Z12.39 SCREENING FOR MALIGNANT NEOPLASM OF BREAST: ICD-10-CM

## 2019-02-22 DIAGNOSIS — J31.0 CHRONIC RHINITIS: ICD-10-CM

## 2019-02-22 DIAGNOSIS — F32.A DEPRESSION, UNSPECIFIED DEPRESSION TYPE: ICD-10-CM

## 2019-02-22 DIAGNOSIS — E21.3 HYPERPARATHYROIDISM (HCC): ICD-10-CM

## 2019-02-22 DIAGNOSIS — E55.9 VITAMIN D DEFICIENCY: ICD-10-CM

## 2019-02-22 DIAGNOSIS — Z87.09 HISTORY OF ASTHMA: ICD-10-CM

## 2019-02-22 DIAGNOSIS — F90.0 ADHD, PREDOMINANTLY INATTENTIVE TYPE: Primary | ICD-10-CM

## 2019-02-22 DIAGNOSIS — I10 HYPERTENSION, ESSENTIAL, BENIGN: ICD-10-CM

## 2019-02-22 DIAGNOSIS — E03.9 HYPOTHYROIDISM, UNSPECIFIED TYPE: ICD-10-CM

## 2019-02-22 PROCEDURE — 3008F BODY MASS INDEX DOCD: CPT | Performed by: NURSE PRACTITIONER

## 2019-02-22 PROCEDURE — 1036F TOBACCO NON-USER: CPT | Performed by: NURSE PRACTITIONER

## 2019-02-22 PROCEDURE — 3075F SYST BP GE 130 - 139MM HG: CPT | Performed by: NURSE PRACTITIONER

## 2019-02-22 PROCEDURE — 99214 OFFICE O/P EST MOD 30 MIN: CPT | Performed by: NURSE PRACTITIONER

## 2019-02-22 PROCEDURE — 3078F DIAST BP <80 MM HG: CPT | Performed by: NURSE PRACTITIONER

## 2019-02-22 RX ORDER — MONTELUKAST SODIUM 10 MG/1
10 TABLET ORAL
Qty: 30 TABLET | Refills: 2 | Status: SHIPPED | OUTPATIENT
Start: 2019-02-22 | End: 2019-06-21 | Stop reason: ALTCHOICE

## 2019-02-22 RX ORDER — DEXTROAMPHETAMINE SACCHARATE, AMPHETAMINE ASPARTATE, DEXTROAMPHETAMINE SULFATE AND AMPHETAMINE SULFATE 1.25; 1.25; 1.25; 1.25 MG/1; MG/1; MG/1; MG/1
TABLET ORAL
Qty: 75 TABLET | Refills: 0 | Status: SHIPPED | OUTPATIENT
Start: 2019-02-22 | End: 2019-04-02 | Stop reason: SDUPTHER

## 2019-02-22 RX ORDER — VENLAFAXINE HYDROCHLORIDE 150 MG/1
150 CAPSULE, EXTENDED RELEASE ORAL DAILY
Qty: 90 CAPSULE | Refills: 1 | Status: SHIPPED | OUTPATIENT
Start: 2019-02-22 | End: 2019-04-04 | Stop reason: SDUPTHER

## 2019-02-22 NOTE — PROGRESS NOTES
Assessment/Plan:     Diagnoses and all orders for this visit:    ADHD, predominantly inattentive type  -     amphetamine-dextroamphetamine (ADDERALL) 5 MG tablet; Take 1 5 tablets (7 5 mg) in the AM & 1 tablet (5 mg) in the PM  -     PDMP site queried and no red flags  Controlled substance agreement signed today  Hypertension, essential, benign    Hyperparathyroidism [address] [address] )         -   parathyroid hormone normal         -   repeat DEXA scan in 1 year         -   continue vitamin-D    Hypothyroidism, unspecified type         -   TSH normal   Continue levothyroxine 50 mcg daily    Vitamin D deficiency         -   continue vitamin-D supplement daily  Plan to recheck in 6 months    Depression, unspecified depression type  -     venlafaxine (EFFEXOR-XR) 150 mg 24 hr capsule; Take 1 capsule (150 mg total) by mouth daily  -      well controlled on Effexor    History of asthma        -     advised patient to check with insurance on coverage for Advair HFA versus Diskus, Symbicort, Breo    Rhinosinusitis  -     montelukast (SINGULAIR) 10 mg tablet; Take 1 tablet (10 mg total) by mouth daily at bedtime        -     continue Claritin in the morning as well as Nasacort  Start Singulair 1 tablet before bed at night time  Follow-up to monitor symptoms in 3 months    Chronic rhinitis    Screening for malignant neoplasm of breast  -     Mammo screening bilateral w cad; Future    Other orders  -     Cancel: Hepatitis C antibody; Future  -     Cancel: fluticasone-salmeterol (ADVAIR DISKUS) 100-50 mcg/dose inhaler; Inhale 1 puff daily    Patient to follow up with our office in 3 months  Advised her to have labs drawn in 6 months  Labs should include a vitamin-D level and hepatitis C screening      Subjective:      Patient ID: Phillip Sanchez is a 66 y o  female  HPI  Patient presents for 6 month follow up and to review labs   She also needs med refills today    Hypertension  Patient has not been on any antihypertensive medications  She has not been monitoring her blood pressure at home  Hypothyroidism  Migdalia Pham is a 66 y o  female who presents for follow up of hypothyroidism  Current symptoms: none   Patient denies change in energy level, diarrhea, heat / cold intolerance, nervousness, palpitations and weight changes  Symptoms have been basically asymptomatic  Current medications include levothyroxine 50mcg   Last TSH was within normal limits at 3 53    Depression  Patient is here for follow up of depression  Her depression has been stable with no new symptoms  Well controlled  She has been on Effexor since 2001 and feels that works very well for her  Current treatment includes Effexor-XR 150mg daily  She denies any suicidal or homicidal ideation  Bilateral nephrolithiasis  She has not had the lithotriopsy done  She denies any hematuria  Asymptomatic at this time  She is going to follow up with Urology and plan for the procedure this summer  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    Hyperparathyroidism  S/p resection of right lower parathyroid adenoma  Recent labs from December 2018 showed normal calcium and PTH    ADHD  Patient is here today for follow up ADHD  The patient's ADHD subtype is inattentive type  Her ADHD has  beenwell controlled since the last visit  Patient is complaint with medication  Current medication includes Adderall 7 5mg in the morning and 5mg pm    She is using the medication daily  Left thumb pain  Onset of pain was a few months ago  She thought she sprained her thumb from using it to lift her work bag over her shoulder  Over time the pain has become more consistent, made worse by movement  Pain is described as a constant aching  It is not debilitating or affecting her daily life at this time  No swelling or stiffness  She does have a family history of osteoarthritis in her father which is in his hands    Uses it to lift her work bag    Asthma  The patient has been well controlled on Advair Diskus 100-50 mcg inhaler  She says that her co-pay for this medication is 80 dollars and is looking to something that is cheaper  She has been out of her medication for over a month now  Chronic rhinitis  Patient is currently using her Nasacort daily as well as Zyrtec twice a day  She continues with symptoms of chronic rhinitis  The following portions of the patient's history were reviewed and updated as appropriate: allergies, current medications, past family history, past medical history, past social history, past surgical history and problem list     Review of Systems   Constitutional: Negative for activity change, appetite change, chills, fatigue and fever  Eyes: Negative for visual disturbance  Respiratory: Negative for cough and chest tightness  Cardiovascular: Negative for chest pain, palpitations and leg swelling  Gastrointestinal: Negative for abdominal distention, abdominal pain, blood in stool, constipation, diarrhea, nausea and vomiting  Endocrine: Negative for cold intolerance and heat intolerance  Genitourinary: Negative for hematuria  Neurological: Negative for dizziness, light-headedness and headaches  Psychiatric/Behavioral: Negative for self-injury and suicidal ideas           Past Medical History:   Diagnosis Date    Abrasion of axilla     LAST ASSESSED: 11/21/14    ADHD     Allergic     Allergic rhinitis     LAST ASSESSED: 5/15/15    Asthma     TRANSITIONED FROM: ASTHMA; RESOLVED: 11/9/16    Bilateral nephrolithiasis 7/25/2018    Depression     Fracture of plateau of left tibia     RESOLVED: 4/14/15    Headache     Hematuria     Hyperparathyroidism ([address] )     Hypothyroidism     Left ankle sprain     LAST ASSESSED: 10/21/14    No known health problems     DENIED MENTAL STATUS CHANGE AS PER ALLSCRIPTS; CONFLICTED WITH ADHD AND DEPRESSION IN MED HX SO IT COULD NOT BE ADDED IN PERT NEGS    Obstructive sleep apnea on CPAP     Scoliosis     Thyroid nodule     Urinary frequency     RESOLVED: 10/27/16    Vaginal bleeding, abnormal     LAST ASSESSED: 6/6/16         Current Outpatient Medications:     Acetaminophen (TYLENOL PO), Take by mouth as needed, Disp: , Rfl:     albuterol (PROAIR HFA) 90 mcg/act inhaler, Inhale 2 puffs every 6 (six) hours as needed for wheezing, Disp: 8 5 g, Rfl: 0    amphetamine-dextroamphetamine (ADDERALL) 5 MG tablet, Take 1 5 tablets (7 5 mg) in the AM & 1 tablet (5 mg) in the PM, Disp: 75 tablet, Rfl: 0    cetirizine (ZyrTEC) 10 mg tablet, Take 2 tablets by mouth daily  , Disp: , Rfl:     Cholecalciferol (VITAMIN D3) 1000 units CAPS, Take 1 capsule by mouth Daily  , Disp: , Rfl:     fluticasone-salmeterol (ADVAIR DISKUS) 100-50 mcg/dose inhaler, Inhale 1 puff daily, Disp: 60 each, Rfl: 3    levothyroxine 50 mcg tablet, take 1 tablet by mouth once daily, Disp: 90 tablet, Rfl: 1    multivitamin (THERAGRAN) TABS, Take 1 tablet by mouth daily  , Disp: , Rfl:     Triamcinolone Acetonide (NASACORT AQ) 55 MCG/ACT AERO, 1 Act into each nostril Daily 2 puffs in each nostril, Disp: , Rfl:     venlafaxine (EFFEXOR-XR) 150 mg 24 hr capsule, Take 1 capsule (150 mg total) by mouth daily, Disp: 90 capsule, Rfl: 1    Allergies   Allergen Reactions    Compazine [Prochlorperazine] Anaphylaxis     Category: Allergy; Annotation - [id number]: ALL FORMS    Erythromycin GI Intolerance     Category: Allergy; Annotation - [id number]: ALL FORMS    Sulfa Antibiotics GI Intolerance     Patient states that it is like when you are drunk, dizziness and confusion    Sulfamethoxazole-Trimethoprim Nausea Only and Dizziness     Category: Allergy;  Annotation - [id number]: ALL FORMS       Social History   Past Surgical History:   Procedure Laterality Date    FOOT SURGERY Right     "Nerve removal"    PARATHYROID GLAND SURGERY  5/17/2018    AL COLONOSCOPY FLX DX W/COLLJ SPEC WHEN PFRMD N/A 6/21/2018    Procedure: COLONOSCOPY;  Surgeon: Estephanie Laguna MD;  [facility]; Service: Gastroenterology    MA EXPLORE PARATHYROID GLANDS Right 5/17/2018    Procedure: MINIMALLY INVASIVE PARATHYROIDECTOMY;   PTH MONITORING;  Surgeon: Terrance Sim MD;  [facility];  Service: Surgical Oncology    TONSILLECTOMY      TONSILLECTOMY AND ADENOIDECTOMY       Family History   Problem Relation Age of Onset    Prostate cancer Father     Anemia Father     Neuropathy Father     Prostate cancer Brother     Paget's disease of bone Brother     Heart Valve Disease Mother         s/p MVR    Glaucoma Mother     Rickets Mother     Stroke Mother     Hypertension Family         BENIGN ESSENTIAL    Heart disease Family         CARDIAC DISORDER       Objective:  /78 (BP Location: Left arm, Patient Position: Sitting, Cuff Size: Standard)   Pulse 85   Temp 99 °F (37 2 °C) (Oral)   Ht 5' 1 4" (1 56 m)   Wt 73 kg (161 lb)   LMP  (LMP Unknown)   SpO2 98%   BMI 30 03 kg/m²      Physical Exam   Constitutional: She is oriented to person, place, and time  She appears well-developed and well-nourished  No distress  HENT:   Head: Normocephalic and atraumatic  Right Ear: Tympanic membrane and external ear normal    Left Ear: Tympanic membrane and external ear normal    Nose: Nose normal    Mouth/Throat: Oropharynx is clear and moist  No oropharyngeal exudate, posterior oropharyngeal edema or posterior oropharyngeal erythema  Eyes: Pupils are equal, round, and reactive to light  Conjunctivae and EOM are normal    Neck: Normal range of motion  Neck supple  Carotid bruit is not present  Cardiovascular: Normal rate, regular rhythm and normal heart sounds  No murmur heard  Pulmonary/Chest: Effort normal and breath sounds normal  No respiratory distress  She has no decreased breath sounds  She has no wheezes  She has no rhonchi  Musculoskeletal: She exhibits no edema     Lymphadenopathy: She has no cervical adenopathy  Neurological: She is alert and oriented to person, place, and time  Skin: Skin is warm and dry  She is not diaphoretic  Psychiatric: She has a normal mood and affect  Her behavior is normal    Vitals reviewed

## 2019-04-02 DIAGNOSIS — F90.0 ADHD, PREDOMINANTLY INATTENTIVE TYPE: ICD-10-CM

## 2019-04-02 RX ORDER — DEXTROAMPHETAMINE SACCHARATE, AMPHETAMINE ASPARTATE, DEXTROAMPHETAMINE SULFATE AND AMPHETAMINE SULFATE 1.25; 1.25; 1.25; 1.25 MG/1; MG/1; MG/1; MG/1
TABLET ORAL
Qty: 75 TABLET | Refills: 0 | Status: SHIPPED | OUTPATIENT
Start: 2019-04-02 | End: 2019-05-14 | Stop reason: SDUPTHER

## 2019-04-04 ENCOUNTER — OFFICE VISIT (OUTPATIENT)
Dept: INTERNAL MEDICINE CLINIC | Age: 59
End: 2019-04-04
Payer: COMMERCIAL

## 2019-04-04 VITALS
OXYGEN SATURATION: 98 % | DIASTOLIC BLOOD PRESSURE: 94 MMHG | SYSTOLIC BLOOD PRESSURE: 152 MMHG | HEIGHT: 61 IN | BODY MASS INDEX: 30.55 KG/M2 | WEIGHT: 161.8 LBS | HEART RATE: 83 BPM | TEMPERATURE: 99.6 F

## 2019-04-04 DIAGNOSIS — R03.0 ELEVATED BP WITHOUT DIAGNOSIS OF HYPERTENSION: ICD-10-CM

## 2019-04-04 DIAGNOSIS — J45.901 MODERATE ACUTE EXACERBATION OF ASTHMA: ICD-10-CM

## 2019-04-04 DIAGNOSIS — J01.10 ACUTE NON-RECURRENT FRONTAL SINUSITIS: Primary | ICD-10-CM

## 2019-04-04 DIAGNOSIS — F32.A DEPRESSION, UNSPECIFIED DEPRESSION TYPE: ICD-10-CM

## 2019-04-04 DIAGNOSIS — E03.9 ACQUIRED HYPOTHYROIDISM: ICD-10-CM

## 2019-04-04 PROCEDURE — 1036F TOBACCO NON-USER: CPT | Performed by: PHYSICIAN ASSISTANT

## 2019-04-04 PROCEDURE — 99214 OFFICE O/P EST MOD 30 MIN: CPT | Performed by: PHYSICIAN ASSISTANT

## 2019-04-04 PROCEDURE — 3008F BODY MASS INDEX DOCD: CPT | Performed by: PHYSICIAN ASSISTANT

## 2019-04-04 RX ORDER — PREDNISONE 20 MG/1
40 TABLET ORAL DAILY
Qty: 10 TABLET | Refills: 0 | Status: SHIPPED | OUTPATIENT
Start: 2019-04-04 | End: 2019-04-09

## 2019-04-04 RX ORDER — LEVOTHYROXINE SODIUM 0.05 MG/1
50 TABLET ORAL DAILY
Qty: 90 TABLET | Refills: 1 | Status: SHIPPED | OUTPATIENT
Start: 2019-04-04 | End: 2020-01-20 | Stop reason: SDUPTHER

## 2019-04-04 RX ORDER — VENLAFAXINE HYDROCHLORIDE 150 MG/1
150 CAPSULE, EXTENDED RELEASE ORAL DAILY
Qty: 90 CAPSULE | Refills: 1 | Status: SHIPPED | OUTPATIENT
Start: 2019-04-04 | End: 2019-05-20 | Stop reason: SDUPTHER

## 2019-04-04 RX ORDER — AMOXICILLIN AND CLAVULANATE POTASSIUM 875; 125 MG/1; MG/1
1 TABLET, FILM COATED ORAL EVERY 12 HOURS SCHEDULED
Qty: 14 TABLET | Refills: 0 | Status: SHIPPED | OUTPATIENT
Start: 2019-04-04 | End: 2019-04-11

## 2019-05-14 DIAGNOSIS — F90.0 ADHD, PREDOMINANTLY INATTENTIVE TYPE: ICD-10-CM

## 2019-05-14 RX ORDER — DEXTROAMPHETAMINE SACCHARATE, AMPHETAMINE ASPARTATE, DEXTROAMPHETAMINE SULFATE AND AMPHETAMINE SULFATE 1.25; 1.25; 1.25; 1.25 MG/1; MG/1; MG/1; MG/1
TABLET ORAL
Qty: 75 TABLET | Refills: 0 | Status: SHIPPED | OUTPATIENT
Start: 2019-05-14 | End: 2019-07-15 | Stop reason: SDUPTHER

## 2019-05-16 RX ORDER — BUDESONIDE AND FORMOTEROL FUMARATE DIHYDRATE 80; 4.5 UG/1; UG/1
2 AEROSOL RESPIRATORY (INHALATION) DAILY
COMMUNITY
End: 2019-05-20 | Stop reason: CLARIF

## 2019-05-20 ENCOUNTER — OFFICE VISIT (OUTPATIENT)
Dept: INTERNAL MEDICINE CLINIC | Facility: CLINIC | Age: 59
End: 2019-05-20
Payer: COMMERCIAL

## 2019-05-20 VITALS
SYSTOLIC BLOOD PRESSURE: 132 MMHG | OXYGEN SATURATION: 96 % | HEART RATE: 86 BPM | DIASTOLIC BLOOD PRESSURE: 88 MMHG | HEIGHT: 61 IN | BODY MASS INDEX: 31.08 KG/M2 | TEMPERATURE: 98.9 F | WEIGHT: 164.6 LBS

## 2019-05-20 DIAGNOSIS — F90.0 ADHD, PREDOMINANTLY INATTENTIVE TYPE: ICD-10-CM

## 2019-05-20 DIAGNOSIS — E03.9 HYPOTHYROIDISM, UNSPECIFIED TYPE: ICD-10-CM

## 2019-05-20 DIAGNOSIS — N20.0 BILATERAL NEPHROLITHIASIS: ICD-10-CM

## 2019-05-20 DIAGNOSIS — E04.1 THYROID NODULE: ICD-10-CM

## 2019-05-20 DIAGNOSIS — Z11.59 NEED FOR HEPATITIS C SCREENING TEST: Primary | ICD-10-CM

## 2019-05-20 DIAGNOSIS — Z23 NEED FOR DIPHTHERIA-TETANUS-PERTUSSIS (TDAP) VACCINE: ICD-10-CM

## 2019-05-20 DIAGNOSIS — F32.A DEPRESSION, UNSPECIFIED DEPRESSION TYPE: ICD-10-CM

## 2019-05-20 DIAGNOSIS — K21.9 GERD WITHOUT ESOPHAGITIS: ICD-10-CM

## 2019-05-20 PROCEDURE — 99213 OFFICE O/P EST LOW 20 MIN: CPT | Performed by: INTERNAL MEDICINE

## 2019-05-20 RX ORDER — VENLAFAXINE HYDROCHLORIDE 150 MG/1
150 CAPSULE, EXTENDED RELEASE ORAL DAILY
Qty: 90 CAPSULE | Refills: 1 | Status: SHIPPED | OUTPATIENT
Start: 2019-05-20 | End: 2020-05-18 | Stop reason: SDUPTHER

## 2019-05-24 DIAGNOSIS — J32.9 RHINOSINUSITIS: ICD-10-CM

## 2019-05-24 DIAGNOSIS — J31.0 RHINOSINUSITIS: ICD-10-CM

## 2019-05-24 RX ORDER — MONTELUKAST SODIUM 10 MG/1
TABLET ORAL
Qty: 30 TABLET | Refills: 2 | OUTPATIENT
Start: 2019-05-24

## 2019-06-21 ENCOUNTER — OFFICE VISIT (OUTPATIENT)
Dept: INTERNAL MEDICINE CLINIC | Facility: CLINIC | Age: 59
End: 2019-06-21
Payer: COMMERCIAL

## 2019-06-21 VITALS
WEIGHT: 164.8 LBS | HEART RATE: 82 BPM | HEIGHT: 61 IN | BODY MASS INDEX: 31.11 KG/M2 | OXYGEN SATURATION: 97 % | TEMPERATURE: 98.8 F | SYSTOLIC BLOOD PRESSURE: 142 MMHG | DIASTOLIC BLOOD PRESSURE: 96 MMHG

## 2019-06-21 DIAGNOSIS — M18.12 OSTEOARTHRITIS OF CARPOMETACARPAL JOINT OF LEFT THUMB, UNSPECIFIED OSTEOARTHRITIS TYPE: Primary | ICD-10-CM

## 2019-06-21 DIAGNOSIS — L91.8 SKIN TAG: ICD-10-CM

## 2019-06-21 PROCEDURE — 1036F TOBACCO NON-USER: CPT | Performed by: INTERNAL MEDICINE

## 2019-06-21 PROCEDURE — 20600 DRAIN/INJ JOINT/BURSA W/O US: CPT | Performed by: INTERNAL MEDICINE

## 2019-06-21 PROCEDURE — 3008F BODY MASS INDEX DOCD: CPT | Performed by: INTERNAL MEDICINE

## 2019-06-21 PROCEDURE — 99213 OFFICE O/P EST LOW 20 MIN: CPT | Performed by: INTERNAL MEDICINE

## 2019-06-21 PROCEDURE — 11200 RMVL SKIN TAGS UP TO&INC 15: CPT | Performed by: INTERNAL MEDICINE

## 2019-06-21 RX ORDER — LIDOCAINE HYDROCHLORIDE 10 MG/ML
1 INJECTION, SOLUTION INFILTRATION; PERINEURAL
Status: COMPLETED | OUTPATIENT
Start: 2019-06-21 | End: 2019-06-21

## 2019-06-21 RX ORDER — TRIAMCINOLONE ACETONIDE 40 MG/ML
40 INJECTION, SUSPENSION INTRA-ARTICULAR; INTRAMUSCULAR
Status: COMPLETED | OUTPATIENT
Start: 2019-06-21 | End: 2019-06-21

## 2019-06-21 RX ADMIN — TRIAMCINOLONE ACETONIDE 40 MG: 40 INJECTION, SUSPENSION INTRA-ARTICULAR; INTRAMUSCULAR at 15:38

## 2019-06-21 RX ADMIN — LIDOCAINE HYDROCHLORIDE 1 ML: 10 INJECTION, SOLUTION INFILTRATION; PERINEURAL at 15:38

## 2019-07-08 ENCOUNTER — OFFICE VISIT (OUTPATIENT)
Dept: UROLOGY | Facility: CLINIC | Age: 59
End: 2019-07-08
Payer: COMMERCIAL

## 2019-07-08 VITALS
HEIGHT: 61 IN | BODY MASS INDEX: 31 KG/M2 | WEIGHT: 164.2 LBS | HEART RATE: 84 BPM | SYSTOLIC BLOOD PRESSURE: 118 MMHG | DIASTOLIC BLOOD PRESSURE: 68 MMHG

## 2019-07-08 DIAGNOSIS — N20.0 KIDNEY STONES: Primary | ICD-10-CM

## 2019-07-08 PROCEDURE — 99213 OFFICE O/P EST LOW 20 MIN: CPT | Performed by: PHYSICIAN ASSISTANT

## 2019-07-09 ENCOUNTER — TELEPHONE (OUTPATIENT)
Dept: INTERNAL MEDICINE CLINIC | Age: 59
End: 2019-07-09

## 2019-07-09 NOTE — PROGRESS NOTES
UROLOGY PROGRESS NOTE   Patient Identifiers: Vinh Chavez (MRN [id number])  Date of Service: 7/8/2019    Subjective:     72-year-old female with history of hyperparathyroid and bilateral kidney stones  Her brother also has hyperparathyroid  Her 24 urine is pending at this time her last intact PTH was 37 within the normal range  Calcium is normal   At 9 4  Her last CT scan showed bilateral nonobstructing stones  There are no recent imaging  Patient has  no complaints        Objective:     VITALS:    Vitals:    07/08/19 1159   BP: 118/68   Pulse: 84           LABS:  Lab Results   Component Value Date    HGB 14 1 04/02/2018    HCT 39 6 04/02/2018    WBC 7 43 04/02/2018     04/02/2018   ]    Lab Results   Component Value Date     11/29/2013    K 3 5 04/02/2018     04/02/2018    CO2 31 04/02/2018    BUN 17 04/02/2018    CREATININE 0 84 04/02/2018    CALCIUM 9 4 12/27/2018    GLUCOSE 94 11/29/2013   ]        INPATIENT MEDS:    Current Outpatient Medications:     Acetaminophen (TYLENOL PO), Take by mouth as needed, Disp: , Rfl:     albuterol (PROAIR HFA) 90 mcg/act inhaler, Inhale 2 puffs every 6 (six) hours as needed for wheezing, Disp: 8 5 g, Rfl: 0    amphetamine-dextroamphetamine (ADDERALL) 5 MG tablet, Take 1 5 tablets (7 5 mg) in the AM & 1 tablet (5 mg) in the PM, Disp: 75 tablet, Rfl: 0    cetirizine (ZyrTEC) 10 mg tablet, Take 2 tablets by mouth daily  , Disp: , Rfl:     Cholecalciferol (VITAMIN D3) 1000 units CAPS, Take 1 capsule by mouth Daily  , Disp: , Rfl:     fluticasone-salmeterol (ADVAIR DISKUS) 100-50 mcg/dose inhaler, Inhale 1 puff daily, Disp: 60 each, Rfl: 3    levothyroxine 50 mcg tablet, Take 1 tablet (50 mcg total) by mouth daily for 90 days, Disp: 90 tablet, Rfl: 1    multivitamin (THERAGRAN) TABS, Take 1 tablet by mouth daily  , Disp: , Rfl:     Triamcinolone Acetonide (NASACORT AQ) 55 MCG/ACT AERO, 1 Act into each nostril Daily 2 puffs in each nostril, Disp: , Rfl:     venlafaxine (EFFEXOR-XR) 150 mg 24 hr capsule, Take 1 capsule (150 mg total) by mouth daily, Disp: 90 capsule, Rfl: 1      Physical Exam:   /68 (BP Location: Left arm, Patient Position: Sitting, Cuff Size: Adult)   Pulse 84   Ht 5' 1" (1 549 m)   Wt 74 5 kg (164 lb 3 2 oz)   LMP  (LMP Unknown)   BMI 31 03 kg/m²   GEN: no acute distress    RESP: breathing comfortably with no accessory muscle use    ABD: soft, non-tender, non-distended   INCISION:   EXT: no significant peripheral edema     RADIOLOGY:     None     Assessment:    1   Nephrolithiasis     Plan:   - will check a KUB at this time and I will call her with a 24 hour urine results  -  -  -
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
## 2019-07-10 NOTE — TELEPHONE ENCOUNTER
Patient called back and asks about the form  I stated I would find out about this form  I called over at the [place] office and Melissa Araiza Doctor knew anything about this form on this patient      I called [employer] and got a person to help me out  They help me get the form faxed over to our office since the account number was incorrect  I received the form and will put it back on [facility]'s desk to have her help me out with this patient
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
## 2019-07-10 NOTE — TELEPHONE ENCOUNTER
Faxed over to [facility] Ed form for the patient  I called the patient back to let her know that it was faxed over as well

## 2019-07-15 DIAGNOSIS — F90.0 ADHD, PREDOMINANTLY INATTENTIVE TYPE: ICD-10-CM

## 2019-07-16 RX ORDER — DEXTROAMPHETAMINE SACCHARATE, AMPHETAMINE ASPARTATE, DEXTROAMPHETAMINE SULFATE AND AMPHETAMINE SULFATE 1.25; 1.25; 1.25; 1.25 MG/1; MG/1; MG/1; MG/1
TABLET ORAL
Qty: 75 TABLET | Refills: 0 | Status: SHIPPED | OUTPATIENT
Start: 2019-07-16 | End: 2019-09-17 | Stop reason: SDUPTHER

## 2019-07-16 RX ORDER — DEXTROAMPHETAMINE SACCHARATE, AMPHETAMINE ASPARTATE, DEXTROAMPHETAMINE SULFATE AND AMPHETAMINE SULFATE 1.25; 1.25; 1.25; 1.25 MG/1; MG/1; MG/1; MG/1
TABLET ORAL
Qty: 75 TABLET | Refills: 0 | OUTPATIENT
Start: 2019-07-16

## 2019-07-18 ENCOUNTER — TELEPHONE (OUTPATIENT)
Dept: INTERNAL MEDICINE CLINIC | Facility: CLINIC | Age: 59
End: 2019-07-18

## 2019-07-19 ENCOUNTER — OFFICE VISIT (OUTPATIENT)
Dept: SLEEP CENTER | Facility: CLINIC | Age: 59
End: 2019-07-19
Payer: COMMERCIAL

## 2019-07-19 VITALS
BODY MASS INDEX: 31.8 KG/M2 | DIASTOLIC BLOOD PRESSURE: 78 MMHG | HEART RATE: 92 BPM | HEIGHT: 60 IN | SYSTOLIC BLOOD PRESSURE: 122 MMHG | WEIGHT: 162 LBS

## 2019-07-19 DIAGNOSIS — F51.12 INSUFFICIENT SLEEP SYNDROME: ICD-10-CM

## 2019-07-19 DIAGNOSIS — J31.0 CHRONIC RHINITIS: ICD-10-CM

## 2019-07-19 DIAGNOSIS — E66.9 OBESITY (BMI 30-39.9): ICD-10-CM

## 2019-07-19 DIAGNOSIS — F90.0 ADHD, PREDOMINANTLY INATTENTIVE TYPE: ICD-10-CM

## 2019-07-19 DIAGNOSIS — I10 HYPERTENSION, ESSENTIAL, BENIGN: ICD-10-CM

## 2019-07-19 DIAGNOSIS — G47.33 OSA (OBSTRUCTIVE SLEEP APNEA): Primary | ICD-10-CM

## 2019-07-19 DIAGNOSIS — R45.86 MOOD DISTURBANCE: ICD-10-CM

## 2019-07-19 PROCEDURE — 99214 OFFICE O/P EST MOD 30 MIN: CPT | Performed by: INTERNAL MEDICINE

## 2019-07-19 PROCEDURE — 3074F SYST BP LT 130 MM HG: CPT | Performed by: INTERNAL MEDICINE

## 2019-07-19 NOTE — PROGRESS NOTES
Review of Systems      Genitourinary none   Cardiology none   Gastrointestinal none   Neurology none   Constitutional none   Integumentary none   Psychiatry depression   Musculoskeletal none   Pulmonary shortness of breath with activity   ENT throat clearing   Endocrine excessive thirst   Hematological none

## 2019-07-19 NOTE — PROGRESS NOTES
Follow-Up Note - Yvon Khoury  64 y o  female  :1960  JLL:[id number]    CC: I saw this patient for follow-up in clinic today for her Sleep Disordered Breathing, Coexisting Sleep and Medical Problems  PFSH, Problem List, Medications & Allergies were reviewed in EMR  Interval changes: none reported  She  has a past medical history of Abrasion of axilla, ADHD, Allergic, Allergic rhinitis, Asthma, Bilateral nephrolithiasis (2018), Depression, Fracture of plateau of left tibia, Headache, Hematuria, Hyperparathyroidism ([facility] [address] ), Hypothyroidism, Left ankle sprain, No known health problems, Obstructive sleep apnea on CPAP, Scoliosis, Thyroid nodule, Urinary frequency, and Vaginal bleeding, abnormal     She has a current medication list which includes the following prescription(s): acetaminophen, albuterol, amphetamine-dextroamphetamine, cetirizine, vitamin d3, fluticasone-salmeterol, multivitamin, triamcinolone acetonide, venlafaxine, and levothyroxine  ROS: Reviewed (see attached)  A little weight reduction  She is on treatment for her allergies  ADHD symptoms and mood are controlled on current medication    DATA REVIEWED:  using PAP > 4 hours/night 93% of the time  Estimated ANSELMO 4 3/hour at pressure of 10cm H2O    SUBJECTIVE: Regarding use of PAP, Armond reports:   · She is experiencing some adverse effects: mask leaks  and mask discomfort  · She is   benefiting from use: sleeping better   Sleep Routine: She reports getting <5 hrs sleep on week nights and >10 hours on weekends; she has no difficulty initiating or maintaining sleep   She awakens with the aid of an alarm and feels refreshed  She has excessive drowsiness   She rated herself at Total score: 8 /24 on the [place]um sleepiness scale  Habits: reports that she has never smoked  She has never used smokeless tobacco ,  reports that she drinks alcohol ,  reports that she does not use drugs  , Caffeine use: limited , Exercise routine: none   OBJECTIVE: /78   Pulse 92   Ht 5' (1 524 m)   Wt 73 5 kg (162 lb)   LMP  (LMP Unknown)   BMI 31 64 kg/m²    Constitutional: Patient is well groomed; well appearing  Skin/Extrem: warm & dry; col & hydration normal; no edema  Psych: cooperativeand in no distress  Normal mood, affect & thought , Anxious  CNS: Alert, orientated, clear & coherent speech  H&N: EOMI; NC/AT:no facial pressure marks, no rashes  Neck Circumference: 36 cm  ENMT Mucus membranes normal Nasal airway:patent  Oral airway: crowded  Resp:effort is normal CVS: RRR ABD:truncal obesity MSK:Gait normal     ASSESSMENT: Primary Sleep/Secondary(to Medical or Psych conditions) & comorbidities   1  ERICA (obstructive sleep apnea)  PAP DME Resupply/Reorder   2  Insufficient sleep syndrome     3  Chronic rhinitis     4  Hypertension, essential, benign     5  Obesity (BMI 30-39 9)     6  ADHD, predominantly inattentive type     7  Mood disturbance       PLAN:  1  Treatment with  PAP is medically necessary and Josemanuel Bianka is agreable to continue use  2  Care of equipment, methods to improve comfort using PAP and importance of compliance with therapy were discussed  3  Pressure setting: continue 10 cmH2O     4  Rx provided to replace supplies and Care coordinated with DME provider  5  Strategies for weight reduction were discussed  6  Follow-up is advised in 1 year or sooner if needed to monitor progress, compliance and to adjust therapy  Thank you for allowing me to participate in the care of this patient      Sincerely,    Authenticated electronically by Tiburcio Rosa MD on 43/57/65   Board Certified Specialist

## 2019-07-24 DIAGNOSIS — Z87.09 HISTORY OF ASTHMA: ICD-10-CM

## 2019-07-24 NOTE — TELEPHONE ENCOUNTER
[employer] approved Flutcasone-Salmeterol (Advair)  They denied [place] (Fluticasone-Salmeterol)

## 2019-07-25 NOTE — TELEPHONE ENCOUNTER
[employer] denied Fluticasone-Salmeterol  PA sent to Cover My Meds  [employer] denied [place], they only cover brand name Advair  [address], they will resend RX for brand name Advair

## 2019-08-01 ENCOUNTER — OFFICE VISIT (OUTPATIENT)
Dept: INTERNAL MEDICINE CLINIC | Facility: CLINIC | Age: 59
End: 2019-08-01
Payer: COMMERCIAL

## 2019-08-01 ENCOUNTER — APPOINTMENT (OUTPATIENT)
Dept: RADIOLOGY | Age: 59
End: 2019-08-01
Payer: COMMERCIAL

## 2019-08-01 VITALS
HEART RATE: 78 BPM | OXYGEN SATURATION: 98 % | SYSTOLIC BLOOD PRESSURE: 124 MMHG | BODY MASS INDEX: 30.32 KG/M2 | TEMPERATURE: 98.5 F | DIASTOLIC BLOOD PRESSURE: 78 MMHG | HEIGHT: 61 IN | WEIGHT: 160.6 LBS

## 2019-08-01 DIAGNOSIS — J31.0 RHINOSINUSITIS: ICD-10-CM

## 2019-08-01 DIAGNOSIS — J31.0 CHRONIC RHINITIS: ICD-10-CM

## 2019-08-01 DIAGNOSIS — M18.12 OSTEOARTHRITIS OF CARPOMETACARPAL JOINT OF LEFT THUMB, UNSPECIFIED OSTEOARTHRITIS TYPE: ICD-10-CM

## 2019-08-01 DIAGNOSIS — Z23 NEED FOR PNEUMOCOCCAL VACCINE: ICD-10-CM

## 2019-08-01 DIAGNOSIS — F90.0 ADHD, PREDOMINANTLY INATTENTIVE TYPE: ICD-10-CM

## 2019-08-01 DIAGNOSIS — R51.9 SINUS HEADACHE: ICD-10-CM

## 2019-08-01 DIAGNOSIS — T63.441A BEE STING REACTION, ACCIDENTAL OR UNINTENTIONAL, INITIAL ENCOUNTER: ICD-10-CM

## 2019-08-01 DIAGNOSIS — M18.12 OSTEOARTHRITIS OF CARPOMETACARPAL JOINT OF LEFT THUMB, UNSPECIFIED OSTEOARTHRITIS TYPE: Primary | ICD-10-CM

## 2019-08-01 DIAGNOSIS — J32.9 RHINOSINUSITIS: ICD-10-CM

## 2019-08-01 PROCEDURE — 73140 X-RAY EXAM OF FINGER(S): CPT

## 2019-08-01 PROCEDURE — 99214 OFFICE O/P EST MOD 30 MIN: CPT | Performed by: INTERNAL MEDICINE

## 2019-08-01 NOTE — ASSESSMENT & PLAN NOTE
- Pain not improved by previous steroid injection  - Pain still limiting her ROM and requiring use of a brace  - Refer to hand surgeon

## 2019-08-01 NOTE — PROGRESS NOTES
Assessment/Plan:    Osteoarthritis of carpometacarpal (CMC) joint of left thumb  - Pain not improved by previous steroid injection  - Pain still limiting her ROM and requiring use of a brace  - Refer to hand surgeon    Sinus headache  - Chronic and only mildly improved with OTC treatment  - Refer to ENT    ADHD, predominantly inattentive type  Controlled substance agreement was renewed  Bee sting reaction  Mild local erythema  No evidence of cellulitis  Continue to monitor  Apply hydrocortisone and to diphenhydramine lotion for symptomatic relief  Diagnoses and all orders for this visit:    Osteoarthritis of carpometacarpal joint of left thumb, unspecified osteoarthritis type  -     XR thumb left first digit-min 2v; Future    Need for pneumococcal vaccine  -     PNEUMOCOCCAL POLYSACCHARIDE VACCINE 23-VALENT =>1YO SQ IM    Chronic rhinitis  -     Ambulatory Referral to Otolaryngology; Future    Rhinosinusitis  -     Ambulatory Referral to Otolaryngology; Future    Sinus headache  -     Ambulatory Referral to Otolaryngology; Future    ADHD, predominantly inattentive type    Bee sting reaction, accidental or unintentional, initial encounter          Subjective:      Patient ID: Celia Morales is a 64 y o  female  She got a bee sting Sunday afternoon, and since then it's remained red, itchy, and hot  She's been using hydrocortisone cream and ice which help somewhat, but she feels the need to repeat treatment every several hours  It was painful for 1 day and then got itchy  The redness has been progressively regressing and not spreading after the first day  She has sinus headaches about once a month during which she has pain over the maxillary sinuses but not the frontal sinuses  She also has congestion in her nose, lightheadedness, photophobia but denies red/itchy/watery eyes, vision changes, vomiting, dizziness, syncope, ear pain, and tinnitus    The episodes last 1 to 2 days and are accompanied by nausea  She has treated these episodes with OTC sinus pain and pressure medicines and saline rinses which help a little bit  She also notes postnasal drip causing occasional cough and sore throat  The following portions of the patient's history were reviewed and updated as appropriate: allergies, current medications, past family history, past medical history, past social history, past surgical history and problem list     Review of Systems   Constitutional: Negative for appetite change, chills, fever and unexpected weight change  HENT: Positive for congestion, postnasal drip, rhinorrhea, sinus pressure, sinus pain and sore throat  Negative for ear discharge, ear pain, facial swelling, hearing loss, nosebleeds, sneezing, tinnitus, trouble swallowing and voice change  Eyes: Positive for photophobia  Negative for pain, discharge, redness, itching and visual disturbance  Respiratory: Negative for cough, chest tightness and shortness of breath  Cardiovascular: Negative for chest pain  Musculoskeletal: Negative for arthralgias, back pain, joint swelling, myalgias and neck pain  Neurological: Positive for light-headedness and headaches  Negative for dizziness, syncope and numbness           Past Medical History:   Diagnosis Date    Abrasion of axilla     LAST ASSESSED: 11/21/14    ADHD     Allergic     Allergic rhinitis     LAST ASSESSED: 5/15/15    Asthma     TRANSITIONED FROM: ASTHMA; RESOLVED: 11/9/16    Bilateral nephrolithiasis 7/25/2018    Depression     Fracture of plateau of left tibia     RESOLVED: 4/14/15    Headache     Hematuria     Hyperparathyroidism ([address] )     Hypothyroidism     Left ankle sprain     LAST ASSESSED: 10/21/14    No known health problems     DENIED MENTAL STATUS CHANGE AS PER ALLSCRIPTS; CONFLICTED WITH ADHD AND DEPRESSION IN MED HX SO IT COULD NOT BE ADDED IN PERT NEGS    Obstructive sleep apnea on CPAP     Scoliosis     Thyroid nodule     Urinary frequency RESOLVED: 10/27/16    Vaginal bleeding, abnormal     LAST ASSESSED: 6/6/16         Current Outpatient Medications:     Acetaminophen (TYLENOL PO), Take by mouth as needed, Disp: , Rfl:     albuterol (PROAIR HFA) 90 mcg/act inhaler, Inhale 2 puffs every 6 (six) hours as needed for wheezing, Disp: 8 5 g, Rfl: 0    amphetamine-dextroamphetamine (ADDERALL) 5 MG tablet, Take 1 5 tablets (7 5 mg) in the AM & 1 tablet (5 mg) in the PM, Disp: 75 tablet, Rfl: 0    cetirizine (ZyrTEC) 10 mg tablet, Take 2 tablets by mouth daily  , Disp: , Rfl:     Cholecalciferol (VITAMIN D3) 1000 units CAPS, Take 1 capsule by mouth Daily  , Disp: , Rfl:     fluticasone-salmeterol (ADVAIR DISKUS) 100-50 mcg/dose inhaler, Inhale 1 puff daily, Disp: 60 each, Rfl: 3    multivitamin (THERAGRAN) TABS, Take 1 tablet by mouth daily  , Disp: , Rfl:     Triamcinolone Acetonide (NASACORT AQ) 55 MCG/ACT AERO, 1 Act into each nostril Daily 2 puffs in each nostril, Disp: , Rfl:     venlafaxine (EFFEXOR-XR) 150 mg 24 hr capsule, Take 1 capsule (150 mg total) by mouth daily, Disp: 90 capsule, Rfl: 1    levothyroxine 50 mcg tablet, Take 1 tablet (50 mcg total) by mouth daily for 90 days, Disp: 90 tablet, Rfl: 1    Allergies   Allergen Reactions    Compazine [Prochlorperazine] Anaphylaxis     Category: Allergy; Annotation - [id number]: ALL FORMS    Erythromycin GI Intolerance     Category: Allergy; Annotation - [id number]: ALL FORMS    Sulfa Antibiotics GI Intolerance     Patient states that it is like when you are drunk, dizziness and confusion    Sulfamethoxazole-Trimethoprim Nausea Only and Dizziness     Category: Allergy;  Annotation - [id number]: ALL FORMS       Social History   Past Surgical History:   Procedure Laterality Date    FOOT SURGERY Right     "Nerve removal"    PARATHYROID GLAND SURGERY  5/17/2018    MO COLONOSCOPY FLX DX W/COLLJ SPEC WHEN PFRMD N/A 6/21/2018    Procedure: COLONOSCOPY;  Surgeon: Zaria Marvin MD;  Location: [facility]; Service: Gastroenterology    MD EXPLORE PARATHYROID GLANDS Right 5/17/2018    Procedure: MINIMALLY INVASIVE PARATHYROIDECTOMY;   PTH MONITORING;  Surgeon: Kaylee Reyse MD;  Location: [facility];  Service: Surgical Oncology    TONSILLECTOMY      TONSILLECTOMY AND ADENOIDECTOMY       Family History   Problem Relation Age of Onset    Prostate cancer Father     Anemia Father     Neuropathy Father     Prostate cancer Brother     Paget's disease of bone Brother     Heart Valve Disease Mother         s/p MVR    Glaucoma Mother     Rickets Mother     Stroke Mother     Hypertension Family         BENIGN ESSENTIAL    Heart disease Family         CARDIAC DISORDER       Objective:  /78 (BP Location: Left arm, Patient Position: Sitting, Cuff Size: Standard)   Pulse 78   Temp 98 5 °F (36 9 °C) (Tympanic)   Ht 5' 0 63" (1 54 m) Comment: shoes off  Wt 72 8 kg (160 lb 9 6 oz) Comment: shoes off  LMP  (LMP Unknown)   SpO2 98%   BMI 30 72 kg/m²     No results found for this or any previous visit (from the past 1344 hour(s))  Physical Exam   Constitutional: She is oriented to person, place, and time  She appears well-developed and well-nourished  No distress  HENT:   Head: Normocephalic and atraumatic  Nose: Nose normal    Mouth/Throat: Uvula is midline, oropharynx is clear and moist and mucous membranes are normal  No tonsillar exudate  Eyes: Pupils are equal, round, and reactive to light  Conjunctivae are normal  Right eye exhibits no discharge  Left eye exhibits no discharge  No scleral icterus  Neck: Normal range of motion  Neck supple  Cardiovascular: Normal rate, regular rhythm, S1 normal, S2 normal, normal heart sounds and intact distal pulses  Exam reveals no gallop, no S3, no S4, no distant heart sounds and no friction rub  No murmur heard  Pulmonary/Chest: Effort normal and breath sounds normal  No accessory muscle usage or stridor  No respiratory distress  She has no decreased breath sounds  She has no wheezes  She has no rhonchi  She has no rales  She exhibits no tenderness  Musculoskeletal: Normal range of motion  She exhibits no edema, tenderness or deformity  Arms:  Lymphadenopathy:     She has no cervical adenopathy  Neurological: She is alert and oriented to person, place, and time  Skin: Skin is warm and dry  Capillary refill takes less than 2 seconds  No rash noted  She is not diaphoretic  There is erythema  No pallor  Psychiatric: She has a normal mood and affect

## 2019-08-01 NOTE — ASSESSMENT & PLAN NOTE
Mild local erythema  No evidence of cellulitis  Continue to monitor  Apply hydrocortisone and to diphenhydramine lotion for symptomatic relief

## 2019-08-12 ENCOUNTER — TELEPHONE (OUTPATIENT)
Dept: INTERNAL MEDICINE CLINIC | Age: 59
End: 2019-08-12

## 2019-08-12 DIAGNOSIS — M18.12 OSTEOARTHRITIS OF CARPOMETACARPAL JOINT OF LEFT THUMB, UNSPECIFIED OSTEOARTHRITIS TYPE: Primary | ICD-10-CM

## 2019-08-12 NOTE — TELEPHONE ENCOUNTER
Will place referral to hand surgeon as Dr Jevon Arellano note does state that that was his plan  Please call and notify patient

## 2019-08-15 ENCOUNTER — OFFICE VISIT (OUTPATIENT)
Dept: INTERNAL MEDICINE CLINIC | Age: 59
End: 2019-08-15
Payer: COMMERCIAL

## 2019-08-15 VITALS
HEIGHT: 61 IN | DIASTOLIC BLOOD PRESSURE: 80 MMHG | OXYGEN SATURATION: 98 % | WEIGHT: 162.8 LBS | TEMPERATURE: 97.8 F | BODY MASS INDEX: 30.73 KG/M2 | SYSTOLIC BLOOD PRESSURE: 140 MMHG | HEART RATE: 80 BPM

## 2019-08-15 DIAGNOSIS — N30.01 ACUTE CYSTITIS WITH HEMATURIA: Primary | ICD-10-CM

## 2019-08-15 DIAGNOSIS — R35.0 FREQUENT URINATION: ICD-10-CM

## 2019-08-15 LAB
SL AMB  POCT GLUCOSE, UA: ABNORMAL
SL AMB LEUKOCYTE ESTERASE,UA: ABNORMAL
SL AMB POCT BILIRUBIN,UA: ABNORMAL
SL AMB POCT BLOOD,UA: ABNORMAL
SL AMB POCT CLARITY,UA: CLEAR
SL AMB POCT COLOR,UA: YELLOW
SL AMB POCT KETONES,UA: ABNORMAL
SL AMB POCT NITRITE,UA: ABNORMAL
SL AMB POCT PH,UA: 6.5
SL AMB POCT SPECIFIC GRAVITY,UA: 1.01
SL AMB POCT URINE PROTEIN: ABNORMAL
SL AMB POCT UROBILINOGEN: 0.2

## 2019-08-15 PROCEDURE — 81003 URINALYSIS AUTO W/O SCOPE: CPT | Performed by: INTERNAL MEDICINE

## 2019-08-15 PROCEDURE — 1036F TOBACCO NON-USER: CPT | Performed by: INTERNAL MEDICINE

## 2019-08-15 PROCEDURE — 99213 OFFICE O/P EST LOW 20 MIN: CPT | Performed by: INTERNAL MEDICINE

## 2019-08-15 PROCEDURE — 3008F BODY MASS INDEX DOCD: CPT | Performed by: INTERNAL MEDICINE

## 2019-08-15 RX ORDER — CEPHALEXIN 500 MG/1
500 CAPSULE ORAL EVERY 12 HOURS SCHEDULED
Qty: 14 CAPSULE | Refills: 0 | Status: SHIPPED | OUTPATIENT
Start: 2019-08-15 | End: 2019-08-22

## 2019-08-15 NOTE — PATIENT INSTRUCTIONS
Urinary Tract Infection in Women   AMBULATORY CARE:   A urinary tract infection (UTI)  is caused by bacteria that get inside your urinary tract  Most bacteria that enter your urinary tract come out when you urinate  If the bacteria stay in your urinary tract, you may get an infection  Your urinary tract includes your kidneys, ureters, bladder, and urethra  Urine is made in your kidneys, and it flows from the ureters to the bladder  Urine leaves the bladder through the urethra  A UTI is more common in your lower urinary tract, which includes your bladder and urethra  Common symptoms include the following:   · Urinating more often or waking from sleep to urinate    · Pain or burning when you urinate    · Pain or pressure in your lower abdomen     · Urine that smells bad    · Blood in your urine    · Leaking urine  Seek care immediately if:   · You are urinating very little or not at all  · You have a high fever with shaking chills  · You have side or back pain that gets worse  Contact your healthcare provider if:   · You have a fever  · You do not feel better after 2 days of taking antibiotics  · You are vomiting  · You have questions or concerns about your condition or care  Treatment for a UTI  may include medicines to treat a bacterial infection  You may also need medicines to decrease pain and burning, or decrease the urge to urinate often  Prevent a UTI:   · Empty your bladder often  Urinate and empty your bladder as soon as you feel the need  Do not hold your urine for long periods of time  · Wipe from front to back after you urinate or have a bowel movement  This will help prevent germs from getting into your urinary tract through your urethra  · Drink liquids as directed  Ask how much liquid to drink each day and which liquids are best for you  You may need to drink more liquids than usual to help flush out the bacteria  Do not drink alcohol, caffeine, or citrus juices  These can irritate your bladder and increase your symptoms  Your healthcare provider may recommend cranberry juice to help prevent a UTI  · Urinate after you have sex  This can help flush out bacteria passed during sex  · Do not douche or use feminine deodorants  These can change the chemical balance in your vagina  · Change sanitary pads or tampons often  This will help prevent germs from getting into your urinary tract  · Do pelvic muscle exercises often  Pelvic muscle exercises may help you start and stop urinating  Strong pelvic muscles may help you empty your bladder easier  Squeeze these muscles tightly for 5 seconds like you are trying to hold back urine  Then relax for 5 seconds  Gradually work up to squeezing for 10 seconds  Do 3 sets of 15 repetitions a day, or as directed  Follow up with your healthcare provider as directed:  Write down your questions so you remember to ask them during your visits  © 2017 [employer] Information is for End User's use only and may not be sold, redistributed or otherwise used for commercial purposes  All illustrations and images included in CareNotes® are the copyrighted property of [employer]  or [employer]  The above information is an  only  It is not intended as medical advice for individual conditions or treatments  Talk to your doctor, nurse or pharmacist before following any medical regimen to see if it is safe and effective for you  Kidney Stones   AMBULATORY CARE:   Kidney stones  form in the urinary system when the water and waste in your urine are out of balance  When this happens, certain types of waste crystals separate from the urine  The crystals build up and form kidney stones  Kidney stones can be made of uric acid, calcium, phosphate, or oxalate crystals  You may have 1 or more kidney stones     Common symptoms include the following:   · Pain in the middle of your back that moves across to your side or that may spread to your groin    · Nausea and vomiting    · Urge to urinate often, burning feeling when you urinate, or pink or red urine    · Tenderness in your lower back, side, or stomach  Seek care immediately if:   · You have vomiting that is not relieved by medicine  Contact your healthcare provider if:   · You have a fever  · You have trouble passing urine  · You see blood in your urine  · You have severe pain  · You have any questions or concerns about your condition or care  Treatment for kidney stones  may include any of the following:  · NSAIDs , such as ibuprofen, help decrease swelling, pain, and fever  This medicine is available with or without a doctor's order  NSAIDs can cause stomach bleeding or kidney problems in certain people  If you take blood thinner medicine, always ask your healthcare provider if NSAIDs are safe for you  Always read the medicine label and follow directions  · Prescription medicine  may be given  Ask how to take this medicine safely  · Medicines  to balance your electrolytes may be needed  · A procedure or surgery  to remove the kidney stones may be needed if they do not pass on their own  Your treatment will depend on the size and location of your kidney stones  Manage your symptoms:   · Drink plenty of liquids  Your healthcare provider may tell you to drink at least 8 to 12 (eight-ounce) cups of liquids each day  This helps flush out the kidney stones when you urinate  Water is the best liquid to drink  · Strain your urine every time you go to the bathroom  Urinate through a strainer or a piece of thin cloth to catch the stones  Take the stones to your healthcare provider so they can be sent to the lab for tests  This will help your healthcare providers plan the best treatment for you  · Eat a variety of healthy foods    Healthy foods include fruits, vegetables, whole-grain breads, low-fat dairy products, beans, and fish  You may need to limit how much sodium (salt) or protein you eat  Ask for information about the best foods for you  · Exercise regularly  Your stones may pass more easily if you stay active  Ask about the best activities for you  After you pass your kidney stones:  Once you have passed your kidney stones, your healthcare provider may  order a 24-hour urine test  Results from a 24-hour urine test will help your healthcare provider plan ways to prevent more stones from forming  If you are told to do a 24-hour test, your healthcare provider will give you more instructions  Follow up with your healthcare provider as directed:  Write down your questions so you remember to ask them during your visits  © 2017 [facility] [facility] Information is for End User's use only and may not be sold, redistributed or otherwise used for commercial purposes  All illustrations and images included in CareNotes® are the copyrighted property of [employer]  or [employer]  The above information is an  only  It is not intended as medical advice for individual conditions or treatments  Talk to your doctor, nurse or pharmacist before following any medical regimen to see if it is safe and effective for you

## 2019-08-15 NOTE — PROGRESS NOTES
Assessment/Plan:    Acute cystitis with hematuria  - point of care urine dipstick was positive for moderate blood and small leukocytes but negative for nitrites  -will start patient on Keflex 500 mg twice daily for 7 days  -will send in her urine for urinalysis with microscopy and reflex culture and sensitivity and adjust antibiotics if necessary  -patient has been counseled to continue to use Tylenol or Advil with meals for her pain  -she was counseled to drink water liberally, greater than 2 L daily    Nephrolithiasis  - is unlikely that her symptoms are secondary to nephrolithiasis at this point  -patient was counseled to continue to drink liberal amounts of water  - patient was however counseled that if she should develop worsening flank pain especially pain radiating to the groin, nausea, inability to keep down oral diet, she should go to the emergency department  - follow up with Urology     Diagnoses and all orders for this visit:    Acute cystitis with hematuria  -     cephalexin (KEFLEX) 500 mg capsule; Take 1 capsule (500 mg total) by mouth every 12 (twelve) hours for 7 days  -     Cancel: UA w Reflex to Microscopic w Reflex to Culture - Clinic Collect  -     UA w Reflex to Microscopic w Reflex to Culture - Clinic Collect    Frequent urination  -     POCT urine dip auto non-scope          Subjective:      Patient ID: Florencio Sandy is a 64 y o  female  HPI  Patient presents with complaints of suprapubic abdominal pain, urinary frequency, discomfort while urinating, small volume urine, feeling of incomplete emptying, hematuria, intermittent low back pain, nausea for the past 2 days  She denies fever, chills, night sweats, chest pain, shortness of breath, palpitations, vomiting, diarrhea, constipation  She does have a history of kidney stones and states that she knows that she has a stone of 8 mm  She declined a lithotripsy about 1 year ago  She denies flank pain    She states that her last UTI was over a year ago  The following portions of the patient's history were reviewed and updated as appropriate:   She  has a past medical history of Abrasion of axilla, ADHD, Allergic, Allergic rhinitis, Asthma, Bilateral nephrolithiasis (7/25/2018), Depression, Fracture of plateau of left tibia, Headache, Hematuria, Hyperparathyroidism ([facility] [address] ), Hypothyroidism, Left ankle sprain, No known health problems, Obstructive sleep apnea on CPAP, Scoliosis, Thyroid nodule, Urinary frequency, and Vaginal bleeding, abnormal   She   Patient Active Problem List    Diagnosis Date Noted    Acute cystitis with hematuria 08/15/2019    Sinus headache 08/01/2019    Bee sting reaction 08/01/2019    Skin tag 06/21/2019    Osteoarthritis of carpometacarpal (CMC) joint of left thumb 06/21/2019    Asthmatic bronchitis without complication 05/36/8905    Rhinosinusitis 12/28/2018    Bilateral nephrolithiasis 07/25/2018    Thyroid nodule 07/25/2018    Inadequate sleep hygiene 07/13/2018    Lactose intolerance 04/06/2018    Obesity (BMI 30-39 9) 04/02/2018    Chronic rhinitis 04/02/2018    Visit for routine gyn exam 02/13/2018    Screening for colon cancer 02/13/2018    Encounter for gynecological examination without abnormal finding 02/13/2018    Osteopenia of multiple sites 01/26/2018    ERICA (obstructive sleep apnea) 10/12/2017    Hyperparathyroidism ([facility] [address] ) 09/14/2017    Vitamin D deficiency 09/14/2017    Lumbar strain 07/25/2017    Allergic 07/25/2017    Hypercalcemia 07/19/2017    Hypertension, essential, benign 07/19/2017    Snoring 05/30/2017    Hypercholesteremia 10/28/2016    GERD without esophagitis 06/06/2016    ADHD, predominantly inattentive type 04/16/2015    Hypothyroidism 12/27/2013    Depression 10/22/2013     She  has a past surgical history that includes Tonsillectomy; Parathyroid gland surgery (5/17/2018); Foot surgery (Right); pr explore parathyroid glands (Right, 5/17/2018);  Tonsillectomy and adenoidectomy; and pr colonoscopy flx dx w/collj spec when pfrmd (N/A, 6/21/2018)  Her family history includes Anemia in her father; Glaucoma in her mother; Heart Valve Disease in her mother; Heart disease in her family; Hypertension in her family; Neuropathy in her father; Paget's disease of bone in her brother; Prostate cancer in her brother and father; Rickets in her mother; Stroke in her mother  She  reports that she has never smoked  She has never used smokeless tobacco  She reports that she drinks alcohol  She reports that she does not use drugs  Current Outpatient Medications   Medication Sig Dispense Refill    Acetaminophen (TYLENOL PO) Take by mouth as needed      albuterol (PROAIR HFA) 90 mcg/act inhaler Inhale 2 puffs every 6 (six) hours as needed for wheezing 8 5 g 0    amphetamine-dextroamphetamine (ADDERALL) 5 MG tablet Take 1 5 tablets (7 5 mg) in the AM & 1 tablet (5 mg) in the PM 75 tablet 0    cetirizine (ZyrTEC) 10 mg tablet Take 10 mg by mouth 2 (two) times a day       Cholecalciferol (VITAMIN D3) 1000 units CAPS Take 1 capsule by mouth Daily        fluticasone-salmeterol (ADVAIR DISKUS) 100-50 mcg/dose inhaler Inhale 1 puff daily 60 each 3    levothyroxine 50 mcg tablet Take 1 tablet (50 mcg total) by mouth daily for 90 days 90 tablet 1    multivitamin (THERAGRAN) TABS Take 1 tablet by mouth daily        Triamcinolone Acetonide (NASACORT AQ) 55 MCG/ACT AERO 1 Act into each nostril Daily 2 puffs in each nostril      venlafaxine (EFFEXOR-XR) 150 mg 24 hr capsule Take 1 capsule (150 mg total) by mouth daily 90 capsule 1    cephalexin (KEFLEX) 500 mg capsule Take 1 capsule (500 mg total) by mouth every 12 (twelve) hours for 7 days 14 capsule 0     No current facility-administered medications for this visit        Current Outpatient Medications on File Prior to Visit   Medication Sig    Acetaminophen (TYLENOL PO) Take by mouth as needed    albuterol (PROAIR HFA) 90 mcg/act inhaler Inhale 2 puffs every 6 (six) hours as needed for wheezing    amphetamine-dextroamphetamine (ADDERALL) 5 MG tablet Take 1 5 tablets (7 5 mg) in the AM & 1 tablet (5 mg) in the PM    cetirizine (ZyrTEC) 10 mg tablet Take 10 mg by mouth 2 (two) times a day     Cholecalciferol (VITAMIN D3) 1000 units CAPS Take 1 capsule by mouth Daily      fluticasone-salmeterol (ADVAIR DISKUS) 100-50 mcg/dose inhaler Inhale 1 puff daily    levothyroxine 50 mcg tablet Take 1 tablet (50 mcg total) by mouth daily for 90 days    multivitamin (THERAGRAN) TABS Take 1 tablet by mouth daily      Triamcinolone Acetonide (NASACORT AQ) 55 MCG/ACT AERO 1 Act into each nostril Daily 2 puffs in each nostril    venlafaxine (EFFEXOR-XR) 150 mg 24 hr capsule Take 1 capsule (150 mg total) by mouth daily     No current facility-administered medications on file prior to visit  She is allergic to compazine [prochlorperazine]; erythromycin; sulfa antibiotics; and sulfamethoxazole-trimethoprim       Review of Systems   Constitutional: Negative for activity change, chills, fatigue, fever and unexpected weight change  HENT: Negative for ear pain, postnasal drip, rhinorrhea, sinus pressure and sore throat  Eyes: Negative for pain  Respiratory: Negative for cough, choking, chest tightness, shortness of breath and wheezing  Cardiovascular: Negative for chest pain, palpitations and leg swelling  Gastrointestinal: Positive for abdominal pain and nausea  Negative for constipation, diarrhea and vomiting  Genitourinary: Positive for decreased urine volume, dysuria and hematuria  Negative for vaginal discharge  Musculoskeletal: Positive for back pain  Negative for arthralgias, gait problem, joint swelling, myalgias and neck stiffness  Skin: Negative for pallor and rash  Neurological: Negative for dizziness, tremors, seizures, syncope, light-headedness and headaches  Hematological: Negative for adenopathy     Psychiatric/Behavioral: Negative for behavioral problems           Past Medical History:   Diagnosis Date    Abrasion of axilla     LAST ASSESSED: 11/21/14    ADHD     Allergic     Allergic rhinitis     LAST ASSESSED: 5/15/15    Asthma     TRANSITIONED FROM: ASTHMA; RESOLVED: 11/9/16    Bilateral nephrolithiasis 7/25/2018    Depression     Fracture of plateau of left tibia     RESOLVED: 4/14/15    Headache     Hematuria     Hyperparathyroidism ([address] )     Hypothyroidism     Left ankle sprain     LAST ASSESSED: 10/21/14    No known health problems     DENIED MENTAL STATUS CHANGE AS PER ALLSCRIPTS; CONFLICTED WITH ADHD AND DEPRESSION IN MED HX SO IT COULD NOT BE ADDED IN PERT NEGS    Obstructive sleep apnea on CPAP     Scoliosis     Thyroid nodule     Urinary frequency     RESOLVED: 10/27/16    Vaginal bleeding, abnormal     LAST ASSESSED: 6/6/16         Current Outpatient Medications:     Acetaminophen (TYLENOL PO), Take by mouth as needed, Disp: , Rfl:     albuterol (PROAIR HFA) 90 mcg/act inhaler, Inhale 2 puffs every 6 (six) hours as needed for wheezing, Disp: 8 5 g, Rfl: 0    amphetamine-dextroamphetamine (ADDERALL) 5 MG tablet, Take 1 5 tablets (7 5 mg) in the AM & 1 tablet (5 mg) in the PM, Disp: 75 tablet, Rfl: 0    cetirizine (ZyrTEC) 10 mg tablet, Take 10 mg by mouth 2 (two) times a day , Disp: , Rfl:     Cholecalciferol (VITAMIN D3) 1000 units CAPS, Take 1 capsule by mouth Daily  , Disp: , Rfl:     fluticasone-salmeterol (ADVAIR DISKUS) 100-50 mcg/dose inhaler, Inhale 1 puff daily, Disp: 60 each, Rfl: 3    levothyroxine 50 mcg tablet, Take 1 tablet (50 mcg total) by mouth daily for 90 days, Disp: 90 tablet, Rfl: 1    multivitamin (THERAGRAN) TABS, Take 1 tablet by mouth daily  , Disp: , Rfl:     Triamcinolone Acetonide (NASACORT AQ) 55 MCG/ACT AERO, 1 Act into each nostril Daily 2 puffs in each nostril, Disp: , Rfl:     venlafaxine (EFFEXOR-XR) 150 mg 24 hr capsule, Take 1 capsule (150 mg total) by mouth daily, Disp: 90 capsule, Rfl: 1    cephalexin (KEFLEX) 500 mg capsule, Take 1 capsule (500 mg total) by mouth every 12 (twelve) hours for 7 days, Disp: 14 capsule, Rfl: 0    Allergies   Allergen Reactions    Compazine [Prochlorperazine] Anaphylaxis     Category: Allergy; Annotation - [id number]: ALL FORMS    Erythromycin GI Intolerance     Category: Allergy; Annotation - [id number]: ALL FORMS    Sulfa Antibiotics GI Intolerance     Patient states that it is like when you are drunk, dizziness and confusion    Sulfamethoxazole-Trimethoprim Nausea Only and Dizziness     Category: Allergy; Annotation - [id number]: ALL FORMS       Social History   Past Surgical History:   Procedure Laterality Date    FOOT SURGERY Right     "Nerve removal"    PARATHYROID GLAND SURGERY  5/17/2018    IL COLONOSCOPY FLX DX W/COLLJ SPEC WHEN PFRMD N/A 6/21/2018    Procedure: COLONOSCOPY;  Surgeon: Melissa Rosas MD;  [facility]; Service: Gastroenterology    IL EXPLORE PARATHYROID GLANDS Right 5/17/2018    Procedure: MINIMALLY INVASIVE PARATHYROIDECTOMY;   PTH MONITORING;  Surgeon: Kaylee Reyes MD;  [facility]: [facility];  Service: Surgical Oncology    TONSILLECTOMY      TONSILLECTOMY AND ADENOIDECTOMY       Family History   Problem Relation Age of Onset    Prostate cancer Father     Anemia Father     Neuropathy Father     Prostate cancer Brother     Paget's disease of bone Brother     Heart Valve Disease Mother         s/p MVR    Glaucoma Mother     Rickets Mother     Stroke Mother     Hypertension Family         BENIGN ESSENTIAL    Heart disease Family         CARDIAC DISORDER       Objective:  /80 (BP Location: Right arm, Patient Position: Sitting, Cuff Size: Adult)   Pulse 80   Temp 97 8 °F (36 6 °C) (Tympanic)   Ht 5' 1 42" (1 56 m)   Wt 73 8 kg (162 lb 12 8 oz)   LMP  (LMP Unknown)   SpO2 98%   BMI 30 34 kg/m²        Physical Exam   Constitutional: She is oriented to person, place, and time  She appears well-developed and well-nourished  No distress  HENT:   Head: Normocephalic and atraumatic  Right Ear: External ear normal    Left Ear: External ear normal    Nose: Nose normal    Mouth/Throat: No oropharyngeal exudate  Dry mucous membranes   Eyes: Pupils are equal, round, and reactive to light  Conjunctivae and EOM are normal  Right eye exhibits no discharge  Left eye exhibits no discharge  No scleral icterus  Neck: Normal range of motion  Neck supple  No JVD present  No tracheal deviation present  No thyromegaly present  Cardiovascular: Normal rate, regular rhythm, normal heart sounds and intact distal pulses  Exam reveals no gallop and no friction rub  No murmur heard  Pulmonary/Chest: Effort normal and breath sounds normal  No respiratory distress  She has no wheezes  She has no rales  She exhibits no tenderness  Abdominal: Soft  Bowel sounds are normal  She exhibits no distension and no mass  There is tenderness (Suprapubic tenderness)  There is no rebound and no guarding  Negative CVA tenderness   Musculoskeletal: Normal range of motion  She exhibits no edema, tenderness or deformity  Lymphadenopathy:     She has no cervical adenopathy  Neurological: She is alert and oriented to person, place, and time  She has normal reflexes  No cranial nerve deficit  She exhibits normal muscle tone  Coordination normal    Skin: Skin is warm and dry  No rash noted  She is not diaphoretic  No erythema  No pallor  Psychiatric: She has a normal mood and affect   Her behavior is normal

## 2019-08-17 LAB
APPEARANCE UR: CLEAR
BACTERIA UR QL AUTO: ABNORMAL /HPF
BILIRUB UR QL STRIP: NEGATIVE
COLOR UR: YELLOW
GLUCOSE UR QL STRIP: NEGATIVE
HGB UR QL STRIP: ABNORMAL
HYALINE CASTS #/AREA URNS LPF: ABNORMAL /LPF
KETONES UR QL STRIP: NEGATIVE
LEUKOCYTE ESTERASE UR QL STRIP: ABNORMAL
NITRITE UR QL STRIP: NEGATIVE
PH UR STRIP: 6.5 [PH] (ref 5–8)
PROT UR QL STRIP: ABNORMAL
RBC #/AREA URNS HPF: ABNORMAL /HPF
SP GR UR STRIP: 1.01 (ref 1–1.03)
SQUAMOUS #/AREA URNS HPF: ABNORMAL /HPF
WBC #/AREA URNS HPF: ABNORMAL /HPF

## 2019-08-19 ENCOUNTER — TELEPHONE (OUTPATIENT)
Dept: INTERNAL MEDICINE CLINIC | Age: 59
End: 2019-08-19

## 2019-08-19 NOTE — TELEPHONE ENCOUNTER
I called and left a message for patient to call the office regarding her lab results ( urinalysis and culture)

## 2019-09-17 DIAGNOSIS — F90.0 ADHD, PREDOMINANTLY INATTENTIVE TYPE: ICD-10-CM

## 2019-09-18 RX ORDER — DEXTROAMPHETAMINE SACCHARATE, AMPHETAMINE ASPARTATE, DEXTROAMPHETAMINE SULFATE AND AMPHETAMINE SULFATE 1.25; 1.25; 1.25; 1.25 MG/1; MG/1; MG/1; MG/1
TABLET ORAL
Qty: 75 TABLET | Refills: 0 | Status: SHIPPED | OUTPATIENT
Start: 2019-09-18 | End: 2019-09-19 | Stop reason: SDUPTHER

## 2019-09-19 DIAGNOSIS — F90.0 ADHD, PREDOMINANTLY INATTENTIVE TYPE: ICD-10-CM

## 2019-09-20 RX ORDER — DEXTROAMPHETAMINE SACCHARATE, AMPHETAMINE ASPARTATE, DEXTROAMPHETAMINE SULFATE AND AMPHETAMINE SULFATE 1.25; 1.25; 1.25; 1.25 MG/1; MG/1; MG/1; MG/1
TABLET ORAL
Qty: 75 TABLET | Refills: 0 | Status: SHIPPED | OUTPATIENT
Start: 2019-09-20 | End: 2019-12-23 | Stop reason: SDUPTHER

## 2019-12-09 ENCOUNTER — TELEPHONE (OUTPATIENT)
Dept: INTERNAL MEDICINE CLINIC | Facility: CLINIC | Age: 59
End: 2019-12-09

## 2019-12-23 DIAGNOSIS — F90.0 ADHD, PREDOMINANTLY INATTENTIVE TYPE: ICD-10-CM

## 2019-12-23 RX ORDER — DEXTROAMPHETAMINE SACCHARATE, AMPHETAMINE ASPARTATE, DEXTROAMPHETAMINE SULFATE AND AMPHETAMINE SULFATE 1.25; 1.25; 1.25; 1.25 MG/1; MG/1; MG/1; MG/1
TABLET ORAL
Qty: 75 TABLET | Refills: 0 | Status: SHIPPED | OUTPATIENT
Start: 2019-12-23 | End: 2020-02-17 | Stop reason: SDUPTHER

## 2019-12-23 NOTE — TELEPHONE ENCOUNTER
Patient would need a refill on her amphetamine-dextroamphetamine (ADDERALL) 5 MG tablet       Patient had to Reschedule appt due to Dr Mack Fairly not in and patient will run out before appt    12/30/2019 with Dr Mack Fairly at [facility] in [place]

## 2019-12-30 ENCOUNTER — OFFICE VISIT (OUTPATIENT)
Dept: INTERNAL MEDICINE CLINIC | Facility: CLINIC | Age: 59
End: 2019-12-30
Payer: COMMERCIAL

## 2019-12-30 VITALS
SYSTOLIC BLOOD PRESSURE: 140 MMHG | HEIGHT: 61 IN | DIASTOLIC BLOOD PRESSURE: 90 MMHG | OXYGEN SATURATION: 95 % | TEMPERATURE: 98.2 F | WEIGHT: 168 LBS | BODY MASS INDEX: 31.72 KG/M2 | HEART RATE: 80 BPM

## 2019-12-30 DIAGNOSIS — J06.9 VIRAL URI WITH COUGH: ICD-10-CM

## 2019-12-30 DIAGNOSIS — J31.0 RHINOSINUSITIS: ICD-10-CM

## 2019-12-30 DIAGNOSIS — I10 HYPERTENSION, ESSENTIAL, BENIGN: Primary | ICD-10-CM

## 2019-12-30 DIAGNOSIS — J32.9 RHINOSINUSITIS: ICD-10-CM

## 2019-12-30 PROCEDURE — 99213 OFFICE O/P EST LOW 20 MIN: CPT | Performed by: INTERNAL MEDICINE

## 2019-12-30 RX ORDER — BENZONATATE 200 MG/1
200 CAPSULE ORAL 3 TIMES DAILY PRN
Qty: 21 CAPSULE | Refills: 0 | Status: SHIPPED | OUTPATIENT
Start: 2019-12-30 | End: 2020-03-31 | Stop reason: ALTCHOICE

## 2019-12-30 RX ORDER — CHLORPHENIRAMINE MALEATE 4 MG/1
4 TABLET ORAL EVERY 6 HOURS PRN
Qty: 30 TABLET | Refills: 0 | Status: SHIPPED | OUTPATIENT
Start: 2019-12-30 | End: 2020-03-31 | Stop reason: ALTCHOICE

## 2019-12-30 NOTE — ASSESSMENT & PLAN NOTE
Symptomatic treatment with Tessalon Perles, chlorpheniramine, continued use of Nasacort, Mucinex, sinus rinses

## 2019-12-30 NOTE — PROGRESS NOTES
Assessment/Plan:    Viral URI with cough  Symptomatic treatment with Tessalon Perles, chlorpheniramine, continued use of Nasacort, Mucinex, sinus rinses  Rhinosinusitis  Symptomatic treatment as outlined above  Hypertension, essential, benign  Blood pressure has been variable  On recheck it was 140/90  We will continue monitor       Diagnoses and all orders for this visit:    Hypertension, essential, benign  -     chlorpheniramine (CHLOR-TRIMETON) 4 MG tablet; Take 1 tablet (4 mg total) by mouth every 6 (six) hours as needed for rhinitis    Rhinosinusitis  -     chlorpheniramine (CHLOR-TRIMETON) 4 MG tablet; Take 1 tablet (4 mg total) by mouth every 6 (six) hours as needed for rhinitis    Viral URI with cough  -     benzonatate (TESSALON) 200 MG capsule; Take 1 capsule (200 mg total) by mouth 3 (three) times a day as needed for cough          Subjective:      Patient ID: Juliette Conner is a 64 y o  female  Patient presents with cough and cold symptoms for 2 days  No fever or chills  No hx of exposure to ill persons 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  Cough is non productive  No myalgias  No GI symptoms of nausea, vomiting or diarrhea  No earache  Sinus pressure and sinus congestion         Family History   Problem Relation Age of Onset    Prostate cancer Father     Anemia Father     Neuropathy Father     Prostate cancer Brother     Paget's disease of bone Brother     Heart Valve Disease Mother         s/p MVR    Glaucoma Mother     Rickets Mother     Stroke Mother     Hypertension Family         BENIGN ESSENTIAL    Heart disease Family         CARDIAC DISORDER     Social History     Socioeconomic History    Marital status: Single     Spouse name: Not on file    Number of children: Not on file    Years of education: Not on file    Highest education level: Not on file   Occupational History    Occupation: UNEMPLOYED   Social Needs    Financial resource strain: Not on file    Food insecurity: Worry: Not on file     Inability: Not on file    Transportation needs:     Medical: Not on file     Non-medical: Not on file   Tobacco Use    Smoking status: Never Smoker    Smokeless tobacco: Never Used   Substance and Sexual Activity    Alcohol use: Yes     Comment: socially; DRINKS ALCOHOL VERY INFREQUENTLY    Drug use: No    Sexual activity: Not on file   Lifestyle    Physical activity:     Days per week: Not on file     Minutes per session: Not on file    Stress: Not on file   Relationships    Social connections:     Talks on phone: Not on file     Gets together: Not on file     Attends Faith service: Not on file     Active member of club or organization: Not on file     Attends meetings of clubs or organizations: Not on file     Relationship status: Not on file    Intimate partner violence:     Fear of current or ex partner: Not on file     Emotionally abused: Not on file     Physically abused: Not on file     Forced sexual activity: Not on file   Other Topics Concern    Not on file   Social History Narrative    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)    TRAVEL HX: [place] JUL[place]AUG 2014     Past Medical History:   Diagnosis Date    Abrasion of axilla     LAST ASSESSED: 11/21/14    ADHD     Allergic     Allergic rhinitis     LAST ASSESSED: 5/15/15    Asthma     TRANSITIONED FROM: ASTHMA; RESOLVED: 11/9/16    Bilateral nephrolithiasis 7/25/2018    Depression     Fracture of plateau of left tibia     RESOLVED: 4/14/15    Headache     Hematuria     Hyperparathyroidism ([address] )     Hypothyroidism     Left ankle sprain     LAST ASSESSED: 10/21/14    No known health problems     DENIED MENTAL STATUS CHANGE AS PER ALLSCRIPTS; CONFLICTED WITH ADHD AND DEPRESSION IN MED HX SO IT COULD NOT BE ADDED IN PERT NEGS    Obstructive sleep apnea on CPAP     Scoliosis     Thyroid nodule     Urinary frequency     RESOLVED: 10/27/16    Vaginal bleeding, abnormal     LAST ASSESSED: 6/6/16       Current Outpatient Medications:     Acetaminophen (TYLENOL PO), Take by mouth as needed, Disp: , Rfl:     albuterol (PROAIR HFA) 90 mcg/act inhaler, Inhale 2 puffs every 6 (six) hours as needed for wheezing, Disp: 8 5 g, Rfl: 0    amphetamine-dextroamphetamine (ADDERALL) 5 MG tablet, take 1 and 1/2 tablets by mouth every morning and 1 tablet every evening, Disp: 75 tablet, Rfl: 0    cetirizine (ZyrTEC) 10 mg tablet, Take 10 mg by mouth 2 (two) times a day , Disp: , Rfl:     Cholecalciferol (VITAMIN D3) 1000 units CAPS, Take 1 capsule by mouth Daily  , Disp: , Rfl:     fluticasone-salmeterol (ADVAIR DISKUS) 100-50 mcg/dose inhaler, Inhale 1 puff daily, Disp: 60 each, Rfl: 3    levothyroxine 50 mcg tablet, Take 1 tablet (50 mcg total) by mouth daily for 90 days, Disp: 90 tablet, Rfl: 1    multivitamin (THERAGRAN) TABS, Take 1 tablet by mouth daily  , Disp: , Rfl:     Triamcinolone Acetonide (NASACORT AQ) 55 MCG/ACT AERO, 1 Act into each nostril Daily 2 puffs in each nostril, Disp: , Rfl:     venlafaxine (EFFEXOR-XR) 150 mg 24 hr capsule, Take 1 capsule (150 mg total) by mouth daily, Disp: 90 capsule, Rfl: 1    benzonatate (TESSALON) 200 MG capsule, Take 1 capsule (200 mg total) by mouth 3 (three) times a day as needed for cough, Disp: 21 capsule, Rfl: 0    chlorpheniramine (CHLOR-TRIMETON) 4 MG tablet, Take 1 tablet (4 mg total) by mouth every 6 (six) hours as needed for rhinitis, Disp: 30 tablet, Rfl: 0  Allergies   Allergen Reactions    Compazine [Prochlorperazine] Anaphylaxis     Category: Allergy; Annotation - [id number]: ALL FORMS    Erythromycin GI Intolerance     Category: Allergy; Annotation - [id number]: ALL FORMS    Sulfa Antibiotics GI Intolerance     Patient states that it is like when you are drunk, dizziness and confusion    Sulfamethoxazole-Trimethoprim Nausea Only and Dizziness     Category: Allergy;  Annotation - [id number]: ALL FORMS     Past Surgical History:   Procedure Laterality Date    FOOT SURGERY Right     "Nerve removal"    PARATHYROID GLAND SURGERY  5/17/2018    NE COLONOSCOPY FLX DX W/COLLJ SPEC WHEN PFRMD N/A 6/21/2018    Procedure: COLONOSCOPY;  Surgeon: Jose A Castro MD;  [facility]; Service: Gastroenterology    NE EXPLORE PARATHYROID GLANDS Right 5/17/2018    Procedure: MINIMALLY INVASIVE PARATHYROIDECTOMY;   PTH MONITORING;  Surgeon: Darrian Escobedo MD;  [facility];  Service: Surgical Oncology    TONSILLECTOMY      TONSILLECTOMY AND ADENOIDECTOMY           Review of Systems   Constitutional: Positive for fatigue  Negative for chills, diaphoresis and fever  HENT: Positive for congestion, rhinorrhea, sinus pressure and sore throat  Negative for ear pain, tinnitus and trouble swallowing  Eyes: Negative  Respiratory: Positive for cough  Negative for shortness of breath and wheezing  Cardiovascular: Negative  Gastrointestinal: Negative  Genitourinary: Negative  Musculoskeletal: Negative  Neurological: Negative  All other systems reviewed and are negative  Objective:      /90 (BP Location: Left arm, Patient Position: Sitting, Cuff Size: Standard)   Pulse 80   Temp 98 2 °F (36 8 °C) (Oral)   Ht 5' 0 95" (1 548 m)   Wt 76 2 kg (168 lb)   LMP  (LMP Unknown)   SpO2 95%   BMI 31 80 kg/m²          Physical Exam   Constitutional: She is oriented to person, place, and time  She appears well-developed and well-nourished  No distress  HENT:   Head: Normocephalic and atraumatic  Right Ear: Tympanic membrane and external ear normal    Left Ear: Tympanic membrane and external ear normal    Nose: Rhinorrhea present  Mouth/Throat: Mucous membranes are normal  Posterior oropharyngeal erythema (mild) present  No oropharyngeal exudate  Eyes: Pupils are equal, round, and reactive to light  Conjunctivae are normal  No scleral icterus  Neck: Neck supple  No JVD present   No thyromegaly present  Cardiovascular: Normal rate, regular rhythm and normal heart sounds  Pulmonary/Chest: Effort normal  No respiratory distress  Abdominal: Soft  Bowel sounds are normal  She exhibits no distension  There is no tenderness  Musculoskeletal: She exhibits no edema  Lymphadenopathy:     She has no cervical adenopathy  Neurological: She is alert and oriented to person, place, and time  No cranial nerve deficit  Non focal   Skin: Skin is warm and dry  No rash noted  She is not diaphoretic  No erythema  Psychiatric: She has a normal mood and affect  Her behavior is normal    Vitals reviewed

## 2020-01-02 DIAGNOSIS — E03.9 ACQUIRED HYPOTHYROIDISM: ICD-10-CM

## 2020-01-02 RX ORDER — LEVOTHYROXINE SODIUM 0.05 MG/1
TABLET ORAL
Qty: 90 TABLET | Refills: 0 | OUTPATIENT
Start: 2020-01-02

## 2020-01-15 DIAGNOSIS — E03.9 ACQUIRED HYPOTHYROIDISM: ICD-10-CM

## 2020-01-17 RX ORDER — LEVOTHYROXINE SODIUM 0.05 MG/1
TABLET ORAL
Qty: 90 TABLET | Refills: 0 | OUTPATIENT
Start: 2020-01-17

## 2020-01-20 DIAGNOSIS — E03.9 ACQUIRED HYPOTHYROIDISM: ICD-10-CM

## 2020-01-20 RX ORDER — LEVOTHYROXINE SODIUM 0.05 MG/1
50 TABLET ORAL DAILY
Qty: 90 TABLET | Refills: 1 | Status: SHIPPED | OUTPATIENT
Start: 2020-01-20 | End: 2020-06-29 | Stop reason: SDUPTHER

## 2020-02-17 DIAGNOSIS — F90.0 ADHD, PREDOMINANTLY INATTENTIVE TYPE: ICD-10-CM

## 2020-02-17 RX ORDER — DEXTROAMPHETAMINE SACCHARATE, AMPHETAMINE ASPARTATE, DEXTROAMPHETAMINE SULFATE AND AMPHETAMINE SULFATE 1.25; 1.25; 1.25; 1.25 MG/1; MG/1; MG/1; MG/1
TABLET ORAL
Qty: 75 TABLET | Refills: 0 | Status: SHIPPED | OUTPATIENT
Start: 2020-02-17 | End: 2020-03-30 | Stop reason: SDUPTHER

## 2020-03-11 DIAGNOSIS — Z87.09 HISTORY OF ASTHMA: ICD-10-CM

## 2020-03-30 DIAGNOSIS — F90.0 ADHD, PREDOMINANTLY INATTENTIVE TYPE: ICD-10-CM

## 2020-03-30 RX ORDER — DEXTROAMPHETAMINE SACCHARATE, AMPHETAMINE ASPARTATE, DEXTROAMPHETAMINE SULFATE AND AMPHETAMINE SULFATE 1.25; 1.25; 1.25; 1.25 MG/1; MG/1; MG/1; MG/1
TABLET ORAL
Qty: 75 TABLET | Refills: 0 | Status: SHIPPED | OUTPATIENT
Start: 2020-03-30 | End: 2020-05-27 | Stop reason: SDUPTHER

## 2020-03-31 ENCOUNTER — TELEMEDICINE (OUTPATIENT)
Dept: INTERNAL MEDICINE CLINIC | Facility: CLINIC | Age: 60
End: 2020-03-31
Payer: COMMERCIAL

## 2020-03-31 DIAGNOSIS — J45.20 MILD INTERMITTENT ASTHMATIC BRONCHITIS WITHOUT COMPLICATION: ICD-10-CM

## 2020-03-31 DIAGNOSIS — Z12.39 SCREENING FOR BREAST CANCER: Primary | ICD-10-CM

## 2020-03-31 DIAGNOSIS — E03.9 HYPOTHYROIDISM, UNSPECIFIED TYPE: ICD-10-CM

## 2020-03-31 DIAGNOSIS — F90.0 ADHD, PREDOMINANTLY INATTENTIVE TYPE: ICD-10-CM

## 2020-03-31 PROBLEM — T63.441A BEE STING REACTION: Status: RESOLVED | Noted: 2019-08-01 | Resolved: 2020-03-31

## 2020-03-31 PROBLEM — J06.9 VIRAL URI WITH COUGH: Status: RESOLVED | Noted: 2019-12-30 | Resolved: 2020-03-31

## 2020-03-31 PROCEDURE — 99213 OFFICE O/P EST LOW 20 MIN: CPT | Performed by: INTERNAL MEDICINE

## 2020-03-31 NOTE — PROGRESS NOTES
Virtual Brief Visit    Problem List Items Addressed This Visit        Endocrine    Hypothyroidism       Respiratory    Asthmatic bronchitis without complication     Currently stable  No changes or additional medications are necessary at this time            Other    ADHD, predominantly inattentive type     Medications recently renewed  Other Visit Diagnoses     Screening for breast cancer    -  Primary                Reason for visit is follow-up for medication monitoring, hypothyroidism, history of asthma  Encounter provider Chris Mccullough MD    Provider located at [address] [address]      Recent Visits  No visits were found meeting these conditions  Showing recent visits within past 7 days and meeting all other requirements     Today's Visits  Date Type Provider Dept   03/31/20 Telemedicine Chris Mccullough  [facility] today's visits and meeting all other requirements     Future Appointments  No visits were found meeting these conditions  Showing future appointments within next 150 days and meeting all other requirements        After connecting through telephone, the patient was identified by name and date of birth  Leo Ruiz was informed that this is a telemedicine visit and that the visit is being conducted through telephone  My office door was closed  No one else was in the room  She acknowledged consent and understanding of privacy and security of the video platform  The patient has agreed to participate and understands they can discontinue the visit at any time  Patient is aware this is a billable service       Akosua Varela is a 64 y o  female with a history of asthma, hypothyroidism, osteopenia, vitamin-D deficiency, depression ADHD of the inattentive type who is interviewed via telephone for a follow-up visit  Her Adderall was renewed yesterday  She has not had the opportunity to obtain her blood work which is long overdue from August of last year  She reports some symptoms of depression but feels that it is more of reactive type due to the current work and living situation brought on by the COVID-19 precautions  All-in-all she is feeling well  Past Medical History:   Diagnosis Date    Abrasion of axilla     LAST ASSESSED: 11/21/14    ADHD     Allergic     Allergic rhinitis     LAST ASSESSED: 5/15/15    Asthma     TRANSITIONED FROM: ASTHMA; RESOLVED: 11/9/16    Bee sting reaction 8/1/2019    Bilateral nephrolithiasis 7/25/2018    Depression     Fracture of plateau of left tibia     RESOLVED: 4/14/15    Headache     Hematuria     Hyperparathyroidism ([address] )     Hypothyroidism     Left ankle sprain     LAST ASSESSED: 10/21/14    No known health problems     DENIED MENTAL STATUS CHANGE AS PER ALLSCRIPTS; CONFLICTED WITH ADHD AND DEPRESSION IN MED HX SO IT COULD NOT BE ADDED IN PERT NEGS    Obstructive sleep apnea on CPAP     Scoliosis     Thyroid nodule     Urinary frequency     RESOLVED: 10/27/16    Vaginal bleeding, abnormal     LAST ASSESSED: 6/6/16    Viral URI with cough 12/30/2019       Past Surgical History:   Procedure Laterality Date    FOOT SURGERY Right     "Nerve removal"    PARATHYROID GLAND SURGERY  5/17/2018    MO COLONOSCOPY FLX DX W/COLLJ SPEC WHEN PFRMD N/A 6/21/2018    Procedure: COLONOSCOPY;  Surgeon: Jeremiah Silva MD;  [facility];   Service: Gastroenterology    MO EXPLORE PARATHYROID GLANDS Right 5/17/2018    Procedure: MINIMALLY INVASIVE PARATHYROIDECTOMY;   PTH MONITORING;  Surgeon: Kacy Quintanilla MD;  [facility];  Service: Surgical Oncology    TONSILLECTOMY      TONSILLECTOMY AND ADENOIDECTOMY         Current Outpatient Medications   Medication Sig Dispense Refill    Acetaminophen (TYLENOL PO) Take by mouth as needed      amphetamine-dextroamphetamine (ADDERALL) 5 MG tablet take 1 and 1/2 tablets by mouth every morning and 1 tablet every evening 75 tablet 0    cetirizine (ZyrTEC) 10 mg tablet Take 10 mg by mouth 2 (two) times a day       Cholecalciferol (VITAMIN D3) 1000 units CAPS Take 1 capsule by mouth Daily        fluticasone-salmeterol (Advair Diskus) 100-50 mcg/dose inhaler Inhale 1 puff daily 60 each 0    levothyroxine 50 mcg tablet Take 1 tablet (50 mcg total) by mouth daily 90 tablet 1    multivitamin (THERAGRAN) TABS Take 1 tablet by mouth daily        Triamcinolone Acetonide (NASACORT AQ) 55 MCG/ACT AERO 1 Act into each nostril Daily 2 puffs in each nostril      venlafaxine (EFFEXOR-XR) 150 mg 24 hr capsule Take 1 capsule (150 mg total) by mouth daily 90 capsule 1    albuterol (PROAIR HFA) 90 mcg/act inhaler Inhale 2 puffs every 6 (six) hours as needed for wheezing (Patient not taking: Reported on 3/31/2020) 8 5 g 0     No current facility-administered medications for this visit  Allergies   Allergen Reactions    Compazine [Prochlorperazine] Anaphylaxis     Category: Allergy; Annotation - [id number]: ALL FORMS    Erythromycin GI Intolerance     Category: Allergy; Annotation - [id number]: ALL FORMS    Sulfa Antibiotics GI Intolerance     Patient states that it is like when you are drunk, dizziness and confusion    Sulfamethoxazole-Trimethoprim Nausea Only and Dizziness     Category: Allergy; Annotation - [id number]: ALL FORMS       Review of Systems   Constitutional: Negative  HENT: Negative  Eyes: Negative  Respiratory: Negative for cough, chest tightness, shortness of breath and wheezing  Cardiovascular: Negative  Gastrointestinal: Negative  Endocrine: Negative  Genitourinary: Negative  Musculoskeletal: Negative  Skin: Negative  Neurological: Negative  Hematological: Negative  Psychiatric/Behavioral: Negative            I spent 20 minutes with the patient during this visit

## 2020-04-14 DIAGNOSIS — F32.A DEPRESSION, UNSPECIFIED DEPRESSION TYPE: ICD-10-CM

## 2020-04-16 RX ORDER — VENLAFAXINE HYDROCHLORIDE 150 MG/1
CAPSULE, EXTENDED RELEASE ORAL
Qty: 90 CAPSULE | Refills: 1 | OUTPATIENT
Start: 2020-04-16

## 2020-04-19 ENCOUNTER — TELEPHONE (OUTPATIENT)
Dept: OTHER | Facility: OTHER | Age: 60
End: 2020-04-19

## 2020-04-20 ENCOUNTER — TELEMEDICINE (OUTPATIENT)
Dept: INTERNAL MEDICINE CLINIC | Facility: CLINIC | Age: 60
End: 2020-04-20
Payer: COMMERCIAL

## 2020-04-20 VITALS
SYSTOLIC BLOOD PRESSURE: 172 MMHG | HEIGHT: 61 IN | BODY MASS INDEX: 31.15 KG/M2 | DIASTOLIC BLOOD PRESSURE: 81 MMHG | WEIGHT: 165 LBS

## 2020-04-20 DIAGNOSIS — Z20.828 EXPOSURE TO SARS-ASSOCIATED CORONAVIRUS: Primary | ICD-10-CM

## 2020-04-20 DIAGNOSIS — Z20.828 EXPOSURE TO SARS-ASSOCIATED CORONAVIRUS: ICD-10-CM

## 2020-04-20 PROCEDURE — 87635 SARS-COV-2 COVID-19 AMP PRB: CPT

## 2020-04-20 PROCEDURE — 99213 OFFICE O/P EST LOW 20 MIN: CPT | Performed by: NURSE PRACTITIONER

## 2020-04-21 LAB — SARS-COV-2 RNA SPEC QL NAA+PROBE: NOT DETECTED

## 2020-05-07 DIAGNOSIS — F32.A DEPRESSION, UNSPECIFIED DEPRESSION TYPE: ICD-10-CM

## 2020-05-07 RX ORDER — VENLAFAXINE HYDROCHLORIDE 150 MG/1
CAPSULE, EXTENDED RELEASE ORAL
Qty: 90 CAPSULE | Refills: 1 | OUTPATIENT
Start: 2020-05-07

## 2020-05-11 DIAGNOSIS — Z87.09 HISTORY OF ASTHMA: ICD-10-CM

## 2020-05-18 DIAGNOSIS — F32.A DEPRESSION, UNSPECIFIED DEPRESSION TYPE: ICD-10-CM

## 2020-05-18 RX ORDER — VENLAFAXINE HYDROCHLORIDE 150 MG/1
150 CAPSULE, EXTENDED RELEASE ORAL DAILY
Qty: 90 CAPSULE | Refills: 1 | Status: SHIPPED | OUTPATIENT
Start: 2020-05-18 | End: 2020-08-10 | Stop reason: SDUPTHER

## 2020-05-18 RX ORDER — VENLAFAXINE HYDROCHLORIDE 150 MG/1
150 CAPSULE, EXTENDED RELEASE ORAL DAILY
Qty: 90 CAPSULE | Refills: 1 | Status: CANCELLED | OUTPATIENT
Start: 2020-05-18

## 2020-05-27 DIAGNOSIS — F90.0 ADHD, PREDOMINANTLY INATTENTIVE TYPE: ICD-10-CM

## 2020-05-28 RX ORDER — DEXTROAMPHETAMINE SACCHARATE, AMPHETAMINE ASPARTATE, DEXTROAMPHETAMINE SULFATE AND AMPHETAMINE SULFATE 1.25; 1.25; 1.25; 1.25 MG/1; MG/1; MG/1; MG/1
TABLET ORAL
Qty: 75 TABLET | Refills: 0 | Status: SHIPPED | OUTPATIENT
Start: 2020-05-28 | End: 2020-06-29 | Stop reason: SDUPTHER

## 2020-06-29 ENCOUNTER — OFFICE VISIT (OUTPATIENT)
Dept: INTERNAL MEDICINE CLINIC | Facility: CLINIC | Age: 60
End: 2020-06-29
Payer: COMMERCIAL

## 2020-06-29 VITALS
BODY MASS INDEX: 31.57 KG/M2 | HEART RATE: 87 BPM | WEIGHT: 167.2 LBS | SYSTOLIC BLOOD PRESSURE: 164 MMHG | DIASTOLIC BLOOD PRESSURE: 106 MMHG | OXYGEN SATURATION: 97 % | HEIGHT: 61 IN | TEMPERATURE: 98.1 F

## 2020-06-29 DIAGNOSIS — F90.0 ADHD, PREDOMINANTLY INATTENTIVE TYPE: ICD-10-CM

## 2020-06-29 DIAGNOSIS — I10 HYPERTENSION, ESSENTIAL, BENIGN: ICD-10-CM

## 2020-06-29 DIAGNOSIS — E03.9 ACQUIRED HYPOTHYROIDISM: ICD-10-CM

## 2020-06-29 DIAGNOSIS — E66.9 OBESITY (BMI 30-39.9): ICD-10-CM

## 2020-06-29 DIAGNOSIS — Z11.59 NEED FOR HEPATITIS C SCREENING TEST: Primary | ICD-10-CM

## 2020-06-29 PROCEDURE — 99214 OFFICE O/P EST MOD 30 MIN: CPT | Performed by: INTERNAL MEDICINE

## 2020-06-29 PROCEDURE — 3080F DIAST BP >= 90 MM HG: CPT | Performed by: INTERNAL MEDICINE

## 2020-06-29 PROCEDURE — 1036F TOBACCO NON-USER: CPT | Performed by: INTERNAL MEDICINE

## 2020-06-29 PROCEDURE — 3008F BODY MASS INDEX DOCD: CPT | Performed by: INTERNAL MEDICINE

## 2020-06-29 PROCEDURE — 3077F SYST BP >= 140 MM HG: CPT | Performed by: INTERNAL MEDICINE

## 2020-06-29 RX ORDER — DEXTROAMPHETAMINE SACCHARATE, AMPHETAMINE ASPARTATE, DEXTROAMPHETAMINE SULFATE AND AMPHETAMINE SULFATE 1.25; 1.25; 1.25; 1.25 MG/1; MG/1; MG/1; MG/1
TABLET ORAL
Qty: 75 TABLET | Refills: 0 | Status: SHIPPED | OUTPATIENT
Start: 2020-06-29 | End: 2020-09-09 | Stop reason: SDUPTHER

## 2020-06-29 RX ORDER — AMLODIPINE AND VALSARTAN 5; 160 MG/1; MG/1
1 TABLET ORAL DAILY
Qty: 30 TABLET | Refills: 5 | Status: SHIPPED | OUTPATIENT
Start: 2020-06-29 | End: 2020-08-10 | Stop reason: ALTCHOICE

## 2020-06-29 RX ORDER — LEVOTHYROXINE SODIUM 0.05 MG/1
50 TABLET ORAL DAILY
Qty: 90 TABLET | Refills: 1 | Status: SHIPPED | OUTPATIENT
Start: 2020-06-29 | End: 2020-11-28

## 2020-07-10 ENCOUNTER — TELEPHONE (OUTPATIENT)
Dept: INTERNAL MEDICINE CLINIC | Age: 60
End: 2020-07-10

## 2020-07-15 LAB
ALBUMIN SERPL-MCNC: 4.7 G/DL (ref 3.6–5.1)
ALBUMIN/GLOB SERPL: 1.9 (CALC) (ref 1–2.5)
ALP SERPL-CCNC: 74 U/L (ref 37–153)
ALT SERPL-CCNC: 47 U/L (ref 6–29)
AST SERPL-CCNC: 32 U/L (ref 10–35)
BASOPHILS # BLD AUTO: 59 CELLS/UL (ref 0–200)
BASOPHILS NFR BLD AUTO: 1.1 %
BILIRUB SERPL-MCNC: 0.5 MG/DL (ref 0.2–1.2)
BUN SERPL-MCNC: 18 MG/DL (ref 7–25)
BUN/CREAT SERPL: ABNORMAL (CALC) (ref 6–22)
CALCIUM SERPL-MCNC: 10.1 MG/DL (ref 8.6–10.4)
CHLORIDE SERPL-SCNC: 104 MMOL/L (ref 98–110)
CHOLEST SERPL-MCNC: 222 MG/DL
CHOLEST/HDLC SERPL: 3.7 (CALC)
CO2 SERPL-SCNC: 30 MMOL/L (ref 20–32)
CREAT SERPL-MCNC: 0.87 MG/DL (ref 0.5–1.05)
EOSINOPHIL # BLD AUTO: 211 CELLS/UL (ref 15–500)
EOSINOPHIL NFR BLD AUTO: 3.9 %
ERYTHROCYTE [DISTWIDTH] IN BLOOD BY AUTOMATED COUNT: 13 % (ref 11–15)
GLOBULIN SER CALC-MCNC: 2.5 G/DL (CALC) (ref 1.9–3.7)
GLUCOSE SERPL-MCNC: 93 MG/DL (ref 65–99)
HCT VFR BLD AUTO: 40.8 % (ref 35–45)
HCV AB S/CO SERPL IA: 0.03
HCV AB SERPL QL IA: NORMAL
HDLC SERPL-MCNC: 60 MG/DL
HGB BLD-MCNC: 14.2 G/DL (ref 11.7–15.5)
LDLC SERPL CALC-MCNC: 142 MG/DL (CALC)
LYMPHOCYTES # BLD AUTO: 2273 CELLS/UL (ref 850–3900)
LYMPHOCYTES NFR BLD AUTO: 42.1 %
MCH RBC QN AUTO: 31.4 PG (ref 27–33)
MCHC RBC AUTO-ENTMCNC: 34.8 G/DL (ref 32–36)
MCV RBC AUTO: 90.3 FL (ref 80–100)
MONOCYTES # BLD AUTO: 378 CELLS/UL (ref 200–950)
MONOCYTES NFR BLD AUTO: 7 %
NEUTROPHILS # BLD AUTO: 2479 CELLS/UL (ref 1500–7800)
NEUTROPHILS NFR BLD AUTO: 45.9 %
NONHDLC SERPL-MCNC: 162 MG/DL (CALC)
PLATELET # BLD AUTO: 275 THOUSAND/UL (ref 140–400)
PMV BLD REES-ECKER: 11.6 FL (ref 7.5–12.5)
POTASSIUM SERPL-SCNC: 4 MMOL/L (ref 3.5–5.3)
PROT SERPL-MCNC: 7.2 G/DL (ref 6.1–8.1)
RBC # BLD AUTO: 4.52 MILLION/UL (ref 3.8–5.1)
SL AMB EGFR AFRICAN AMERICAN: 85 ML/MIN/1.73M2
SL AMB EGFR NON AFRICAN AMERICAN: 73 ML/MIN/1.73M2
SODIUM SERPL-SCNC: 141 MMOL/L (ref 135–146)
T3 SERPL-MCNC: 96 NG/DL (ref 76–181)
T4 FREE SERPL-MCNC: 1.4 NG/DL (ref 0.8–1.8)
TRIGL SERPL-MCNC: 93 MG/DL
TSH SERPL-ACNC: 2.49 MIU/L (ref 0.4–4.5)
WBC # BLD AUTO: 5.4 THOUSAND/UL (ref 3.8–10.8)

## 2020-07-24 ENCOUNTER — TELEPHONE (OUTPATIENT)
Dept: SLEEP CENTER | Facility: CLINIC | Age: 60
End: 2020-07-24

## 2020-07-24 ENCOUNTER — OFFICE VISIT (OUTPATIENT)
Dept: SLEEP CENTER | Facility: CLINIC | Age: 60
End: 2020-07-24
Payer: COMMERCIAL

## 2020-07-24 VITALS
OXYGEN SATURATION: 98 % | BODY MASS INDEX: 31.3 KG/M2 | SYSTOLIC BLOOD PRESSURE: 124 MMHG | HEIGHT: 61 IN | HEART RATE: 110 BPM | DIASTOLIC BLOOD PRESSURE: 82 MMHG | WEIGHT: 165.8 LBS

## 2020-07-24 DIAGNOSIS — J45.20 MILD INTERMITTENT ASTHMATIC BRONCHITIS WITHOUT COMPLICATION: ICD-10-CM

## 2020-07-24 DIAGNOSIS — G47.33 OSA (OBSTRUCTIVE SLEEP APNEA): Primary | ICD-10-CM

## 2020-07-24 DIAGNOSIS — J31.0 CHRONIC RHINITIS: ICD-10-CM

## 2020-07-24 DIAGNOSIS — F90.0 ADHD, PREDOMINANTLY INATTENTIVE TYPE: ICD-10-CM

## 2020-07-24 DIAGNOSIS — I10 HYPERTENSION, ESSENTIAL, BENIGN: ICD-10-CM

## 2020-07-24 DIAGNOSIS — F32.0 CURRENT MILD EPISODE OF MAJOR DEPRESSIVE DISORDER WITHOUT PRIOR EPISODE (HCC): ICD-10-CM

## 2020-07-24 DIAGNOSIS — E66.9 OBESITY (BMI 30-39.9): ICD-10-CM

## 2020-07-24 PROCEDURE — 3079F DIAST BP 80-89 MM HG: CPT | Performed by: INTERNAL MEDICINE

## 2020-07-24 PROCEDURE — 3074F SYST BP LT 130 MM HG: CPT | Performed by: INTERNAL MEDICINE

## 2020-07-24 PROCEDURE — 1036F TOBACCO NON-USER: CPT | Performed by: INTERNAL MEDICINE

## 2020-07-24 PROCEDURE — 99214 OFFICE O/P EST MOD 30 MIN: CPT | Performed by: INTERNAL MEDICINE

## 2020-07-24 PROCEDURE — 3008F BODY MASS INDEX DOCD: CPT | Performed by: INTERNAL MEDICINE

## 2020-07-24 RX ORDER — AMLODIPINE BESYLATE 5 MG/1
TABLET ORAL
COMMUNITY
Start: 2020-07-22 | End: 2020-08-31 | Stop reason: SDUPTHER

## 2020-07-24 RX ORDER — VALSARTAN 160 MG/1
TABLET ORAL
COMMUNITY
Start: 2020-07-24 | End: 2020-12-28 | Stop reason: SDUPTHER

## 2020-07-24 NOTE — PROGRESS NOTES
Review of Systems      Genitourinary post menopausal (no peroids)   Cardiology none   Gastrointestinal none   Neurology none   Constitutional weight change   Integumentary none   Psychiatry depression   Musculoskeletal back pain and sciatica   Pulmonary shortness of breath with activity   ENT throat clearing   Endocrine none   Hematological blood donor

## 2020-07-24 NOTE — PATIENT INSTRUCTIONS

## 2020-07-24 NOTE — PROGRESS NOTES
Follow-Up Note - Yvon Khoury  64 y o  female  :1960  QZ    CC: I saw this patient for follow-up in clinic today for Sleep disordered breathing, Coexisting Sleep and Medical Problems  Results of prior studies in 2017:  The diagnostic study demonstrated an AHI of 5 4 per hour  Minimum oxygen saturation was 85% and she spent 72 2% of time asleep with saturations less than 90%  Mild-to-moderate intensity snoring was noted  There were also periodic limb movements of sleep for an index of 14 per hour and frequent spontaneous arousals for an index of 47 per hour  The titration study demonstrated sleep disordered breathing was successfully remediated with PAP at 9 cm H2O  The periodic limb movements of sleep were virtually eliminated and she had much less sleep fragmentation  PFSH, Problem List, Medications & Allergies were reviewed in EMR  Interval changes: none reported  She  has a past medical history of Abrasion of axilla, ADHD, Allergic, Allergic rhinitis, Asthma, Bee sting reaction (2019), Bilateral nephrolithiasis (2018), Depression, Fracture of plateau of left tibia, Headache, Hematuria, Hyperparathyroidism ([facility] [address] ), Hypothyroidism, Left ankle sprain, No known health problems, Obstructive sleep apnea on CPAP, Scoliosis, Thyroid nodule, Urinary frequency, Vaginal bleeding, abnormal, and Viral URI with cough (2019)  She has a current medication list which includes the following prescription(s): acetaminophen, amlodipine, amphetamine-dextroamphetamine, cetirizine, vitamin d3, fluticasone-salmeterol, levothyroxine, multivitamin, triamcinolone acetonide, valsartan, venlafaxine, albuterol, and amlodipine-valsartan  ROS: constitutional, psychiatric, ENT, respiratory,CVS, GI, UGS, CNS, MSK, integumentary, endocrine, hematological reviewed  Significant for some weight gain  Allergies and asthma controlled  Mood and ADHD stable on current medication  DATA REVIEWED:  using PAP > 4 hours/night 100% of the time  Estimated ANSELMO 4 7/hour at pressure of 10cm H2O @90th percentile  SUBJECTIVE: Regarding use of PAP, Armond reports:   · She is experiencing some adverse effects: dry mouth/throat  · She is   benefiting from use: sleeping better and no longer snoring / having breathing difficulties   Sleep Routine: She reports getting 8 5 hrs sleep  ; she has no difficulty initiating or maintaining sleep   She awakens with aid of an alarm and feels refreshed  She has occasional drowsiness and naps infrequently  She rated herself at Total score: 6 /24 on the [place] sleepiness scale  Habits: reports that she has never smoked  She has never used smokeless tobacco ,  reports that she drinks alcohol ,  reports that she does not use drugs  , Caffeine use: excessive , Exercise routine: none   EXAM: /82   Pulse (!) 110   Ht 5' 1" (1 549 m)   Wt 75 2 kg (165 lb 12 8 oz)   LMP  (LMP Unknown)   SpO2 98%   BMI 31 33 kg/m²     Patient is well groomed; well appearing  Skin/Extrem: warm & dry; col & hydration normal; no edema  Psych: cooperativeand in no distress  Mental state appears normal   CNS: Alert, orientated, clear & coherent speech  H&N: EOMI; NC/AT:no facial pressure marks, no rashes  Neck Circumference: 14 5 cm  ENMT Mucus membranes appear normal Nasal airway:patent  Oral airway:  crowded  Resp:effort is normal CVS: RRR ABD:truncal obesity MSK:Gait normal     IMPRESSION: Primary Sleep/Secondary(to Medical or Psych conditions) & comorbidities   1  ERICA (obstructive sleep apnea)  PAP DME Pressure Change    PAP DME Resupply/Reorder   2  Chronic rhinitis     3  Mild intermittent asthmatic bronchitis without complication     4  Hypertension, essential, benign     5  Obesity (BMI 30-39 9)     6  ADHD, predominantly inattentive type     7  Current mild episode of major depressive disorder without prior episode ([facility] [address] )       PLAN:  1   Treatment with  PAP is medically necessary and Nely Heck is agreable to continue use  2  Care of equipment, methods to improve comfort using PAP and importance of compliance with therapy were discussed  3  Pressure setting: change 10-12 cmH2O     4  Rx provided to replace supplies and Care coordinated with DME provider  5  Strategies for weight reduction were discussed  6  Follow-up is advised in 1 year or sooner if needed to monitor progress, compliance and to adjust therapy  Thank you for allowing me to participate in the care of this patient      Sincerely,    Authenticated electronically by Musa Reyes MD on 67/02/09   Board Certified Specialist

## 2020-07-27 ENCOUNTER — TELEPHONE (OUTPATIENT)
Dept: SLEEP CENTER | Facility: CLINIC | Age: 60
End: 2020-07-27

## 2020-08-10 ENCOUNTER — OFFICE VISIT (OUTPATIENT)
Dept: INTERNAL MEDICINE CLINIC | Facility: CLINIC | Age: 60
End: 2020-08-10
Payer: COMMERCIAL

## 2020-08-10 VITALS
WEIGHT: 166 LBS | OXYGEN SATURATION: 98 % | SYSTOLIC BLOOD PRESSURE: 132 MMHG | HEIGHT: 61 IN | HEART RATE: 84 BPM | BODY MASS INDEX: 31.34 KG/M2 | TEMPERATURE: 98.7 F | DIASTOLIC BLOOD PRESSURE: 84 MMHG

## 2020-08-10 DIAGNOSIS — I10 HYPERTENSION, ESSENTIAL, BENIGN: Primary | ICD-10-CM

## 2020-08-10 DIAGNOSIS — S39.012A SACROILIAC STRAIN, INITIAL ENCOUNTER: ICD-10-CM

## 2020-08-10 DIAGNOSIS — E03.9 HYPOTHYROIDISM, UNSPECIFIED TYPE: ICD-10-CM

## 2020-08-10 DIAGNOSIS — F32.A DEPRESSION, UNSPECIFIED DEPRESSION TYPE: ICD-10-CM

## 2020-08-10 PROBLEM — T78.40XA ALLERGIC: Status: RESOLVED | Noted: 2017-07-25 | Resolved: 2020-08-10

## 2020-08-10 PROBLEM — N30.01 ACUTE CYSTITIS WITH HEMATURIA: Status: RESOLVED | Noted: 2019-08-15 | Resolved: 2020-08-10

## 2020-08-10 PROCEDURE — 3075F SYST BP GE 130 - 139MM HG: CPT | Performed by: INTERNAL MEDICINE

## 2020-08-10 PROCEDURE — 3079F DIAST BP 80-89 MM HG: CPT | Performed by: INTERNAL MEDICINE

## 2020-08-10 PROCEDURE — 3008F BODY MASS INDEX DOCD: CPT | Performed by: INTERNAL MEDICINE

## 2020-08-10 PROCEDURE — 99213 OFFICE O/P EST LOW 20 MIN: CPT | Performed by: INTERNAL MEDICINE

## 2020-08-10 PROCEDURE — 1036F TOBACCO NON-USER: CPT | Performed by: INTERNAL MEDICINE

## 2020-08-10 RX ORDER — VENLAFAXINE HYDROCHLORIDE 150 MG/1
150 CAPSULE, EXTENDED RELEASE ORAL DAILY
Qty: 90 CAPSULE | Refills: 1 | Status: SHIPPED | OUTPATIENT
Start: 2020-08-10 | End: 2021-03-25 | Stop reason: SDUPTHER

## 2020-08-10 NOTE — PROGRESS NOTES
Assessment/Plan:    Sacroiliac strain  Recommended use of cold packs after activity supplemented by topical preparations such as SalonPas with lidocaine or Aspercreme with lidocaine  If pain persists will consider physical therapy evaluation  Hypothyroidism  Thyroid functions are all within normal limits  No changes are necessary to patient's current medication  Hypertension, essential, benign  Blood pressure is nicely controlled on valsartan and amlodipine    Depression  Venlafaxine was renewed       Diagnoses and all orders for this visit:    Hypertension, essential, benign    Depression, unspecified depression type  Comments:  pt reports she needs a refill on her effexor, I did explain that it appears she has a refill at the pharmacy but I sent a new one as she states she doesn't   Orders:  -     venlafaxine (EFFEXOR-XR) 150 mg 24 hr capsule; Take 1 capsule (150 mg total) by mouth daily    Hypothyroidism, unspecified type    Sacroiliac strain, initial encounter          Subjective:      Patient ID: Damian Bower is a 64 y o  female  Patient presents for follow-up for hypertension  She reports no problems with medication  Labs are reviewed  Metabolic panel is essentially within normal limits there was a very mild elevation in 1 of her transaminases but not clinically significant  She also complains of some low back pain which is worse with activity  And in recent days it has become a bit more uncomfortable especially with prolonged activities in standing  She has some concerns whether not she may have sciatic type irritation  She denies any pain radiating down her legs        Family History   Problem Relation Age of Onset    Prostate cancer Father     Anemia Father     Neuropathy Father     Prostate cancer Brother     Paget's disease of bone Brother     Heart Valve Disease Mother         s/p MVR    Glaucoma Mother     Rickets Mother     Stroke Mother     Hypertension Family BENIGN ESSENTIAL    Heart disease Family         CARDIAC DISORDER     Social History     Socioeconomic History    Marital status: Single     Spouse name: Not on file    Number of children: Not on file    Years of education: Not on file    Highest education level: Not on file   Occupational History    Occupation: UNEMPLOYED   Social Needs    Financial resource strain: Not on file    Food insecurity     Worry: Not on file     Inability: Not on file    Transportation needs     Medical: Not on file     Non-medical: Not on file   Tobacco Use    Smoking status: Never Smoker    Smokeless tobacco: Never Used   Substance and Sexual Activity    Alcohol use: Yes     Frequency: Monthly or less     Comment: socially; [address]  INFREQUENTLY    Drug use: No    Sexual activity: Not Currently   Lifestyle    Physical activity     Days per week: Not on file     Minutes per session: Not on file    Stress: Not on file   Relationships    Social connections     Talks on phone: Not on file     Gets together: Not on file     Attends [employer] service: Not on file     Active member of club or organization: Not on file     Attends meetings of clubs or organizations: Not on file     Relationship status: Not on file    Intimate partner violence     Fear of current or ex partner: Not on file     Emotionally abused: Not on file     Physically abused: Not on file     Forced sexual activity: Not on file   Other Topics Concern    Not on file   Social History Narrative    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)    [place] HX[place]     Past Medical History:   Diagnosis Date    Abrasion of axilla     LAST ASSESSED: 11/21/14    Acute cystitis with hematuria 8/15/2019    ADHD     Allergic     Allergic rhinitis     LAST ASSESSED: 5/15/15    Asthma     TRANSITIONED FROM: ASTHMA; RESOLVED: 11/9/16    Bee sting reaction 8/1/2019    Bilateral nephrolithiasis 7/25/2018    Depression     Fracture of plateau of left tibia     RESOLVED: 4/14/15    Headache     Hematuria     Hyperparathyroidism ([address] )     Hypothyroidism     Left ankle sprain     LAST ASSESSED: 10/21/14    No known health problems     DENIED MENTAL STATUS CHANGE AS PER ALLSCRIPTS; CONFLICTED WITH ADHD AND DEPRESSION IN MED HX SO IT COULD NOT BE ADDED IN PERT NEGS    Obstructive sleep apnea on CPAP     Scoliosis     Thyroid nodule     Urinary frequency     RESOLVED: 10/27/16    Vaginal bleeding, abnormal     LAST ASSESSED: 6/6/16    Viral URI with cough 12/30/2019       Current Outpatient Medications:     Acetaminophen (TYLENOL PO), Take by mouth as needed, Disp: , Rfl:     amLODIPine (NORVASC) 5 mg tablet, , Disp: , Rfl:     amphetamine-dextroamphetamine (ADDERALL) 5 MG tablet, take 1 and 1/2 tablets by mouth every morning and 1 tablet every evening, Disp: 75 tablet, Rfl: 0    cetirizine (ZyrTEC) 10 mg tablet, Take 10 mg by mouth 2 (two) times a day , Disp: , Rfl:     Cholecalciferol (VITAMIN D3) 1000 units CAPS, Take 1 capsule by mouth Daily  , Disp: , Rfl:     fluticasone-salmeterol (Advair Diskus) 100-50 mcg/dose inhaler, Inhale 1 puff daily, Disp: 60 each, Rfl: 0    levothyroxine 50 mcg tablet, Take 1 tablet (50 mcg total) by mouth daily, Disp: 90 tablet, Rfl: 1    multivitamin (THERAGRAN) TABS, Take 1 tablet by mouth daily  , Disp: , Rfl:     Triamcinolone Acetonide (NASACORT AQ) 55 MCG/ACT AERO, 1 Act into each nostril Daily 2 puffs in each nostril, Disp: , Rfl:     valsartan (DIOVAN) 160 mg tablet, , Disp: , Rfl:     venlafaxine (EFFEXOR-XR) 150 mg 24 hr capsule, Take 1 capsule (150 mg total) by mouth daily, Disp: 90 capsule, Rfl: 1    albuterol (PROAIR HFA) 90 mcg/act inhaler, Inhale 2 puffs every 6 (six) hours as needed for wheezing (Patient not taking: Reported on 3/31/2020), Disp: 8 5 g, Rfl: 0  Allergies   Allergen Reactions    Compazine [Prochlorperazine] Anaphylaxis Category: Allergy; Annotation - [id number]: ALL FORMS    Erythromycin GI Intolerance     Category: Allergy; Annotation - [id number]: ALL FORMS    Sulfa Antibiotics GI Intolerance     Patient states that it is like when you are drunk, dizziness and confusion    Sulfamethoxazole-Trimethoprim Nausea Only and Dizziness     Category: Allergy; Annotation - [id number]: ALL FORMS     Past Surgical History:   Procedure Laterality Date    FOOT SURGERY Right     "Nerve removal"    PARATHYROID GLAND SURGERY  5/17/2018    SD COLONOSCOPY FLX DX W/COLLJ SPEC WHEN PFRMD N/A 6/21/2018    Procedure: COLONOSCOPY;  Surgeon: Leopold Ewings, MD;  [facility]; Service: Gastroenterology    SD EXPLORE PARATHYROID GLANDS Right 5/17/2018    Procedure: MINIMALLY INVASIVE PARATHYROIDECTOMY;   PTH MONITORING;  Surgeon: Joss Peck MD;  [facility];  Service: Surgical Oncology    TONSILLECTOMY      TONSILLECTOMY AND ADENOIDECTOMY           Review of Systems   Constitutional: Negative  HENT: Negative  Eyes: Negative  Respiratory: Negative  Cardiovascular: Negative  Gastrointestinal: Negative  Endocrine: Negative  Genitourinary: Negative  Musculoskeletal: Positive for back pain (Sacroiliac pain left-sided)  Skin: Negative  Allergic/Immunologic: Negative  Negative for environmental allergies  Neurological: Negative  Hematological: Negative  Psychiatric/Behavioral: Negative  Objective:      /84 (BP Location: Left arm, Patient Position: Sitting, Cuff Size: Standard)   Pulse 84   Temp 98 7 °F (37 1 °C) (Temporal)   Ht 5' 1" (1 549 m)   Wt 75 3 kg (166 lb)   LMP  (LMP Unknown)   SpO2 98%   BMI 31 37 kg/m²          Physical Exam  Vitals signs reviewed  Constitutional:       General: She is not in acute distress  Appearance: She is obese  She is not ill-appearing or diaphoretic  HENT:      Head: Normocephalic and atraumatic        Right Ear: External ear normal       Left Ear: External ear normal       Nose: Nose normal    Eyes:      General: No scleral icterus  Conjunctiva/sclera: Conjunctivae normal       Pupils: Pupils are equal, round, and reactive to light  Neck:      Musculoskeletal: No neck rigidity or muscular tenderness  Cardiovascular:      Rate and Rhythm: Normal rate and regular rhythm  Pulses: Normal pulses  Heart sounds: No murmur  Pulmonary:      Effort: Pulmonary effort is normal  No respiratory distress  Breath sounds: Normal breath sounds  Abdominal:      General: Abdomen is flat  There is no distension  Musculoskeletal:         General: No swelling or tenderness  Right lower leg: No edema  Left lower leg: No edema  Skin:     General: Skin is warm and dry  Findings: No rash  Neurological:      General: No focal deficit present  Mental Status: She is alert and oriented to person, place, and time  Mental status is at baseline  Cranial Nerves: No cranial nerve deficit  Sensory: No sensory deficit  Motor: No weakness  Coordination: Coordination normal       Gait: Gait normal       Deep Tendon Reflexes: Reflexes normal       Comments: No pain on straight leg raising  Mild tenderness to palpation of the left sacroiliac joint   Psychiatric:         Mood and Affect: Mood normal          Behavior: Behavior normal          Thought Content:  Thought content normal          Judgment: Judgment normal

## 2020-08-10 NOTE — PATIENT INSTRUCTIONS
Sacroiliitis   WHAT YOU NEED TO KNOW:   Sacroiliitis is a painful swelling of one or both of your sacroiliac joints that lasts at least 3 months  The sacroiliac joint connects your pelvis to the base of your spine  DISCHARGE INSTRUCTIONS:   Medicines:  Ask for more information about these and other medicines you may need to treat sacroiliitis:  · Pain medicine: You may be given medicine to take away or decrease pain  Do not wait until the pain is severe before you take your medicine  You may be given the medicine as a pill to swallow or as a lotion that you put on the painful area  · NSAIDs  help decrease swelling and pain or fever  This medicine is available with or without a doctor's order  NSAIDs can cause stomach bleeding or kidney problems in certain people  If you take blood thinner medicine, always ask your healthcare provider if NSAIDs are safe for you  Always read the medicine label and follow directions  · Muscle relaxers  help decrease pain and muscle spasms  · Take your medicine as directed  Contact your healthcare provider if you think your medicine is not helping or if you have side effects  Tell him of her if you are allergic to any medicine  Keep a list of the medicines, vitamins, and herbs you take  Include the amounts, and when and why you take them  Bring the list or the pill bottles to follow-up visits  Carry your medicine list with you in case of an emergency  Physical therapy:  Your healthcare provider may suggest physical therapy  Your physical therapist may teach you exercises to improve posture (the way you stand and sit), flexibility, and strength in your lower back  He may also teach you how to remain safely active and avoid further injury  Follow the exercise plan given to you by your physical therapist   Rest:  Follow your healthcare provider's instructions about how much rest you should get  Avoid activity that worsens your pain    Ice or heat packs:  Use ice or heat packs on the sore area of your body to decrease the pain and swelling  Put ice in a plastic bag covered with a towel on your low back  Cover heated items with a towel to avoid burns  Use ice and heat as directed  Follow up with your healthcare provider or spine specialist within 1 to 2 weeks:  Write down your questions so you remember to ask them during your visits  Contact your healthcare provider or spine specialist if:   · Your pain makes it hard for you to do your daily activities, such as work or school  · Your pain does not go away after treatment  · You feel depressed or anxious  · You have questions about your condition or care  Return to the emergency department if:   · You have a fever  · Your pain is worse than before  · Your pain prevents you from sleeping  © 2017 [facility] Information is for End User's use only and may not be sold, redistributed or otherwise used for commercial purposes  All illustrations and images included in CareNotes® are the copyrighted property of A D A M , Inc  or Marshall Navarro  The above information is an  only  It is not intended as medical advice for individual conditions or treatments  Talk to your doctor, nurse or pharmacist before following any medical regimen to see if it is safe and effective for you

## 2020-08-10 NOTE — ASSESSMENT & PLAN NOTE
Thyroid functions are all within normal limits  No changes are necessary to patient's current medication

## 2020-08-10 NOTE — ASSESSMENT & PLAN NOTE
Recommended use of cold packs after activity supplemented by topical preparations such as SalonPas with lidocaine or Aspercreme with lidocaine  If pain persists will consider physical therapy evaluation

## 2020-08-31 ENCOUNTER — TELEPHONE (OUTPATIENT)
Dept: INTERNAL MEDICINE CLINIC | Facility: CLINIC | Age: 60
End: 2020-08-31

## 2020-08-31 DIAGNOSIS — I10 HYPERTENSION, ESSENTIAL, BENIGN: Primary | ICD-10-CM

## 2020-08-31 RX ORDER — AMLODIPINE BESYLATE 5 MG/1
5 TABLET ORAL DAILY
Qty: 90 TABLET | Refills: 1 | Status: SHIPPED | OUTPATIENT
Start: 2020-08-31 | End: 2021-03-02 | Stop reason: SDUPTHER

## 2020-08-31 NOTE — TELEPHONE ENCOUNTER
Amlodipine 5mgs #90 to be called into [facility] aid Sara Stevens

## 2020-09-01 NOTE — TELEPHONE ENCOUNTER
Pt left message on script line that she needed refill of levothyroxine  6 M supply of medication sent to pharmacy pt requested, [facility] in [facility] on 6/29/2020  Need to contact patient and have her call pharmacy for medication

## 2020-09-01 NOTE — TELEPHONE ENCOUNTER
Patient called to say she still needs Levothyoxine called into [facility]   There was only a 1-90day called in no refills per pharmacy

## 2020-09-03 NOTE — TELEPHONE ENCOUNTER
Pt calling because the pharmacy told her that she should not be picking up any levothyoxine until the end of the months because she  back in June, however pt is saying that she did not pickup any medication in June that it was back in April  Pt has been out of her meds for a week

## 2020-09-03 NOTE — TELEPHONE ENCOUNTER
Spoke to the pharmacist at [facility] in [facility]  Patient picked up RX for Levothyroxine #90 on 6/29/20  She is not due for refill for 24 days  The pharmacist ran the RX to insurance and it will be covered on 9/5/20  Called patient and relayed message

## 2020-09-09 DIAGNOSIS — F90.0 ADHD, PREDOMINANTLY INATTENTIVE TYPE: ICD-10-CM

## 2020-09-09 DIAGNOSIS — Z87.09 HISTORY OF ASTHMA: ICD-10-CM

## 2020-09-10 RX ORDER — DEXTROAMPHETAMINE SACCHARATE, AMPHETAMINE ASPARTATE, DEXTROAMPHETAMINE SULFATE AND AMPHETAMINE SULFATE 1.25; 1.25; 1.25; 1.25 MG/1; MG/1; MG/1; MG/1
TABLET ORAL
Qty: 75 TABLET | Refills: 0 | Status: SHIPPED | OUTPATIENT
Start: 2020-09-10 | End: 2020-10-28 | Stop reason: SDUPTHER

## 2020-09-24 ENCOUNTER — OFFICE VISIT (OUTPATIENT)
Dept: INTERNAL MEDICINE CLINIC | Age: 60
End: 2020-09-24
Payer: COMMERCIAL

## 2020-09-24 VITALS
SYSTOLIC BLOOD PRESSURE: 116 MMHG | TEMPERATURE: 97.8 F | BODY MASS INDEX: 31.34 KG/M2 | DIASTOLIC BLOOD PRESSURE: 62 MMHG | HEIGHT: 61 IN | OXYGEN SATURATION: 99 % | WEIGHT: 166 LBS | HEART RATE: 81 BPM

## 2020-09-24 DIAGNOSIS — T63.441A BEE STING REACTION, ACCIDENTAL OR UNINTENTIONAL, INITIAL ENCOUNTER: Primary | ICD-10-CM

## 2020-09-24 DIAGNOSIS — T78.40XA ALLERGIC REACTION, INITIAL ENCOUNTER: ICD-10-CM

## 2020-09-24 PROCEDURE — 99213 OFFICE O/P EST LOW 20 MIN: CPT | Performed by: PHYSICIAN ASSISTANT

## 2020-09-24 PROCEDURE — 1036F TOBACCO NON-USER: CPT | Performed by: PHYSICIAN ASSISTANT

## 2020-09-24 PROCEDURE — 3074F SYST BP LT 130 MM HG: CPT | Performed by: PHYSICIAN ASSISTANT

## 2020-09-24 PROCEDURE — 3078F DIAST BP <80 MM HG: CPT | Performed by: PHYSICIAN ASSISTANT

## 2020-09-24 PROCEDURE — 3725F SCREEN DEPRESSION PERFORMED: CPT | Performed by: PHYSICIAN ASSISTANT

## 2020-09-24 RX ORDER — PREDNISONE 10 MG/1
TABLET ORAL
Qty: 22 TABLET | Refills: 0 | Status: SHIPPED | OUTPATIENT
Start: 2020-09-24 | End: 2021-01-07 | Stop reason: ALTCHOICE

## 2020-09-24 RX ORDER — EPINEPHRINE 0.3 MG/.3ML
0.3 INJECTION SUBCUTANEOUS ONCE
Qty: 1 EACH | Refills: 2 | Status: SHIPPED | OUTPATIENT
Start: 2020-09-24 | End: 2022-06-30

## 2020-09-24 NOTE — PROGRESS NOTES
Assessment/Plan:         Diagnoses and all orders for this visit:    Bee sting reaction, accidental or unintentional, initial encounter  -     predniSONE 10 mg tablet; 4 tabs daily for 1 day then 3 tabs daily x 2 days then 2 tabs daily x 2 days then 1 tab daily x 2 days    Allergic reaction, initial encounter  -     EPINEPHrine (EPIPEN) 0 3 mg/0 3 mL SOAJ; Inject 0 3 mL (0 3 mg total) into a muscle once for 1 dose        Pt to get epi pen to keep on hand due to more sig reaction this episode  Start pred taper tonight, warned may keep awake  Will take with food  If redness continues to spread needs further w/u or if fever /pain to go to ER for add'l testing  Pt understands instructions  Continue zyrtec bid    Report any fever or progression of redness      Subjective:      Patient ID: Adali Friend is a 64 y o  female  Pt c/o sting from hornet (she believes) yesterday around 6pm, pt states she cleaned it and checked for a stinger after this occurred  Pt takes zyrtec bid already and just applied ice to area  However during the day today she noted worsening redness and itching  She also felt slightly nauseated and had 1 bout of loose stools  Pt does have a hx of allergic rhinitis and multiple allergies and got allergy shots as a young adult  Pt does not have a hx of reaction to bee stings  The following portions of the patient's history were reviewed and updated as appropriate: allergies, current medications, past family history, past medical history, past social history, past surgical history and problem list     Review of Systems   Constitutional: Negative for activity change, appetite change, fatigue and fever  HENT: Negative for drooling, facial swelling, sore throat and trouble swallowing  Respiratory: Negative for cough and shortness of breath  Cardiovascular: Negative for chest pain and leg swelling  Gastrointestinal: Positive for diarrhea and nausea   Negative for abdominal distention, abdominal pain and vomiting  Genitourinary: Negative for dysuria and flank pain  Skin: Positive for rash  Neurological: Negative for dizziness, light-headedness and headaches  Psychiatric/Behavioral: Negative for sleep disturbance  The patient is not nervous/anxious            Past Medical History:   Diagnosis Date    Abrasion of axilla     LAST ASSESSED: 11/21/14    Acute cystitis with hematuria 8/15/2019    ADHD     Allergic     Allergic rhinitis     LAST ASSESSED: 5/15/15    Asthma     TRANSITIONED FROM: ASTHMA; RESOLVED: 11/9/16    Bee sting reaction 8/1/2019    Bilateral nephrolithiasis 7/25/2018    Depression     Fracture of plateau of left tibia     RESOLVED: 4/14/15    Headache     Hematuria     Hyperparathyroidism ([address] )     Hypothyroidism     Left ankle sprain     LAST ASSESSED: 10/21/14    No known health problems     DENIED MENTAL STATUS CHANGE AS PER ALLSCRIPTS; CONFLICTED WITH ADHD AND DEPRESSION IN MED HX SO IT COULD NOT BE ADDED IN PERT NEGS    Obstructive sleep apnea on CPAP     Scoliosis     Thyroid nodule     Urinary frequency     RESOLVED: 10/27/16    Vaginal bleeding, abnormal     LAST ASSESSED: 6/6/16    Viral URI with cough 12/30/2019         Current Outpatient Medications:     Acetaminophen (TYLENOL PO), Take by mouth as needed, Disp: , Rfl:     amLODIPine (NORVASC) 5 mg tablet, Take 1 tablet (5 mg total) by mouth daily, Disp: 90 tablet, Rfl: 1    amphetamine-dextroamphetamine (ADDERALL) 5 MG tablet, take 1 and 1/2 tablets by mouth every morning and 1 tablet every evening, Disp: 75 tablet, Rfl: 0    cetirizine (ZyrTEC) 10 mg tablet, Take 10 mg by mouth 2 (two) times a day , Disp: , Rfl:     Cholecalciferol (VITAMIN D3) 1000 units CAPS, Take 1 capsule by mouth Daily  , Disp: , Rfl:     fluticasone-salmeterol (Advair Diskus) 100-50 mcg/dose inhaler, Inhale 1 puff daily, Disp: 60 each, Rfl: 0    levothyroxine 50 mcg tablet, Take 1 tablet (50 mcg total) by mouth daily, Disp: 90 tablet, Rfl: 1    multivitamin (THERAGRAN) TABS, Take 1 tablet by mouth daily  , Disp: , Rfl:     Triamcinolone Acetonide (NASACORT AQ) 55 MCG/ACT AERO, 1 Act into each nostril Daily 2 puffs in each nostril, Disp: , Rfl:     valsartan (DIOVAN) 160 mg tablet, , Disp: , Rfl:     venlafaxine (EFFEXOR-XR) 150 mg 24 hr capsule, Take 1 capsule (150 mg total) by mouth daily, Disp: 90 capsule, Rfl: 1    albuterol (PROAIR HFA) 90 mcg/act inhaler, Inhale 2 puffs every 6 (six) hours as needed for wheezing (Patient not taking: Reported on 3/31/2020), Disp: 8 5 g, Rfl: 0    EPINEPHrine (EPIPEN) 0 3 mg/0 3 mL SOAJ, Inject 0 3 mL (0 3 mg total) into a muscle once for 1 dose, Disp: 1 each, Rfl: 2    predniSONE 10 mg tablet, 4 tabs daily for 1 day then 3 tabs daily x 2 days then 2 tabs daily x 2 days then 1 tab daily x 2 days, Disp: 22 tablet, Rfl: 0    Allergies   Allergen Reactions    Compazine [Prochlorperazine] Anaphylaxis     Category: Allergy; Annotation - [id number]: ALL FORMS    Erythromycin GI Intolerance     Category: Allergy; Annotation - [id number]: ALL FORMS    Sulfa Antibiotics GI Intolerance     Patient states that it is like when you are drunk, dizziness and confusion    Sulfamethoxazole-Trimethoprim Nausea Only and Dizziness     Category: Allergy; Annotation - [id number]: ALL FORMS       Social History   Past Surgical History:   Procedure Laterality Date    FOOT SURGERY Right     "Nerve removal"    PARATHYROID GLAND SURGERY  5/17/2018    [place] COLONOSCOPY FLX DX W/COLLJ SPEC WHEN PFRMD N/A 6/21/2018    Procedure: COLONOSCOPY;  Surgeon: Jake Quinn MD;  [facility];   Service: Gastroenterology    [place] EXPLORE PARATHYROID GLANDS Right 5/17/2018    Procedure: MINIMALLY INVASIVE PARATHYROIDECTOMY;   PTH MONITORING;  Surgeon: Siri Avilez MD;  [facility];  Service: Surgical Oncology    TONSILLECTOMY      TONSILLECTOMY AND ADENOIDECTOMY       Family History   Problem Relation Age of Onset    Prostate cancer Father     Anemia Father     Neuropathy Father     Prostate cancer Brother     Paget's disease of bone Brother     Heart Valve Disease Mother         s/p MVR    Glaucoma Mother     Rickets Mother     Stroke Mother     Hypertension Family         BENIGN ESSENTIAL    Heart disease Family         CARDIAC DISORDER       Objective:  /62 (BP Location: Left arm, Patient Position: Sitting, Cuff Size: Standard)   Pulse 81   Temp 97 8 °F (36 6 °C) (Temporal)   Ht 5' 0 83" (1 545 m) Comment: shoes on  Wt 75 3 kg (166 lb) Comment: shoes on  LMP  (LMP Unknown)   SpO2 99%   BMI 31 54 kg/m²        Physical Exam  Vitals signs reviewed  Constitutional:       General: She is not in acute distress  HENT:      Head: Normocephalic and atraumatic  Eyes:      General:         Right eye: No discharge  Left eye: No discharge  Conjunctiva/sclera: Conjunctivae normal    Cardiovascular:      Rate and Rhythm: Normal rate and regular rhythm  Pulmonary:      Effort: Pulmonary effort is normal  No respiratory distress  Breath sounds: Normal breath sounds  No stridor  No wheezing, rhonchi or rales  Abdominal:      General: Bowel sounds are normal       Comments: R side lower abdomen - visible puncture, healed, erythema extending to R axillary line  No warmth to touch  No drainage or vesicles  No drainage at puncture site    Skin:     Findings: Rash (see abdomen exam ) present  Neurological:      General: No focal deficit present  Mental Status: She is alert and oriented to person, place, and time

## 2020-10-28 DIAGNOSIS — F90.0 ADHD, PREDOMINANTLY INATTENTIVE TYPE: ICD-10-CM

## 2020-10-29 RX ORDER — DEXTROAMPHETAMINE SACCHARATE, AMPHETAMINE ASPARTATE, DEXTROAMPHETAMINE SULFATE AND AMPHETAMINE SULFATE 1.25; 1.25; 1.25; 1.25 MG/1; MG/1; MG/1; MG/1
TABLET ORAL
Qty: 75 TABLET | Refills: 0 | Status: SHIPPED | OUTPATIENT
Start: 2020-10-29 | End: 2020-12-28 | Stop reason: SDUPTHER

## 2020-11-27 DIAGNOSIS — E03.9 ACQUIRED HYPOTHYROIDISM: ICD-10-CM

## 2020-11-28 RX ORDER — LEVOTHYROXINE SODIUM 0.05 MG/1
TABLET ORAL
Qty: 90 TABLET | Refills: 1 | Status: SHIPPED | OUTPATIENT
Start: 2020-11-28 | End: 2021-05-24

## 2020-12-28 DIAGNOSIS — F90.0 ADHD, PREDOMINANTLY INATTENTIVE TYPE: ICD-10-CM

## 2020-12-28 DIAGNOSIS — I10 HYPERTENSION, ESSENTIAL, BENIGN: Primary | ICD-10-CM

## 2020-12-28 RX ORDER — VALSARTAN 160 MG/1
160 TABLET ORAL DAILY
Qty: 90 TABLET | Refills: 1 | Status: SHIPPED | OUTPATIENT
Start: 2020-12-28 | End: 2021-06-28 | Stop reason: SDUPTHER

## 2020-12-28 RX ORDER — DEXTROAMPHETAMINE SACCHARATE, AMPHETAMINE ASPARTATE, DEXTROAMPHETAMINE SULFATE AND AMPHETAMINE SULFATE 1.25; 1.25; 1.25; 1.25 MG/1; MG/1; MG/1; MG/1
TABLET ORAL
Qty: 75 TABLET | Refills: 0 | Status: SHIPPED | OUTPATIENT
Start: 2020-12-28 | End: 2021-02-19 | Stop reason: SDUPTHER

## 2020-12-29 ENCOUNTER — HOSPITAL ENCOUNTER (OUTPATIENT)
Dept: RADIOLOGY | Age: 60
Discharge: HOME | End: 2020-12-29
Payer: COMMERCIAL

## 2020-12-29 VITALS — WEIGHT: 165 LBS | BODY MASS INDEX: 32.39 KG/M2 | HEIGHT: 60 IN

## 2020-12-29 DIAGNOSIS — Z12.39 SCREENING FOR BREAST CANCER: ICD-10-CM

## 2020-12-29 DIAGNOSIS — Z12.31 ENCOUNTER FOR SCREENING MAMMOGRAM FOR MALIGNANT NEOPLASM OF BREAST: ICD-10-CM

## 2020-12-29 PROCEDURE — 77067 SCR MAMMO BI INCL CAD: CPT

## 2020-12-29 PROCEDURE — 77063 BREAST TOMOSYNTHESIS BI: CPT

## 2021-01-07 ENCOUNTER — OFFICE VISIT (OUTPATIENT)
Dept: INTERNAL MEDICINE CLINIC | Facility: CLINIC | Age: 61
End: 2021-01-07
Payer: COMMERCIAL

## 2021-01-07 VITALS
HEIGHT: 61 IN | SYSTOLIC BLOOD PRESSURE: 120 MMHG | WEIGHT: 169.4 LBS | BODY MASS INDEX: 31.98 KG/M2 | OXYGEN SATURATION: 98 % | TEMPERATURE: 98.8 F | DIASTOLIC BLOOD PRESSURE: 82 MMHG | HEART RATE: 83 BPM

## 2021-01-07 DIAGNOSIS — R13.19 ESOPHAGEAL DYSPHAGIA: ICD-10-CM

## 2021-01-07 DIAGNOSIS — E03.9 HYPOTHYROIDISM, UNSPECIFIED TYPE: ICD-10-CM

## 2021-01-07 DIAGNOSIS — Z11.4 SCREENING FOR HIV WITHOUT PRESENCE OF RISK FACTORS: ICD-10-CM

## 2021-01-07 DIAGNOSIS — S39.012D STRAIN OF LUMBAR REGION, SUBSEQUENT ENCOUNTER: Primary | ICD-10-CM

## 2021-01-07 DIAGNOSIS — I10 HYPERTENSION, ESSENTIAL, BENIGN: ICD-10-CM

## 2021-01-07 DIAGNOSIS — Z23 NEED FOR PNEUMOCOCCAL VACCINE: ICD-10-CM

## 2021-01-07 DIAGNOSIS — E78.00 HYPERCHOLESTEREMIA: ICD-10-CM

## 2021-01-07 PROCEDURE — 90471 IMMUNIZATION ADMIN: CPT

## 2021-01-07 PROCEDURE — 3079F DIAST BP 80-89 MM HG: CPT | Performed by: INTERNAL MEDICINE

## 2021-01-07 PROCEDURE — 3008F BODY MASS INDEX DOCD: CPT | Performed by: INTERNAL MEDICINE

## 2021-01-07 PROCEDURE — 3074F SYST BP LT 130 MM HG: CPT | Performed by: INTERNAL MEDICINE

## 2021-01-07 PROCEDURE — 1036F TOBACCO NON-USER: CPT | Performed by: INTERNAL MEDICINE

## 2021-01-07 PROCEDURE — 99214 OFFICE O/P EST MOD 30 MIN: CPT | Performed by: INTERNAL MEDICINE

## 2021-01-07 PROCEDURE — 3725F SCREEN DEPRESSION PERFORMED: CPT | Performed by: INTERNAL MEDICINE

## 2021-01-07 PROCEDURE — 90732 PPSV23 VACC 2 YRS+ SUBQ/IM: CPT

## 2021-01-07 NOTE — ASSESSMENT & PLAN NOTE
Barium swallow has been requested  We will re-evaluate the need for EGD in GI consult when the results are available 
Blood pressure appears to be nicely controlled on Diovan 160 mg 
Follow-up labs in 6 months 
Lumbar exercises
Will re-evaluate in 6 months    Currently managing with dietary measures alone
No

## 2021-01-07 NOTE — PROGRESS NOTES
Assessment/Plan:    Lumbar strain  Lumbar exercises      Hypertension, essential, benign  Blood pressure appears to be nicely controlled on Diovan 160 mg  Hypercholesteremia  Will re-evaluate in 6 months  Currently managing with dietary measures alone    Esophageal dysphagia  Barium swallow has been requested  We will re-evaluate the need for EGD in GI consult when the results are available  Hypothyroidism  Follow-up labs in 6 months  Diagnoses and all orders for this visit:    Strain of lumbar region, subsequent encounter  -     CBC and Platelet; Future  -     Comprehensive metabolic panel; Future    Hypertension, essential, benign  -     Comprehensive metabolic panel; Future  -     Lipid panel; Future    Hypercholesteremia  -     Comprehensive metabolic panel; Future  -     Lipid panel; Future    Esophageal dysphagia  -     FL barium swallow; Future  -     CBC and Platelet; Future  -     Comprehensive metabolic panel; Future    Screening for HIV without presence of risk factors  -     HIV 1/2 Antigen/Antibody (4th Generation) w Reflex [facility]; Future    Hypothyroidism, unspecified type  -     T3; Future  -     T4, free; Future  -     TSH, 3rd generation; Future          Subjective:      Patient ID: Dinora Mcbride is a 64 y o  female  Patient presents to the office for follow-up visit for hypertension, she is complaining of some back pain which she attributes to the fact 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  She also has noticed recent onset of some difficulty swallowing where it feels as if food is getting stuck in the upper part of her esophagus  She denies choking on food  She usually drinks a liquid and that relieves her symptoms  There is no associated weight loss  She denies any persistent pain or discomfort  She has no history of dyspepsia    Her chart does mention a history of GERD without esophagitis but the patient says she has never taken medication for it and does not recall ever being told that she had this condition  A review of her records reveals no evidence of any upper GI testing    She has had no issues with her blood pressure medication and states that her blood pressure seems to be under very nice control at the present time period      Family History   Problem Relation Age of Onset    Prostate cancer Father     Anemia Father     Neuropathy Father     Prostate cancer Brother     Paget's disease of bone Brother     Heart Valve Disease Mother         s/p MVR    Glaucoma Mother     Rickets Mother     Stroke Mother     Hypertension Family         BENIGN ESSENTIAL    Heart disease Family         CARDIAC DISORDER    No Known Problems Maternal Grandmother     No Known Problems Maternal Grandfather     No Known Problems Paternal Grandmother     No Known Problems Paternal Grandfather     No Known Problems Paternal Aunt      Social History     Socioeconomic History    Marital status: Single     Spouse name: Not on file    Number of children: Not on file    Years of education: Not on file    Highest education level: Not on file   Occupational History    Occupation: UNEMPLOYED   Social Needs    Financial resource strain: Not on file    Food insecurity     Worry: Not on file     Inability: Not on file   [employer] needs     Medical: Not on file     Non-medical: Not on file   Tobacco Use    Smoking status: Never Smoker    Smokeless tobacco: Never Used   Substance and Sexual Activity    Alcohol use: Yes     Frequency: Monthly or less     Comment: socially; DRINKS ALCOHOL VERY INFREQUENTLY    Drug use: No    Sexual activity: Not Currently   Lifestyle    Physical activity     Days per week: Not on file     Minutes per session: Not on file    Stress: Not on file   Relationships    Social connections     Talks on phone: Not on file     Gets together: Not on file     Attends Anabaptist service: Not on file     Active member of club or organization: Not on file     Attends meetings of clubs or organizations: Not on file     Relationship status: Not on file    Intimate partner violence     Fear of current or ex partner: Not on file     Emotionally abused: Not on file     Physically abused: Not on file     Forced sexual activity: Not on file   Other Topics Concern    Not on file   Social History Narrative    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)    [place] HX: [place]UL[place]     Past Medical History:   Diagnosis Date    Abrasion of axilla     LAST ASSESSED: 11/21/14    Acute cystitis with hematuria 8/15/2019    ADHD     Allergic     Allergic rhinitis     LAST ASSESSED: 5/15/15    Asthma     TRANSITIONED FROM: ASTHMA; RESOLVED: 11/9/16    Bee sting reaction 8/1/2019    Bilateral nephrolithiasis 7/25/2018    Depression     Fracture of plateau of left tibia     RESOLVED: 4/14/15    Headache     Hematuria     Hyperparathyroidism ([address] )     Hypothyroidism     Left ankle sprain     LAST ASSESSED: 10/21/14    No known health problems     DENIED MENTAL STATUS CHANGE AS PER ALLSCRIPTS; CONFLICTED WITH ADHD AND DEPRESSION IN MED HX SO IT COULD NOT BE ADDED IN PERT NEGS    Obstructive sleep apnea on CPAP     Scoliosis     Thyroid nodule     Urinary frequency     RESOLVED: 10/27/16    Vaginal bleeding, abnormal     LAST ASSESSED: 6/6/16    Viral URI with cough 12/30/2019       Current Outpatient Medications:     Acetaminophen (TYLENOL PO), Take by mouth as needed, Disp: , Rfl:     amLODIPine (NORVASC) 5 mg tablet, Take 1 tablet (5 mg total) by mouth daily, Disp: 90 tablet, Rfl: 1    amphetamine-dextroamphetamine (ADDERALL) 5 MG tablet, take 1 and 1/2 tablets by mouth every morning and 1 tablet every evening, Disp: 75 tablet, Rfl: 0    cetirizine (ZyrTEC) 10 mg tablet, Take 10 mg by mouth 2 (two) times a day , Disp: , Rfl:     Cholecalciferol (VITAMIN D3) 1000 units CAPS, Take 1 capsule by mouth Daily  , Disp: , Rfl:     EPINEPHrine (EPIPEN) 0 3 mg/0 3 mL SOAJ, Inject 0 3 mL (0 3 mg total) into a muscle once for 1 dose, Disp: 1 each, Rfl: 2    fluticasone-salmeterol (Advair Diskus) 100-50 mcg/dose inhaler, Inhale 1 puff daily, Disp: 60 each, Rfl: 0    levothyroxine 50 mcg tablet, take 1 tablet by mouth once daily, Disp: 90 tablet, Rfl: 1    multivitamin (THERAGRAN) TABS, Take 1 tablet by mouth daily  , Disp: , Rfl:     Triamcinolone Acetonide (NASACORT AQ) 55 MCG/ACT AERO, 1 Act into each nostril Daily 2 puffs in each nostril, Disp: , Rfl:     valsartan (DIOVAN) 160 mg tablet, Take 1 tablet (160 mg total) by mouth daily, Disp: 90 tablet, Rfl: 1    venlafaxine (EFFEXOR-XR) 150 mg 24 hr capsule, Take 1 capsule (150 mg total) by mouth daily, Disp: 90 capsule, Rfl: 1    albuterol (PROAIR HFA) 90 mcg/act inhaler, Inhale 2 puffs every 6 (six) hours as needed for wheezing (Patient not taking: Reported on 3/31/2020), Disp: 8 5 g, Rfl: 0  Allergies   Allergen Reactions    Compazine [Prochlorperazine] Anaphylaxis     Category: Allergy; Annotation - [id number]: ALL FORMS    Erythromycin GI Intolerance     Category: Allergy; Annotation - [id number]: ALL FORMS    Sulfa Antibiotics GI Intolerance     Patient states that it is like when you are drunk, dizziness and confusion    Sulfamethoxazole-Trimethoprim Nausea Only and Dizziness     Category: Allergy; Annotation - [id number]: ALL FORMS     Past Surgical History:   Procedure Laterality Date    FOOT SURGERY Right     "Nerve removal"    PARATHYROID GLAND SURGERY  5/17/2018    [place] COLONOSCOPY FLX DX W/COLLJ SPEC WHEN PFRMD N/A 6/21/2018    Procedure: COLONOSCOPY;  Surgeon: Maykel Durant MD;  [facility];   Service: Gastroenterology    RI EXPLORE PARATHYROID GLANDS Right 5/17/2018    Procedure: MINIMALLY INVASIVE PARATHYROIDECTOMY;   PTH MONITORING;  Surgeon: Matt Valverde MD;  [facility];  Service: Surgical Oncology    TONSILLECTOMY  TONSILLECTOMY AND ADENOIDECTOMY           Review of Systems   Constitutional: Negative  HENT: Negative  Eyes: Negative  Respiratory: Negative for chest tightness and shortness of breath  Cardiovascular: Negative  Negative for chest pain and leg swelling  Gastrointestinal:        Dysphagia as noted in HPI   Genitourinary: Negative  Musculoskeletal: Positive for back pain  Negative for arthralgias and gait problem  Skin: Negative  Allergic/Immunologic: Negative  Neurological: Negative  Hematological: Negative  Psychiatric/Behavioral: Negative  Objective:      /82 (BP Location: Left arm, Patient Position: Sitting, Cuff Size: Standard)   Pulse 83   Temp 98 8 °F (37 1 °C) (Tympanic)   Ht 5' 1 1" (1 552 m) Comment: with shoes  Wt 76 8 kg (169 lb 6 4 oz) Comment: with shoes  LMP  (LMP Unknown)   SpO2 98%   BMI 31 90 kg/m²  BMI Counseling: Body mass index is 31 9 kg/m²  The BMI is above normal  Nutrition recommendations include decreasing overall calorie intake, decreasing soda and/or juice intake, moderation in carbohydrate intake, increasing intake of lean protein, reducing intake of saturated fat and trans fat and reducing intake of cholesterol  Exercise recommendations include exercising 3-5 times per week  Physical Exam  Vitals signs reviewed  Constitutional:       General: She is not in acute distress  Appearance: Normal appearance  She is not ill-appearing, toxic-appearing or diaphoretic  HENT:      Head: Normocephalic and atraumatic  Right Ear: External ear normal       Left Ear: External ear normal    Eyes:      Conjunctiva/sclera: Conjunctivae normal       Pupils: Pupils are equal, round, and reactive to light  Neck:      Musculoskeletal: No neck rigidity or muscular tenderness  Cardiovascular:      Rate and Rhythm: Normal rate and regular rhythm  Pulses: Normal pulses  Heart sounds: Normal heart sounds  No murmur  Pulmonary:      Effort: Pulmonary effort is normal  No respiratory distress  Breath sounds: Normal breath sounds  Abdominal:      General: Abdomen is flat  Bowel sounds are normal  There is no distension  Musculoskeletal: Normal range of motion  General: No swelling or tenderness  Skin:     General: Skin is warm  Coloration: Skin is not jaundiced  Findings: No rash  Neurological:      General: No focal deficit present  Mental Status: She is alert and oriented to person, place, and time  Mental status is at baseline

## 2021-01-07 NOTE — PATIENT INSTRUCTIONS
Lower Back Exercises   WHAT YOU NEED TO KNOW:   Lower back exercises help heal and strengthen your back muscles to prevent another injury  Ask your healthcare provider if you need to see a physical therapist for more advanced exercises  DISCHARGE INSTRUCTIONS:   Return to the emergency department if:   · You have severe pain that prevents you from moving  Contact your healthcare provider if:   · Your pain becomes worse  · You have new pain  · You have questions or concerns about your condition or care  Do lower back exercises safely:   · Do the exercises on a mat or firm surface  (not on a bed) to support your spine and prevent low back pain  · Move slowly and smoothly  Avoid fast or jerky motions  · Breathe normally  Do not hold your breath  · Stop if you feel pain  It is normal to feel some discomfort at first  Regular exercise will help decrease your discomfort over time  Lower back exercises: Your healthcare provider may recommend that you do back exercises 10 to 30 minutes each day  He may also recommend that you do exercises 1 to 3 times each day  Ask your healthcare provider which exercises are best for you and how often to do them  · Ankle pumps:  Lie on your back  Move your foot up (with your toes pointing toward your head)  Then, move your foot down (with your toes pointing away from you)  Repeat this exercise 10 times on each side  · Heel slides:  Lie on your back  Slowly bend one leg and then straighten it  Next, bend the other leg and then straighten it  Repeat 10 times on each side  · Pelvic tilt:  Lie on your back with your knees bent and feet flat on the floor  Place your arms in a relaxed position beside your body  Tighten the muscles of your abdomen and flatten your back against the floor  Hold for 5 seconds  Repeat 5 times  · Back stretch:  Lie on your back with your hands behind your head   Bend your knees and turn the lower half of your body to one side  Hold this position for 10 seconds  Repeat 3 times on each side  · Straight leg raises:  Lie on your back with one leg straight  Bend the other knee  Tighten your abdomen and then slowly lift the straight leg up about 6 to 12 inches off the floor  Hold for 1 to 5 seconds  Lower your leg slowly  Repeat 10 times on each leg  · Knee-to-chest:  Lie on your back with your knees bent and feet flat on the floor  Pull one of your knees toward your chest and hold it there for 5 seconds  Return your leg to the starting position  Lift the other knee toward your chest and hold for 5 seconds  Do this 5 times on each side  · Cat and camel:  Place your hands and knees on the floor  Arch your back upward toward the ceiling and lower your head  Round out your spine as much as you can  Hold for 5 seconds  Lift your head upward and push your chest downward toward the floor  Hold for 5 seconds  Do 3 sets or as directed  · Wall squats:  Stand with your back against a wall  Tighten the muscles of your abdomen  Slowly lower your body until your knees are bent at a 45 degree angle  Hold this position for 5 seconds  Slowly move back up to a standing position  Repeat 10 times  · Curl up:  Lie on your back with your knees bent and feet flat on the floor  Place your hands, palms down, underneath the curve in your lower back  Next, with your elbows on the floor, lift your shoulders and chest 2 to 3 inches  Keep your head in line with your shoulders  Hold this position for 5 seconds  When you can do this exercise without pain for 10 to 15 seconds, you may add a rotation  While your shoulders and chest are lifted off the ground, turn slightly to the left and hold  Repeat on the other side  · Bird dog:  Place your hands and knees on the floor  Keep your wrists directly below your shoulders and your knees directly below your hips  Pull your belly button in toward your spine   Do not flatten or arch your back  Tighten your abdominal muscles  Raise one arm straight out so that it is aligned with your head  Next, raise the leg opposite your arm  Hold this position for 15 seconds  Lower your arm and leg slowly and change sides  Do 5 sets  © Copyright [address] Information is for End User's use only and may not be sold, redistributed or otherwise used for commercial purposes  All illustrations and images included in CareNotes® are the copyrighted property of A D A M , Inc  or [facility] [place] [employer]   The above information is an  only  It is not intended as medical advice for individual conditions or treatments  Talk to your doctor, nurse or pharmacist before following any medical regimen to see if it is safe and effective for you

## 2021-02-19 DIAGNOSIS — F90.0 ADHD, PREDOMINANTLY INATTENTIVE TYPE: ICD-10-CM

## 2021-02-22 RX ORDER — DEXTROAMPHETAMINE SACCHARATE, AMPHETAMINE ASPARTATE, DEXTROAMPHETAMINE SULFATE AND AMPHETAMINE SULFATE 1.25; 1.25; 1.25; 1.25 MG/1; MG/1; MG/1; MG/1
TABLET ORAL
Qty: 75 TABLET | Refills: 0 | Status: SHIPPED | OUTPATIENT
Start: 2021-02-22 | End: 2021-04-14 | Stop reason: SDUPTHER

## 2021-03-02 DIAGNOSIS — I10 HYPERTENSION, ESSENTIAL, BENIGN: ICD-10-CM

## 2021-03-02 RX ORDER — AMLODIPINE BESYLATE 5 MG/1
5 TABLET ORAL DAILY
Qty: 90 TABLET | Refills: 1 | Status: SHIPPED | OUTPATIENT
Start: 2021-03-02 | End: 2021-08-30

## 2021-03-10 DIAGNOSIS — Z23 ENCOUNTER FOR IMMUNIZATION: ICD-10-CM

## 2021-03-25 DIAGNOSIS — F32.A DEPRESSION, UNSPECIFIED DEPRESSION TYPE: ICD-10-CM

## 2021-03-25 RX ORDER — VENLAFAXINE HYDROCHLORIDE 150 MG/1
150 CAPSULE, EXTENDED RELEASE ORAL DAILY
Qty: 90 CAPSULE | Refills: 1 | Status: SHIPPED | OUTPATIENT
Start: 2021-03-25 | End: 2021-11-08 | Stop reason: SDUPTHER

## 2021-04-14 DIAGNOSIS — F90.0 ADHD, PREDOMINANTLY INATTENTIVE TYPE: ICD-10-CM

## 2021-04-16 RX ORDER — DEXTROAMPHETAMINE SACCHARATE, AMPHETAMINE ASPARTATE, DEXTROAMPHETAMINE SULFATE AND AMPHETAMINE SULFATE 1.25; 1.25; 1.25; 1.25 MG/1; MG/1; MG/1; MG/1
TABLET ORAL
Qty: 75 TABLET | Refills: 0 | Status: SHIPPED | OUTPATIENT
Start: 2021-04-16 | End: 2021-06-09 | Stop reason: SDUPTHER

## 2021-04-29 DIAGNOSIS — Z87.09 HISTORY OF ASTHMA: ICD-10-CM

## 2021-05-23 DIAGNOSIS — E03.9 ACQUIRED HYPOTHYROIDISM: ICD-10-CM

## 2021-05-24 RX ORDER — LEVOTHYROXINE SODIUM 0.05 MG/1
TABLET ORAL
Qty: 90 TABLET | Refills: 1 | Status: SHIPPED | OUTPATIENT
Start: 2021-05-24 | End: 2021-11-29 | Stop reason: SDUPTHER

## 2021-06-09 DIAGNOSIS — F90.0 ADHD, PREDOMINANTLY INATTENTIVE TYPE: ICD-10-CM

## 2021-06-11 RX ORDER — DEXTROAMPHETAMINE SACCHARATE, AMPHETAMINE ASPARTATE, DEXTROAMPHETAMINE SULFATE AND AMPHETAMINE SULFATE 1.25; 1.25; 1.25; 1.25 MG/1; MG/1; MG/1; MG/1
TABLET ORAL
Qty: 75 TABLET | Refills: 0 | Status: SHIPPED | OUTPATIENT
Start: 2021-06-11 | End: 2021-07-12 | Stop reason: SDUPTHER

## 2021-06-23 ENCOUNTER — RA CDI HCC (OUTPATIENT)
Dept: OTHER | Facility: HOSPITAL | Age: 61
End: 2021-06-23

## 2021-06-23 NOTE — PROGRESS NOTES
[place] [address]  coding opportunities             Chart reviewed, (number of) suggestions sent to provider: 2                  Patients insurance company: [employer] (Medicare Advantage and Commercial)     Visit status: Patient canceled the appointment        [place] [address]  coding opportunities             Chart reviewed, (number of) suggestions sent to provider: 2                  Patients insurance company: [employer] (Medicare Advantage and Commercial)           1) E21 3: Hyperparathyroidism ([place] [address] ) - Please review for current status and assess and document, if applicable - found in active problem list - last assessed on 2/22/2019   2) Depression - Can Depression be further classified using more specific code from F32 0 - F32 5 or F33 0 - F33 9    RESCHEDULED TO 7/12/2021

## 2021-06-28 DIAGNOSIS — Z87.09 HISTORY OF ASTHMA: ICD-10-CM

## 2021-06-28 DIAGNOSIS — I10 HYPERTENSION, ESSENTIAL, BENIGN: ICD-10-CM

## 2021-06-28 RX ORDER — VALSARTAN 160 MG/1
160 TABLET ORAL DAILY
Qty: 90 TABLET | Refills: 1 | Status: SHIPPED | OUTPATIENT
Start: 2021-06-28 | End: 2022-01-05 | Stop reason: SDUPTHER

## 2021-07-06 ENCOUNTER — RA CDI HCC (OUTPATIENT)
Dept: OTHER | Facility: HOSPITAL | Age: 61
End: 2021-07-06

## 2021-07-06 NOTE — PROGRESS NOTES
[place] [address]  coding opportunities             Chart reviewed, (number of) suggestions sent to provider: 1               Number of suggestions NOT actually used: 1     Patients insurance company: [employer] (Medicare Advantage and Commercial)     Visit status: Patient arrived for their scheduled appointment     Provider never responded to [facility]  coding request     [facility]  coding opportunities             Chart reviewed, (number of) suggestions sent to provider: 1                  Patients insurance company: [employer] (Medicare Advantage and Commercial)           E21 3: Hyperparathyroidism [facility]) - Please review for current status and assess and document, if applicable - found in active problem list - last assessed on 2/22/2019

## 2021-07-12 ENCOUNTER — OFFICE VISIT (OUTPATIENT)
Dept: INTERNAL MEDICINE CLINIC | Facility: CLINIC | Age: 61
End: 2021-07-12
Payer: COMMERCIAL

## 2021-07-12 VITALS
WEIGHT: 173 LBS | BODY MASS INDEX: 32.66 KG/M2 | DIASTOLIC BLOOD PRESSURE: 60 MMHG | OXYGEN SATURATION: 96 % | SYSTOLIC BLOOD PRESSURE: 100 MMHG | HEART RATE: 94 BPM | TEMPERATURE: 98.3 F | HEIGHT: 61 IN

## 2021-07-12 DIAGNOSIS — I10 HYPERTENSION, ESSENTIAL, BENIGN: Primary | ICD-10-CM

## 2021-07-12 DIAGNOSIS — S39.012A STRAIN OF LUMBAR REGION, INITIAL ENCOUNTER: ICD-10-CM

## 2021-07-12 DIAGNOSIS — E03.9 HYPOTHYROIDISM, UNSPECIFIED TYPE: ICD-10-CM

## 2021-07-12 DIAGNOSIS — S39.012D SACROILIAC STRAIN, SUBSEQUENT ENCOUNTER: ICD-10-CM

## 2021-07-12 DIAGNOSIS — F90.0 ADHD, PREDOMINANTLY INATTENTIVE TYPE: ICD-10-CM

## 2021-07-12 DIAGNOSIS — R13.19 ESOPHAGEAL DYSPHAGIA: ICD-10-CM

## 2021-07-12 PROCEDURE — 1036F TOBACCO NON-USER: CPT | Performed by: INTERNAL MEDICINE

## 2021-07-12 PROCEDURE — 3008F BODY MASS INDEX DOCD: CPT | Performed by: INTERNAL MEDICINE

## 2021-07-12 PROCEDURE — 99214 OFFICE O/P EST MOD 30 MIN: CPT | Performed by: INTERNAL MEDICINE

## 2021-07-12 PROCEDURE — 3078F DIAST BP <80 MM HG: CPT | Performed by: INTERNAL MEDICINE

## 2021-07-12 PROCEDURE — 3074F SYST BP LT 130 MM HG: CPT | Performed by: INTERNAL MEDICINE

## 2021-07-12 PROCEDURE — 3725F SCREEN DEPRESSION PERFORMED: CPT | Performed by: INTERNAL MEDICINE

## 2021-07-12 RX ORDER — DEXTROAMPHETAMINE SACCHARATE, AMPHETAMINE ASPARTATE, DEXTROAMPHETAMINE SULFATE AND AMPHETAMINE SULFATE 1.25; 1.25; 1.25; 1.25 MG/1; MG/1; MG/1; MG/1
TABLET ORAL
Qty: 75 TABLET | Refills: 0 | Status: SHIPPED | OUTPATIENT
Start: 2021-07-12 | End: 2021-09-20 | Stop reason: SDUPTHER

## 2021-07-12 NOTE — PROGRESS NOTES
Assessment/Plan:    Hypertension, essential, benign  All controlled with current medications  Hypothyroidism  Follow-up thyroid function studies have been scheduled  No changes to current dosing    Lumbar strain    Will refer to physical therapy    Sacroiliac strain  Will refer to physical therapy  Esophageal dysphagia  Patient is a barium swallow scheduled since January she has not had time to schedule the test   She was encouraged to do so as she is still experiencing some occasional dysphagia  Diagnoses and all orders for this visit:    Hypertension, essential, benign    Esophageal dysphagia    Hypothyroidism, unspecified type    Sacroiliac strain, subsequent encounter  -     Ambulatory referral to Physical Therapy; Future    ADHD, predominantly inattentive type  -     amphetamine-dextroamphetamine (ADDERALL) 5 MG tablet; take 1 and 1/2 tablets by mouth every morning and 1 tablet every evening    Strain of lumbar region, initial encounter  -     Ambulatory referral to Physical Therapy; Future          Subjective:      Patient ID: Nkechi Adams is a 63 y o  female  Patient presents to the office for follow-up visit  She has not had the opportunity to obtain any of the lab work or diagnostics that were are noted at her previous visit  She is still experiencing episodic esophageal dysphagia  She denies any dyspepsia or symptoms of acid regurgitation  She has had some significant weight gain which 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  She has been vaccinated for COVID-19 with the [employer] vaccine  She denies any weakness or difficulty ambulating with her lower extremities    She is complaining of some sciatic type symptoms with intermittent mild radicular symptoms in both buttocks      Family History   Problem Relation Age of Onset    Prostate cancer Father     Anemia Father     Neuropathy Father     Prostate cancer Brother     Paget's disease of bone Brother     Heart Valve Disease Mother         s/p MVR    Glaucoma Mother     Rickets Mother     Stroke Mother     Hypertension Family         BENIGN ESSENTIAL    Heart disease Family         CARDIAC DISORDER    No Known Problems Maternal Grandmother     No Known Problems Maternal Grandfather     No Known Problems Paternal Grandmother     No Known Problems Paternal Grandfather     No Known Problems Paternal Aunt      Social History     Socioeconomic History    Marital status: Single     Spouse name: Not on file    Number of children: Not on file    Years of education: Not on file    Highest education level: Not on file   Occupational History    Occupation: UNEMPLOYED   Tobacco Use    Smoking status: Never Smoker    Smokeless tobacco: Never Used   Vaping Use    Vaping Use: Never used   Substance and Sexual Activity    Alcohol use: Yes     Comment: socially; [address]  INFREQUENTLY    Drug use: No    Sexual activity: Not Currently   Other Topics Concern    Not on file   Social History Narrative    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)    [place] HX: [place]     Social Determinants of Health     Financial Resource Strain:     Difficulty of Paying Living Expenses:    Food Insecurity:     Worried About Running Out of Food in the Last Year:     [address] in the Last Year:    Transportation Needs:     Lack of Transportation (Medical):      Lack of Transportation (Non-Medical):    Physical Activity:     Days of Exercise per Week:     Minutes of Exercise per Session:    Stress:     Feeling of Stress :    Social Connections:     Frequency of Communication with Friends and Family:     Frequency of Social Gatherings with Friends and Family:     Attends Jew Services:     Active Member of Clubs or Organizations:     Attends Club or Organization Meetings:     Marital Status:    Intimate Partner Violence:     Fear of Current or Ex-Partner:     Emotionally Abused:     Physically Abused:     Sexually Abused:      Past Medical History:   Diagnosis Date    Abrasion of axilla     LAST ASSESSED: 11/21/14    Acute cystitis with hematuria 8/15/2019    ADHD     Allergic     Allergic rhinitis     LAST ASSESSED: 5/15/15    Asthma     TRANSITIONED FROM: ASTHMA; RESOLVED: 11/9/16    Bee sting reaction 8/1/2019    Bilateral nephrolithiasis 7/25/2018    Depression     Fracture of plateau of left tibia     RESOLVED: 4/14/15    Headache     Hematuria     Hyperparathyroidism ([address] )     Hypothyroidism     Left ankle sprain     LAST ASSESSED: 10/21/14    No known health problems     DENIED MENTAL STATUS CHANGE AS PER ALLSCRIPTS; CONFLICTED WITH ADHD AND DEPRESSION IN MED HX SO IT COULD NOT BE ADDED IN PERT NEGS    Obstructive sleep apnea on CPAP     Scoliosis     Thyroid nodule     Urinary frequency     RESOLVED: 10/27/16    Vaginal bleeding, abnormal     LAST ASSESSED: 6/6/16    Viral URI with cough 12/30/2019       Current Outpatient Medications:     Acetaminophen (TYLENOL PO), Take by mouth as needed, Disp: , Rfl:     albuterol (PROAIR HFA) 90 mcg/act inhaler, Inhale 2 puffs every 6 (six) hours as needed for wheezing, Disp: 8 5 g, Rfl: 0    amLODIPine (NORVASC) 5 mg tablet, Take 1 tablet (5 mg total) by mouth daily, Disp: 90 tablet, Rfl: 1    amphetamine-dextroamphetamine (ADDERALL) 5 MG tablet, take 1 and 1/2 tablets by mouth every morning and 1 tablet every evening, Disp: 75 tablet, Rfl: 0    cetirizine (ZyrTEC) 10 mg tablet, Take 10 mg by mouth 2 (two) times a day , Disp: , Rfl:     Cholecalciferol (VITAMIN D3) 1000 units CAPS, Take 1 capsule by mouth Daily  , Disp: , Rfl:     EPINEPHrine (EPIPEN) 0 3 mg/0 3 mL SOAJ, Inject 0 3 mL (0 3 mg total) into a muscle once for 1 dose, Disp: 1 each, Rfl: 2    fluticasone-salmeterol (Advair Diskus) 100-50 mcg/dose inhaler, Inhale 1 puff daily, Disp: 60 blister, Rfl: 1    levothyroxine 50 mcg tablet, take 1 tablet by mouth once daily, Disp: 90 tablet, Rfl: 1    multivitamin (THERAGRAN) TABS, Take 1 tablet by mouth daily  , Disp: , Rfl:     Triamcinolone Acetonide (NASACORT AQ) 55 MCG/ACT AERO, 1 Act into each nostril Daily 2 puffs in each nostril, Disp: , Rfl:     valsartan (DIOVAN) 160 mg tablet, Take 1 tablet (160 mg total) by mouth daily, Disp: 90 tablet, Rfl: 1    venlafaxine (EFFEXOR-XR) 150 mg 24 hr capsule, Take 1 capsule (150 mg total) by mouth daily, Disp: 90 capsule, Rfl: 1  Allergies   Allergen Reactions    Compazine [Prochlorperazine] Anaphylaxis     Category: Allergy; Annotation - [id number]: ALL FORMS    Erythromycin GI Intolerance     Category: Allergy; Annotation - [id number]: ALL FORMS    Sulfa Antibiotics GI Intolerance     Patient states that it is like when you are drunk, dizziness and confusion    Sulfamethoxazole-Trimethoprim Nausea Only and Dizziness     Category: Allergy; Annotation - [id number]: ALL FORMS     Past Surgical History:   Procedure Laterality Date    FOOT SURGERY Right     "Nerve removal"    PARATHYROID GLAND SURGERY  5/17/2018    ND COLONOSCOPY FLX DX W/COLLJ SPEC WHEN PFRMD N/A 6/21/2018    Procedure: COLONOSCOPY;  Surgeon: Christ Moran MD;  [facility]: [facility]; Service: Gastroenterology    ND EXPLORE PARATHYROID GLANDS Right 5/17/2018    Procedure: MINIMALLY INVASIVE PARATHYROIDECTOMY;   PTH MONITORING;  Surgeon: Rolanda Turner MD;  Location: [facility];  Service: Surgical Oncology    TONSILLECTOMY      TONSILLECTOMY AND ADENOIDECTOMY           Review of Systems   Constitutional: Positive for activity change (Decreased physical activity) and unexpected weight change (Unexpected weight gain)  HENT: Negative  Eyes: Negative  Respiratory: Negative  Negative for cough and shortness of breath  Cardiovascular: Negative      Gastrointestinal:        Intermittent esophageal dysphagia   Endocrine: Negative  Genitourinary: Negative  Musculoskeletal: Positive for back pain  Skin: Negative  Allergic/Immunologic: Negative  Neurological: Negative for dizziness, facial asymmetry, weakness, light-headedness and numbness  Hematological: Negative  Psychiatric/Behavioral: Negative  Objective:      /60 (BP Location: Right arm, Patient Position: Sitting, Cuff Size: Large)   Pulse 94   Temp 98 3 °F (36 8 °C) (Tympanic)   Ht 5' 1 02" (1 55 m)   Wt 78 5 kg (173 lb)   LMP  (LMP Unknown)   SpO2 96% Comment: Room Air  BMI 32 66 kg/m²          Physical Exam  Vitals reviewed  Constitutional:       General: She is not in acute distress  Appearance: She is not ill-appearing, toxic-appearing or diaphoretic  HENT:      Head: Normocephalic and atraumatic  Right Ear: External ear normal       Left Ear: External ear normal    Eyes:      General: No scleral icterus  Conjunctiva/sclera: Conjunctivae normal       Pupils: Pupils are equal, round, and reactive to light  Cardiovascular:      Rate and Rhythm: Normal rate and regular rhythm  Pulses: Normal pulses  Heart sounds: Normal heart sounds  No murmur heard  Pulmonary:      Effort: Pulmonary effort is normal  No respiratory distress  Breath sounds: Normal breath sounds  Abdominal:      General: There is no distension  Tenderness: There is no abdominal tenderness  There is no guarding  Musculoskeletal:      Right lower leg: No edema  Left lower leg: No edema  Skin:     General: Skin is warm  Coloration: Skin is not jaundiced  Findings: No bruising, erythema or rash  Neurological:      General: No focal deficit present  Mental Status: She is alert and oriented to person, place, and time  Mental status is at baseline  Cranial Nerves: No cranial nerve deficit  Motor: No weakness        Gait: Gait normal       Deep Tendon Reflexes: Reflexes normal    Psychiatric: Behavior: Behavior normal          Thought Content:  Thought content normal          Judgment: Judgment normal

## 2021-07-12 NOTE — ASSESSMENT & PLAN NOTE
Patient is a barium swallow scheduled since January she has not had time to schedule the test   She was encouraged to do so as she is still experiencing some occasional dysphagia

## 2021-07-21 ENCOUNTER — EVALUATION (OUTPATIENT)
Dept: PHYSICAL THERAPY | Facility: REHABILITATION | Age: 61
End: 2021-07-21
Payer: COMMERCIAL

## 2021-07-21 DIAGNOSIS — S39.012A STRAIN OF LUMBAR REGION, INITIAL ENCOUNTER: ICD-10-CM

## 2021-07-21 DIAGNOSIS — S39.012D SACROILIAC STRAIN, SUBSEQUENT ENCOUNTER: ICD-10-CM

## 2021-07-21 PROCEDURE — 97112 NEUROMUSCULAR REEDUCATION: CPT | Performed by: PHYSICAL THERAPIST

## 2021-07-21 PROCEDURE — 97161 PT EVAL LOW COMPLEX 20 MIN: CPT | Performed by: PHYSICAL THERAPIST

## 2021-07-21 NOTE — PROGRESS NOTES
PT Evaluation     Today's date: 2021  Patient name: Rossy Mancilla  : 1960  MRN: [id number]  Referring provider: Ahmet De La Cruz MD  Dx:   Encounter Diagnosis     ICD-10-CM    1  Sacroiliac strain, subsequent encounter  S39 012D Ambulatory referral to Physical Therapy   2  Strain of lumbar region, initial encounter  S39 012A Ambulatory referral to Physical Therapy                  Assessment  Assessment details: Rossy Mancilla is a pleasant 63 y o  female who presents with chronic L low back pain  The patient's greatest concerns are the pain she is experiencing, worry over not knowing what's wrong, concern at no signs of improvement and fear of not being able to keep active  No further referral appears necessary at this time based upon examination results  Primary movement impairment diagnosis of lumbar spine hypomobility, trunk neuromuscular control deficit, LE weakness, L piriformis tightness resulting in pathoanatomical symptoms of chronic L sided low back pain which is limiting her ability to exercise or recreation, go to work, perform household chores, perform yard work, sleep, sit for extended periods of time and walk  Physical exam is consistent with stabilization treatment group for low back pain  Patient does demonstrate any radicular findings and has no significant changes with repeated movements screen  Her symptoms seem to be secondary to postural control issues and asymmetric paraspinal soft tissue extensibility which is secondary to sitting posture  Pt  will benefit from skilled PT services that includes manual therapy techniques to enhance tissue extensibility, neuromuscular re-education to facilitate motor control, therapeutic exercise to increase functional mobility, and modalities prn to reduce pain and inflammation        Primary Impairments:  1) lumbar spine hypomobility   2) trunk neuromuscular control defciit  3) LE weakness  4) L piriformis tightness      Impairments: abnormal coordination, abnormal muscle firing, abnormal muscle tone, abnormal or restricted ROM, abnormal movement, activity intolerance, difficulty understanding, impaired physical strength, lacks appropriate home exercise program, pain with function, poor posture  and poor body mechanics    Symptom irritability: moderateUnderstanding of Dx/Px/POC: good   Prognosis: good  Prognosis details: Positive prognostic indicators include positive attitude toward recovery  Negative prognostic indicators include chronicity of symptoms  Goals  STG  Patient will be independent with home exercise program in 1-2 visits  Patient will report 50% improvement in sleep quality secondary to pain in 3 weeks  Patient will tolerate 15 min of walking without reproduction of symptoms in 3 weeks  LTG  Patient will be independent in comprehensive HEP upon discharge  Patient will achieve goal FOTO score upon discharge  Patient will return to walking without restriction secondary to pain upon discharge  Patient will demonstrate 4/5 MMT without reproduction of pain upon discharge  Plan  Plan details: 2x per week for 3 weeks  1x per week for 3 weeks  9 total visits  Patient would benefit from: skilled physical therapy  Planned therapy interventions: activity modification, joint mobilization, manual therapy, motor coordination training, neuromuscular re-education, patient education, self care, therapeutic activities, therapeutic exercise, graded activity, home exercise program, behavior modification, graded exercise, functional ROM exercises, strengthening, stretching and postural training  Treatment plan discussed with: patient        Subjective Evaluation    History of Present Illness  Mechanism of injury: Patient reports with hx of scoliosis, and current episode  L sided low back and "sciatic nerve pain"  6 month hx of pain which she attributes to working from home with a poor work set up   She was provided with some "stretches" from her family doctor that did not provide her with any relief  Patient does report some mild radicular symptoms that are caused by bending and physical activity  Her low back is often aggravated by performing activities that fatigue her "leg muscles"  She has tried OTC Tylenol without success  She does note some relief with rest, but not a resolution of symptoms  Patient denies numbness/tingling/weakness of legs  Denies bowel/bladder dysfunction  Pain  Current pain ratin  At best pain ratin  At worst pain rating: 10 (while trying to rotate in bed)  Location: L lower lumbar; L posterior thigh  Quality: dull ache, pressure and tight  Relieving factors: rest and relaxation    Patient Goals  Patient goals for therapy: increased strength, independence with ADLs/IADLs, return to sport/leisure activities, increased motion and decreased pain  Patient goal: Walk for exercise without restriction  Heavy household duties  Objective     General Comments:      Lumbar Comments  Gait: slight L antalgic    TrA activation: poor; compensation with Valsalva    Prone multifidus activation: L decreased    Seated posture: excessive PPT; increased L lumbar sidebend    Lower Quarter Screen:   Reflexes: 2+ B/L patellar  Dermatomes: intact  Myotomes: intact    Manual Muscle Testing:   Hip Extension: R  3+/5 p! L  3+/5   Hip Abduction: R  3+/5  L  3+/5   Hip ER: R  3+/5   L  3/5 p! Palpation Assessment: hypertonic L lumbar paraspinal; reproduction of symptoms palpation of L PSIS    Range of Motion:   Lumbar Spine Flexion: WNL  Lumbar Spine Extension: 50% limited; L sided lumbar p! Lumbar Spine Sidebending: R WNL L 75% limited p!     Special Tests   Hip Scour: neg  FAY: reproduction of lumbar pain  SI Compression: neg  Sacral Thrust: neg  Active SLR: neg  Passive SLR: neg  Tripod Sign: neg  Piriformis muscle length: reproduction of lumbar pain    Repeated Movements:     RFIS:  No chage   FABIO: produce; no worse   REIL: produce; no worse                  Precautions: hx of scoliosis      Manuals 7/21                                                                Neuro Re-Ed             Supine PPT 20x HEP            Clamshells             Bridge w/ TrA             TrA progressions             Seated posture exercises                          Education 10 min; postural education            Ther Ex             Bike/Nustep             LTR 20x R only            Periscapular strengthening             LE strengthening                                                                 Ther Activity                                       Gait Training                                       Modalities

## 2021-07-28 ENCOUNTER — OFFICE VISIT (OUTPATIENT)
Dept: PHYSICAL THERAPY | Facility: REHABILITATION | Age: 61
End: 2021-07-28
Payer: COMMERCIAL

## 2021-07-28 DIAGNOSIS — S39.012D SACROILIAC STRAIN, SUBSEQUENT ENCOUNTER: Primary | ICD-10-CM

## 2021-07-28 DIAGNOSIS — S39.012A STRAIN OF LUMBAR REGION, INITIAL ENCOUNTER: ICD-10-CM

## 2021-07-28 PROCEDURE — 97112 NEUROMUSCULAR REEDUCATION: CPT | Performed by: PHYSICAL THERAPIST

## 2021-07-28 PROCEDURE — 97110 THERAPEUTIC EXERCISES: CPT | Performed by: PHYSICAL THERAPIST

## 2021-07-28 NOTE — PROGRESS NOTES
Daily Note     Today's date: 2021  Patient name: Jae Grover  : 1960  MRN: [id number]  Referring provider: Cris Arvizu MD  Dx:   Encounter Diagnosis     ICD-10-CM    1  Sacroiliac strain, subsequent encounter  S39 012D    2  Strain of lumbar region, initial encounter  S39 012A                   Subjective: Patient has been compliant with HEP  She reports that she did a lot of walking while on vacation and reports lateral hip muscular soreness after walking  She feels that the stretches for her back have been beneficial        Objective: See treatment diary below      Assessment: Patient has some reproduction of localized L sided low back pain with supine lumbopelvic stability exercises  Patient tolerated progression in exercises well and educated in updated HEP to include: bridge, marches, and standing hip abduction w/ TrA  Progress to TB resisted trunk strengthening exercises next visit  Plan: Continue per plan of care        Precautions: hx of scoliosis      Manuals                                                                Neuro Re-Ed             Supine PPT 20x HEP            Clamshells             Bridge w/ TrA  3x10           TrA w/ march  30x ea alt           Standing hip abduction  3x10 ea           Seated posture exercises             Palloff press   nv          Education 10 min; postural education 10 min: HEP updates           Ther Ex             Bike/Nustep  8 min NS; lvl 5           LTR 20x R only 20x R           Periscapular strengthening             LE strengthening                                                                 Ther Activity                                       Gait Training                                       Modalities

## 2021-07-30 ENCOUNTER — OFFICE VISIT (OUTPATIENT)
Dept: SLEEP CENTER | Facility: CLINIC | Age: 61
End: 2021-07-30
Payer: COMMERCIAL

## 2021-07-30 ENCOUNTER — OFFICE VISIT (OUTPATIENT)
Dept: PHYSICAL THERAPY | Facility: REHABILITATION | Age: 61
End: 2021-07-30
Payer: COMMERCIAL

## 2021-07-30 VITALS
DIASTOLIC BLOOD PRESSURE: 72 MMHG | WEIGHT: 169 LBS | BODY MASS INDEX: 31.91 KG/M2 | HEIGHT: 61 IN | HEART RATE: 74 BPM | OXYGEN SATURATION: 98 % | SYSTOLIC BLOOD PRESSURE: 120 MMHG

## 2021-07-30 DIAGNOSIS — S39.012A STRAIN OF LUMBAR REGION, INITIAL ENCOUNTER: ICD-10-CM

## 2021-07-30 DIAGNOSIS — S39.012D SACROILIAC STRAIN, SUBSEQUENT ENCOUNTER: Primary | ICD-10-CM

## 2021-07-30 DIAGNOSIS — F90.0 ADHD, PREDOMINANTLY INATTENTIVE TYPE: ICD-10-CM

## 2021-07-30 DIAGNOSIS — G47.33 OSA (OBSTRUCTIVE SLEEP APNEA): Primary | ICD-10-CM

## 2021-07-30 DIAGNOSIS — F32.0 CURRENT MILD EPISODE OF MAJOR DEPRESSIVE DISORDER WITHOUT PRIOR EPISODE (HCC): ICD-10-CM

## 2021-07-30 DIAGNOSIS — J45.20 MILD INTERMITTENT ASTHMATIC BRONCHITIS WITHOUT COMPLICATION: ICD-10-CM

## 2021-07-30 DIAGNOSIS — E66.9 OBESITY (BMI 30-39.9): ICD-10-CM

## 2021-07-30 DIAGNOSIS — I10 HYPERTENSION, ESSENTIAL, BENIGN: ICD-10-CM

## 2021-07-30 DIAGNOSIS — J31.0 CHRONIC RHINITIS: ICD-10-CM

## 2021-07-30 PROCEDURE — 97112 NEUROMUSCULAR REEDUCATION: CPT | Performed by: PHYSICAL THERAPIST

## 2021-07-30 PROCEDURE — 97110 THERAPEUTIC EXERCISES: CPT | Performed by: PHYSICAL THERAPIST

## 2021-07-30 PROCEDURE — 99214 OFFICE O/P EST MOD 30 MIN: CPT | Performed by: INTERNAL MEDICINE

## 2021-07-30 NOTE — PROGRESS NOTES
Daily Note     Today's date: 2021  Patient name: Davi Brito  : 1960  MRN: [id number]  Referring provider: Henrietta Benavides MD  Dx:   Encounter Diagnosis     ICD-10-CM    1  Sacroiliac strain, subsequent encounter  S39 012D    2  Strain of lumbar region, initial encounter  S39 012A                   Subjective: Patient presents with increased low back pain today  She states that it became irritated today while she was out running errands  Objective: See treatment diary below      Assessment: Patient reports improvement in low back pain after warm up on bike  Patient demonstrates good tolerance to addition of thera band resisted lumbo pelvic stability exercises  HEP should be updated to include band resisted exercises next visit if patient reports good tolerance to tx  Plan: Continue per plan of care        Precautions: hx of scoliosis      Manuals                                                               Neuro Re-Ed             Supine PPT 20x HEP            Clamshells             Bridge w/ TrA  3x10 20x          TrA / march  30x ea alt           Standing hip abduction  3x10 ea           Seated  on PBall    nv         Palloff press   2x10 ea 5s; double ytb          Unilateral TB row   20 ea; double ytb          Banded side steps    3 laps; otb; cueing for mini squat and TrA          Education 10 min; postural education 10 min: HEP updates           Ther Ex             Bike/Nustep  8 min NS; lvl 5 8 min bike          LTR 20x R only 20x R 20x R          Periscapular strengthening             LE strengthening                                                                 Ther Activity                                       Gait Training                                       Modalities

## 2021-07-30 NOTE — PATIENT INSTRUCTIONS
Continuous Positive Airway Pressure (CPAP) therapy was prescribed to you as a medical necessity and there are risks of discontinuing  use of the device, some of which may be long term  Symptoms you experienced before using CPAP may return such as snoring, apneas, excessive daytime sleepiness, hypertension, cardiac arrhythmias, risk of stroke, congestive heart-failure, exacerbation of COPD and potential respiratory failure  Ultimately, it is a personal decision for you to make if you continue use of an affected device or discontinue until a replacement is provided  Unfortunately, [employer] has not yet provided us with information about available devices  You can visit their website at [email or web address]  [email or web address]/[email or web address] to register your device or www  [employer]  expertinquiry  com to learn more about how [facility] to replace your device  Another option would be to check with your medical equipment provider to determine if you are eligible for a new machine through your insurance, if not you can pay out of pocket for a new machine  According to [facility], an in [employer] filter that can be purchased online for upwards of $4  can also be helpful  If you wish to get a replacement machine, we are able to provide you with a script  P;ease include your mask type  Nursing Support:  When: Monday through Friday 7A-5PM except holidays  Where: Our direct line is [phone]  If you are having a true emergency please call 911  In the event that the line is busy or it is after hours please leave a voice message and we will return your call  Please speak clearly, leaving your full name, birth date, best number to reach you and the reason for your call  Medication refills: We will need the name of the medication, the dosage, the ordering provider, whether you get a 30 or 90 day refill, and the pharmacy name and address  Medications will be ordered by the provider only    Nurses cannot call in prescriptions  Please allow 7 days for medication refills  Physician requested updates: If your provider requested that you call with an update after starting medication, please be ready to provide us the medication and dosage, what time you take your medication, the time you attempt to fall asleep, time you fall asleep, when you wake up, and what time you get out of bed  Sleep Study Results: We will contact you with sleep study results and/or next steps after the physician has reviewed your testing

## 2021-07-30 NOTE — PROGRESS NOTES
Review of Systems      Genitourinary none   Cardiology none   Gastrointestinal none   Neurology none   Constitutional fatigue and weight change   Integumentary none   Psychiatry depression   Musculoskeletal joint pain and back pain   Pulmonary shortness of breath with activity   ENT throat clearing   Endocrine none   Hematological none

## 2021-07-30 NOTE — PROGRESS NOTES
Follow-Up Note - Yvon Khoury  63 y o  female  KLS:[birth year]  [facility]:[id number]  DOS:7/30/2021    CC: I saw this patient for follow-up in clinic today for Sleep disordered breathing, Coexisting Sleep and Medical Problems  She has a [employer] 1 machine  Results of prior studies in 2017:  The diagnostic study demonstrated an AHI of 5 4 per hour  Minimum oxygen saturation was 85% and she spent 72 2% of time asleep with saturations less than 90%  Mild-to-moderate intensity snoring was noted  There were also periodic limb movements of sleep for an index of 14 per hour and frequent spontaneous arousals for an index of 47 per hour  The titration study demonstrated sleep disordered breathing was successfully remediated with PAP at 9 cm H2O  The periodic limb movements of sleep were virtually eliminated and she had much less sleep fragmentation  PFSH, Problem List, Medications & Allergies were reviewed in EMR  Interval changes: none reported  She  has a past medical history of Abrasion of axilla, Acute cystitis with hematuria (8/15/2019), ADHD, Allergic, Allergic rhinitis, Asthma, Bee sting reaction (8/1/2019), Bilateral nephrolithiasis (7/25/2018), Depression, Fracture of plateau of left tibia, Headache, Hematuria, Hyperparathyroidism ([address] ), Hypothyroidism, Left ankle sprain, No known health problems, Obstructive sleep apnea on CPAP, Scoliosis, Thyroid nodule, Urinary frequency, Vaginal bleeding, abnormal, and Viral URI with cough (12/30/2019)  She has a current medication list which includes the following prescription(s): acetaminophen, albuterol, amlodipine, amphetamine-dextroamphetamine, cetirizine, vitamin d3, fluticasone-salmeterol, levothyroxine, multivitamin, triamcinolone acetonide, valsartan, venlafaxine, and epinephrine  PHYSIOLOGICAL DATA REVIEW AND INTERPRETATION:   using PAP > 4 hours/night 90%   Estimated ANSELMO 3 5/hour with pressure of 12cm H2O @90th percentile; Compliance: Very good; Sleep disordered breathing:stable & within target range; Patient reports has not been using So Clean to sanitize the machine  SUBJECTIVE: Regarding use of PAP, Armond reports:   · She is experiencing some adverse effects:  Condensation in the tubing particularly in the winter  · She is benefiting from use: sleeping better   Sleep Routine: Renae Whitfield reports getting 5-6 hrs sleep on work days and more on days off; she has no difficulty initiating or maintaining sleep   She arises with aid of an alarm and is not always refreshed  Graylin Going reports Excessive Daytime Sleepiness, dozes off when sedentary at home and rated herself at Total score: 8 /24 on the Alex Sleepiness Scale  Habits: reports that she has never smoked  She has never used smokeless tobacco ,  reports current alcohol use ,  reports no history of drug use , Caffeine use: moderate , Exercise routine: none because of back pain  ROS: as attached  Significant for few lb weight gain  He reported no nasal symptoms  Asthma is controlled  Jerryl Rm EXAM: /72   Pulse 74   Ht 5' 1" (1 549 m)   Wt 76 7 kg (169 lb)   LMP  (LMP Unknown)   SpO2 98%   BMI 31 93 kg/m²     Patient is well groomed; well appearing  H&N: EOMI; NC/AT:no facial pressure marks, no rashes  Skin/Extrem: col & hydration normal; no edema  Psych: cooperativeand in no distress  Mental state:appears normal   Resp: Respiratory effort is normal  CNS: Alert, orientated, clear & coherent speech  Physical findings otherwise essentially unchanged from previous  IMPRESSION: Problem List Items & Comorbidities Addressed this Visit    1  ERICA (obstructive sleep apnea)  Cpap DME    PAP DME Resupply/Reorder   2  Chronic rhinitis     3  Mild intermittent asthmatic bronchitis without complication     4  Hypertension, essential, benign     5  ADHD, predominantly inattentive type     6   Current mild episode of major depressive disorder without prior episode ([facility] [address] )     7  Obesity (BMI 30-39  9)         PLAN:  I reviewed results of prior studies and physiologic data with the patient  Notified regarding recall of [employer] devices and the recommendation to discontinue use pending resolution of degradation of the foam by replacing it  I discussed treatment options with risks and benefits  Patient understands risks of discontinuing PAP vs continuing use while awaiting resolution of the new issue  Patient instructed to register machine with [employer]  Advised to follow progress on their website, including web sites of DME provider, [facility] and [facility] for notifications  Treatment is medically necessary and patient will weigh the options regarding use, but elected to continue using Pap while awaiting remediation  Plans to get a replacement machine  In the interim to also use an in line biological filter as suggested by Umair Ramsay  Care of equipment, methods to improve comfort using PAP and importance of compliance with therapy were discussed  Instructed not to use So Clean or other cleaning devices unless approved by  a and FDA  Pressure setting: 10-12 cmH2O  Rx provided to replace  supplies and Care coordinated with DME provider  Discussed strategies for weight reduction  Follow-up is advised in 1 year or sooner if needed to monitor progress, compliance and to adjust therapy  Thank you for allowing me to participate in the care of this patient      Sincerely,    Authenticated electronically by Alda Diaz MD on 35/82/84   Board Certified Specialist

## 2021-08-02 ENCOUNTER — TELEPHONE (OUTPATIENT)
Dept: SLEEP CENTER | Facility: CLINIC | Age: 61
End: 2021-08-02

## 2021-08-02 NOTE — TELEPHONE ENCOUNTER
DME resupply order faxed to [facility]    I spoke with patient, states she would like script for replacement machine to go to [address] faxed

## 2021-08-03 ENCOUNTER — OFFICE VISIT (OUTPATIENT)
Dept: INTERNAL MEDICINE CLINIC | Facility: CLINIC | Age: 61
End: 2021-08-03
Payer: COMMERCIAL

## 2021-08-03 VITALS
HEIGHT: 61 IN | SYSTOLIC BLOOD PRESSURE: 120 MMHG | OXYGEN SATURATION: 98 % | BODY MASS INDEX: 31.68 KG/M2 | TEMPERATURE: 97.6 F | HEART RATE: 85 BPM | WEIGHT: 167.8 LBS | DIASTOLIC BLOOD PRESSURE: 80 MMHG

## 2021-08-03 DIAGNOSIS — Z02.1 PRE-EMPLOYMENT HEALTH SCREENING EXAMINATION: Primary | ICD-10-CM

## 2021-08-03 DIAGNOSIS — Z23 NEED FOR DIPHTHERIA-TETANUS-PERTUSSIS (TDAP) VACCINE: ICD-10-CM

## 2021-08-03 DIAGNOSIS — Z11.1 SCREENING FOR TUBERCULOSIS: ICD-10-CM

## 2021-08-03 DIAGNOSIS — Z02.1 PHYSICAL EXAM, PRE-EMPLOYMENT: ICD-10-CM

## 2021-08-03 DIAGNOSIS — Z00.00 ANNUAL PHYSICAL EXAM: ICD-10-CM

## 2021-08-03 PROCEDURE — 3074F SYST BP LT 130 MM HG: CPT | Performed by: INTERNAL MEDICINE

## 2021-08-03 PROCEDURE — 3008F BODY MASS INDEX DOCD: CPT | Performed by: INTERNAL MEDICINE

## 2021-08-03 PROCEDURE — 99396 PREV VISIT EST AGE 40-64: CPT | Performed by: INTERNAL MEDICINE

## 2021-08-03 PROCEDURE — 90715 TDAP VACCINE 7 YRS/> IM: CPT

## 2021-08-03 PROCEDURE — 3079F DIAST BP 80-89 MM HG: CPT | Performed by: INTERNAL MEDICINE

## 2021-08-03 PROCEDURE — 86580 TB INTRADERMAL TEST: CPT

## 2021-08-03 PROCEDURE — 90471 IMMUNIZATION ADMIN: CPT

## 2021-08-03 PROCEDURE — 1036F TOBACCO NON-USER: CPT | Performed by: INTERNAL MEDICINE

## 2021-08-03 RX ORDER — CHOLECALCIFEROL (VITAMIN D3) 125 MCG
1 CAPSULE ORAL
COMMUNITY

## 2021-08-03 NOTE — PROGRESS NOTES
Assessment/Plan:    Physical exam, pre-employment  No health issues uncovered  Blood pressure remains nicely controlled  She has been losing some weight through dieting and increased activity  She is euthyroid  Diagnoses and all orders for this visit:    Pre-employment health screening examination  -     Varicella Zoster Virus Ab (Immunity Scr),ACIF (S); Future  -     Measles/Mumps/Rubella Immunity; Future    Need for diphtheria-tetanus-pertussis (Tdap) vaccine  -     TDAP VACCINE GREATER THAN OR EQUAL TO 6YO IM    Screening for tuberculosis  -     TB Skin Test    Physical exam, pre-employment    Other orders  -     Melatonin 5 MG TABS; Take 1 tablet by mouth daily at bedtime          Subjective:      Patient ID: Deidra Cantu is a 63 y o  female  The patient presents for pre employment physical   She has no current complaints  She has a history of well-controlled hypertension, hypothyroidism, mild obesity, ADHD which has been stable with medication, GERD, ERICA, osteopenia, hypercholesterolemia, status post parathyroidectomy parathyroid adenoma in 2018   She is feeling well in general and has no complaints      Family History   Problem Relation Age of Onset    Prostate cancer Father     Anemia Father     Neuropathy Father     Prostate cancer Brother     Paget's disease of bone Brother     Heart Valve Disease Mother         s/p MVR    Glaucoma Mother     Rickets Mother     Stroke Mother     Hypertension Family         BENIGN ESSENTIAL    Heart disease Family         CARDIAC DISORDER    No Known Problems Maternal Grandmother     No Known Problems Maternal Grandfather     No Known Problems Paternal Grandmother     No Known Problems Paternal Grandfather     No Known Problems Paternal Aunt      Social History     Socioeconomic History    Marital status: Single     Spouse name: Not on file    Number of children: Not on file    Years of education: Not on file    Highest education level: Not on file   Occupational History    Occupation: UNEMPLOYED   Tobacco Use    Smoking status: Never Smoker    Smokeless tobacco: Never Used   Vaping Use    Vaping Use: Never used   Substance and Sexual Activity    Alcohol use: Yes     Comment: very infrequent     Drug use: No    Sexual activity: Not Currently   Other Topics Concern    Not on file   Social History Narrative    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)    [place] HX[place] JUL[place]     Social Determinants of Health     Financial Resource Strain:     Difficulty of Paying Living Expenses:    Food Insecurity:     Worried About Running Out of Food in the Last Year:     [address] in the Last Year:    Transportation Needs:     Lack of Transportation (Medical):      Lack of Transportation (Non-Medical):    Physical Activity:     Days of Exercise per Week:     Minutes of Exercise per Session:    Stress:     Feeling of Stress :    Social Connections:     Frequency of Communication with Friends and Family:     Frequency of Social Gatherings with Friends and Family:     Attends Cheondo[employer] Services:     Active Member of Clubs or Organizations:     Attends Club or Organization Meetings:     Marital Status:    Intimate Partner Violence:     Fear of Current or Ex-Partner:     Emotionally Abused:     Physically Abused:     Sexually Abused:      Past Medical History:   Diagnosis Date    Abrasion of axilla     LAST ASSESSED: 11/21/14    Acute cystitis with hematuria 8/15/2019    ADHD     Allergic     Allergic rhinitis     LAST ASSESSED: 5/15/15    Asthma     TRANSITIONED FROM: ASTHMA; RESOLVED: 11/9/16    Bee sting reaction 8/1/2019    Bilateral nephrolithiasis 7/25/2018    Depression     Fracture of plateau of left tibia     RESOLVED: 4/14/15    Headache     Hematuria     Hyperparathyroidism ([address] )     Hypothyroidism     Left ankle sprain     LAST ASSESSED: 10/21/14    No known health problems     DENIED MENTAL STATUS CHANGE AS PER ALLSCRIPTS; CONFLICTED WITH ADHD AND DEPRESSION IN MED HX SO IT COULD NOT BE ADDED IN PERT NEGS    Obstructive sleep apnea on CPAP     Scoliosis     Thyroid nodule     Urinary frequency     RESOLVED: 10/27/16    Vaginal bleeding, abnormal     LAST ASSESSED: 6/6/16    Viral URI with cough 12/30/2019       Current Outpatient Medications:     Acetaminophen (TYLENOL PO), Take by mouth as needed, Disp: , Rfl:     albuterol (PROAIR HFA) 90 mcg/act inhaler, Inhale 2 puffs every 6 (six) hours as needed for wheezing, Disp: 8 5 g, Rfl: 0    amLODIPine (NORVASC) 5 mg tablet, Take 1 tablet (5 mg total) by mouth daily, Disp: 90 tablet, Rfl: 1    amphetamine-dextroamphetamine (ADDERALL) 5 MG tablet, take 1 and 1/2 tablets by mouth every morning and 1 tablet every evening, Disp: 75 tablet, Rfl: 0    cetirizine (ZyrTEC) 10 mg tablet, Take 10 mg by mouth 2 (two) times a day , Disp: , Rfl:     Cholecalciferol (VITAMIN D3) 1000 units CAPS, Take 1 capsule by mouth Daily  , Disp: , Rfl:     EPINEPHrine (EPIPEN) 0 3 mg/0 3 mL SOAJ, Inject 0 3 mL (0 3 mg total) into a muscle once for 1 dose, Disp: 1 each, Rfl: 2    fluticasone-salmeterol (Advair Diskus) 100-50 mcg/dose inhaler, Inhale 1 puff daily, Disp: 60 blister, Rfl: 1    levothyroxine 50 mcg tablet, take 1 tablet by mouth once daily, Disp: 90 tablet, Rfl: 1    Melatonin 5 MG TABS, Take 1 tablet by mouth daily at bedtime, Disp: , Rfl:     multivitamin (THERAGRAN) TABS, Take 1 tablet by mouth daily  , Disp: , Rfl:     Triamcinolone Acetonide (NASACORT AQ) 55 MCG/ACT AERO, 1 Act into each nostril Daily 2 puffs in each nostril, Disp: , Rfl:     valsartan (DIOVAN) 160 mg tablet, Take 1 tablet (160 mg total) by mouth daily, Disp: 90 tablet, Rfl: 1    venlafaxine (EFFEXOR-XR) 150 mg 24 hr capsule, Take 1 capsule (150 mg total) by mouth daily, Disp: 90 capsule, Rfl: 1  Allergies   Allergen Reactions    Compazine [Prochlorperazine] Anaphylaxis     Category: Allergy; Annotation - [id number]: ALL FORMS    Erythromycin GI Intolerance     Category: Allergy; Annotation - [id number]: ALL FORMS    Sulfa Antibiotics GI Intolerance     Patient states that it is like when you are drunk, dizziness and confusion    Sulfamethoxazole-Trimethoprim Nausea Only and Dizziness     Category: Allergy; Annotation - [id number]: ALL FORMS     Past Surgical History:   Procedure Laterality Date    FOOT SURGERY Right     "Nerve removal"    PARATHYROID GLAND SURGERY  5/17/2018    GA COLONOSCOPY FLX DX W/COLLJ SPEC WHEN PFRMD N/A 6/21/2018    Procedure: COLONOSCOPY;  Surgeon: Lilia Alvarez MD;  Location: [facility]; Service: Gastroenterology    GA EXPLORE PARATHYROID GLANDS Right 5/17/2018    Procedure: MINIMALLY INVASIVE PARATHYROIDECTOMY;   PTH MONITORING;  Surgeon: Iris Nye MD;  Location: [facility];  Service: Surgical Oncology    TONSILLECTOMY      TONSILLECTOMY AND ADENOIDECTOMY           Review of Systems   Constitutional: Negative  HENT: Negative  Eyes: Negative  Respiratory: Negative  Cardiovascular: Negative  Gastrointestinal: Negative  Endocrine: Negative  Genitourinary: Negative  Musculoskeletal: Negative  Skin: Negative  Neurological: Negative  Hematological: Negative  Psychiatric/Behavioral: Negative  Objective:      /80   Pulse 85   Temp 97 6 °F (36 4 °C) (Tympanic)   Ht 5' 1 34" (1 558 m)   Wt 76 1 kg (167 lb 12 8 oz)   LMP  (LMP Unknown)   SpO2 98%   BMI 31 36 kg/m²  BMI Counseling: Body mass index is 31 36 kg/m²  The BMI is above normal  Nutrition recommendations include reducing portion sizes, decreasing overall calorie intake, 3-5 servings of fruits/vegetables daily, consuming healthier snacks, decreasing soda and/or juice intake, moderation in carbohydrate intake, increasing intake of lean protein and reducing intake of cholesterol   Exercise recommendations include exercising 3-5 times per week  Physical Exam  Vitals reviewed  Constitutional:       General: She is not in acute distress  Appearance: She is obese  She is not ill-appearing, toxic-appearing or diaphoretic  HENT:      Head: Atraumatic  Right Ear: Tympanic membrane and external ear normal       Left Ear: Tympanic membrane and external ear normal       Mouth/Throat:      Mouth: Mucous membranes are moist       Pharynx: Oropharynx is clear  Eyes:      General: No scleral icterus  Conjunctiva/sclera: Conjunctivae normal       Pupils: Pupils are equal, round, and reactive to light  Cardiovascular:      Rate and Rhythm: Normal rate and regular rhythm  Pulses: Normal pulses  Heart sounds: Normal heart sounds  No murmur heard  Pulmonary:      Effort: Pulmonary effort is normal  No respiratory distress  Breath sounds: Normal breath sounds  Abdominal:      General: There is no distension  Tenderness: There is no abdominal tenderness  Musculoskeletal:         General: No swelling or tenderness  Cervical back: Neck supple  No tenderness  Right lower leg: No edema  Left lower leg: No edema  Skin:     General: Skin is warm  Coloration: Skin is not jaundiced  Findings: No bruising, erythema or rash  Neurological:      General: No focal deficit present  Mental Status: She is alert and oriented to person, place, and time  Mental status is at baseline     Psychiatric:         Mood and Affect: Mood normal          Behavior: Behavior normal

## 2021-08-04 ENCOUNTER — OFFICE VISIT (OUTPATIENT)
Dept: PHYSICAL THERAPY | Facility: REHABILITATION | Age: 61
End: 2021-08-04
Payer: COMMERCIAL

## 2021-08-04 DIAGNOSIS — S39.012D SACROILIAC STRAIN, SUBSEQUENT ENCOUNTER: Primary | ICD-10-CM

## 2021-08-04 DIAGNOSIS — S39.012A STRAIN OF LUMBAR REGION, INITIAL ENCOUNTER: ICD-10-CM

## 2021-08-04 PROBLEM — E21.3 HYPERPARATHYROIDISM (HCC): Status: RESOLVED | Noted: 2017-09-14 | Resolved: 2021-08-04

## 2021-08-04 PROCEDURE — 97110 THERAPEUTIC EXERCISES: CPT | Performed by: PHYSICAL THERAPIST

## 2021-08-04 PROCEDURE — 97112 NEUROMUSCULAR REEDUCATION: CPT | Performed by: PHYSICAL THERAPIST

## 2021-08-04 NOTE — PROGRESS NOTES
Daily Note     Today's date: 2021  Patient name: Stevo Barksdale  : 1960  MRN: [id number]  Referring provider: Belkis Tate MD  Dx:   Encounter Diagnosis     ICD-10-CM    1  Sacroiliac strain, subsequent encounter  S39 012D    2  Strain of lumbar region, initial encounter  S39 012A                   Subjective: Patient presents with baseline level of back pain today, 1/10  She reports increased back pain after gardening and lifting boxes this weekend that resolved over night  Objective: See treatment diary below      Assessment: Patient demonstrates good tolerance to progress of lumbopelvic stability exercises  Seated exercises and active rotation tolerated without increase in back pain  Patient educated in updated HEP to include TB resisted unilateral row, palloff press, and banded side steps  Continue to progress seated lumobpelvic stability exercises and loading into trunk movements as tolerated  Plan: Continue per plan of care        Precautions: hx of scoliosis      Manuals                                          Neuro Re-Ed         Supine PPT 20x HEP        Clamshells         Bridge w/ TrA  3x10 20x      TrA w/ march  30x ea alt       Standing hip abduction  3x10 ea       Seated TrA march on PBall    3x10 ea; seated yellow PBall     Palloff press   2x10 ea 5s; double ytb 2x10 ea 5s; double otb     Unilateral TB row   20 ea; double ytb 2x20 ea; double otb     Banded side steps    3 laps; otb; cueing for mini squat and TrA 3 laps; otb; cueing for mini squat and TrA     Education 10 min; postural education 10 min: HEP updates       Ther Ex         Bike/Nustep  8 min NS; lvl 5 8 min bike 8 min bike     LTR 20x R only 20x R 20x R 20x R     Periscapular strengthening         LE strengthening         Seated trunk rotation    10x ea; 6#                                Ther Activity                           Gait Training                           Modalities

## 2021-08-04 NOTE — ASSESSMENT & PLAN NOTE
No health issues uncovered  Blood pressure remains nicely controlled  She has been losing some weight through dieting and increased activity  She is euthyroid

## 2021-08-05 ENCOUNTER — CLINICAL SUPPORT (OUTPATIENT)
Dept: INTERNAL MEDICINE CLINIC | Facility: CLINIC | Age: 61
End: 2021-08-05

## 2021-08-05 DIAGNOSIS — Z11.1 SCREENING FOR TUBERCULOSIS: Primary | ICD-10-CM

## 2021-08-05 LAB
INDURATION: 0 MM
TB SKIN TEST: NEGATIVE

## 2021-08-06 ENCOUNTER — OFFICE VISIT (OUTPATIENT)
Dept: PHYSICAL THERAPY | Facility: REHABILITATION | Age: 61
End: 2021-08-06
Payer: COMMERCIAL

## 2021-08-06 DIAGNOSIS — S39.012D SACROILIAC STRAIN, SUBSEQUENT ENCOUNTER: Primary | ICD-10-CM

## 2021-08-06 DIAGNOSIS — S39.012A STRAIN OF LUMBAR REGION, INITIAL ENCOUNTER: ICD-10-CM

## 2021-08-06 PROCEDURE — 97112 NEUROMUSCULAR REEDUCATION: CPT | Performed by: PHYSICAL THERAPIST

## 2021-08-06 PROCEDURE — 97110 THERAPEUTIC EXERCISES: CPT | Performed by: PHYSICAL THERAPIST

## 2021-08-06 NOTE — PROGRESS NOTES
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 and is feeling additional stress today  Objective: See treatment diary below      Assessment: Patient demonstrating good response to therapeutic interventions  Patient appropriately challenged with posture and lumbopelvic stability exercises  Continue to progress load as tolerated  Plan: Continue per plan of care        Precautions: hx of scoliosis      Manuals                                         Neuro Re-Ed         Supine PPT 20x HEP        Clamshells         Bridge w/ TrA  3x10 20x      TrA w/ march  30x ea alt       Standing hip abduction  3x10 ea       Seated TrA march on PBall    3x10 ea; seated yellow PBall 3x10 ea; seated green PBall    Seated TrA unilateral scaption on PBall     3x10 ea; 2# green PBall    Palloff press   2x10 ea 5s; double ytb 2x10 ea 5s; double otb 2x10 ea 5s; double otb    Unilateral TB row   20 ea; double ytb 2x20 ea; double otb 2x20 ea; double otb    Banded side steps    3 laps; otb; cueing for mini squat and TrA 3 laps; otb; cueing for mini squat and TrA     Education 10 min; postural education 10 min: HEP updates       Ther Ex         Bike/Nustep  8 min NS; lvl 5 8 min bike 8 min bike 10 min    LTR 20x R only 20x R 20x R 20x R np    Periscapular strengthening         LE strengthening         Seated trunk rotation    10x ea; 6# 10x ea; 6#    Suitcase carry     3x 2 laps @ mirror; 15#                      Ther Activity                           Gait Training                           Modalities

## 2021-08-10 LAB
ALBUMIN SERPL-MCNC: 4.5 G/DL (ref 3.6–5.1)
ALBUMIN/GLOB SERPL: 1.7 (CALC) (ref 1–2.5)
ALP SERPL-CCNC: 70 U/L (ref 37–153)
ALT SERPL-CCNC: 33 U/L (ref 6–29)
AST SERPL-CCNC: 24 U/L (ref 10–35)
BASOPHILS # BLD AUTO: 70 CELLS/UL (ref 0–200)
BASOPHILS NFR BLD AUTO: 1.2 %
BILIRUB SERPL-MCNC: 0.5 MG/DL (ref 0.2–1.2)
BUN SERPL-MCNC: 19 MG/DL (ref 7–25)
BUN/CREAT SERPL: ABNORMAL (CALC) (ref 6–22)
CALCIUM SERPL-MCNC: 9.8 MG/DL (ref 8.6–10.4)
CHLORIDE SERPL-SCNC: 104 MMOL/L (ref 98–110)
CHOLEST SERPL-MCNC: 234 MG/DL
CHOLEST/HDLC SERPL: 3.7 (CALC)
CO2 SERPL-SCNC: 28 MMOL/L (ref 20–32)
CREAT SERPL-MCNC: 0.78 MG/DL (ref 0.5–1.05)
EOSINOPHIL # BLD AUTO: 261 CELLS/UL (ref 15–500)
EOSINOPHIL NFR BLD AUTO: 4.5 %
ERYTHROCYTE [DISTWIDTH] IN BLOOD BY AUTOMATED COUNT: 13 % (ref 11–15)
GLOBULIN SER CALC-MCNC: 2.7 G/DL (CALC) (ref 1.9–3.7)
GLUCOSE SERPL-MCNC: 105 MG/DL (ref 65–99)
HCT VFR BLD AUTO: 42.1 % (ref 35–45)
HDLC SERPL-MCNC: 64 MG/DL
HGB BLD-MCNC: 14.2 G/DL (ref 11.7–15.5)
HIV 1+2 AB+HIV1 P24 AG SERPL QL IA: NORMAL
LDLC SERPL CALC-MCNC: 144 MG/DL (CALC)
LYMPHOCYTES # BLD AUTO: 2245 CELLS/UL (ref 850–3900)
LYMPHOCYTES NFR BLD AUTO: 38.7 %
MCH RBC QN AUTO: 30.7 PG (ref 27–33)
MCHC RBC AUTO-ENTMCNC: 33.7 G/DL (ref 32–36)
MCV RBC AUTO: 91.1 FL (ref 80–100)
MEV IGG SER IA-ACNC: 61.7 AU/ML
MONOCYTES # BLD AUTO: 412 CELLS/UL (ref 200–950)
MONOCYTES NFR BLD AUTO: 7.1 %
MUV IGG SER IA-ACNC: 99.4 AU/ML
NEUTROPHILS # BLD AUTO: 2813 CELLS/UL (ref 1500–7800)
NEUTROPHILS NFR BLD AUTO: 48.5 %
NONHDLC SERPL-MCNC: 170 MG/DL (CALC)
PLATELET # BLD AUTO: 288 THOUSAND/UL (ref 140–400)
PMV BLD REES-ECKER: 12.1 FL (ref 7.5–12.5)
POTASSIUM SERPL-SCNC: 4.3 MMOL/L (ref 3.5–5.3)
PROT SERPL-MCNC: 7.2 G/DL (ref 6.1–8.1)
RBC # BLD AUTO: 4.62 MILLION/UL (ref 3.8–5.1)
RUBV IGG SERPL IA-ACNC: 1.2 INDEX
SL AMB EGFR AFRICAN AMERICAN: 96 ML/MIN/1.73M2
SL AMB EGFR NON AFRICAN AMERICAN: 83 ML/MIN/1.73M2
SODIUM SERPL-SCNC: 140 MMOL/L (ref 135–146)
TRIGL SERPL-MCNC: 133 MG/DL
VZV IGG SER IA-ACNC: 386.5 INDEX
WBC # BLD AUTO: 5.8 THOUSAND/UL (ref 3.8–10.8)

## 2021-08-11 ENCOUNTER — OFFICE VISIT (OUTPATIENT)
Dept: PHYSICAL THERAPY | Facility: REHABILITATION | Age: 61
End: 2021-08-11
Payer: COMMERCIAL

## 2021-08-11 DIAGNOSIS — S39.012D SACROILIAC STRAIN, SUBSEQUENT ENCOUNTER: Primary | ICD-10-CM

## 2021-08-11 DIAGNOSIS — S39.012A STRAIN OF LUMBAR REGION, INITIAL ENCOUNTER: ICD-10-CM

## 2021-08-11 PROCEDURE — 97112 NEUROMUSCULAR REEDUCATION: CPT | Performed by: PHYSICAL THERAPIST

## 2021-08-11 PROCEDURE — 97530 THERAPEUTIC ACTIVITIES: CPT | Performed by: PHYSICAL THERAPIST

## 2021-08-11 PROCEDURE — 97110 THERAPEUTIC EXERCISES: CPT | Performed by: PHYSICAL THERAPIST

## 2021-08-11 NOTE — PROGRESS NOTES
Daily Note     Today's date: 2021  Patient name: Alexander Cantu  : 1960  MRN: [id number]  Referring provider: Patricia Atkinson MD  Dx:   Encounter Diagnosis     ICD-10-CM    1  Sacroiliac strain, subsequent encounter  S39 012D    2  Strain of lumbar region, initial encounter  S39 012A                   Subjective: Patient reports that she continues to struggle with morning pain and stiffness  She has been taking Motrin first thing in the morning to help alleviate symptoms  She reports that the pain does not return when the medication wears off  She had a lot of back pain while standing still for a few hours this weekend while working at [employer]        Objective: See treatment diary below      Assessment: Patient demonstrating good response to therapeutic interventions  Patient appropriately challenged with posture and lumbopelvic stability exercises  LTR discussed as a way to address morning stiffness symptoms  Continue to progress load as tolerated  Plan: Continue per plan of care        Precautions: hx of scoliosis      Manuals                                         Neuro Re-Ed         Supine PPT 20x HEP        Clamshells         Bridge w/ TrA  3x10 20x      TrA w/ march  30x ea alt       Standing hip abduction  3x10 ea       Seated TrA march on PBall    3x10 ea; seated yellow PBall 3x10 ea; seated green PBall 30x ea; seated green PBall   Seated TrA unilateral scaption on PBall     3x10 ea; 2# green PBall    Palloff press   2x10 ea 5s; double ytb 2x10 ea 5s; double otb 2x10 ea 5s; double otb HEP   Unilateral TB row   20 ea; double ytb 2x20 ea; double otb 2x20 ea; double otb HEP   Banded side steps    3 laps; otb; cueing for mini squat and TrA 3 laps; otb; cueing for mini squat and TrA     Education 10 min; postural education 10 min: HEP updates       Ther Ex         Bike/Nustep  8 min NS; lvl 5 8 min bike 8 min bike 10 min 10 min   LTR 20x R only 20x R 20x R 20x R np Periscapular strengthening         LE strengthening         Seated trunk rotation    10x ea; 6# 10x ea; 6#    Suitcase carry     3x 2 laps @ mirror; 15# 3x2 laps @ mirror; 20#   Chops/Lifts @ kiera      3x10 ea; 5#                     Ther Activity         Squats w/ TrA      3x10; w/ cueing            Gait Training                           Modalities

## 2021-08-16 DIAGNOSIS — Z11.59 NEED FOR HEPATITIS B SCREENING TEST: Primary | ICD-10-CM

## 2021-08-16 NOTE — PROGRESS NOTES
Patient does not have a record of receiving Hepatitis B vaccines  Needs blood test to confirm immunity for her work    Order mailed to patient at her request

## 2021-08-19 ENCOUNTER — OFFICE VISIT (OUTPATIENT)
Dept: PHYSICAL THERAPY | Facility: REHABILITATION | Age: 61
End: 2021-08-19
Payer: COMMERCIAL

## 2021-08-19 DIAGNOSIS — S39.012D SACROILIAC STRAIN, SUBSEQUENT ENCOUNTER: Primary | ICD-10-CM

## 2021-08-19 DIAGNOSIS — S39.012A STRAIN OF LUMBAR REGION, INITIAL ENCOUNTER: ICD-10-CM

## 2021-08-19 PROCEDURE — 97110 THERAPEUTIC EXERCISES: CPT | Performed by: PHYSICAL THERAPIST

## 2021-08-19 PROCEDURE — 97112 NEUROMUSCULAR REEDUCATION: CPT | Performed by: PHYSICAL THERAPIST

## 2021-08-19 NOTE — PROGRESS NOTES
Daily Note     Today's date: 2021  Patient name: Samantha Casanova  : 1960  MRN: [id number]  Referring provider: Cris Infante MD  Dx:   Encounter Diagnosis     ICD-10-CM    1  Sacroiliac strain, subsequent encounter  S39 012D    2  Strain of lumbar region, initial encounter  S39 012A                   Subjective: Patient is frustrated that her back is still bothering her  She has been very stressed lately due to starting a new job, a recent death in the family, and increased physical demands at work  She does report improved symptoms upon waking up in the morning  Objective: See treatment diary below      Assessment: Significant time spent discussing pain pathways and how stress is related to pain  Patient responds well to tx to improve confidence with HEP and her ability to manage symptoms independently  Patient responds well to tx, reporting improvement in pain levels at the end of tx  Assess patient's ability to self manage symptoms  Progress trunk strengthening if continuing PT  Plan: Continue per plan of care        Precautions: hx of scoliosis      Manuals                                    Neuro Re-Ed        Supine PPT        Clamshells        Bridge w/ TrA 20x       TrA w/ march        Standing hip abduction        Seated TrA march on PBall  3x10 ea; seated yellow PBall 3x10 ea; seated green PBall 30x ea; seated green PBall    Seated TrA unilateral scaption on PBall   3x10 ea; 2# green PBall     Palloff press 2x10 ea 5s; double ytb 2x10 ea 5s; double otb 2x10 ea 5s; double otb HEP 2x10 ea 5s; double otb   Unilateral TB row 20 ea; double ytb 2x20 ea; double otb 2x20 ea; double otb HEP 2x20 ea; double otb   Banded side steps  3 laps; otb; cueing for mini squat and TrA 3 laps; otb; cueing for mini squat and TrA      Education     15 min; HEP and pain education   Ther Ex        Bike/Nustep 8 min bike 8 min bike 10 min 10 min 10 min; TM walk   LTR 20x R 20x R np 3 way PBall rollout     10x ea   LE strengthening        Seated trunk rotation  10x ea; 6# 10x ea; 6#     Suitcase carry   3x 2 laps @ mirror; 15# 3x2 laps @ mirror; 20#    Chops/Lifts @ kiera    3x10 ea; 5# 3x10 ea; 6#                   Ther Activity        Squats w/ TrA    3x10; w/ cueing            Gait Training                        Modalities

## 2021-08-30 DIAGNOSIS — I10 HYPERTENSION, ESSENTIAL, BENIGN: ICD-10-CM

## 2021-08-30 RX ORDER — AMLODIPINE BESYLATE 5 MG/1
5 TABLET ORAL DAILY
Qty: 90 TABLET | Refills: 1 | Status: SHIPPED | OUTPATIENT
Start: 2021-08-30 | End: 2022-03-17 | Stop reason: SDUPTHER

## 2021-09-01 ENCOUNTER — OFFICE VISIT (OUTPATIENT)
Dept: PHYSICAL THERAPY | Facility: REHABILITATION | Age: 61
End: 2021-09-01
Payer: COMMERCIAL

## 2021-09-01 DIAGNOSIS — S39.012A STRAIN OF LUMBAR REGION, INITIAL ENCOUNTER: Primary | ICD-10-CM

## 2021-09-01 DIAGNOSIS — S39.012D SACROILIAC STRAIN, SUBSEQUENT ENCOUNTER: ICD-10-CM

## 2021-09-01 PROCEDURE — 97112 NEUROMUSCULAR REEDUCATION: CPT | Performed by: PHYSICAL THERAPIST

## 2021-09-01 NOTE — PROGRESS NOTES
Daily Note     Today's date: 2021  Patient name: Henrique Reyes  : 1960  MRN: [id number]  Referring provider: Grant Renteria MD  Dx:   Encounter Diagnosis     ICD-10-CM    1  Strain of lumbar region, initial encounter  S39 012A    2  Sacroiliac strain, subsequent encounter  S39 012D                   Subjective: Patient reports that she has been extremely stressed with work that is bothering her back  She feels that she has made some minor progress in the past 2 weeks  Pain: 4/10    Objective: See treatment diary below      Palpation Assessment: hypertonic B/L lumbar paraspinal     Range of Motion:   Lumbar Spine Flexion: WNL  Lumbar Spine Extension: WNL; mild L p! Lumbar Spine Sidebending: R WNL L WNL    FAY:    Repeated Movements:     Prone on elbows: "general stiffness"; no worse      Assessment: Patient responds well to initiation of extension exercises  She reports improvement in symptoms at end of tx  Patient will follow up in 2 weeks to assess her response to new HEP  Plan: Continue per plan of care        Precautions: hx of scoliosis      Manuals                                        Neuro Re-Ed         Supine PPT         Clamshells         Bridge w/ TrA 20x        TrA w/ march         Standing hip abduction         Seated TrA march on PBall  3x10 ea; seated yellow PBall 3x10 ea; seated green PBall 30x ea; seated green PBall     Seated TrA unilateral scaption on PBall   3x10 ea; 2# green PBall      Palloff press 2x10 ea 5s; double ytb 2x10 ea 5s; double otb 2x10 ea 5s; double otb HEP 2x10 ea 5s; double otb 2x10 ea 5s; double btb   Unilateral TB row 20 ea; double ytb 2x20 ea; double otb 2x20 ea; double otb HEP 2x20 ea; double otb 2x20 ea; double btb   Banded side steps  3 laps; otb; cueing for mini squat and TrA 3 laps; otb; cueing for mini squat and TrA       Education     15 min; HEP and pain education 15 min; HEP progression   Ther Ex Bike/Nustep 8 min bike 8 min bike 10 min 10 min 10 min; TM walk 10 min; TM walk   LTR 20x R 20x R np      3 way PBall rollout     10x ea    LE strengthening         Seated trunk rotation  10x ea; 6# 10x ea; 6#      Suitcase carry   3x 2 laps @ mirror; 15# 3x2 laps @ mirror; 20#     Chops/Lifts @ kiera    3x10 ea; 5# 3x10 ea; 6#    Prone on elbow      2x2 min   Prone press ups      2x10            Ther Activity         Squats w/ TrA    3x10; w/ cueing              Gait Training                           Modalities

## 2021-09-15 ENCOUNTER — OFFICE VISIT (OUTPATIENT)
Dept: PHYSICAL THERAPY | Facility: REHABILITATION | Age: 61
End: 2021-09-15
Payer: COMMERCIAL

## 2021-09-15 DIAGNOSIS — S39.012D SACROILIAC STRAIN, SUBSEQUENT ENCOUNTER: ICD-10-CM

## 2021-09-15 DIAGNOSIS — S39.012A STRAIN OF LUMBAR REGION, INITIAL ENCOUNTER: Primary | ICD-10-CM

## 2021-09-15 PROCEDURE — 97110 THERAPEUTIC EXERCISES: CPT | Performed by: PHYSICAL THERAPIST

## 2021-09-15 NOTE — PROGRESS NOTES
Daily Note     Today's date: 9/15/2021  Patient name: Aaliyah Regan  : 1960  MRN: [id number]  Referring provider: Yuridia Chapa MD  Dx:   Encounter Diagnosis     ICD-10-CM    1  Strain of lumbar region, initial encounter  S39 012A    2  Sacroiliac strain, subsequent encounter  S39 012D                   Subjective: Patient has been compliant with HEP  She states that she continues to have good days and bad days  Objective: See treatment diary below      Assessment: Home exercise program discussed with patient regarding a return to the gym for back/trunk/LE strengthening  Patient's concerns regarding continued back pain and preventative measures were discussed and addressed  Patient will return in 3 weeks to assess her self management and symptoms  Plan: Possible D/C to HEP next visit       Precautions: hx of scoliosis      Manuals 8/11 8/19 9/1 9/15                               Neuro Re-Ed       Supine PPT       Clamshells       Bridge w/ TrA       TrA w/ march       Standing hip abduction       Seated TrA march on PBall 30x ea; seated green PBall      Seated TrA unilateral scaption on PBall       Palloff press HEP 2x10 ea 5s; double otb 2x10 ea 5s; double btb    Unilateral TB row HEP 2x20 ea; double otb 2x20 ea; double btb    Banded side steps        Education  15 min; HEP and pain education 15 min; HEP progression 30 min; HEP discussion; lat pulldown/row/leg press @ gym   Ther Ex       Bike/Nustep 10 min 10 min; TM walk 10 min; TM walk 10 min; TM walk   LTR       3 way PBall rollout  10x ea     LE strengthening       Seated trunk rotation       Suitcase carry 3x2 laps @ mirror; 20#      Chops/Lifts @ kiera 3x10 ea; 5# 3x10 ea; 6#     Prone on elbow   2x2 min 2 min   Prone press ups   2x10 10x w/ sag          Ther Activity       Squats w/ TrA 3x10; w/ cueing             Gait Training                     Modalities

## 2021-09-20 DIAGNOSIS — F90.0 ADHD, PREDOMINANTLY INATTENTIVE TYPE: ICD-10-CM

## 2021-09-21 RX ORDER — DEXTROAMPHETAMINE SACCHARATE, AMPHETAMINE ASPARTATE, DEXTROAMPHETAMINE SULFATE AND AMPHETAMINE SULFATE 1.25; 1.25; 1.25; 1.25 MG/1; MG/1; MG/1; MG/1
TABLET ORAL
Qty: 75 TABLET | Refills: 0 | Status: SHIPPED | OUTPATIENT
Start: 2021-09-21 | End: 2021-11-08 | Stop reason: SDUPTHER

## 2021-10-06 ENCOUNTER — OFFICE VISIT (OUTPATIENT)
Dept: PHYSICAL THERAPY | Facility: REHABILITATION | Age: 61
End: 2021-10-06
Payer: COMMERCIAL

## 2021-10-06 DIAGNOSIS — S39.012D SACROILIAC STRAIN, SUBSEQUENT ENCOUNTER: ICD-10-CM

## 2021-10-06 DIAGNOSIS — S39.012A STRAIN OF LUMBAR REGION, INITIAL ENCOUNTER: Primary | ICD-10-CM

## 2021-10-06 PROCEDURE — 97140 MANUAL THERAPY 1/> REGIONS: CPT | Performed by: PHYSICAL THERAPIST

## 2021-10-06 PROCEDURE — 97112 NEUROMUSCULAR REEDUCATION: CPT | Performed by: PHYSICAL THERAPIST

## 2021-10-08 ENCOUNTER — OFFICE VISIT (OUTPATIENT)
Dept: INTERNAL MEDICINE CLINIC | Facility: CLINIC | Age: 61
End: 2021-10-08
Payer: COMMERCIAL

## 2021-10-08 VITALS
SYSTOLIC BLOOD PRESSURE: 118 MMHG | WEIGHT: 170 LBS | HEART RATE: 82 BPM | OXYGEN SATURATION: 98 % | TEMPERATURE: 98.4 F | DIASTOLIC BLOOD PRESSURE: 76 MMHG | BODY MASS INDEX: 32.1 KG/M2 | HEIGHT: 61 IN

## 2021-10-08 DIAGNOSIS — S93.602A SPRAIN OF LEFT FOOT, INITIAL ENCOUNTER: Primary | ICD-10-CM

## 2021-10-08 PROCEDURE — 99213 OFFICE O/P EST LOW 20 MIN: CPT | Performed by: INTERNAL MEDICINE

## 2021-10-08 PROCEDURE — 3074F SYST BP LT 130 MM HG: CPT | Performed by: INTERNAL MEDICINE

## 2021-10-08 PROCEDURE — 1036F TOBACCO NON-USER: CPT | Performed by: INTERNAL MEDICINE

## 2021-10-08 PROCEDURE — 3078F DIAST BP <80 MM HG: CPT | Performed by: INTERNAL MEDICINE

## 2021-10-08 PROCEDURE — 3008F BODY MASS INDEX DOCD: CPT | Performed by: INTERNAL MEDICINE

## 2021-10-18 DIAGNOSIS — Z87.09 HISTORY OF ASTHMA: ICD-10-CM

## 2021-11-08 DIAGNOSIS — F90.0 ADHD, PREDOMINANTLY INATTENTIVE TYPE: ICD-10-CM

## 2021-11-08 DIAGNOSIS — F32.A DEPRESSION, UNSPECIFIED DEPRESSION TYPE: ICD-10-CM

## 2021-11-08 RX ORDER — DEXTROAMPHETAMINE SACCHARATE, AMPHETAMINE ASPARTATE, DEXTROAMPHETAMINE SULFATE AND AMPHETAMINE SULFATE 1.25; 1.25; 1.25; 1.25 MG/1; MG/1; MG/1; MG/1
TABLET ORAL
Qty: 75 TABLET | Refills: 0 | Status: SHIPPED | OUTPATIENT
Start: 2021-11-08 | End: 2022-01-05 | Stop reason: SDUPTHER

## 2021-11-08 RX ORDER — VENLAFAXINE HYDROCHLORIDE 150 MG/1
150 CAPSULE, EXTENDED RELEASE ORAL DAILY
Qty: 90 CAPSULE | Refills: 1 | Status: SHIPPED | OUTPATIENT
Start: 2021-11-08 | End: 2022-05-06 | Stop reason: SDUPTHER

## 2021-11-29 DIAGNOSIS — E03.9 ACQUIRED HYPOTHYROIDISM: ICD-10-CM

## 2021-11-29 RX ORDER — LEVOTHYROXINE SODIUM 0.05 MG/1
50 TABLET ORAL DAILY
Qty: 90 TABLET | Refills: 1 | Status: SHIPPED | OUTPATIENT
Start: 2021-11-29 | End: 2022-06-10 | Stop reason: SDUPTHER

## 2021-12-29 ENCOUNTER — TELEMEDICINE (OUTPATIENT)
Dept: INTERNAL MEDICINE CLINIC | Facility: OTHER | Age: 61
End: 2021-12-29
Payer: COMMERCIAL

## 2021-12-29 VITALS — BODY MASS INDEX: 32.1 KG/M2 | HEIGHT: 61 IN | WEIGHT: 170 LBS

## 2021-12-29 DIAGNOSIS — Z20.822 EXPOSURE TO COVID-19 VIRUS: Primary | ICD-10-CM

## 2021-12-29 PROCEDURE — 99213 OFFICE O/P EST LOW 20 MIN: CPT | Performed by: NURSE PRACTITIONER

## 2021-12-29 PROCEDURE — 3008F BODY MASS INDEX DOCD: CPT | Performed by: NURSE PRACTITIONER

## 2021-12-29 PROCEDURE — 1036F TOBACCO NON-USER: CPT | Performed by: NURSE PRACTITIONER

## 2021-12-30 ENCOUNTER — CLINICAL SUPPORT (OUTPATIENT)
Dept: INTERNAL MEDICINE CLINIC | Facility: OTHER | Age: 61
End: 2021-12-30

## 2021-12-30 ENCOUNTER — HOSPITAL ENCOUNTER (OUTPATIENT)
Dept: RADIOLOGY | Age: 61
Discharge: HOME | End: 2021-12-30
Payer: COMMERCIAL

## 2021-12-30 VITALS — WEIGHT: 170 LBS | HEIGHT: 61 IN | BODY MASS INDEX: 32.1 KG/M2

## 2021-12-30 DIAGNOSIS — Z20.822 EXPOSURE TO COVID-19 VIRUS: Primary | ICD-10-CM

## 2021-12-30 DIAGNOSIS — Z12.31 ENCOUNTER FOR SCREENING MAMMOGRAM FOR MALIGNANT NEOPLASM OF BREAST: ICD-10-CM

## 2021-12-30 PROCEDURE — U0005 INFEC AGEN DETEC AMPLI PROBE: HCPCS | Performed by: NURSE PRACTITIONER

## 2021-12-30 PROCEDURE — U0003 INFECTIOUS AGENT DETECTION BY NUCLEIC ACID (DNA OR RNA); SEVERE ACUTE RESPIRATORY SYNDROME CORONAVIRUS 2 (SARS-COV-2) (CORONAVIRUS DISEASE [COVID-19]), AMPLIFIED PROBE TECHNIQUE, MAKING USE OF HIGH THROUGHPUT TECHNOLOGIES AS DESCRIBED BY CMS-2020-01-R: HCPCS | Performed by: NURSE PRACTITIONER

## 2021-12-30 PROCEDURE — 77067 SCR MAMMO BI INCL CAD: CPT

## 2021-12-30 PROCEDURE — 77063 BREAST TOMOSYNTHESIS BI: CPT

## 2022-01-01 LAB — SARS-COV-2 RNA RESP QL NAA+PROBE: NEGATIVE

## 2022-01-05 DIAGNOSIS — I10 HYPERTENSION, ESSENTIAL, BENIGN: ICD-10-CM

## 2022-01-05 DIAGNOSIS — F90.0 ADHD, PREDOMINANTLY INATTENTIVE TYPE: ICD-10-CM

## 2022-01-05 RX ORDER — VALSARTAN 160 MG/1
160 TABLET ORAL DAILY
Qty: 90 TABLET | Refills: 1 | Status: SHIPPED | OUTPATIENT
Start: 2022-01-05 | End: 2022-01-07 | Stop reason: SDUPTHER

## 2022-01-05 RX ORDER — DEXTROAMPHETAMINE SACCHARATE, AMPHETAMINE ASPARTATE, DEXTROAMPHETAMINE SULFATE AND AMPHETAMINE SULFATE 1.25; 1.25; 1.25; 1.25 MG/1; MG/1; MG/1; MG/1
TABLET ORAL
Qty: 75 TABLET | Refills: 0 | Status: SHIPPED | OUTPATIENT
Start: 2022-01-05 | End: 2022-02-24 | Stop reason: SDUPTHER

## 2022-01-07 DIAGNOSIS — I10 HYPERTENSION, ESSENTIAL, BENIGN: ICD-10-CM

## 2022-01-07 NOTE — TELEPHONE ENCOUNTER
Patient is requesting a medication for refilled but she doesn't have an appointment for a follow up with Dr Madison wheeler for patient to call back to schedule the appt

## 2022-01-10 RX ORDER — VALSARTAN 160 MG/1
160 TABLET ORAL DAILY
Qty: 90 TABLET | Refills: 1 | Status: SHIPPED | OUTPATIENT
Start: 2022-01-10 | End: 2022-07-29 | Stop reason: SDUPTHER

## 2022-01-25 ENCOUNTER — HOSPITAL ENCOUNTER (OUTPATIENT)
Dept: ULTRASOUND IMAGING | Facility: CLINIC | Age: 62
Discharge: HOME | End: 2022-01-25
Payer: COMMERCIAL

## 2022-01-25 ENCOUNTER — HOSPITAL ENCOUNTER (OUTPATIENT)
Dept: MAMMOGRAPHY | Facility: CLINIC | Age: 62
Discharge: HOME | End: 2022-01-25
Payer: COMMERCIAL

## 2022-01-25 VITALS — HEIGHT: 61 IN | WEIGHT: 170 LBS | BODY MASS INDEX: 32.1 KG/M2

## 2022-01-25 DIAGNOSIS — R92.8 ABNORMAL MAMMOGRAM: ICD-10-CM

## 2022-01-25 PROCEDURE — G0279 TOMOSYNTHESIS, MAMMO: HCPCS

## 2022-01-25 PROCEDURE — 76642 ULTRASOUND BREAST LIMITED: CPT

## 2022-01-25 PROCEDURE — 77065 DX MAMMO INCL CAD UNI: CPT

## 2022-02-14 DIAGNOSIS — Z87.09 HISTORY OF ASTHMA: ICD-10-CM

## 2022-02-14 NOTE — TELEPHONE ENCOUNTER
Last ov 8/3/21  Next ov - not scheduled, LMOM to call back and schedule a 6 m f/u visit for additional refills

## 2022-02-24 DIAGNOSIS — F90.0 ADHD, PREDOMINANTLY INATTENTIVE TYPE: ICD-10-CM

## 2022-02-24 RX ORDER — DEXTROAMPHETAMINE SACCHARATE, AMPHETAMINE ASPARTATE, DEXTROAMPHETAMINE SULFATE AND AMPHETAMINE SULFATE 1.25; 1.25; 1.25; 1.25 MG/1; MG/1; MG/1; MG/1
TABLET ORAL
Qty: 75 TABLET | Refills: 0 | Status: SHIPPED | OUTPATIENT
Start: 2022-02-24 | End: 2022-03-29 | Stop reason: SDUPTHER

## 2022-02-25 ENCOUNTER — TELEPHONE (OUTPATIENT)
Dept: SLEEP CENTER | Facility: CLINIC | Age: 62
End: 2022-02-25

## 2022-02-25 NOTE — TELEPHONE ENCOUNTER
Spoke with pt on steps she will need to take to get a new cpap due to it being a recalled machine  She will need to speak with our troubleshooting dept and they must deem it broken beyond repair before we can get her a replacement

## 2022-02-25 NOTE — TELEPHONE ENCOUNTER
Received a voicemail from patient stating that it is a week that she hasn't been able to use her PAP  She isn't getting any help from anyone, no one is calling her back  I called and she stated that she was waiting for a phone call from [address] [place] [address] because her machine isn't working  She has been waiting a week now  I spoke to Wilmot and transferred the patient to her

## 2022-02-28 ENCOUNTER — TELEPHONE (OUTPATIENT)
Dept: SLEEP CENTER | Facility: CLINIC | Age: 62
End: 2022-02-28

## 2022-02-28 DIAGNOSIS — G47.33 OSA (OBSTRUCTIVE SLEEP APNEA): Primary | ICD-10-CM

## 2022-03-17 DIAGNOSIS — I10 HYPERTENSION, ESSENTIAL, BENIGN: ICD-10-CM

## 2022-03-17 RX ORDER — AMLODIPINE BESYLATE 5 MG/1
5 TABLET ORAL DAILY
Qty: 90 TABLET | Refills: 1 | Status: SHIPPED | OUTPATIENT
Start: 2022-03-17 | End: 2022-08-08 | Stop reason: SDUPTHER

## 2022-03-29 ENCOUNTER — OFFICE VISIT (OUTPATIENT)
Dept: INTERNAL MEDICINE CLINIC | Facility: CLINIC | Age: 62
End: 2022-03-29
Payer: COMMERCIAL

## 2022-03-29 VITALS
DIASTOLIC BLOOD PRESSURE: 78 MMHG | WEIGHT: 171.4 LBS | TEMPERATURE: 97.8 F | SYSTOLIC BLOOD PRESSURE: 130 MMHG | OXYGEN SATURATION: 96 % | BODY MASS INDEX: 32.36 KG/M2 | HEART RATE: 89 BPM | HEIGHT: 61 IN

## 2022-03-29 DIAGNOSIS — M77.31 CALCANEAL SPUR OF RIGHT FOOT: ICD-10-CM

## 2022-03-29 DIAGNOSIS — F90.0 ADHD, PREDOMINANTLY INATTENTIVE TYPE: ICD-10-CM

## 2022-03-29 DIAGNOSIS — G47.33 OSA (OBSTRUCTIVE SLEEP APNEA): ICD-10-CM

## 2022-03-29 DIAGNOSIS — I10 HYPERTENSION, ESSENTIAL, BENIGN: Primary | ICD-10-CM

## 2022-03-29 DIAGNOSIS — E03.9 HYPOTHYROIDISM, UNSPECIFIED TYPE: ICD-10-CM

## 2022-03-29 DIAGNOSIS — F33.0 MILD EPISODE OF RECURRENT MAJOR DEPRESSIVE DISORDER (HCC): ICD-10-CM

## 2022-03-29 PROBLEM — J32.9 RHINOSINUSITIS: Status: RESOLVED | Noted: 2018-12-28 | Resolved: 2022-03-29

## 2022-03-29 PROBLEM — Z01.419 VISIT FOR ROUTINE GYN EXAM: Status: RESOLVED | Noted: 2018-02-13 | Resolved: 2022-03-29

## 2022-03-29 PROBLEM — J31.0 RHINOSINUSITIS: Status: RESOLVED | Noted: 2018-12-28 | Resolved: 2022-03-29

## 2022-03-29 PROBLEM — L91.8 SKIN TAG: Status: RESOLVED | Noted: 2019-06-21 | Resolved: 2022-03-29

## 2022-03-29 PROBLEM — S93.602A SPRAIN OF LEFT FOOT: Status: RESOLVED | Noted: 2021-10-08 | Resolved: 2022-03-29

## 2022-03-29 PROBLEM — Z02.1 PHYSICAL EXAM, PRE-EMPLOYMENT: Status: RESOLVED | Noted: 2018-02-13 | Resolved: 2022-03-29

## 2022-03-29 PROBLEM — R06.83 SNORING: Status: RESOLVED | Noted: 2017-05-30 | Resolved: 2022-03-29

## 2022-03-29 PROCEDURE — 3078F DIAST BP <80 MM HG: CPT | Performed by: INTERNAL MEDICINE

## 2022-03-29 PROCEDURE — 3008F BODY MASS INDEX DOCD: CPT | Performed by: INTERNAL MEDICINE

## 2022-03-29 PROCEDURE — 1036F TOBACCO NON-USER: CPT | Performed by: INTERNAL MEDICINE

## 2022-03-29 PROCEDURE — 3075F SYST BP GE 130 - 139MM HG: CPT | Performed by: INTERNAL MEDICINE

## 2022-03-29 PROCEDURE — 99214 OFFICE O/P EST MOD 30 MIN: CPT | Performed by: INTERNAL MEDICINE

## 2022-03-29 PROCEDURE — 3725F SCREEN DEPRESSION PERFORMED: CPT | Performed by: INTERNAL MEDICINE

## 2022-03-29 RX ORDER — DEXTROAMPHETAMINE SACCHARATE, AMPHETAMINE ASPARTATE, DEXTROAMPHETAMINE SULFATE AND AMPHETAMINE SULFATE 1.25; 1.25; 1.25; 1.25 MG/1; MG/1; MG/1; MG/1
TABLET ORAL
Qty: 75 TABLET | Refills: 0 | Status: SHIPPED | OUTPATIENT
Start: 2022-03-29 | End: 2022-06-10 | Stop reason: SDUPTHER

## 2022-03-29 RX ORDER — FLUOXETINE HYDROCHLORIDE 20 MG/1
20 CAPSULE ORAL DAILY
Qty: 30 CAPSULE | Refills: 2 | Status: SHIPPED | OUTPATIENT
Start: 2022-03-29 | End: 2022-06-30 | Stop reason: SDUPTHER

## 2022-03-29 NOTE — PROGRESS NOTES
Assessment/Plan:    ADHD, predominantly inattentive type  Adderall was renewed    Calcaneal spur of right foot  Patient was offered a steroid/lidocaine injection into the area of the spur but she prefers to seek invasive therapy  Suggested it applications of diclofenac to the affected area and continued icing after her work day    Depression  Will add fluoxetine 20 mg daily to her current regimen and follow-up in 6 to 8 weeks    Hypertension, essential, benign  Blood pressure is stable and controlled on current medications  Hypothyroidism  Patient is overdue for thyroid labs which were ordered to be done now following visit and reviewed at follow-up    ERICA (obstructive sleep apnea)  Had some issues with malfunctioning CPAP machine which have now been corrected  Diagnoses and all orders for this visit:    Hypertension, essential, benign    Calcaneal spur of right foot    ADHD, predominantly inattentive type  -     amphetamine-dextroamphetamine (ADDERALL) 5 MG tablet; take 1 and 1/2 tablets by mouth every morning and 1 tablet every evening    Hypothyroidism, unspecified type  -     T3; Future  -     T4, free; Future  -     TSH, 3rd generation; Future    ERICA (obstructive sleep apnea)    Mild episode of recurrent major depressive disorder (HCC)  -     FLUoxetine (PROzac) 20 mg capsule; Take 1 capsule (20 mg total) by mouth daily          Subjective:      Patient ID: Damian Bower is a 63 y o  female  Presents to the office for a follow-up visit regarding her ERICA, heel spur, continued issues with depression and ADHD and follow-up for hypertension  Overall she is doing reasonably well at the moment  Her heel spur continues to give her discomfort frequently despite daily icing  She does not wish to pursue any type of steroid injection therapy  We discussed various medications and over-the-counter measures that can be employed to try to get some relief from her heel spur    She had some issues with her CPAP machine which malfunctioned and she had a great deal of difficulty securing a new machine from the manufacture but was finally able to persevere  Her ADHD is relatively stable but her depression seems to be a bit more pronounced of late and she is wondering if there are some other changes that can be made to her medication  She has not had thyroid function testing done since July of 2020  She has remained on the same dose of Synthroid without any issues  Will arrange for lab testing following today's visit        Family History   Problem Relation Age of Onset    Prostate cancer Father     Anemia Father     Neuropathy Father     Prostate cancer Brother     Paget's disease of bone Brother     Heart Valve Disease Mother         s/p MVR    Glaucoma Mother     Rickets Mother     Stroke Mother     Hypertension Family         BENIGN ESSENTIAL    Heart disease Family         CARDIAC DISORDER    No Known Problems Maternal Grandmother     No Known Problems Maternal Grandfather     No Known Problems Paternal Grandmother     No Known Problems Paternal Grandfather     No Known Problems Paternal Aunt      Social History     Socioeconomic History    Marital status: Single     Spouse name: Not on file    Number of children: Not on file    Years of education: Not on file    Highest education level: Not on file   Occupational History    Occupation: UNEMPLOYED   Tobacco Use    Smoking status: Never Smoker    Smokeless tobacco: Never Used   Vaping Use    Vaping Use: Never used   Substance and Sexual Activity    Alcohol use: Yes     Comment: very infrequent     Drug use: No    Sexual activity: Not Currently   Other Topics Concern    Not on file   Social History Narrative    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)    [place] H[place]: [place] JUL[place]G 2014     Social Determinants of Health     Financial Resource Strain: Not on file   Food Insecurity: Not on file Transportation Needs: Not on file   Physical Activity: Not on file   Stress: Not on file   Social Connections: Not on file   Intimate Partner Violence: Not on file   Housing Stability: Not on file     Past Medical History:   Diagnosis Date    Abrasion of axilla     LAST ASSESSED: 11/21/14    Acute cystitis with hematuria 8/15/2019    ADHD     Allergic     Allergic rhinitis     LAST ASSESSED: 5/15/15    Asthma     TRANSITIONED FROM: ASTHMA; RESOLVED: 11/9/16    Bee sting reaction 8/1/2019    Bilateral nephrolithiasis 7/25/2018    Depression     Fracture of plateau of left tibia     RESOLVED: 4/14/15    Headache     Hematuria     Hyperparathyroidism ([address] )     Hypothyroidism     Left ankle sprain     LAST ASSESSED: 10/21/14    No known health problems     DENIED MENTAL STATUS CHANGE AS PER ALLSCRIPTS; CONFLICTED WITH ADHD AND DEPRESSION IN MED HX SO IT COULD NOT BE ADDED IN PERT NEGS    Obstructive sleep apnea on CPAP     Scoliosis     Thyroid nodule     Urinary frequency     RESOLVED: 10/27/16    Vaginal bleeding, abnormal     LAST ASSESSED: 6/6/16    Viral URI with cough 12/30/2019       Current Outpatient Medications:     Acetaminophen (TYLENOL PO), Take by mouth as needed, Disp: , Rfl:     albuterol (PROAIR HFA) 90 mcg/act inhaler, Inhale 2 puffs every 6 (six) hours as needed for wheezing, Disp: 8 5 g, Rfl: 0    amLODIPine (NORVASC) 5 mg tablet, Take 1 tablet (5 mg total) by mouth daily, Disp: 90 tablet, Rfl: 1    amphetamine-dextroamphetamine (ADDERALL) 5 MG tablet, take 1 and 1/2 tablets by mouth every morning and 1 tablet every evening, Disp: 75 tablet, Rfl: 0    cetirizine (ZyrTEC) 10 mg tablet, Take 10 mg by mouth 2 (two) times a day , Disp: , Rfl:     Cholecalciferol (VITAMIN D3) 1000 units CAPS, Take 1 capsule by mouth Daily  , Disp: , Rfl:     EPINEPHrine (EPIPEN) 0 3 mg/0 3 mL SOAJ, Inject 0 3 mL (0 3 mg total) into a muscle once for 1 dose, Disp: 1 each, Rfl: 2   fluticasone-salmeterol (Advair Diskus) 100-50 mcg/dose inhaler, Inhale 1 puff daily, Disp: 60 blister, Rfl: 1    levothyroxine 50 mcg tablet, Take 1 tablet (50 mcg total) by mouth daily, Disp: 90 tablet, Rfl: 1    Melatonin 5 MG TABS, Take 1 tablet by mouth daily at bedtime, Disp: , Rfl:     multivitamin (THERAGRAN) TABS, Take 1 tablet by mouth daily  , Disp: , Rfl:     Triamcinolone Acetonide (NASACORT AQ) 55 MCG/ACT AERO, 1 Act into each nostril Daily 2 puffs in each nostril, Disp: , Rfl:     valsartan (DIOVAN) 160 mg tablet, Take 1 tablet (160 mg total) by mouth daily, Disp: 90 tablet, Rfl: 1    venlafaxine (EFFEXOR-XR) 150 mg 24 hr capsule, Take 1 capsule (150 mg total) by mouth daily, Disp: 90 capsule, Rfl: 1    FLUoxetine (PROzac) 20 mg capsule, Take 1 capsule (20 mg total) by mouth daily, Disp: 30 capsule, Rfl: 2  Allergies   Allergen Reactions    Compazine [Prochlorperazine] Anaphylaxis     Category: Allergy; Annotation - [id number]: ALL FORMS    Eggs Or Egg-Derived Products - Food Allergy Abdominal Pain and Diarrhea    Erythromycin GI Intolerance     Category: Allergy; Annotation - [id number]: ALL FORMS    Lactose Intolerance (Gi) - Food Allergy Abdominal Pain and Diarrhea    Sulfa Antibiotics GI Intolerance     Patient states that it is like when you are drunk, dizziness and confusion    Sulfamethoxazole-Trimethoprim Nausea Only and Dizziness     Category: Allergy; Annotation - [id number]: ALL FORMS     Past Surgical History:   Procedure Laterality Date    FOOT SURGERY Right     "Nerve removal"    PARATHYROID GLAND SURGERY  5/17/2018    AL COLONOSCOPY FLX DX W/COLLJ SPEC WHEN PFRMD N/A 6/21/2018    Procedure: COLONOSCOPY;  Surgeon: Catalina Millan MD;  Location: [facility];   Service: Gastroenterology    AL EXPLORE PARATHYROID GLANDS Right 5/17/2018    Procedure: MINIMALLY INVASIVE PARATHYROIDECTOMY;   PTH MONITORING;  Surgeon: Rafat Puga MD;  Location: [facility];  Service: Surgical Oncology    TONSILLECTOMY      TONSILLECTOMY AND ADENOIDECTOMY           Review of Systems   Constitutional: Negative  HENT: Negative  Eyes: Negative  Respiratory: Negative  Cardiovascular: Negative  Gastrointestinal: Negative  Genitourinary: Negative  Musculoskeletal: Positive for gait problem (Due to calcaneal spur)  Skin: Negative  Allergic/Immunologic: Negative  Hematological: Negative  Psychiatric/Behavioral: Positive for decreased concentration, dysphoric mood and sleep disturbance  Objective:      /78 (BP Location: Left arm, Patient Position: Sitting, Cuff Size: Standard)   Pulse 89   Temp 97 8 °F (36 6 °C) (Tympanic)   Ht 5' 1 42" (1 56 m)   Wt 77 7 kg (171 lb 6 4 oz)   LMP  (LMP Unknown)   SpO2 96%   BMI 31 95 kg/m²  BMI Counseling: Body mass index is 31 95 kg/m²  The BMI is above normal  Nutrition recommendations include reducing portion sizes, 3-5 servings of fruits/vegetables daily, consuming healthier snacks, decreasing soda and/or juice intake, moderation in carbohydrate intake and increasing intake of lean protein  Physical Exam  Vitals reviewed  Constitutional:       General: She is not in acute distress  Appearance: Normal appearance  She is not ill-appearing, toxic-appearing or diaphoretic  HENT:      Head: Normocephalic and atraumatic  Right Ear: External ear normal       Left Ear: External ear normal       Nose: Nose normal    Eyes:      Conjunctiva/sclera: Conjunctivae normal       Pupils: Pupils are equal, round, and reactive to light  Cardiovascular:      Rate and Rhythm: Normal rate and regular rhythm  Pulses: Normal pulses  Heart sounds: Normal heart sounds  No murmur heard  Pulmonary:      Effort: Pulmonary effort is normal  No respiratory distress  Breath sounds: Normal breath sounds  No wheezing or rales  Abdominal:      General: Abdomen is flat  There is no distension     Musculoskeletal: General: Tenderness (The right calcaneus) present  No swelling  Cervical back: Neck supple  No rigidity  Right lower leg: No edema  Left lower leg: No edema  Skin:     General: Skin is warm  Coloration: Skin is not jaundiced  Findings: No bruising, erythema or rash  Neurological:      General: No focal deficit present  Mental Status: She is alert and oriented to person, place, and time  Mental status is at baseline     Psychiatric:         Mood and Affect: Mood normal          Behavior: Behavior normal

## 2022-03-30 NOTE — ASSESSMENT & PLAN NOTE
Patient was offered a steroid/lidocaine injection into the area of the spur but she prefers to seek invasive therapy    Suggested it applications of diclofenac to the affected area and continued icing after her work day

## 2022-03-30 NOTE — ASSESSMENT & PLAN NOTE
Patient is overdue for thyroid labs which were ordered to be done now following visit and reviewed at follow-up

## 2022-04-11 DIAGNOSIS — Z87.09 HISTORY OF ASTHMA: ICD-10-CM

## 2022-05-06 DIAGNOSIS — F32.A DEPRESSION, UNSPECIFIED DEPRESSION TYPE: ICD-10-CM

## 2022-05-06 RX ORDER — VENLAFAXINE HYDROCHLORIDE 150 MG/1
150 CAPSULE, EXTENDED RELEASE ORAL DAILY
Qty: 90 CAPSULE | Refills: 1 | Status: SHIPPED | OUTPATIENT
Start: 2022-05-06

## 2022-06-10 DIAGNOSIS — E03.9 ACQUIRED HYPOTHYROIDISM: ICD-10-CM

## 2022-06-10 DIAGNOSIS — F90.0 ADHD, PREDOMINANTLY INATTENTIVE TYPE: ICD-10-CM

## 2022-06-10 RX ORDER — LEVOTHYROXINE SODIUM 0.05 MG/1
50 TABLET ORAL DAILY
Qty: 90 TABLET | Refills: 1 | Status: SHIPPED | OUTPATIENT
Start: 2022-06-10

## 2022-06-10 RX ORDER — DEXTROAMPHETAMINE SACCHARATE, AMPHETAMINE ASPARTATE, DEXTROAMPHETAMINE SULFATE AND AMPHETAMINE SULFATE 1.25; 1.25; 1.25; 1.25 MG/1; MG/1; MG/1; MG/1
TABLET ORAL
Qty: 75 TABLET | Refills: 0 | Status: SHIPPED | OUTPATIENT
Start: 2022-06-10 | End: 2022-08-01 | Stop reason: SDUPTHER

## 2022-06-30 ENCOUNTER — OFFICE VISIT (OUTPATIENT)
Dept: INTERNAL MEDICINE CLINIC | Facility: CLINIC | Age: 62
End: 2022-06-30
Payer: COMMERCIAL

## 2022-06-30 VITALS
DIASTOLIC BLOOD PRESSURE: 70 MMHG | WEIGHT: 169.6 LBS | HEIGHT: 61 IN | HEART RATE: 88 BPM | TEMPERATURE: 98.2 F | BODY MASS INDEX: 32.02 KG/M2 | SYSTOLIC BLOOD PRESSURE: 126 MMHG | OXYGEN SATURATION: 96 %

## 2022-06-30 DIAGNOSIS — R13.19 ESOPHAGEAL DYSPHAGIA: ICD-10-CM

## 2022-06-30 DIAGNOSIS — H10.13 ALLERGIC CONJUNCTIVITIS OF BOTH EYES: ICD-10-CM

## 2022-06-30 DIAGNOSIS — T78.40XA ALLERGIC REACTION, INITIAL ENCOUNTER: ICD-10-CM

## 2022-06-30 DIAGNOSIS — R13.14 PHARYNGOESOPHAGEAL DYSPHAGIA: ICD-10-CM

## 2022-06-30 DIAGNOSIS — I10 HYPERTENSION, ESSENTIAL, BENIGN: ICD-10-CM

## 2022-06-30 DIAGNOSIS — E03.9 HYPOTHYROIDISM, UNSPECIFIED TYPE: ICD-10-CM

## 2022-06-30 DIAGNOSIS — Z12.31 ENCOUNTER FOR SCREENING MAMMOGRAM FOR MALIGNANT NEOPLASM OF BREAST: Primary | ICD-10-CM

## 2022-06-30 DIAGNOSIS — J45.20 MILD INTERMITTENT ASTHMATIC BRONCHITIS WITHOUT COMPLICATION: ICD-10-CM

## 2022-06-30 DIAGNOSIS — F33.0 MILD EPISODE OF RECURRENT MAJOR DEPRESSIVE DISORDER (HCC): ICD-10-CM

## 2022-06-30 PROBLEM — M77.32 CALCANEAL SPUR OF LEFT FOOT: Status: ACTIVE | Noted: 2022-03-29

## 2022-06-30 PROCEDURE — 3078F DIAST BP <80 MM HG: CPT | Performed by: INTERNAL MEDICINE

## 2022-06-30 PROCEDURE — 1036F TOBACCO NON-USER: CPT | Performed by: INTERNAL MEDICINE

## 2022-06-30 PROCEDURE — 99214 OFFICE O/P EST MOD 30 MIN: CPT | Performed by: INTERNAL MEDICINE

## 2022-06-30 PROCEDURE — 3074F SYST BP LT 130 MM HG: CPT | Performed by: INTERNAL MEDICINE

## 2022-06-30 PROCEDURE — 3008F BODY MASS INDEX DOCD: CPT | Performed by: INTERNAL MEDICINE

## 2022-06-30 RX ORDER — OLOPATADINE HYDROCHLORIDE 1 MG/ML
1 SOLUTION/ DROPS OPHTHALMIC 2 TIMES DAILY
Qty: 5 ML | Refills: 2 | Status: SHIPPED | OUTPATIENT
Start: 2022-06-30

## 2022-06-30 RX ORDER — EPINEPHRINE 0.15 MG/.3ML
0.15 INJECTION INTRAMUSCULAR ONCE
Qty: 0.3 ML | Refills: 0 | Status: SHIPPED | OUTPATIENT
Start: 2022-06-30 | End: 2022-08-08

## 2022-06-30 RX ORDER — FLUOXETINE HYDROCHLORIDE 20 MG/1
20 CAPSULE ORAL DAILY
Qty: 30 CAPSULE | Refills: 3 | Status: SHIPPED | OUTPATIENT
Start: 2022-06-30

## 2022-06-30 RX ORDER — EPINEPHRINE 0.3 MG/.3ML
0.3 INJECTION SUBCUTANEOUS ONCE
Qty: 1 EACH | Refills: 2 | Status: CANCELLED | OUTPATIENT
Start: 2022-06-30 | End: 2022-06-30

## 2022-06-30 NOTE — PROGRESS NOTES
Assessment/Plan:    Allergic conjunctivitis of both eyes  Will provide the patient with a prescription for Pataday ophthalmic drops 1 drop to each eye twice daily    Asthmatic bronchitis without complication  Stable with current medications  Depression  Remains on fluoxetine 20 mg daily in combination with venlafaxine with satisfactory control of symptoms    Esophageal dysphagia  Barium swallow has been scheduled    Hypothyroidism  Follow-up thyroid function studies to be done  Patient continues on levothyroxine 50 mcg daily    Hypertension, essential, benign  Controlled on current medications valsartan and amlodipine  Diagnoses and all orders for this visit:    Encounter for screening mammogram for malignant neoplasm of breast  -     Mammo screening bilateral w 3d & cad; Future    Allergic reaction, initial encounter  -     EPINEPHrine (EPIPEN JR) 0 15 mg/0 3 mL SOAJ; Inject 0 3 mL (0 15 mg total) into a muscle once for 1 dose    Mild episode of recurrent major depressive disorder (HCC)  -     FLUoxetine (PROzac) 20 mg capsule; Take 1 capsule (20 mg total) by mouth daily    Mild intermittent asthmatic bronchitis without complication    Hypertension, essential, benign    Allergic conjunctivitis of both eyes  -     olopatadine (PATANOL) 0 1 % ophthalmic solution; Administer 1 drop to both eyes 2 (two) times a day    Pharyngoesophageal dysphagia  -     FL barium swallow ROUTINE esophagus; Future    Esophageal dysphagia    Hypothyroidism, unspecified type          Subjective:      Patient ID: Coy Dominguez is a 63 y o  female  Patient is seen for follow-up visit  She complains of irritated itchy and watery eyes  She does have some seasonal allergies but has never had symptoms with her eyes as severe as they are currently  She also complains of persistent left heel spur pain  Pain is variable in intensity and depends on what activity she is doing    We have tried numerous modalities but nothing appears to be providing significant relief  Again, she was offered a local steroid injection but prefers more conservative treatments  She has some concerns about some fine tremors of her hands  She is currently taking Advair for her asthma which has been stable  Most likely the source of her tremor may be the salmeterol from the medication  She complains of some intermittent difficulty swallowing which seems to be more pharyngeal esophageal   She states it feels like food sometimes gets stuck and then she is able to mobilize it  She denies choking on any food or aspirating  She has a history of having a mild reaction to insect stings a and uses an EpiPen but states that the EpiPen is too large for her purse and she would like to use the EpiPen Yakov, she understands that this is 1/2 of the usual dose and if she does get stung and develops a reaction she should seek emergency care as soon as possible  There were no labs done prior to this visit        Family History   Problem Relation Age of Onset    Prostate cancer Father     Anemia Father     Neuropathy Father     Prostate cancer Brother     Paget's disease of bone Brother     Heart Valve Disease Mother         s/p MVR    Glaucoma Mother     Rickets Mother     Stroke Mother     Hypertension Family         BENIGN ESSENTIAL    Heart disease Family         CARDIAC DISORDER    No Known Problems Maternal Grandmother     No Known Problems Maternal Grandfather     No Known Problems Paternal Grandmother     No Known Problems Paternal Grandfather     No Known Problems Paternal Aunt      Social History     Socioeconomic History    Marital status: Single     Spouse name: Not on file    Number of children: Not on file    Years of education: Not on file    Highest education level: Not on file   Occupational History    Occupation: UNEMPLOYED   Tobacco Use    Smoking status: Never Smoker    Smokeless tobacco: Never Used   Vaping Use    Vaping Use: Never used   Substance and Sexual Activity    Alcohol use: Yes     Comment: very infrequent     Drug use: No    Sexual activity: Not Currently   Other Topics Concern    Not on file   Social History Narrative    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)    [place] HX: [place] JUL[place]     Social Determinants of Health     Financial Resource Strain: Not on file   Food Insecurity: Not on file   Transportation Needs: Not on file   Physical Activity: Not on file   Stress: Not on file   Social Connections: Not on file   Intimate Partner Violence: Not on file   Housing Stability: Not on file     Past Medical History:   Diagnosis Date    Abrasion of axilla     LAST ASSESSED: 11/21/14    Acute cystitis with hematuria 8/15/2019    ADHD     Allergic     Allergic rhinitis     LAST ASSESSED: 5/15/15    Asthma     TRANSITIONED FROM: ASTHMA; RESOLVED: 11/9/16    Bee sting reaction 8/1/2019    Bilateral nephrolithiasis 7/25/2018    Depression     Fracture of plateau of left tibia     RESOLVED: 4/14/15    Headache     Hematuria     Hyperparathyroidism ([address] )     Hypothyroidism     Left ankle sprain     LAST ASSESSED: 10/21/14    No known health problems     DENIED MENTAL STATUS CHANGE AS PER ALLSCRIPTS; CONFLICTED WITH ADHD AND DEPRESSION IN MED HX SO IT COULD NOT BE ADDED IN PERT NEGS    Obstructive sleep apnea on CPAP     Scoliosis     Thyroid nodule     Urinary frequency     RESOLVED: 10/27/16    Vaginal bleeding, abnormal     LAST ASSESSED: 6/6/16    Viral URI with cough 12/30/2019       Current Outpatient Medications:     Acetaminophen (TYLENOL PO), Take by mouth as needed, Disp: , Rfl:     albuterol (PROAIR HFA) 90 mcg/act inhaler, Inhale 2 puffs every 6 (six) hours as needed for wheezing, Disp: 8 5 g, Rfl: 0    amLODIPine (NORVASC) 5 mg tablet, Take 1 tablet (5 mg total) by mouth daily, Disp: 90 tablet, Rfl: 1    amphetamine-dextroamphetamine (ADDERALL) 5 MG tablet, take 1 and 1/2 tablets by mouth every morning and 1 tablet every evening, Disp: 75 tablet, Rfl: 0    cetirizine (ZyrTEC) 10 mg tablet, Take 10 mg by mouth 2 (two) times a day , Disp: , Rfl:     Cholecalciferol (VITAMIN D3) 1000 units CAPS, Take 1 capsule by mouth Daily  , Disp: , Rfl:     EPINEPHrine (EPIPEN JR) 0 15 mg/0 3 mL SOAJ, Inject 0 3 mL (0 15 mg total) into a muscle once for 1 dose, Disp: 0 3 mL, Rfl: 0    FLUoxetine (PROzac) 20 mg capsule, Take 1 capsule (20 mg total) by mouth daily, Disp: 30 capsule, Rfl: 3    fluticasone-salmeterol (Advair Diskus) 100-50 mcg/dose inhaler, Inhale 1 puff daily, Disp: 60 blister, Rfl: 1    levothyroxine 50 mcg tablet, Take 1 tablet (50 mcg total) by mouth daily, Disp: 90 tablet, Rfl: 1    Melatonin 5 MG TABS, Take 1 tablet by mouth daily at bedtime, Disp: , Rfl:     multivitamin (THERAGRAN) TABS, Take 1 tablet by mouth daily  , Disp: , Rfl:     olopatadine (PATANOL) 0 1 % ophthalmic solution, Administer 1 drop to both eyes 2 (two) times a day, Disp: 5 mL, Rfl: 2    Triamcinolone Acetonide (Nasacort Allergy) 55 MCG/ACT nasal spray, 1 Act into each nostril Daily 2 puffs in each nostril, Disp: , Rfl:     valsartan (DIOVAN) 160 mg tablet, Take 1 tablet (160 mg total) by mouth daily, Disp: 90 tablet, Rfl: 1    venlafaxine (EFFEXOR-XR) 150 mg 24 hr capsule, Take 1 capsule (150 mg total) by mouth daily, Disp: 90 capsule, Rfl: 1  Allergies   Allergen Reactions    Compazine [Prochlorperazine] Anaphylaxis     Category: Allergy; Annotation - [id number]: ALL FORMS    Eggs Or Egg-Derived Products - Food Allergy Abdominal Pain and Diarrhea    Erythromycin GI Intolerance     Category: Allergy;  Annotation - [id number]: ALL FORMS    Lactose Intolerance (Gi) - Food Allergy Abdominal Pain and Diarrhea    Sulfa Antibiotics GI Intolerance     Patient states that it is like when you are drunk, dizziness and confusion    Sulfamethoxazole-Trimethoprim Nausea Only and Dizziness     Category: Allergy; Annotation - [id number]: ALL FORMS     Past Surgical History:   Procedure Laterality Date    FOOT SURGERY Right     "Nerve removal"    PARATHYROID GLAND SURGERY  5/17/2018    MO COLONOSCOPY FLX DX W/COLLJ SPEC WHEN PFRMD N/A 6/21/2018    Procedure: COLONOSCOPY;  Surgeon: Silver Hameed MD;  Location: [facility]; Service: Gastroenterology    MO EXPLORE PARATHYROID GLANDS Right 5/17/2018    Procedure: MINIMALLY INVASIVE PARATHYROIDECTOMY;   PTH MONITORING;  Surgeon: Jorge Bowden MD;  Location: [facility];  Service: Surgical Oncology    TONSILLECTOMY      TONSILLECTOMY AND ADENOIDECTOMY           Review of Systems   Constitutional: Negative  HENT: Negative  Eyes: Positive for redness and itching  Respiratory: Negative  Cardiovascular: Negative  Gastrointestinal:        Occasional payal pharyngeal dysphagia   Endocrine: Negative  Genitourinary: Negative  Musculoskeletal: Positive for arthralgias (Left heel pain)  Skin: Negative  Allergic/Immunologic: Positive for environmental allergies  Neurological: Negative  Hematological: Negative  Psychiatric/Behavioral: Negative  Objective:      /70 (BP Location: Left arm, Patient Position: Sitting, Cuff Size: Standard)   Pulse 88   Temp 98 2 °F (36 8 °C) (Temporal)   Ht 5' 1 02" (1 55 m)   Wt 76 9 kg (169 lb 9 6 oz)   LMP  (LMP Unknown)   SpO2 96%   BMI 32 02 kg/m²          Physical Exam  Vitals reviewed  Constitutional:       General: She is not in acute distress  Appearance: Normal appearance  She is not ill-appearing, toxic-appearing or diaphoretic  HENT:      Head: Normocephalic and atraumatic  Right Ear: External ear normal       Left Ear: External ear normal    Eyes:      General: No scleral icterus  Conjunctiva/sclera: Conjunctivae normal       Pupils: Pupils are equal, round, and reactive to light     Cardiovascular:      Rate and Rhythm: Normal rate and regular rhythm  Pulmonary:      Effort: Pulmonary effort is normal  No respiratory distress  Abdominal:      General: Abdomen is flat  There is no distension  Musculoskeletal:         General: Tenderness (Left heel) present  Cervical back: Neck supple  Right lower leg: No edema  Left lower leg: No edema  Skin:     General: Skin is warm  Coloration: Skin is not jaundiced  Findings: No bruising, erythema or rash  Neurological:      General: No focal deficit present  Mental Status: She is alert and oriented to person, place, and time  Mental status is at baseline     Psychiatric:         Mood and Affect: Mood normal          Behavior: Behavior normal

## 2022-06-30 NOTE — ASSESSMENT & PLAN NOTE
Will provide the patient with a prescription for Pataday ophthalmic drops 1 drop to each eye twice daily

## 2022-06-30 NOTE — ASSESSMENT & PLAN NOTE
Remains on fluoxetine 20 mg daily in combination with venlafaxine with satisfactory control of symptoms

## 2022-07-29 DIAGNOSIS — I10 HYPERTENSION, ESSENTIAL, BENIGN: ICD-10-CM

## 2022-07-29 RX ORDER — VALSARTAN 160 MG/1
160 TABLET ORAL DAILY
Qty: 90 TABLET | Refills: 1 | Status: SHIPPED | OUTPATIENT
Start: 2022-07-29

## 2022-07-29 RX ORDER — VALSARTAN 160 MG/1
TABLET ORAL
Qty: 90 TABLET | Refills: 1 | OUTPATIENT
Start: 2022-07-29

## 2022-07-29 NOTE — TELEPHONE ENCOUNTER
Rx refill valsartan (DIOVAN) 160 mg tablet      Send to [address] [facility]- 6/30/22  NOV- 1/3/2023

## 2022-08-01 DIAGNOSIS — F90.0 ADHD, PREDOMINANTLY INATTENTIVE TYPE: ICD-10-CM

## 2022-08-01 RX ORDER — DEXTROAMPHETAMINE SACCHARATE, AMPHETAMINE ASPARTATE, DEXTROAMPHETAMINE SULFATE AND AMPHETAMINE SULFATE 1.25; 1.25; 1.25; 1.25 MG/1; MG/1; MG/1; MG/1
TABLET ORAL
Qty: 75 TABLET | Refills: 0 | Status: SHIPPED | OUTPATIENT
Start: 2022-08-01 | End: 2022-09-27 | Stop reason: SDUPTHER

## 2022-08-05 ENCOUNTER — OFFICE VISIT (OUTPATIENT)
Dept: SLEEP CENTER | Facility: CLINIC | Age: 62
End: 2022-08-05
Payer: COMMERCIAL

## 2022-08-05 VITALS
SYSTOLIC BLOOD PRESSURE: 125 MMHG | DIASTOLIC BLOOD PRESSURE: 70 MMHG | BODY MASS INDEX: 31.72 KG/M2 | HEART RATE: 89 BPM | WEIGHT: 168 LBS | HEIGHT: 61 IN

## 2022-08-05 DIAGNOSIS — F90.0 ADHD, PREDOMINANTLY INATTENTIVE TYPE: ICD-10-CM

## 2022-08-05 DIAGNOSIS — J45.20 MILD INTERMITTENT ASTHMATIC BRONCHITIS WITHOUT COMPLICATION: ICD-10-CM

## 2022-08-05 DIAGNOSIS — E66.9 OBESITY (BMI 30-39.9): ICD-10-CM

## 2022-08-05 DIAGNOSIS — J31.0 CHRONIC RHINITIS: ICD-10-CM

## 2022-08-05 DIAGNOSIS — F32.0 CURRENT MILD EPISODE OF MAJOR DEPRESSIVE DISORDER WITHOUT PRIOR EPISODE (HCC): ICD-10-CM

## 2022-08-05 DIAGNOSIS — F43.9 STRESS: ICD-10-CM

## 2022-08-05 DIAGNOSIS — G47.33 OSA (OBSTRUCTIVE SLEEP APNEA): Primary | ICD-10-CM

## 2022-08-05 DIAGNOSIS — I10 HYPERTENSION, ESSENTIAL, BENIGN: ICD-10-CM

## 2022-08-05 PROCEDURE — 99214 OFFICE O/P EST MOD 30 MIN: CPT | Performed by: INTERNAL MEDICINE

## 2022-08-05 NOTE — PROGRESS NOTES
Review of Systems      Genitourinary none   Cardiology none   Gastrointestinal none   Neurology none   Constitutional fatigue   Integumentary none   Psychiatry anxiety and depression   Musculoskeletal none   Pulmonary shortness of breath with activity   ENT throat clearing   Endocrine excessive thirst   Hematological none

## 2022-08-05 NOTE — PROGRESS NOTES
Follow-Up Note - [id number] [facility],[address]  63 y o  female  :1960  UGN:[id number]  DOS:2022    CC: I saw this patient for follow-up in clinic today for Sleep disordered breathing, Coexisting Sleep and Medical Problems  Results of prior studies in 2017:  The diagnostic study demonstrated an AHI of 5 4 per hour  Minimum oxygen saturation was 85% and she spent 72 2% of time asleep with saturations less than 90%  Mild-to-moderate intensity snoring was noted  There were also periodic limb movements of sleep for an index of 14 per hour and frequent spontaneous arousals for an index of 47 per hour  The titration study demonstrated sleep disordered breathing was successfully remediated with PAP at 9 cm H2O  The periodic limb movements of sleep were virtually eliminated and she had much less sleep fragmentation  PFSH, Problem List, Medications & Allergies were reviewed in EMR  Interval changes: [none reported ]   She  has a past medical history of Abrasion of axilla, Acute cystitis with hematuria (8/15/2019), ADHD, Allergic, Allergic rhinitis, Asthma, Bee sting reaction (2019), Bilateral nephrolithiasis (2018), Depression, Fracture of plateau of left tibia, Headache, Hematuria, Hyperparathyroidism ([facility] [address] ), Hypothyroidism, Left ankle sprain, No known health problems, Obstructive sleep apnea on CPAP, Scoliosis, Thyroid nodule, Urinary frequency, Vaginal bleeding, abnormal, and Viral URI with cough (2019)  She has a current medication list which includes the following prescription(s): acetaminophen, albuterol, amlodipine, amphetamine-dextroamphetamine, cetirizine, vitamin d3, fluoxetine, fluticasone-salmeterol, levothyroxine, melatonin, multivitamin, triamcinolone acetonide, valsartan, venlafaxine, epinephrine, and olopatadine  PHYSIOLOGICAL DATA REVIEW AND INTERPRETATION: [ ] {pap compliance :02789::" using PAP > 4 hours/night ***%   Estimated ANSELMO ***/hour with pressure of ***cm H2O Sandro@[employer] percentile]"}; Patient {HAS/HAS NOT:} been using ozone based devices to sanitize the machine  SUBJECTIVE: Regarding use of PAP, Armond reports:   · {svno/min/si::"no"} adverse effects: {dryness /congestion / etc:75900}; has [not] noticed any fibres or foreign material in air line  · She is[ ]benefiting from use: sleeping better   Sleep Routine: Toño Sarabia reports getting *** hrs sleep[  ]; she has {DIMS:36134::"no difficulty initiating or maintaining sleep "}  She arises {Single/Multiple:08686::"spontaneously"} and {refreshed:93884::"feels refreshed"}  Armond {denies EDS:58907::"denies"} [Excessive] [Daytime] Sleepiness, [{eds effects:61766} ] She rated [herself] at Total score: 5 /24 on the [place] Sleepiness Scale  Habits:[ reports that she has never smoked  She has never used smokeless tobacco ], [ reports current alcohol use ], [ reports no history of drug use ], Caffeine use:{limited / moderate:04698::"limited"}[ ]; Exercise routine: {Desc; regular/irre::"regular "}[ ]  ROS: reviewed & as attached  [Significant for *** ]     EXAM: /70 (BP Location: Left arm, Patient Position: Sitting, Cuff Size: Adult)   Pulse 89   Ht 5' 1" (1 549 m)   Wt 76 2 kg (168 lb)   LMP  (LMP Unknown)   BMI 31 74 kg/m²     Wt Readings from Last 3 Encounters:   22 76 2 kg (168 lb)   22 76 9 kg (169 lb 9 6 oz)   22 77 7 kg (171 lb 6 4 oz)      Patient is well groomed; well appearing  CNS: Alert, orientated, [clear & coherent] speech  Psych: cooperative[and in no distress]  Mental state:[appears normal]  H&N: EOMI; NC/AT:[no] facial pressure marks, [no] rashes  Skin/Extrem: col & hydration [normal]; [no] edema  Resp: Respiratory effort [is normal]  [Physical findings otherwise essentially unchanged from previous ]    IMPRESSION: Problem List Items & Comorbidities Addressed this Visit  No diagnosis found  PLAN:  1   I reviewed [results of prior studies and] [physiologic data ]with the patient  2  [I discussed treatment options with risks and benefits ]  3  Treatment with  PAP is medically necessary and Jennifer Search is agreable to continue use  4  Care of equipment, methods to improve comfort using PAP and importance of compliance with therapy were discussed  5  Pressure setting: {change:15088::"continue"} *** cmH2O     6  Rx provided to replace[ ] supplies and Care coordinated with DME provider  7  [Discussed] strategies for weight [reduction]  8  Follow-up is advised in [1 Moraima Prows [or sooner if needed] [to monitor progress, compliance and to adjust therapy]  [***remove all fields]  Thank you for allowing me to participate in the care of this patient  Sincerely,     Authenticated electronically on 71/94/69   Board Certified Specialist     Portions of the record may have been created with voice recognition software  Occasional wrong word or "sound a like" substitutions may have occurred due to the inherent limitations of voice recognition software  There may also be notations and random deletions of words or characters from malfunctioning software  Read the chart carefully and recognize, using context, where substitutions/deletions have occurred

## 2022-08-05 NOTE — PROGRESS NOTES
Follow-Up Note - [id number] [facility],[address]  63 y o  female  :1960  CRH:[id number]  DOS:2022    CC: I saw this patient for follow-up in clinic today for Sleep disordered breathing, Coexisting Sleep and Medical Problems  She got a replacement machine from [employer] and a [employer] through her insurance  He prefers the latter and is using it consistently  PFSH, Problem List, Medications & Allergies were reviewed in EMR  Interval changes: none reported  She  has a past medical history of Abrasion of axilla, Acute cystitis with hematuria (8/15/2019), ADHD, Allergic, Allergic rhinitis, Asthma, Bee sting reaction (2019), Bilateral nephrolithiasis (2018), Depression, Fracture of plateau of left tibia, Headache, Hematuria, Hyperparathyroidism ([facility] [address] ), Hypothyroidism, Left ankle sprain, No known health problems, Obstructive sleep apnea on CPAP, Scoliosis, Thyroid nodule, Urinary frequency, Vaginal bleeding, abnormal, and Viral URI with cough (2019)  She has a current medication list which includes the following prescription(s): acetaminophen, albuterol, amlodipine, amphetamine-dextroamphetamine, cetirizine, vitamin d3, fluoxetine, fluticasone-salmeterol, levothyroxine, melatonin, multivitamin, triamcinolone acetonide, valsartan, venlafaxine, epinephrine, and olopatadine  PHYSIOLOGICAL DATA REVIEW AND INTERPRETATION:    using PAP > 4 hours/night 97%  Estimated ANSELMO 1 5/hour with pressure of 11 8cm H2O @90th/95th percentile; Patient has not been using ozone based devices to sanitize the machine  SUBJECTIVE: Regarding use of PAPArmond reports:   · no adverse effects: ; has not noticed any fibres or foreign material in air line  · She is benefiting from use: sleeping better   Sleep Routine: Eboni Ag reports getting 6 hrs sleep  ; she has difficulty initiating and maintaining sleep  that she attributes to stress since she lost a job    She arises spontaneously and is not always refreshed  Ever Hahn reports Excessive Daytime Sleepiness, feels like napping but does not have the opportunity  She rated herself at Total score: 5 /24 on the [place] Sleepiness Scale  Habits: reports that she has never smoked  She has never used smokeless tobacco ,  reports current alcohol use ,  reports no history of drug use , Caffeine use:limited ; Exercise routine: none   ROS: reviewed & as attached  Significant for weight has been stable  She has some nasal and respiratory symptoms due to allergies and asthma  She reported no cardiac symptoms  She feels anxiety is controlled but has some depression  EXAM: /70 (BP Location: Left arm, Patient Position: Sitting, Cuff Size: Adult)   Pulse 89   Ht 5' 1" (1 549 m)   Wt 76 2 kg (168 lb)   LMP  (LMP Unknown)   BMI 31 74 kg/m²     Wt Readings from Last 3 Encounters:   08/05/22 76 2 kg (168 lb)   06/30/22 76 9 kg (169 lb 9 6 oz)   03/29/22 77 7 kg (171 lb 6 4 oz)      Patient is well groomed; well appearing  CNS: Alert, orientated, clear & coherent speech  Psych: cooperativeand in no distress  Mental state:appears normal   H&N: EOMI; NC/AT:no facial pressure marks, no rashes  Skin/Extrem: col & hydration normal; no edema  Resp: Respiratory effort is normal  Physical findings otherwise essentially unchanged from previous  IMPRESSION: Problem List Items & Comorbidities Addressed this Visit    1  ERICA (obstructive sleep apnea)  PAP DME Resupply/Reorder   2  Chronic rhinitis     3  Mild intermittent asthmatic bronchitis without complication     4  Hypertension, essential, benign     5  ADHD, predominantly inattentive type     6  Current mild episode of major depressive disorder without prior episode ([facility])     7  Stress     8  Obesity (BMI 30-39  9)         PLAN:  1  I reviewed results of prior studies and physiologic data with the patient  2  I discussed treatment options with risks and benefits    3  Treatment with  PAP is medically necessary and Julious Spies is agreable to continue use  4  Care of equipment, methods to improve comfort using PAP and importance of compliance with therapy were discussed  5  Pressure setting: continue 10-12 cmH2O     6  Rx provided to replace  supplies and Care coordinated with DME provider  7  Multi component Cognitive behavioral therapy for Insomnia undertaken - Sleep Restriction, Stimulus control, Relaxation techniques and Sleep hygiene were discussed  8  Discussed strategies for weight reduction  9  Follow-up is advised in 1 year or sooner if needed to monitor progress, compliance and to adjust therapy  Thank you for allowing me to participate in the care of this patient  Sincerely,     Authenticated electronically on 34/41/65   Board Certified Specialist     Portions of the record may have been created with voice recognition software  Occasional wrong word or "sound a like" substitutions may have occurred due to the inherent limitations of voice recognition software  There may also be notations and random deletions of words or characters from malfunctioning software  Read the chart carefully and recognize, using context, where substitutions/deletions have occurred

## 2022-08-08 ENCOUNTER — TELEPHONE (OUTPATIENT)
Dept: SLEEP CENTER | Facility: CLINIC | Age: 62
End: 2022-08-08

## 2022-08-08 ENCOUNTER — OFFICE VISIT (OUTPATIENT)
Dept: INTERNAL MEDICINE CLINIC | Age: 62
End: 2022-08-08
Payer: COMMERCIAL

## 2022-08-08 VITALS
BODY MASS INDEX: 31.36 KG/M2 | WEIGHT: 166.1 LBS | OXYGEN SATURATION: 97 % | DIASTOLIC BLOOD PRESSURE: 96 MMHG | HEIGHT: 61 IN | SYSTOLIC BLOOD PRESSURE: 144 MMHG | HEART RATE: 84 BPM | TEMPERATURE: 98 F

## 2022-08-08 DIAGNOSIS — I10 HYPERTENSION, ESSENTIAL, BENIGN: ICD-10-CM

## 2022-08-08 DIAGNOSIS — L23.7 POISON IVY DERMATITIS: Primary | ICD-10-CM

## 2022-08-08 PROCEDURE — 99213 OFFICE O/P EST LOW 20 MIN: CPT | Performed by: INTERNAL MEDICINE

## 2022-08-08 RX ORDER — PREDNISONE 20 MG/1
20 TABLET ORAL DAILY
Qty: 15 TABLET | Refills: 0 | Status: SHIPPED | OUTPATIENT
Start: 2022-08-08 | End: 2022-08-17

## 2022-08-08 RX ORDER — AMLODIPINE BESYLATE 5 MG/1
5 TABLET ORAL DAILY
Qty: 90 TABLET | Refills: 1 | Status: SHIPPED | OUTPATIENT
Start: 2022-08-08

## 2022-08-08 NOTE — PROGRESS NOTES
Assessment/Plan:    1  Poison ivy dermatitis  Will treat with tapering dose of prednisone  Also advised to use cortisone cream topical 2 to 3 times a day  She is already on antihistamine    2  Essential hypertension  Blood pressure is slightly elevated  She was out of her amlodipine over the last few days  Will send the prescription today and advised her to take it as soon as possible  Follow-up  Keep next scheduled appointment with PCP     Diagnoses and all orders for this visit:    Poison ivy dermatitis  -     predniSONE 20 mg tablet; Take 1 tablet (20 mg total) by mouth daily for 9 days Take 40 mg for 3 days, 30 mg for 3 days and 20 mg for 3 days    Hypertension, essential, benign  -     amLODIPine (NORVASC) 5 mg tablet; Take 1 tablet (5 mg total) by mouth daily               Subjective:          Patient ID: Dawson Gaming is a 63 y o  female  Complaining of rash with vesicles over both arms started about 4 days ago while she was working in her lawn  The following portions of the patient's history were reviewed and updated as appropriate: allergies, current medications, past family history, past medical history, past social history, past surgical history and problem list     Review of Systems   Constitutional: Negative for fatigue and fever  HENT: Negative for congestion, ear discharge, ear pain, postnasal drip, sinus pressure, sore throat, tinnitus and trouble swallowing  Eyes: Negative for discharge, itching and visual disturbance  Respiratory: Negative for cough and shortness of breath  Cardiovascular: Negative for chest pain and palpitations  Gastrointestinal: Negative for abdominal pain, diarrhea, nausea and vomiting  Endocrine: Negative for cold intolerance and polyuria  Genitourinary: Negative for difficulty urinating, dysuria and urgency  Musculoskeletal: Negative for arthralgias and neck pain  Skin: Positive for rash     Allergic/Immunologic: Negative for environmental allergies  Neurological: Negative for dizziness, weakness and headaches  Psychiatric/Behavioral: The patient is not nervous/anxious            Past Medical History:   Diagnosis Date    Abrasion of axilla     LAST ASSESSED: 11/21/14    Acute cystitis with hematuria 8/15/2019    ADHD     Allergic     Allergic rhinitis     LAST ASSESSED: 5/15/15    Asthma     TRANSITIONED FROM: ASTHMA; RESOLVED: 11/9/16    Bee sting reaction 8/1/2019    Bilateral nephrolithiasis 7/25/2018    Depression     Fracture of plateau of left tibia     RESOLVED: 4/14/15    Headache     Hematuria     Hyperparathyroidism ([address] )     Hypothyroidism     Left ankle sprain     LAST ASSESSED: 10/21/14    No known health problems     DENIED MENTAL STATUS CHANGE AS PER [facility]; CONFLICTED WITH ADHD AND DEPRESSION IN MED HX SO IT COULD NOT BE ADDED IN PERT NEGS    Obstructive sleep apnea on CPAP     Scoliosis     Thyroid nodule     Urinary frequency     RESOLVED: 10/27/16    Vaginal bleeding, abnormal     LAST ASSESSED: 6/6/16    Viral URI with cough 12/30/2019         Current Outpatient Medications:     Acetaminophen (TYLENOL PO), Take by mouth as needed, Disp: , Rfl:     albuterol (PROAIR HFA) 90 mcg/act inhaler, Inhale 2 puffs every 6 (six) hours as needed for wheezing, Disp: 8 5 g, Rfl: 0    amLODIPine (NORVASC) 5 mg tablet, Take 1 tablet (5 mg total) by mouth daily, Disp: 90 tablet, Rfl: 1    amphetamine-dextroamphetamine (ADDERALL) 5 MG tablet, take 1 and 1/2 tablets by mouth every morning and 1 tablet every evening, Disp: 75 tablet, Rfl: 0    cetirizine (ZyrTEC) 10 mg tablet, Take 10 mg by mouth 2 (two) times a day , Disp: , Rfl:     Cholecalciferol (VITAMIN D3) 1000 units CAPS, Take 1 capsule by mouth Daily  , Disp: , Rfl:     EPINEPHrine (EPIPEN JR) 0 15 mg/0 3 mL SOAJ, Inject 0 3 mL (0 15 mg total) into a muscle once for 1 dose, Disp: 0 3 mL, Rfl: 0    FLUoxetine (PROzac) 20 mg capsule, Take 1 capsule (20 mg total) by mouth daily, Disp: 30 capsule, Rfl: 3    fluticasone-salmeterol (Advair Diskus) 100-50 mcg/dose inhaler, Inhale 1 puff daily, Disp: 60 blister, Rfl: 1    levothyroxine 50 mcg tablet, Take 1 tablet (50 mcg total) by mouth daily, Disp: 90 tablet, Rfl: 1    Melatonin 5 MG TABS, Take 1 tablet by mouth daily at bedtime, Disp: , Rfl:     multivitamin (THERAGRAN) TABS, Take 1 tablet by mouth daily  , Disp: , Rfl:     predniSONE 20 mg tablet, Take 1 tablet (20 mg total) by mouth daily for 9 days Take 40 mg for 3 days, 30 mg for 3 days and 20 mg for 3 days, Disp: 15 tablet, Rfl: 0    Triamcinolone Acetonide (Nasacort Allergy) 55 MCG/ACT nasal spray, 1 Act into each nostril Daily 2 puffs in each nostril, Disp: , Rfl:     valsartan (DIOVAN) 160 mg tablet, Take 1 tablet (160 mg total) by mouth daily, Disp: 90 tablet, Rfl: 1    venlafaxine (EFFEXOR-XR) 150 mg 24 hr capsule, Take 1 capsule (150 mg total) by mouth daily, Disp: 90 capsule, Rfl: 1    olopatadine (PATANOL) 0 1 % ophthalmic solution, Administer 1 drop to both eyes 2 (two) times a day (Patient not taking: No sig reported), Disp: 5 mL, Rfl: 2    Allergies   Allergen Reactions    Compazine [Prochlorperazine] Anaphylaxis     Category: Allergy; Annotation - [id number]: ALL FORMS    Eggs Or Egg-Derived Products - Food Allergy Abdominal Pain and Diarrhea    Erythromycin GI Intolerance     Category: Allergy; Annotation - [id number]: ALL FORMS    Lactose Intolerance (Gi) - Food Allergy Abdominal Pain and Diarrhea    Sulfa Antibiotics GI Intolerance     Patient states that it is like when you are drunk, dizziness and confusion    Sulfamethoxazole-Trimethoprim Nausea Only and Dizziness     Category: Allergy;  Annotation - [id number]: ALL FORMS       Social History   Past Surgical History:   Procedure Laterality Date    FOOT SURGERY Right     "Nerve removal"    PARATHYROID GLAND SURGERY  5/17/2018    WA COLONOSCOPY FLX DX W/COLLJ SPEC WHEN PFRMD N/A 6/21/2018    Procedure: COLONOSCOPY;  Surgeon: Siria Tolliver MD;  [facility]: [facility]; Service: Gastroenterology    VA EXPLORE PARATHYROID GLANDS Right 5/17/2018    Procedure: MINIMALLY INVASIVE PARATHYROIDECTOMY;   PTH MONITORING;  Surgeon: Momo Chiu MD;  Location: [facility];  Service: Surgical Oncology    TONSILLECTOMY      TONSILLECTOMY AND ADENOIDECTOMY       Family History   Problem Relation Age of Onset    Prostate cancer Father     Anemia Father     Neuropathy Father     Prostate cancer Brother     Paget's disease of bone Brother     Heart Valve Disease Mother         s/p MVR    Glaucoma Mother     Rickets Mother     Stroke Mother     Hypertension Family         BENIGN ESSENTIAL    Heart disease Family         CARDIAC DISORDER    No Known Problems Maternal Grandmother     No Known Problems Maternal Grandfather     No Known Problems Paternal Grandmother     No Known Problems Paternal Grandfather     No Known Problems Paternal Aunt        Objective:  /96 (BP Location: Left arm, Patient Position: Sitting, Cuff Size: Standard)   Pulse 84   Temp 98 °F (36 7 °C) (Temporal)   Ht 5' 1" (1 549 m)   Wt 75 3 kg (166 lb 1 6 oz)   LMP  (LMP Unknown)   SpO2 97%   BMI 31 38 kg/m²   Body mass index is 31 38 kg/m²  Physical Exam  Constitutional:       Appearance: She is well-developed  HENT:      Head: Normocephalic  Right Ear: External ear normal       Left Ear: External ear normal       Nose: No rhinorrhea  Mouth/Throat:      Pharynx: No posterior oropharyngeal erythema  Eyes:      General: No scleral icterus  Pupils: Pupils are equal, round, and reactive to light  Neck:      Thyroid: No thyromegaly  Trachea: No tracheal deviation  Cardiovascular:      Rate and Rhythm: Normal rate and regular rhythm  Heart sounds: Normal heart sounds  Pulmonary:      Effort: Pulmonary effort is normal  No respiratory distress        Breath sounds: Normal breath sounds  Chest:      Chest wall: No tenderness  Abdominal:      General: Bowel sounds are normal       Palpations: Abdomen is soft  There is no mass  Tenderness: There is no abdominal tenderness  Musculoskeletal:         General: Normal range of motion  Cervical back: Normal range of motion and neck supple  Right lower leg: No edema  Left lower leg: No edema  Lymphadenopathy:      Cervical: No cervical adenopathy  Skin:     General: Skin is warm  Findings: Rash present  Comments: Vesicular rash noted over both upper extremity compatible with poison ivy/oak dermatitis   Neurological:      Mental Status: She is alert and oriented to person, place, and time  Cranial Nerves: No cranial nerve deficit  Psychiatric:         Mood and Affect: Mood normal          Behavior: Behavior normal          Thought Content:  Thought content normal

## 2022-09-27 DIAGNOSIS — F90.0 ADHD, PREDOMINANTLY INATTENTIVE TYPE: ICD-10-CM

## 2022-09-27 RX ORDER — DEXTROAMPHETAMINE SACCHARATE, AMPHETAMINE ASPARTATE, DEXTROAMPHETAMINE SULFATE AND AMPHETAMINE SULFATE 1.25; 1.25; 1.25; 1.25 MG/1; MG/1; MG/1; MG/1
TABLET ORAL
Qty: 75 TABLET | Refills: 0 | Status: SHIPPED | OUTPATIENT
Start: 2022-09-27

## 2022-10-10 DIAGNOSIS — Z87.09 HISTORY OF ASTHMA: ICD-10-CM

## 2022-10-10 RX ORDER — FLUTICASONE PROPIONATE AND SALMETEROL 100; 50 UG/1; UG/1
1 POWDER RESPIRATORY (INHALATION) DAILY
Qty: 60 BLISTER | Refills: 5 | Status: SHIPPED | OUTPATIENT
Start: 2022-10-10

## 2022-10-11 PROBLEM — H10.13 ALLERGIC CONJUNCTIVITIS OF BOTH EYES: Status: RESOLVED | Noted: 2022-06-30 | Resolved: 2022-10-11

## 2022-10-26 NOTE — TELEPHONE ENCOUNTER
I called the patient with the results  She stated that she asked Dr Bety Vidal what the next step would be if the findings were arthritis    She said that she was told that she would be referred to a hand specialist   I do not see an order in the chart for this referral  69F  with MR and DM bib brother for abdominal pain and back pain onset this morning. Pt sent for EKG. Denies vomiting, noted to be tachy, hypotensive and oral temp of 99.8.

## 2022-10-31 DIAGNOSIS — F33.0 MILD EPISODE OF RECURRENT MAJOR DEPRESSIVE DISORDER (HCC): ICD-10-CM

## 2022-10-31 DIAGNOSIS — F32.A DEPRESSION, UNSPECIFIED DEPRESSION TYPE: ICD-10-CM

## 2022-10-31 RX ORDER — VENLAFAXINE HYDROCHLORIDE 150 MG/1
150 CAPSULE, EXTENDED RELEASE ORAL DAILY
Qty: 90 CAPSULE | Refills: 1 | Status: SHIPPED | OUTPATIENT
Start: 2022-10-31

## 2022-10-31 RX ORDER — FLUOXETINE HYDROCHLORIDE 20 MG/1
20 CAPSULE ORAL DAILY
Qty: 90 CAPSULE | Refills: 1 | Status: SHIPPED | OUTPATIENT
Start: 2022-10-31

## 2022-11-18 DIAGNOSIS — F90.0 ADHD, PREDOMINANTLY INATTENTIVE TYPE: ICD-10-CM

## 2022-11-21 RX ORDER — DEXTROAMPHETAMINE SACCHARATE, AMPHETAMINE ASPARTATE, DEXTROAMPHETAMINE SULFATE AND AMPHETAMINE SULFATE 1.25; 1.25; 1.25; 1.25 MG/1; MG/1; MG/1; MG/1
TABLET ORAL
Qty: 75 TABLET | Refills: 0 | Status: SHIPPED | OUTPATIENT
Start: 2022-11-21 | End: 2022-11-22 | Stop reason: SDUPTHER

## 2022-11-22 DIAGNOSIS — F90.0 ADHD, PREDOMINANTLY INATTENTIVE TYPE: ICD-10-CM

## 2022-11-22 RX ORDER — DEXTROAMPHETAMINE SACCHARATE, AMPHETAMINE ASPARTATE, DEXTROAMPHETAMINE SULFATE AND AMPHETAMINE SULFATE 1.25; 1.25; 1.25; 1.25 MG/1; MG/1; MG/1; MG/1
TABLET ORAL
Qty: 75 TABLET | Refills: 0 | Status: SHIPPED | OUTPATIENT
Start: 2022-11-22

## 2022-11-29 ENCOUNTER — HOSPITAL ENCOUNTER (OUTPATIENT)
Dept: RADIOLOGY | Facility: HOSPITAL | Age: 62
Discharge: HOME | End: 2022-11-29
Attending: INTERNAL MEDICINE

## 2022-11-29 DIAGNOSIS — R13.14 PHARYNGOESOPHAGEAL DYSPHAGIA: ICD-10-CM

## 2022-12-06 ENCOUNTER — OFFICE VISIT (OUTPATIENT)
Dept: INTERNAL MEDICINE CLINIC | Facility: CLINIC | Age: 62
End: 2022-12-06

## 2022-12-06 VITALS
TEMPERATURE: 98.9 F | OXYGEN SATURATION: 99 % | HEIGHT: 62 IN | BODY MASS INDEX: 31.1 KG/M2 | HEART RATE: 91 BPM | DIASTOLIC BLOOD PRESSURE: 78 MMHG | WEIGHT: 169 LBS | SYSTOLIC BLOOD PRESSURE: 114 MMHG

## 2022-12-06 DIAGNOSIS — E03.9 HYPOTHYROIDISM, UNSPECIFIED TYPE: ICD-10-CM

## 2022-12-06 DIAGNOSIS — K44.9 HIATAL HERNIA: ICD-10-CM

## 2022-12-06 DIAGNOSIS — R14.0 ABDOMINAL BLOATING: ICD-10-CM

## 2022-12-06 DIAGNOSIS — I10 HYPERTENSION, ESSENTIAL, BENIGN: Primary | ICD-10-CM

## 2022-12-06 NOTE — PROGRESS NOTES
Name: Swati Spear      : 1960      MRN: [id number]  Encounter Provider: Rekha Serrano MD  Encounter Date: 2022   Encounter department: [address] [place] [address]     1  Hypertension, essential, benign  Assessment & Plan:  Blood pressure is stable continue amlodipine and valsartan 160      2  Hypothyroidism, unspecified type  Assessment & Plan:  Reprinted patient's orders for thyroid function studies  3  Hiatal hernia  Assessment & Plan:  Seen on video barium swallow no other abnormality noted to explain her dysphagia      4  Abdominal bloating  Assessment & Plan:  Nonspecific finding with some nonspecific tenderness  Will check some labs along with an abdominal ultrasound possibly a CT scan to follow    Orders:  -     US abdomen complete; Future; Expected date: 2022  -     CBC and differential; Future  -     Comprehensive metabolic panel; Future  -     Lipid panel; Future  -     Amylase; Future  -     C-reactive protein; Future           Subjective      Patient presents to the office with concerns about some abdominal bloating over the past 3 weeks  She describes it more of a discomfort than a pain  There is no associated vomiting  She does state that she has felt nauseous on a few occasions  She has not noticed any change in her bowel habits or her urinary habits  There has been no weight loss  She recently had a video barium swallow to evaluate for esophageal dysphagia  The exam disclosed no abnormalities of the esophagus on swallowing and did show a mild to moderate sliding hiatal hernia  She has had no fever, chills or night sweats  Review of Systems   Constitutional: Positive for activity change (Some increased fatigue)  Negative for appetite change, chills, diaphoresis, fatigue, fever and unexpected weight change  HENT: Negative  Eyes: Negative  Respiratory: Negative  Cardiovascular: Negative  Gastrointestinal: Positive for abdominal distention and nausea  Negative for abdominal pain, anal bleeding, blood in stool, constipation, diarrhea, rectal pain and vomiting  Endocrine: Negative  Genitourinary: Negative  Musculoskeletal: Negative  Allergic/Immunologic: Negative  Neurological: Negative  Hematological: Negative  Psychiatric/Behavioral: Negative          Current Outpatient Medications on File Prior to Visit   Medication Sig   • Acetaminophen (TYLENOL PO) Take by mouth as needed   • amLODIPine (NORVASC) 5 mg tablet Take 1 tablet (5 mg total) by mouth daily   • amphetamine-dextroamphetamine (ADDERALL) 5 MG tablet take 1 and 1/2 tablets by mouth every morning and 1 tablet every evening   • cetirizine (ZyrTEC) 10 mg tablet Take 10 mg by mouth 2 (two) times a day    • Cholecalciferol (VITAMIN D3) 1000 units CAPS Take 1 capsule by mouth Daily     • EPINEPHrine (EPIPEN JR) 0 15 mg/0 3 mL SOAJ Inject 0 3 mL (0 15 mg total) into a muscle once for 1 dose   • FLUoxetine (PROzac) 20 mg capsule Take 1 capsule (20 mg total) by mouth daily   • Fluticasone-Salmeterol (Advair Diskus) 100-50 mcg/dose inhaler Inhale 1 puff daily   • levothyroxine 50 mcg tablet Take 1 tablet (50 mcg total) by mouth daily   • Melatonin 5 MG TABS Take 1 tablet by mouth daily at bedtime   • multivitamin (THERAGRAN) TABS Take 1 tablet by mouth daily     • Triamcinolone Acetonide (Nasacort Allergy) 55 MCG/ACT nasal spray 1 Act into each nostril Daily 2 puffs in each nostril   • valsartan (DIOVAN) 160 mg tablet Take 1 tablet (160 mg total) by mouth daily   • venlafaxine (EFFEXOR-XR) 150 mg 24 hr capsule Take 1 capsule (150 mg total) by mouth daily   • [DISCONTINUED] albuterol (PROAIR HFA) 90 mcg/act inhaler Inhale 2 puffs every 6 (six) hours as needed for wheezing       Objective     /78 (BP Location: Left arm, Patient Position: Sitting, Cuff Size: Standard)   Pulse 91   Temp 98 9 °F (37 2 °C) (Tympanic)   Ht 5' 1 69" (1 567 m)   Wt 76 7 kg (169 lb)   LMP  (LMP Unknown)   SpO2 99%   BMI 31 22 kg/m²     Physical Exam  Vitals reviewed  Constitutional:       General: She is not in acute distress  Appearance: Normal appearance  She is well-developed  She is not ill-appearing, toxic-appearing or diaphoretic  HENT:      Head: Normocephalic and atraumatic  Right Ear: External ear normal       Left Ear: External ear normal    Eyes:      General: No scleral icterus  Extraocular Movements: Extraocular movements intact  Conjunctiva/sclera: Conjunctivae normal       Pupils: Pupils are equal, round, and reactive to light  Cardiovascular:      Rate and Rhythm: Normal rate and regular rhythm  Heart sounds: Normal heart sounds  No murmur heard  No friction rub  Pulmonary:      Effort: Pulmonary effort is normal       Breath sounds: Normal breath sounds  No wheezing or rales  Abdominal:      General: Bowel sounds are decreased  Tenderness: There is abdominal tenderness in the left lower quadrant  There is no right CVA tenderness, left CVA tenderness or guarding  Negative signs include Schultz's sign, Rovsing's sign, McBurney's sign, psoas sign and obturator sign  Hernia: No hernia is present  Genitourinary:     Rectum: Normal    Musculoskeletal:         General: No swelling or tenderness  Cervical back: Neck supple  No rigidity  Right lower leg: No edema  Left lower leg: No edema  Skin:     General: Skin is warm and dry  Capillary Refill: Capillary refill takes less than 2 seconds  Coloration: Skin is not cyanotic, jaundiced or mottled  Findings: No bruising, erythema or rash  Neurological:      General: No focal deficit present  Mental Status: She is alert and oriented to person, place, and time  Mental status is at baseline     Psychiatric:         Mood and Affect: Mood normal          Behavior: Behavior normal        Justus Barron MD

## 2022-12-06 NOTE — ASSESSMENT & PLAN NOTE
Nonspecific finding with some nonspecific tenderness    Will check some labs along with an abdominal ultrasound possibly a CT scan to follow

## 2022-12-07 ENCOUNTER — APPOINTMENT (OUTPATIENT)
Dept: LAB | Age: 62
End: 2022-12-07

## 2022-12-07 ENCOUNTER — HOSPITAL ENCOUNTER (OUTPATIENT)
Dept: RADIOLOGY | Age: 62
Discharge: HOME | End: 2022-12-07

## 2022-12-07 DIAGNOSIS — R14.0 ABDOMINAL BLOATING: ICD-10-CM

## 2022-12-07 LAB
ALBUMIN SERPL BCP-MCNC: 4.3 G/DL (ref 3.5–5)
ALP SERPL-CCNC: 64 U/L (ref 46–116)
ALT SERPL W P-5'-P-CCNC: 45 U/L (ref 12–78)
AMYLASE SERPL-CCNC: 84 IU/L (ref 25–115)
ANION GAP SERPL CALCULATED.3IONS-SCNC: 5 MMOL/L (ref 4–13)
AST SERPL W P-5'-P-CCNC: 25 U/L (ref 5–45)
BASOPHILS # BLD AUTO: 0.07 THOUSANDS/ÂΜL (ref 0–0.1)
BASOPHILS NFR BLD AUTO: 1 % (ref 0–1)
BILIRUB SERPL-MCNC: 0.42 MG/DL (ref 0.2–1)
BUN SERPL-MCNC: 20 MG/DL (ref 5–25)
CALCIUM SERPL-MCNC: 10.1 MG/DL (ref 8.3–10.1)
CHLORIDE SERPL-SCNC: 104 MMOL/L (ref 96–108)
CHOLEST SERPL-MCNC: 251 MG/DL
CO2 SERPL-SCNC: 28 MMOL/L (ref 21–32)
CREAT SERPL-MCNC: 0.83 MG/DL (ref 0.6–1.3)
CRP SERPL QL: 3.3 MG/L
EOSINOPHIL # BLD AUTO: 0.2 THOUSAND/ÂΜL (ref 0–0.61)
EOSINOPHIL NFR BLD AUTO: 3 % (ref 0–6)
ERYTHROCYTE [DISTWIDTH] IN BLOOD BY AUTOMATED COUNT: 13.6 % (ref 11.6–15.1)
GFR SERPL CREATININE-BSD FRML MDRD: 76 ML/MIN/1.73SQ M
GLUCOSE P FAST SERPL-MCNC: 82 MG/DL (ref 65–99)
HCT VFR BLD AUTO: 39.7 % (ref 34.8–46.1)
HDLC SERPL-MCNC: 72 MG/DL
HGB BLD-MCNC: 12.8 G/DL (ref 11.5–15.4)
IMM GRANULOCYTES # BLD AUTO: 0.02 THOUSAND/UL (ref 0–0.2)
IMM GRANULOCYTES NFR BLD AUTO: 0 % (ref 0–2)
LDLC SERPL CALC-MCNC: 154 MG/DL (ref 0–100)
LYMPHOCYTES # BLD AUTO: 2.68 THOUSANDS/ÂΜL (ref 0.6–4.47)
LYMPHOCYTES NFR BLD AUTO: 42 % (ref 14–44)
MCH RBC QN AUTO: 30 PG (ref 26.8–34.3)
MCHC RBC AUTO-ENTMCNC: 32.2 G/DL (ref 31.4–37.4)
MCV RBC AUTO: 93 FL (ref 82–98)
MONOCYTES # BLD AUTO: 0.55 THOUSAND/ÂΜL (ref 0.17–1.22)
MONOCYTES NFR BLD AUTO: 9 % (ref 4–12)
NEUTROPHILS # BLD AUTO: 2.92 THOUSANDS/ÂΜL (ref 1.85–7.62)
NEUTS SEG NFR BLD AUTO: 45 % (ref 43–75)
NONHDLC SERPL-MCNC: 179 MG/DL
NRBC BLD AUTO-RTO: 0 /100 WBCS
PLATELET # BLD AUTO: 350 THOUSANDS/UL (ref 149–390)
PMV BLD AUTO: 11.3 FL (ref 8.9–12.7)
POTASSIUM SERPL-SCNC: 4 MMOL/L (ref 3.5–5.3)
PROT SERPL-MCNC: 7.3 G/DL (ref 6.4–8.4)
RBC # BLD AUTO: 4.27 MILLION/UL (ref 3.81–5.12)
SODIUM SERPL-SCNC: 137 MMOL/L (ref 135–147)
TRIGL SERPL-MCNC: 124 MG/DL
WBC # BLD AUTO: 6.44 THOUSAND/UL (ref 4.31–10.16)

## 2022-12-12 ENCOUNTER — HOSPITAL ENCOUNTER (OUTPATIENT)
Dept: MAMMOGRAPHY | Facility: CLINIC | Age: 62
Discharge: HOME | End: 2022-12-12

## 2022-12-12 ENCOUNTER — HOSPITAL ENCOUNTER (OUTPATIENT)
Dept: ULTRASOUND IMAGING | Facility: CLINIC | Age: 62
Discharge: HOME | End: 2022-12-12

## 2022-12-12 VITALS — HEIGHT: 61 IN | BODY MASS INDEX: 31.91 KG/M2 | WEIGHT: 169 LBS

## 2022-12-12 DIAGNOSIS — R92.8 ABNORMAL MAMMOGRAM: ICD-10-CM

## 2022-12-12 NOTE — PROGRESS NOTES
Met with patient and Dr Javier Stafford     regarding recommendation for;      __x___ RIGHT ______LEFT      __x___Ultrasound guided  ______Stereotactic  Breast biopsy  __x___Verbalized understanding  Blood thinners:  _____yes _x____no    Date stopped: _____x______    Biopsy teaching sheet given:  ___x____yes ______no    I reviewed Armond's medical history-see Epic summary  Consent reviewed

## 2022-12-13 ENCOUNTER — TELEPHONE (OUTPATIENT)
Dept: INTERNAL MEDICINE CLINIC | Facility: CLINIC | Age: 62
End: 2022-12-13

## 2022-12-14 DIAGNOSIS — K76.0 HEPATIC STEATOSIS: Primary | ICD-10-CM

## 2022-12-27 NOTE — PROGRESS NOTES
[facility] - Outpatient Consultation  Cj York 63 y o  female MRN: [id number]  Encounter: [id number]    PCP:  Bisi Llamas MD, [phone]  Referring Provider: Rickie Cunningham, [phone]    Patient: Cj Yokr, 1960  Reason for Referral: fatty liver disease     ASSESSMENT/PLAN:  63 y o  female with  with HTN, class I obesity, ERICA on CPAP, hypothyroidism, and depression who presents for initial evaluation  She has no acute liver specific complaints or clinical evidence of hepatic decompensation  She was incidentally found to have hepatic steatosis on ultrasound for work-up of abdominal discomfort  She has elevated serum ALT but has normal synthetic function and platelets  We discussed that she likely has NAFLD given her metabolic risk factors, but will complete a serologic work-up to exclude other causes of liver disease  She has low FIB-4 index which excludes advanced liver disease but will obtain ultrasound elastography to assess hepatic fibrosis  We discussed natural history, prognosis, and complications of NAFLD, including progression to cirrhosis as well as risk for cardiovascular events  We discussed that weight loss of at least 5-10% of her body weight as well as aggressive modification of metabolic risk factors are cade in the management to reduce progression of her disease and its complications  I have recommended that she increase her physical activity and limit her intake of simple and refined carbohydrates  She will follow-up in 4 months or sooner if needed  Thank you for the opportunity consult her care      - HAV IgG, HBsAg, HBcAb, and HBsAg; vaccinate if non-immune  - Iron studies with ferritin  - Ceruloplasmin  - A1AT phenotype and levels   - PEGGY, ASMA, AMA, and quantitative immunoglobulins   - US elastography     Joshua Mas MD  Division of Gastroenterology and Hepatology  [facility]    ============================================================================  CC/HPI: 63 y o  female with HTN, class I obesity, ERICA on CPAP, hypothyroidism, and depression who presents for initial evaluation  She had an US in December 2022 for work-up of abdominal discomfort and was noted to have hepatic steatosis on ultrasound  She denies prior history of personal history or family history of liver disease  She denies excessive EtOH consumption and denies any high risk exposures  Regarding her weight, she is at her heaviest weight and has tried to lose weight loss through weight watchers but has not been successful with sustaining her weight loss  ROS: Complete review of systems otherwise negative       PAST MEDICAL/SURGICAL HISTORY:  Past Medical History:   Diagnosis Date   • Abrasion of axilla     LAST ASSESSED: 11/21/14   • Acute cystitis with hematuria 08/15/2019   • ADHD    • Allergic    • Allergic rhinitis     LAST ASSESSED: 5/15/15   • Anxiety 2000   • Asthma     TRANSITIONED FROM: ASTHMA; RESOLVED: 11/9/16   • Bee sting reaction 08/01/2019   • Bilateral nephrolithiasis 07/25/2018   • Depression    • Disease of thyroid gland     Hypothyroidism   • Fracture of plateau of left tibia     RESOLVED: 4/14/15   • Headache    • Hematuria    • Hyperparathyroidism ([facility] [address] )    • Hypertension    • Hypothyroidism    • Kidney stone 2018   • Left ankle sprain     LAST ASSESSED: 10/21/14   • No known health problems     DENIED MENTAL STATUS CHANGE AS PER [employer]CR[facility]; CONFLICTED WITH ADHD AND DEPRESSION IN MED HX SO IT COULD NOT BE ADDED IN PERT NEGS   • Obesity 2020   • Obstructive sleep apnea on CPAP    • Pneumonia Twice in [place]   • Scoliosis    • Thyroid nodule    • Urinary frequency     RESOLVED: 10/27/16   • Vaginal bleeding, abnormal     LAST ASSESSED: 6/6/16   • Viral URI with cough 12/30/2019   • Visual impairment         Past Surgical History:   Procedure Laterality Date   • EYE SURGERY  2010 Lasik   • FOOT SURGERY Right     "Nerve removal"   • FRACTURE SURGERY      left shoulder and right arm when I was a kid   • PARATHYROID GLAND SURGERY  05/17/2018   • VA COLONOSCOPY FLX DX W/COLLJ SPEC WHEN PFRMD N/A 06/21/2018    Procedure: COLONOSCOPY;  Surgeon: Madison Echevarria MD;  Location: [facility]; Service: Gastroenterology   • VA PARATHYROIDECTOMY/EXPLORATION PARATHYROIDS Right 05/17/2018    Procedure: MINIMALLY INVASIVE PARATHYROIDECTOMY;   PTH MONITORING;  Surgeon: Cory Pack MD;  Location: [facility];  Service: Surgical Oncology   • TONSILLECTOMY     • TONSILLECTOMY AND ADENOIDECTOMY         FAMILY/SOCIAL HISTORY:  Family History   Problem Relation Age of Onset   • Prostate cancer Father    • Anemia Father    • Neuropathy Father    • Arthritis Father    • Cancer Father         PROSTATE   • Prostate cancer Brother    • Paget's disease of bone Brother    • Arthritis Brother    • Cancer Brother         PROSTATE   • Hearing loss Brother         Tinitus   • Heart Valve Disease Mother         s/p MVR   • Glaucoma Mother    • Rickets Mother    • Stroke Mother    • Hypertension Family         BENIGN ESSENTIAL   • Heart disease Family         CARDIAC DISORDER   • No Known Problems Maternal Grandmother    • No Known Problems Maternal Grandfather    • No Known Problems Paternal Grandmother    • No Known Problems Paternal Grandfather    • No Known Problems Paternal Aunt        Social History     Tobacco Use   • Smoking status: Never   • Smokeless tobacco: Never   Vaping Use   • Vaping Use: Never used   Substance Use Topics   • Alcohol use: Yes     Comment: I have a drink or two a couple times a month     • Drug use: No       MEDICATIONS:  Current Outpatient Medications on File Prior to Visit   Medication Sig Dispense Refill   • Acetaminophen (TYLENOL PO) Take by mouth as needed     • amLODIPine (NORVASC) 5 mg tablet Take 1 tablet (5 mg total) by mouth daily 90 tablet 1   • amphetamine-dextroamphetamine (ADDERALL) 5 MG tablet take 1 and 1/2 tablets by mouth every morning and 1 tablet every evening 75 tablet 0   • cetirizine (ZyrTEC) 10 mg tablet Take 10 mg by mouth 2 (two) times a day      • Cholecalciferol (VITAMIN D3) 1000 units CAPS Take 1 capsule by mouth Daily       • EPINEPHrine (EPIPEN JR) 0 15 mg/0 3 mL SOAJ Inject 0 3 mL (0 15 mg total) into a muscle once for 1 dose 0 3 mL 0   • FLUoxetine (PROzac) 20 mg capsule Take 1 capsule (20 mg total) by mouth daily 90 capsule 1   • Fluticasone-Salmeterol (Advair Diskus) 100-50 mcg/dose inhaler Inhale 1 puff daily 60 blister 5   • Melatonin 5 MG TABS Take 1 tablet by mouth daily at bedtime     • multivitamin (THERAGRAN) TABS Take 1 tablet by mouth daily       • Triamcinolone Acetonide (Nasacort Allergy) 55 MCG/ACT nasal spray 1 Act into each nostril Daily 2 puffs in each nostril     • valsartan (DIOVAN) 160 mg tablet Take 1 tablet (160 mg total) by mouth daily 90 tablet 1   • venlafaxine (EFFEXOR-XR) 150 mg 24 hr capsule Take 1 capsule (150 mg total) by mouth daily 90 capsule 1   • [DISCONTINUED] levothyroxine 50 mcg tablet Take 1 tablet (50 mcg total) by mouth daily 90 tablet 1     No current facility-administered medications on file prior to visit  Allergies   Allergen Reactions   • Compazine [Prochlorperazine] Anaphylaxis     Category: Allergy; Annotation - [id number]: ALL FORMS   • Eggs Or Egg-Derived Products - Food Allergy Abdominal Pain and Diarrhea   • Erythromycin GI Intolerance     Category: Allergy; Annotation - [id number]: ALL FORMS   • Lactose Intolerance (Gi) - Food Allergy Abdominal Pain and Diarrhea   • Sulfa Antibiotics GI Intolerance     Patient states that it is like when you are drunk, dizziness and confusion  Bactrim   • Sulfamethoxazole-Trimethoprim Nausea Only and Dizziness     Category: Allergy;  Annotation - [id number]: ALL FORMS       PHYSICAL EXAM:  /76 (BP Location: Left arm, Patient Position: Sitting, Cuff Size: Standard)   Pulse 89   Temp (!) 97 4 °F (36 3 °C) (Tympanic)   Ht 5' 1" (1 549 m)   Wt 76 1 kg (167 lb 12 8 oz)   LMP  (LMP Unknown)   SpO2 96%   BMI 31 71 kg/m²   GENERAL: NAD, AAO  HEENT: anicteric, OP clear, MMMs  ABDOMEN: S/ND/NT, normoactive BS, no hepatomegaly, spleen not palpable  EXTREMITIES: no edema  SKIN: no rashes, no palmar erythema, no spider angiomata   NEURO: normal gait, no tremor, no asterixis     LABS/RADIOLOGY/ENDOSCOPY:  Lab Results   Component Value Date    WBC 6 44 12/07/2022    HGB 12 8 12/07/2022    HCT 39 7 12/07/2022     12/07/2022    GLUF 82 12/07/2022    BUN 20 12/07/2022    CREATININE 0 83 12/07/2022     11/29/2013    K 4 0 12/07/2022     12/07/2022    CO2 28 12/07/2022    PROT 7 6 11/29/2013    ALKPHOS 64 12/07/2022    ALT 45 12/07/2022    AST 25 12/07/2022    GLOB 2 7 08/09/2021    CALCIUM 10 1 12/07/2022    EGFR 76 12/07/2022    CHOL 223 11/29/2013    TRIG 124 12/07/2022    HDL 72 12/07/2022    INR 0 97 04/02/2018    PTT 27 04/02/2018     US (12/2022)   Nephrolithiasis  Hepatic steatosis  Computed MELD-Na score unavailable  Necessary lab results were not found in the last year  Computed MELD score unavailable  Necessary lab results were not found in the last year

## 2022-12-28 ENCOUNTER — OFFICE VISIT (OUTPATIENT)
Dept: GASTROENTEROLOGY | Facility: CLINIC | Age: 62
End: 2022-12-28

## 2022-12-28 VITALS
SYSTOLIC BLOOD PRESSURE: 124 MMHG | DIASTOLIC BLOOD PRESSURE: 76 MMHG | HEIGHT: 61 IN | WEIGHT: 167.8 LBS | TEMPERATURE: 97.4 F | BODY MASS INDEX: 31.68 KG/M2 | HEART RATE: 89 BPM | OXYGEN SATURATION: 96 %

## 2022-12-28 DIAGNOSIS — E03.9 ACQUIRED HYPOTHYROIDISM: ICD-10-CM

## 2022-12-28 DIAGNOSIS — K76.0 HEPATIC STEATOSIS: ICD-10-CM

## 2022-12-28 DIAGNOSIS — K76.0 NAFLD (NONALCOHOLIC FATTY LIVER DISEASE): Primary | ICD-10-CM

## 2022-12-28 RX ORDER — LEVOTHYROXINE SODIUM 0.05 MG/1
50 TABLET ORAL DAILY
Qty: 90 TABLET | Refills: 1 | Status: SHIPPED | OUTPATIENT
Start: 2022-12-28

## 2022-12-28 NOTE — PATIENT INSTRUCTIONS
For patients with fatty liver disease, weight loss through diet and exercise is recommended  To MAINTAIN weight you must use up at least as many calories as you take in  To LOSE weight you must use up more calories than you take in  Start by knowing how many calories you should be eating and drinking to maintain your weight  Nutrition and calorie information on food labels is typically based on a 2,000 calorie diet  You may need fewer or more calories depending on several factors including age, gender, and level of physical activity  Increase the amount and intensity of your physical activity to match the number of calories you take in  Aim for at least 150 minutes of moderate physical activity or 75 minutes of vigorous physical activity - or an equal combination of both - each week  Regular physical activity can help you maintain your weight, keep off weight that you lose and help you reach physical and cardiovascular fitness  If it's hard to schedule regular exercise sessions, try aiming for sessions of at least 10 minutes spread throughout the week  As you make daily food choices, base your eating pattern on these recommendations:   Eat a variety of fresh, frozen and canned vegetables and fruits without high-calorie sauces or added salt and sugars  Replace high-calorie foods with fruits and vegetables  Choose fiber-rich whole grains for most grain servings  Choose poultry and fish without skin and prepare them in healthy ways without added saturated and trans fat  If you choose to eat meat, look for the leanest cuts available and prepare them in healthy and delicious ways  Eat a variety of fish at least twice a week, especially fish containing omega-3 fatty acids (for example, salmon, trout and herring)  Select fat-free (skim) and low-fat (1%) dairy products  Avoid foods containing partially hydrogenated vegetable oils to reduce trans fat in your diet     Limit saturated fat and trans fat and replace them with the better fats, monounsaturated and polyunsaturated  If you need to lower your blood cholesterol, reduce saturated fat to no more than 5 to 6 percent of total calories  For someone eating 2,000 calories a day, that's about 13 grams of saturated fat  Cut back on beverages and foods with added sugars  Choose foods with less sodium and prepare foods with little or no salt  To prevent fluid problems in the legs and abdomen aim to eat no more than 2,000 milligrams of sodium per day  If you can't meet these goals right now, even reducing sodium intake by 1,000 mg per day can benefit blood pressure  If you drink alcohol, drink in moderation  That means no more than one drink per day if you're a woman and no more than two drinks per day if you're a man  Please note that the effect of alcohol on NAFLD is unclear  Some studies show that moderate alcohol use may be beneficial, but others show no benefit and even harm  Discuss specific recommendations with your hepatologist    Studies have shown that drinking up to 4 cups of caffeinated coffee per day may reduce progression of liver disease and development of liver cancer  If you have cirrhosis of the liver avoid raw shellfish such as oysters and clams  These can carry a specific bacteria that can be very harmful in liver disease    For more information on Fatty Liver Disease and Prevention please see this video from the [address]: [place]     For more information on healthy eating, please visit the American Heart Association website: José Miguel norman     For tips on physical activity, please visit the American Heart Association website:   [employer]     If you are looking for additional local support, education or are ready to take action to end liver disease, contact the [employer]  The long-term is the nation's leading nonprofit focusing on providing high-quality education and support services for the prevention, treatment and cure of over 100 liver diseases  You can learn more by visiting [email or web address]  [email or web address]        Patient will schedule US

## 2023-01-03 ENCOUNTER — OFFICE VISIT (OUTPATIENT)
Dept: INTERNAL MEDICINE CLINIC | Facility: CLINIC | Age: 63
End: 2023-01-03

## 2023-01-03 VITALS
BODY MASS INDEX: 31.47 KG/M2 | SYSTOLIC BLOOD PRESSURE: 124 MMHG | OXYGEN SATURATION: 97 % | TEMPERATURE: 98.6 F | WEIGHT: 171 LBS | HEIGHT: 62 IN | DIASTOLIC BLOOD PRESSURE: 80 MMHG | HEART RATE: 86 BPM

## 2023-01-03 DIAGNOSIS — F90.0 ADHD, PREDOMINANTLY INATTENTIVE TYPE: ICD-10-CM

## 2023-01-03 DIAGNOSIS — K76.0 HEPATIC STEATOSIS: ICD-10-CM

## 2023-01-03 DIAGNOSIS — F32.0 CURRENT MILD EPISODE OF MAJOR DEPRESSIVE DISORDER WITHOUT PRIOR EPISODE (HCC): ICD-10-CM

## 2023-01-03 DIAGNOSIS — Z23 NEED FOR INFLUENZA VACCINATION: ICD-10-CM

## 2023-01-03 DIAGNOSIS — I10 HYPERTENSION, ESSENTIAL, BENIGN: ICD-10-CM

## 2023-01-03 DIAGNOSIS — Z23 NEED FOR VACCINATION AGAINST STREPTOCOCCUS PNEUMONIAE: Primary | ICD-10-CM

## 2023-01-03 DIAGNOSIS — E03.9 HYPOTHYROIDISM, UNSPECIFIED TYPE: ICD-10-CM

## 2023-01-03 PROBLEM — L23.7 POISON IVY DERMATITIS: Status: RESOLVED | Noted: 2022-08-08 | Resolved: 2023-01-03

## 2023-01-03 RX ORDER — DEXTROAMPHETAMINE SACCHARATE, AMPHETAMINE ASPARTATE, DEXTROAMPHETAMINE SULFATE AND AMPHETAMINE SULFATE 1.25; 1.25; 1.25; 1.25 MG/1; MG/1; MG/1; MG/1
TABLET ORAL
Qty: 75 TABLET | Refills: 0 | Status: SHIPPED | OUTPATIENT
Start: 2023-01-03

## 2023-01-03 RX ORDER — VALSARTAN 160 MG/1
160 TABLET ORAL DAILY
Qty: 90 TABLET | Refills: 1 | Status: SHIPPED | OUTPATIENT
Start: 2023-01-03

## 2023-01-03 NOTE — PROGRESS NOTES
Name: Rony Arciniega      : 1960      MRN: [id number]  Encounter Provider: Josephine Thomas MD  Encounter Date: 1/3/2023   Encounter department: [address] [place] [address]  Need for vaccination against Streptococcus pneumoniae    2  ADHD, predominantly inattentive type  Assessment & Plan:  Patient has been adjusting medication dosage on her own and feels she may not require twice daily dosing  Orders:  -     amphetamine-dextroamphetamine (ADDERALL) 5 MG tablet; take 1 and 1/2 tablets by mouth every morning and 1 tablet every evening    3  Hypertension, essential, benign  Assessment & Plan:  Blood pressure is nicely controlled current medications  No changes are needed    Orders:  -     valsartan (DIOVAN) 160 mg tablet; Take 1 tablet (160 mg total) by mouth daily    4  Need for influenza vaccination  -     influenza vaccine, quadrivalent, recombinant, PF, 0 5 mL, for patients 18 yr+ (FLUBLOK)    5  Hepatic steatosis  Assessment & Plan:  Diagnostic evaluation is in progress  Seen by hepatology  Patients were diet weight reduction given to the patient  She is looking at initiating [address]      6  Hypothyroidism, unspecified type  Assessment & Plan:  Overdue thyroid function studies ordered 9 months ago  7  Current mild episode of major depressive disorder without prior episode [facility])  Assessment & Plan:  Changes in medication is necessary at this time patient continues on venlafaxine  Her mood is likely affected by recent findings of hepatic steatosis and the recent discovery of a right breast mass patient has a biopsy scheduled in 2 days           Subjective      Patient presents to the office for a follow-up visit  She is currently planning on undergoing a right breast biopsy for a small discovered on mammography and confirmed by ultrasound    She is also dealing with a recent diagnosis of hepatic steatosis and is still undergoing additional diagnostic studies  Recent lab studies significant for hypercholesterolemia given her ongoing use we will defer initiation of any cholesterol-lowering medications until we evaluate the effects of dietary changes  The patient is planning on initiating a Mediterranean diet plan on with some gradual increase in exercise activity  She does have some ongoing depression and anxiety issues which have been moderately affected by ongoing issues but not to the point where any of her medications require adjustments  She also has an attention deficit disorder the taking her medication twice daily but she feels that she probably only needs it once daily and would like to try to discontinue her afternoon dose  Review of Systems   Constitutional: Negative  HENT: Negative  Eyes: Negative  Respiratory: Negative  Cardiovascular: Negative  Gastrointestinal: Positive for abdominal distention  Endocrine: Negative  Genitourinary: Negative  Musculoskeletal: Negative  Allergic/Immunologic: Negative  Neurological: Negative  Hematological: Negative  Psychiatric/Behavioral: Positive for dysphoric mood         Current Outpatient Medications on File Prior to Visit   Medication Sig   • Acetaminophen (TYLENOL PO) Take by mouth as needed   • amLODIPine (NORVASC) 5 mg tablet Take 1 tablet (5 mg total) by mouth daily   • cetirizine (ZyrTEC) 10 mg tablet Take 10 mg by mouth 2 (two) times a day    • Cholecalciferol (VITAMIN D3) 1000 units CAPS Take 1 capsule by mouth Daily     • EPINEPHrine (EPIPEN JR) 0 15 mg/0 3 mL SOAJ Inject 0 3 mL (0 15 mg total) into a muscle once for 1 dose   • FLUoxetine (PROzac) 20 mg capsule Take 1 capsule (20 mg total) by mouth daily   • Fluticasone-Salmeterol (Advair Diskus) 100-50 mcg/dose inhaler Inhale 1 puff daily   • levothyroxine 50 mcg tablet Take 1 tablet (50 mcg total) by mouth daily   • Melatonin 5 MG TABS Take 1 tablet by mouth daily at bedtime   • multivitamin [facility]) TABS Take 1 tablet by mouth daily     • Triamcinolone Acetonide (Nasacort Allergy) 55 MCG/ACT nasal spray 1 Act into each nostril Daily 2 puffs in each nostril   • venlafaxine (EFFEXOR-XR) 150 mg 24 hr capsule Take 1 capsule (150 mg total) by mouth daily   • [DISCONTINUED] amphetamine-dextroamphetamine (ADDERALL) 5 MG tablet take 1 and 1/2 tablets by mouth every morning and 1 tablet every evening   • [DISCONTINUED] valsartan (DIOVAN) 160 mg tablet Take 1 tablet (160 mg total) by mouth daily     Patient's depression screening was positive with a PHQ-2 score of   Their PHQ-9 score was 9  Patient's depressive symptoms likely due to other medical condition  Would recommend treatment of underlying condition  Will continue to monitor at next office visit  Objective     /80 (BP Location: Left arm, Patient Position: Sitting, Cuff Size: Standard)   Pulse 86   Temp 98 6 °F (37 °C) (Tympanic)   Ht 5' 1 73" (1 568 m)   Wt 77 6 kg (171 lb)   LMP  (LMP Unknown)   SpO2 97%   BMI 31 55 kg/m²     Physical Exam  Vitals reviewed  Constitutional:       General: She is not in acute distress  Appearance: Normal appearance  She is not ill-appearing, toxic-appearing or diaphoretic  HENT:      Head: Normocephalic and atraumatic  Right Ear: External ear normal       Left Ear: External ear normal    Eyes:      General: No scleral icterus  Conjunctiva/sclera: Conjunctivae normal       Pupils: Pupils are equal, round, and reactive to light  Cardiovascular:      Rate and Rhythm: Normal rate and regular rhythm  Heart sounds: Normal heart sounds  No murmur heard  Pulmonary:      Effort: Pulmonary effort is normal  No respiratory distress  Breath sounds: Normal breath sounds  Abdominal:      General: Bowel sounds are normal  There is no distension  Tenderness: There is no abdominal tenderness  Musculoskeletal:         General: No swelling  Normal range of motion        Cervical back: Neck supple  Right lower leg: No edema  Left lower leg: No edema  Lymphadenopathy:      Cervical: No cervical adenopathy  Skin:     General: Skin is warm  Coloration: Skin is not jaundiced  Findings: No bruising, erythema or rash  Neurological:      General: No focal deficit present  Mental Status: She is alert and oriented to person, place, and time  Mental status is at baseline  Psychiatric:         Mood and Affect: Mood normal          Behavior: Behavior normal          Thought Content:  Thought content normal          Judgment: Judgment normal        Karyna Garcia MD

## 2023-01-03 NOTE — PROGRESS NOTES
Depression Screening Follow-up Plan: Patient's depression screening was positive with a PHQ-2 score of    Their PHQ-9 score was 9  {Depression screen follow-up plan:[id number]}

## 2023-01-04 NOTE — ASSESSMENT & PLAN NOTE
Patient has been adjusting medication dosage on her own and feels she may not require twice daily dosing

## 2023-01-04 NOTE — ASSESSMENT & PLAN NOTE
Diagnostic evaluation is in progress  Seen by hepatology  Patients were diet weight reduction given to the patient    She is looking at initiating [id number]

## 2023-01-04 NOTE — ASSESSMENT & PLAN NOTE
Changes in medication is necessary at this time patient continues on venlafaxine    Her mood is likely affected by recent findings of hepatic steatosis and the recent discovery of a right breast mass patient has a biopsy scheduled in 2 days

## 2023-01-05 ENCOUNTER — HOSPITAL ENCOUNTER (OUTPATIENT)
Dept: ULTRASOUND IMAGING | Facility: CLINIC | Age: 63
Discharge: HOME | End: 2023-01-05

## 2023-01-05 ENCOUNTER — HOSPITAL ENCOUNTER (OUTPATIENT)
Dept: MAMMOGRAPHY | Facility: CLINIC | Age: 63
Discharge: HOME | End: 2023-01-05

## 2023-01-05 DIAGNOSIS — R92.8 ABNORMAL ULTRASOUND OF BREAST: ICD-10-CM

## 2023-01-05 DIAGNOSIS — R92.8 ABNORMAL MAMMOGRAM: ICD-10-CM

## 2023-01-05 RX ORDER — LIDOCAINE HYDROCHLORIDE 10 MG/ML
5 INJECTION, SOLUTION EPIDURAL; INFILTRATION; INTRACAUDAL; PERINEURAL ONCE
Status: DISCONTINUED | OUTPATIENT
Start: 2023-01-05 | End: 2023-01-06 | Stop reason: HOSPADM

## 2023-01-05 RX ADMIN — IOHEXOL 100 ML: 350 INJECTION, SOLUTION INTRAVENOUS at 09:00

## 2023-02-06 DIAGNOSIS — Z87.09 HISTORY OF ASTHMA: ICD-10-CM

## 2023-02-06 RX ORDER — FLUTICASONE PROPIONATE AND SALMETEROL 100; 50 UG/1; UG/1
1 POWDER RESPIRATORY (INHALATION) DAILY
Qty: 180 BLISTER | Refills: 1 | Status: SHIPPED | OUTPATIENT
Start: 2023-02-06

## 2023-03-03 DIAGNOSIS — F90.0 ADHD, PREDOMINANTLY INATTENTIVE TYPE: ICD-10-CM

## 2023-03-03 DIAGNOSIS — I10 HYPERTENSION, ESSENTIAL, BENIGN: ICD-10-CM

## 2023-03-06 RX ORDER — AMLODIPINE BESYLATE 5 MG/1
5 TABLET ORAL DAILY
Qty: 90 TABLET | Refills: 1 | Status: SHIPPED | OUTPATIENT
Start: 2023-03-06

## 2023-03-06 RX ORDER — DEXTROAMPHETAMINE SACCHARATE, AMPHETAMINE ASPARTATE, DEXTROAMPHETAMINE SULFATE AND AMPHETAMINE SULFATE 1.25; 1.25; 1.25; 1.25 MG/1; MG/1; MG/1; MG/1
TABLET ORAL
Qty: 75 TABLET | Refills: 0 | Status: SHIPPED | OUTPATIENT
Start: 2023-03-06

## 2023-04-05 ENCOUNTER — HOSPITAL ENCOUNTER (OUTPATIENT)
Dept: RADIOLOGY | Age: 63
Discharge: HOME | End: 2023-04-05

## 2023-04-05 DIAGNOSIS — K76.0 HEPATIC STEATOSIS: ICD-10-CM

## 2023-04-07 ENCOUNTER — OFFICE VISIT (OUTPATIENT)
Dept: INTERNAL MEDICINE CLINIC | Facility: OTHER | Age: 63
End: 2023-04-07

## 2023-04-07 VITALS
HEIGHT: 62 IN | DIASTOLIC BLOOD PRESSURE: 70 MMHG | SYSTOLIC BLOOD PRESSURE: 124 MMHG | HEART RATE: 84 BPM | TEMPERATURE: 98 F | OXYGEN SATURATION: 98 % | WEIGHT: 166.6 LBS | RESPIRATION RATE: 20 BRPM | BODY MASS INDEX: 30.66 KG/M2

## 2023-04-07 DIAGNOSIS — Z12.4 CERVICAL CANCER SCREENING: ICD-10-CM

## 2023-04-07 DIAGNOSIS — Z78.0 ASYMPTOMATIC POSTMENOPAUSAL STATUS: ICD-10-CM

## 2023-04-07 DIAGNOSIS — Z00.00 ANNUAL PHYSICAL EXAM: Primary | ICD-10-CM

## 2023-04-07 DIAGNOSIS — K76.0 HEPATIC STEATOSIS: ICD-10-CM

## 2023-04-07 DIAGNOSIS — E66.9 OBESITY (BMI 30-39.9): ICD-10-CM

## 2023-04-07 DIAGNOSIS — J45.20 MILD INTERMITTENT ASTHMATIC BRONCHITIS WITHOUT COMPLICATION: ICD-10-CM

## 2023-04-07 RX ORDER — ALBUTEROL SULFATE 90 UG/1
2 AEROSOL, METERED RESPIRATORY (INHALATION) EVERY 6 HOURS PRN
Qty: 18 G | Refills: 3 | Status: SHIPPED | OUTPATIENT
Start: 2023-04-07

## 2023-04-07 NOTE — PROGRESS NOTES
[facility] [place]    NAME: Michaelle Foster  AGE: 66 y o  SEX: female  : 1960     DATE: 2023     Assessment and Plan:     Problem List Items Addressed This Visit        Digestive    Hepatic steatosis    Relevant Orders    Ambulatory Referral to Nutrition Services       Respiratory    Asthmatic bronchitis without complication    Relevant Medications    albuterol (ProAir HFA) 90 mcg/act inhaler       Other    Obesity (BMI 30-39  9)    Relevant Orders    Ambulatory Referral to Nutrition Services    Annual physical exam - Primary     Discussed diet and exercise  Referral given to nutrition/dietitian  She is up-to-date on immunizations  She is up-to-date on lipid cardiovascular screening  She is up-to-date on breast cancer screening  She is up-to-date on colorectal cancer screening  Referral given to gynecology for cervical cancer screening  Other Visit Diagnoses     Cervical cancer screening        Relevant Orders    Ambulatory Referral to Gynecology    Asymptomatic postmenopausal status        Relevant Orders    DXA bone density spine hip and pelvis          Immunizations and preventive care screenings were discussed with patient today  Appropriate education was printed on patient's after visit summary  Counseling:  • Alcohol/drug use: discussed moderation in alcohol intake, the recommendations for healthy alcohol use, and avoidance of illicit drug use  • Dental Health: discussed importance of regular tooth brushing, flossing, and dental visits  • Injury prevention: discussed safety/seat belts, safety helmets, smoke detectors, carbon dioxide detectors, and smoking near bedding or upholstery  • Sexual health: discussed sexually transmitted diseases, partner selection, use of condoms, avoidance of unintended pregnancy, and contraceptive alternatives    · Exercise: the importance of regular exercise/physical activity was discussed  Recommend exercise 3-5 times per week for at least 30 minutes  BMI Counseling: Body mass index is 30 74 kg/m²  The BMI is above normal  Nutrition recommendations include decreasing portion sizes, encouraging healthy choices of fruits and vegetables, decreasing fast food intake, consuming healthier snacks, limiting drinks that contain sugar, moderation in carbohydrate intake, increasing intake of lean protein, reducing intake of saturated and trans fat and reducing intake of cholesterol  Exercise recommendations include moderate physical activity 150 minutes/week and exercising 3-5 times per week  No pharmacotherapy was ordered  Patient referred to weight management and PCP  Rationale for BMI follow-up plan is due to patient being overweight or obese  Return if symptoms worsen or fail to improve  Chief Complaint:     Chief Complaint   Patient presents with   • Physical Exam     For work will bring forms   • hm     Bmi f/u plan, cervical screening   • Medication Refill     Would like a refill on her albuterol rescue inhaler but its not on her med list any more      History of Present Illness:     Adult Annual Physical   Patient here for a comprehensive physical exam  The patient reports no problems  Diet and Physical Activity  · Diet/Nutrition: well balanced diet  · Exercise: 3-4 times a week on average  Depression Screening  PHQ-2/9 Depression Screening         General Health  · Sleep: sleeps poorly, gets 7-8 hours of sleep on average and due to not working, out of routine  Sleeps with CPAP  · Hearing: normal - bilateral   · Vision: no vision problems  · Dental: regular dental visits, brushes teeth twice daily and flosses teeth occasionally  /GYN Health  · Patient is: postmenopausal     Review of Systems:     Review of Systems   Constitutional: Negative for activity change, appetite change, chills, diaphoresis, fatigue and fever     HENT: Negative for congestion, postnasal drip, rhinorrhea, sinus pressure, sinus pain, sneezing and sore throat  Eyes: Negative for visual disturbance  Respiratory: Negative for apnea, cough, choking, chest tightness, shortness of breath and wheezing  Cardiovascular: Negative for chest pain, palpitations and leg swelling  Gastrointestinal: Negative for abdominal distention, abdominal pain, anal bleeding, blood in stool, constipation, diarrhea, nausea and vomiting  Endocrine: Negative for cold intolerance and heat intolerance  Genitourinary: Negative for difficulty urinating, dysuria and hematuria  Musculoskeletal: Negative  Skin: Negative  Neurological: Negative for dizziness, weakness, light-headedness, numbness and headaches  Hematological: Negative for adenopathy  Psychiatric/Behavioral: Negative for agitation, sleep disturbance and suicidal ideas  All other systems reviewed and are negative       Past Medical History:     Past Medical History:   Diagnosis Date   • Abrasion of axilla     LAST ASSESSED: 11/21/14   • Acute cystitis with hematuria 08/15/2019   • ADHD    • Allergic    • Allergic rhinitis     LAST ASSESSED: 5/15/15   • Anxiety 2000   • Asthma     TRANSITIONED FROM: ASTHMA; RESOLVED: 11/9/16   • Bee sting reaction 08/01/2019   • Bilateral nephrolithiasis 07/25/2018   • Depression    • Disease of thyroid gland     Hypothyroidism   • Fracture of plateau of left tibia     RESOLVED: 4/14/15   • Headache    • Hematuria    • Hyperparathyroidism ([address] )    • Hypertension    • Hypothyroidism    • Kidney stone 2018   • Left ankle sprain     LAST ASSESSED: 10/21/14   • No known health problems     DENIED MENTAL STATUS CHANGE AS PER ALLSCRIPTS; CONFLICTED WITH ADHD AND DEPRESSION IN MED HX SO IT COULD NOT BE ADDED IN PERT NEGS   • Obesity 2020   • Obstructive sleep apnea on CPAP    • Pneumonia Twice in [place]   • Poison ivy dermatitis 8/8/2022   • Scoliosis    • Thyroid nodule    • Urinary "frequency     RESOLVED: 10/27/16   • Vaginal bleeding, abnormal     LAST ASSESSED: 6/6/16   • Viral URI with cough 12/30/2019   • Visual impairment       Past Surgical History:     Past Surgical History:   Procedure Laterality Date   • EYE SURGERY  2010    Lasik   • FOOT SURGERY Right     \"Nerve removal\"   • FRACTURE SURGERY      left shoulder and right arm when I was a kid   • PARATHYROID GLAND SURGERY  05/17/2018   • NV COLONOSCOPY FLX DX W/COLLJ SPEC WHEN PFRMD N/A 06/21/2018    Procedure: COLONOSCOPY;  Surgeon: Eitan Burton MD;  Location: [facility]; Service: Gastroenterology   • NV PARATHYROIDECTOMY/EXPLORATION PARATHYROIDS Right 05/17/2018    Procedure: MINIMALLY INVASIVE PARATHYROIDECTOMY;   PTH MONITORING;  Surgeon: Amelia Swan MD;  Location: [facility];  Service: Surgical Oncology   • TONSILLECTOMY     • TONSILLECTOMY AND ADENOIDECTOMY        Social History:     Social History     Socioeconomic History   • Marital status: Single     Spouse name: None   • Number of children: None   • Years of education: None   • Highest education level: None   Occupational History   • Occupation: UNEMPLOYED   Tobacco Use   • Smoking status: Never   • Smokeless tobacco: Never   Vaping Use   • Vaping Use: Never used   Substance and Sexual Activity   • Alcohol use: Yes     Comment: I have a drink or two a couple times a month     • Drug use: No   • Sexual activity: Not Currently     Partners: Male     Birth control/protection: None   Other Topics Concern   • None   Social History Narrative    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)    [place] HX: [place] JUL[place] 2014     Social Determinants of Health     Financial Resource Strain: Not on file   Food Insecurity: Not on file   Transportation Needs: Not on file   Physical Activity: Not on file   Stress: Not on file   Social Connections: Not on file   Intimate Partner Violence: Not on file   Housing Stability: Not on file      " Family History:     Family History   Problem Relation Age of Onset   • Prostate cancer Father    • Anemia Father    • Neuropathy Father    • Arthritis Father    • Cancer Father         PROSTATE   • Prostate cancer Brother    • Paget's disease of bone Brother    • Arthritis Brother    • Cancer Brother         PROSTATE   • Hearing loss Brother         Tinitus   • Heart Valve Disease Mother         s/p MVR   • Glaucoma Mother    • Rickets Mother    • Stroke Mother    • Hypertension Family         BENIGN ESSENTIAL   • Heart disease Family         CARDIAC DISORDER   • No Known Problems Maternal Grandmother    • No Known Problems Maternal Grandfather    • No Known Problems Paternal Grandmother    • No Known Problems Paternal Grandfather    • No Known Problems Paternal Aunt       Current Medications:     Current Outpatient Medications   Medication Sig Dispense Refill   • Acetaminophen (TYLENOL PO) Take by mouth as needed     • albuterol (ProAir HFA) 90 mcg/act inhaler Inhale 2 puffs every 6 (six) hours as needed for wheezing or shortness of breath 18 g 3   • amLODIPine (NORVASC) 5 mg tablet Take 1 tablet (5 mg total) by mouth daily 90 tablet 1   • amphetamine-dextroamphetamine (ADDERALL) 5 MG tablet take 1 and 1/2 tablets by mouth every morning and 1 tablet every evening (Patient taking differently: Take 7 5 mg by mouth take 1 and 1/2 tablets by mouth every morning) 75 tablet 0   • cetirizine (ZyrTEC) 10 mg tablet Take 10 mg by mouth 2 (two) times a day      • Cholecalciferol (VITAMIN D3) 1000 units CAPS Take 1 capsule by mouth Daily       • EPINEPHrine (EPIPEN JR) 0 15 mg/0 3 mL SOAJ Inject 0 3 mL (0 15 mg total) into a muscle once for 1 dose 0 3 mL 0   • FLUoxetine (PROzac) 20 mg capsule Take 1 capsule (20 mg total) by mouth daily 90 capsule 1   • Fluticasone-Salmeterol (Advair Diskus) 100-50 mcg/dose inhaler Inhale 1 puff daily 180 blister 1   • levothyroxine 50 mcg tablet Take 1 tablet (50 mcg total) by mouth daily 90 "tablet 1   • Melatonin 5 MG TABS Take 1 tablet by mouth daily at bedtime     • multivitamin (THERAGRAN) TABS Take 1 tablet by mouth daily       • Triamcinolone Acetonide (Nasacort Allergy) 55 MCG/ACT nasal spray 1 Act into each nostril Daily 2 puffs in each nostril     • valsartan (DIOVAN) 160 mg tablet Take 1 tablet (160 mg total) by mouth daily 90 tablet 1   • venlafaxine (EFFEXOR-XR) 150 mg 24 hr capsule Take 1 capsule (150 mg total) by mouth daily 90 capsule 1     No current facility-administered medications for this visit  Allergies: Allergies   Allergen Reactions   • Compazine [Prochlorperazine] Anaphylaxis     Category: Allergy; Annotation - [id number]: ALL FORMS   • Eggs Or Egg-Derived Products - Food Allergy Abdominal Pain and Diarrhea   • Erythromycin GI Intolerance     Category: Allergy; Annotation - [id number]: ALL FORMS   • Lactose Intolerance (Gi) - Food Allergy Abdominal Pain and Diarrhea   • Sulfa Antibiotics GI Intolerance     Patient states that it is like when you are drunk, dizziness and confusion  Bactrim   • Sulfamethoxazole-Trimethoprim Nausea Only and Dizziness     Category: Allergy; Annotation - [id number]: ALL FORMS      Physical Exam:     /70 (BP Location: Right arm, Patient Position: Sitting, Cuff Size: Standard)   Pulse 84   Temp 98 °F (36 7 °C) (Temporal)   Resp 20   Ht 5' 1 73\" (1 568 m)   Wt 75 6 kg (166 lb 9 6 oz)   LMP  (LMP Unknown)   SpO2 98%   BMI 30 74 kg/m²     Physical Exam  Vitals and nursing note reviewed  Constitutional:       General: She is not in acute distress  Appearance: Normal appearance  She is normal weight  She is not ill-appearing  HENT:      Head: Normocephalic and atraumatic  Right Ear: Tympanic membrane, ear canal and external ear normal  There is no impacted cerumen  Left Ear: Tympanic membrane, ear canal and external ear normal  There is no impacted cerumen  Nose: Nose normal  No congestion or rhinorrhea        " Mouth/Throat:      Mouth: Mucous membranes are moist       Pharynx: Oropharynx is clear  No oropharyngeal exudate or posterior oropharyngeal erythema  Eyes:      General: No scleral icterus  Right eye: No discharge  Left eye: No discharge  Extraocular Movements: Extraocular movements intact  Conjunctiva/sclera: Conjunctivae normal       Pupils: Pupils are equal, round, and reactive to light  Neck:      Vascular: No carotid bruit  Cardiovascular:      Rate and Rhythm: Normal rate and regular rhythm  Pulses: Normal pulses  Heart sounds: Normal heart sounds  No murmur heard  No friction rub  No gallop  Pulmonary:      Effort: Pulmonary effort is normal  No respiratory distress  Breath sounds: Normal breath sounds  No stridor  No wheezing, rhonchi or rales  Chest:      Chest wall: No tenderness  Abdominal:      General: Abdomen is flat  Bowel sounds are normal  There is no distension  Palpations: Abdomen is soft  There is no mass  Tenderness: There is no abdominal tenderness  Hernia: No hernia is present  Musculoskeletal:         General: No swelling, tenderness, deformity or signs of injury  Normal range of motion  Cervical back: Normal range of motion and neck supple  No rigidity  No muscular tenderness  Thoracic back: Scoliosis present  Lumbar back: Scoliosis present  Right lower leg: No edema  Left lower leg: No edema  Lymphadenopathy:      Cervical: No cervical adenopathy  Skin:     General: Skin is warm and dry  Capillary Refill: Capillary refill takes less than 2 seconds  Coloration: Skin is not jaundiced or pale  Findings: No bruising, erythema, lesion or rash  Neurological:      General: No focal deficit present  Mental Status: She is alert and oriented to person, place, and time  Mental status is at baseline  Cranial Nerves: No cranial nerve deficit  Sensory: No sensory deficit  Motor: No weakness  Coordination: Coordination normal       Gait: Gait normal       Deep Tendon Reflexes: Reflexes normal    Psychiatric:         Mood and Affect: Mood normal          Behavior: Behavior normal          Thought Content:  Thought content normal          Judgment: Judgment normal           Dianne Doe MD    [facility] [place]

## 2023-04-07 NOTE — ASSESSMENT & PLAN NOTE
Discussed diet and exercise  Referral given to nutrition/dietitian  She is up-to-date on immunizations  She is up-to-date on lipid cardiovascular screening  She is up-to-date on breast cancer screening  She is up-to-date on colorectal cancer screening  Referral given to gynecology for cervical cancer screening

## 2023-04-28 ENCOUNTER — OFFICE VISIT (OUTPATIENT)
Dept: GASTROENTEROLOGY | Facility: CLINIC | Age: 63
End: 2023-04-28

## 2023-04-28 VITALS
TEMPERATURE: 98.4 F | DIASTOLIC BLOOD PRESSURE: 82 MMHG | WEIGHT: 167.2 LBS | HEIGHT: 61 IN | SYSTOLIC BLOOD PRESSURE: 124 MMHG | BODY MASS INDEX: 31.57 KG/M2

## 2023-04-28 DIAGNOSIS — K76.0 NAFLD (NONALCOHOLIC FATTY LIVER DISEASE): Primary | ICD-10-CM

## 2023-04-28 NOTE — PROGRESS NOTES
[facility] - Outpatient Consultation  Christa Schaeffer 66 y o  female MRN: [id number]  Encounter: [id number]    PCP:  Kali Johnson MD, [phone]  Referring Provider:  No ref  provider found,     Patient: Christa Schaeffer, 1960  Reason for Referral: NAFLD     ASSESSMENT/PLAN:  66 y o  female with HTN, class I obesity, ERICA on CPAP, hypothyroidism, and depression who presents for follow up evaluation  She has no acute liver specific complaints or clinical evidence of hepatic decompensation  She was incidentally found to have hepatic steatosis on ultrasound for work-up of abdominal discomfort  She has elevated serum ALT but has normal synthetic function and platelets  Her serologic work-up was unrevealing and she had an US elastography which showed minimal fibrosis       We discussed natural history, prognosis, and complications of NAFLD, including progression to cirrhosis as well as risk for cardiovascular events  We discussed that weight loss of at least 5-10% of her body weight as well as aggressive modification of metabolic risk factors are cade in the management to reduce progression of her disease and its complications  She is motivated to lose weight and is interested in seeking a nutritionist for additional counseling  She is not interested in weight loss medications at this time  Would recommend periodic surveillance of her liver tests as well as elastography every 1-2 years for monitoring for cirrhosis  She may return to liver clinic if she develops worsening liver tests or advanced fibrosis from NAFLD  I advised that should avoid interval weight gain, excessive EtOH consumption and hepatotoxic medications  Thank you for the opportunity consult her care      - Needs HAV and HBV vaccination with PCP or local pharmacy  - Ultrasound elastography every 1-2 years  - Due for repeat COY in 2028    Neha Griffin MD  Division of Gastroenterology and Hepatology  [facility]    ============================================================================  CC/HPI: 66 y o  female with HTN, class I obesity, ERICA on CPAP, hypothyroidism, and depression who presents for follow up evaluation  Interval events  - US elastography showed F0-F1 disease   - Serologic work-up was unremarkable but showed HAV/HBV non-immunity  - She has been working on improving her diet by replacing her diet with whole grains and increasing her exercise  States that her abdominal discomfort has improved  Extended liver history   She had an US in December 2022 for work-up of abdominal discomfort and was noted to have hepatic steatosis on ultrasound  She denies prior history of personal history or family history of liver disease  She denies excessive EtOH consumption and denies any high risk exposures       Regarding her weight, she is at her heaviest weight and has tried to lose weight loss through weight watchers but has not been successful with sustaining her weight loss       ROS: Complete review of systems otherwise negative       PAST MEDICAL/SURGICAL HISTORY:  Past Medical History:   Diagnosis Date   • Abrasion of axilla     LAST ASSESSED: 11/21/14   • Acute cystitis with hematuria 08/15/2019   • ADHD    • Allergic    • Allergic rhinitis     LAST ASSESSED: 5/15/15   • Anxiety 2000   • Asthma     TRANSITIONED FROM: ASTHMA; RESOLVED: 11/9/16   • Bee sting reaction 08/01/2019   • Bilateral nephrolithiasis 07/25/2018   • Depression    • Disease of thyroid gland     Hypothyroidism   • Fracture of plateau of left tibia     RESOLVED: 4/14/15   • Headache    • Hematuria    • Hyperparathyroidism ([facility] [address] )    • Hypertension    • Hypothyroidism    • Kidney stone 2018   • Left ankle sprain     LAST ASSESSED: 10/21/14   • No known health problems     DENIED MENTAL STATUS CHANGE AS PER ALLSCRIPTS; CONFLICTED WITH ADHD AND DEPRESSION IN MED HX SO IT COULD NOT BE ADDED IN PERT NEGS   • Obesity 2020   • Obstructive "sleep apnea on CPAP    • Pneumonia Twice in [place]   • Poison ivy dermatitis 8/8/2022   • Scoliosis    • Thyroid nodule    • Urinary frequency     RESOLVED: 10/27/16   • Vaginal bleeding, abnormal     LAST ASSESSED: 6/6/16   • Viral URI with cough 12/30/2019   • Visual impairment         Past Surgical History:   Procedure Laterality Date   • EYE SURGERY  2010    Lasik   • FOOT SURGERY Right     \"Nerve removal\"   • FRACTURE SURGERY      left shoulder and right arm when I was a kid   • PARATHYROID GLAND SURGERY  05/17/2018   • NV COLONOSCOPY FLX DX W/COLLJ SPEC WHEN PFRMD N/A 06/21/2018    Procedure: COLONOSCOPY;  Surgeon: Trent Corea MD;  Location: [facility];   Service: Gastroenterology   • NV PARATHYROIDECTOMY/EXPLORATION PARATHYROIDS Right 05/17/2018    Procedure: MINIMALLY INVASIVE PARATHYROIDECTOMY;   PTH MONITORING;  Surgeon: Manjeet Leonard MD;  Location: [facility];  Service: Surgical Oncology   • TONSILLECTOMY     • TONSILLECTOMY AND ADENOIDECTOMY         FAMILY/SOCIAL HISTORY:  Family History   Problem Relation Age of Onset   • Prostate cancer Father    • Anemia Father    • Neuropathy Father    • Arthritis Father    • Cancer Father         PROSTATE   • Prostate cancer Brother    • Paget's disease of bone Brother    • Arthritis Brother    • Cancer Brother         PROSTATE   • Hearing loss Brother         Tinitus   • Heart Valve Disease Mother         s/p MVR   • Glaucoma Mother    • Rickets Mother    • Stroke Mother    • Hypertension Family         BENIGN ESSENTIAL   • Heart disease Family         CARDIAC DISORDER   • No Known Problems Maternal Grandmother    • No Known Problems Maternal Grandfather    • No Known Problems Paternal Grandmother    • No Known Problems Paternal Grandfather    • No Known Problems Paternal Aunt        Social History     Tobacco Use   • Smoking status: Never   • Smokeless tobacco: Never   Vaping Use   • Vaping Use: Never used   Substance Use Topics   • Alcohol use: Yes     " Comment: I have a drink or two a couple times a month  • Drug use: No       MEDICATIONS:  Current Outpatient Medications on File Prior to Visit   Medication Sig Dispense Refill   • Acetaminophen (TYLENOL PO) Take by mouth as needed     • albuterol (ProAir HFA) 90 mcg/act inhaler Inhale 2 puffs every 6 (six) hours as needed for wheezing or shortness of breath 18 g 3   • amLODIPine (NORVASC) 5 mg tablet Take 1 tablet (5 mg total) by mouth daily 90 tablet 1   • amphetamine-dextroamphetamine (ADDERALL) 5 MG tablet take 1 and 1/2 tablets by mouth every morning and 1 tablet every evening (Patient taking differently: Take 7 5 mg by mouth take 1 and 1/2 tablets by mouth every morning) 75 tablet 0   • cetirizine (ZyrTEC) 10 mg tablet Take 10 mg by mouth 2 (two) times a day      • Cholecalciferol (VITAMIN D3) 1000 units CAPS Take 1 capsule by mouth Daily       • EPINEPHrine (EPIPEN JR) 0 15 mg/0 3 mL SOAJ Inject 0 3 mL (0 15 mg total) into a muscle once for 1 dose 0 3 mL 0   • FLUoxetine (PROzac) 20 mg capsule Take 1 capsule (20 mg total) by mouth daily 90 capsule 1   • Fluticasone-Salmeterol (Advair Diskus) 100-50 mcg/dose inhaler Inhale 1 puff daily 180 blister 1   • levothyroxine 50 mcg tablet Take 1 tablet (50 mcg total) by mouth daily 90 tablet 1   • Melatonin 5 MG TABS Take 1 tablet by mouth daily at bedtime     • multivitamin (THERAGRAN) TABS Take 1 tablet by mouth daily       • Triamcinolone Acetonide (Nasacort Allergy) 55 MCG/ACT nasal spray 1 Act into each nostril Daily 2 puffs in each nostril     • valsartan (DIOVAN) 160 mg tablet Take 1 tablet (160 mg total) by mouth daily 90 tablet 1   • venlafaxine (EFFEXOR-XR) 150 mg 24 hr capsule Take 1 capsule (150 mg total) by mouth daily 90 capsule 1     No current facility-administered medications on file prior to visit  Allergies   Allergen Reactions   • Compazine [Prochlorperazine] Anaphylaxis     Category: Allergy;  Annotation - [id number]: ALL FORMS   • Eggs Or "Egg-Derived Products - Food Allergy Abdominal Pain and Diarrhea   • Erythromycin GI Intolerance     Category: Allergy; Annotation - [id number]: ALL FORMS   • Lactose Intolerance (Gi) - Food Allergy Abdominal Pain and Diarrhea   • Sulfa Antibiotics GI Intolerance     Patient states that it is like when you are drunk, dizziness and confusion  Bactrim   • Sulfamethoxazole-Trimethoprim Nausea Only and Dizziness     Category: Allergy; Annotation - [id number]: ALL FORMS       PHYSICAL EXAM:  /82 (BP Location: Left arm, Patient Position: Sitting, Cuff Size: Adult)   Temp 98 4 °F (36 9 °C) (Tympanic)   Ht 5' 1\" (1 549 m)   Wt 75 8 kg (167 lb 3 2 oz)   LMP  (LMP Unknown)   BMI 31 59 kg/m²   GENERAL: NAD, AAO  HEENT: anicteric, OP clear, MMM  ABDOMEN: S/ND/NT, normoactive BS, no hepatomegaly, spleen not palpable  EXTREMITIES: no edema  SKIN: no rashes, no palmar erythema, no spider angiomata   NEURO: normal gait, no tremor, no asterixis     LABS/RADIOLOGY/ENDOSCOPY:  Lab Results   Component Value Date    WBC 6 44 12/07/2022    HGB 12 8 12/07/2022    HCT 39 7 12/07/2022     12/07/2022    GLUF 82 12/07/2022    BUN 20 12/07/2022    CREATININE 0 83 12/07/2022     11/29/2013    K 4 0 12/07/2022     12/07/2022    CO2 28 12/07/2022    PROT 7 6 11/29/2013    ALKPHOS 64 12/07/2022    ALT 45 12/07/2022    AST 25 12/07/2022    GLOB 2 7 08/09/2021    CALCIUM 10 1 12/07/2022    EGFR 76 12/07/2022    CHOL 223 11/29/2013    TRIG 124 12/07/2022    HDL 72 12/07/2022    INR 0 97 04/02/2018    PTT 27 04/02/2018     HAV IGG-  HBsAg-  HBcAb-  HBsAb-  Ferritin 46  A1AT MM  ASMA-  IgG-    US elastography (4/5/2023)   Metavir score: F0 - F1, Absent or Mild Fibrosis     According to the updated SRU consensus statement, a liver stiffness of 7 29 kPa, which in the absence of other known clinical signs*, rules out compensated advanced chronic liver disease (cACLD)   If there are known clinical signs, may need further test " for confirmation  https://pubs  rsna org/doi/10 1148/radiol  [id number]     Liver steatosis grading:  S3 ( > 67% steatosis)       Computed MELD-Na score unavailable  Necessary lab results were not found in the last year  Computed MELD score unavailable  Necessary lab results were not found in the last year

## 2023-05-03 DIAGNOSIS — F32.A DEPRESSION, UNSPECIFIED DEPRESSION TYPE: ICD-10-CM

## 2023-05-03 DIAGNOSIS — F33.0 MILD EPISODE OF RECURRENT MAJOR DEPRESSIVE DISORDER (HCC): ICD-10-CM

## 2023-05-03 DIAGNOSIS — F90.0 ADHD, PREDOMINANTLY INATTENTIVE TYPE: ICD-10-CM

## 2023-05-03 RX ORDER — VENLAFAXINE HYDROCHLORIDE 150 MG/1
150 CAPSULE, EXTENDED RELEASE ORAL DAILY
Qty: 90 CAPSULE | Refills: 1 | Status: SHIPPED | OUTPATIENT
Start: 2023-05-03

## 2023-05-03 RX ORDER — FLUOXETINE HYDROCHLORIDE 20 MG/1
20 CAPSULE ORAL DAILY
Qty: 90 CAPSULE | Refills: 1 | Status: SHIPPED | OUTPATIENT
Start: 2023-05-03

## 2023-05-03 RX ORDER — DEXTROAMPHETAMINE SACCHARATE, AMPHETAMINE ASPARTATE, DEXTROAMPHETAMINE SULFATE AND AMPHETAMINE SULFATE 1.25; 1.25; 1.25; 1.25 MG/1; MG/1; MG/1; MG/1
TABLET ORAL
Qty: 75 TABLET | Refills: 0 | Status: SHIPPED | OUTPATIENT
Start: 2023-05-03

## 2023-06-26 ENCOUNTER — RA CDI HCC (OUTPATIENT)
Dept: OTHER | Facility: HOSPITAL | Age: 63
End: 2023-06-26

## 2023-06-26 DIAGNOSIS — F90.0 ADHD, PREDOMINANTLY INATTENTIVE TYPE: ICD-10-CM

## 2023-06-26 NOTE — PROGRESS NOTES
[facility]  coding opportunities       Chart reviewed, no opportunity found: CHART REVIEWED, NO OPPORTUNITY FOUND        Patients Insurance        Commercial Insurance: Sunil Chase

## 2023-06-27 RX ORDER — DEXTROAMPHETAMINE SACCHARATE, AMPHETAMINE ASPARTATE, DEXTROAMPHETAMINE SULFATE AND AMPHETAMINE SULFATE 1.25; 1.25; 1.25; 1.25 MG/1; MG/1; MG/1; MG/1
TABLET ORAL
Qty: 75 TABLET | Refills: 0 | Status: SHIPPED | OUTPATIENT
Start: 2023-06-27

## 2023-07-03 ENCOUNTER — OFFICE VISIT (OUTPATIENT)
Dept: INTERNAL MEDICINE CLINIC | Facility: CLINIC | Age: 63
End: 2023-07-03
Payer: COMMERCIAL

## 2023-07-03 VITALS
BODY MASS INDEX: 31.34 KG/M2 | HEIGHT: 61 IN | SYSTOLIC BLOOD PRESSURE: 116 MMHG | TEMPERATURE: 98.5 F | DIASTOLIC BLOOD PRESSURE: 74 MMHG | OXYGEN SATURATION: 97 % | WEIGHT: 166 LBS | HEART RATE: 87 BPM

## 2023-07-03 DIAGNOSIS — E78.00 HYPERCHOLESTEREMIA: ICD-10-CM

## 2023-07-03 DIAGNOSIS — E03.9 ACQUIRED HYPOTHYROIDISM: ICD-10-CM

## 2023-07-03 DIAGNOSIS — I10 HYPERTENSION, ESSENTIAL, BENIGN: ICD-10-CM

## 2023-07-03 DIAGNOSIS — E66.9 MILD OBESITY: ICD-10-CM

## 2023-07-03 DIAGNOSIS — Z12.31 ENCOUNTER FOR SCREENING MAMMOGRAM FOR MALIGNANT NEOPLASM OF BREAST: Primary | ICD-10-CM

## 2023-07-03 DIAGNOSIS — K76.0 HEPATIC STEATOSIS: ICD-10-CM

## 2023-07-03 DIAGNOSIS — Z87.09 HISTORY OF ASTHMA: ICD-10-CM

## 2023-07-03 PROCEDURE — 99214 OFFICE O/P EST MOD 30 MIN: CPT | Performed by: INTERNAL MEDICINE

## 2023-07-03 RX ORDER — LEVOTHYROXINE SODIUM 0.05 MG/1
50 TABLET ORAL DAILY
Qty: 90 TABLET | Refills: 1 | Status: SHIPPED | OUTPATIENT
Start: 2023-07-03

## 2023-07-03 RX ORDER — FLUTICASONE PROPIONATE AND SALMETEROL 100; 50 UG/1; UG/1
1 POWDER RESPIRATORY (INHALATION) DAILY
Qty: 180 BLISTER | Refills: 1 | Status: SHIPPED | OUTPATIENT
Start: 2023-07-03

## 2023-07-03 NOTE — PROGRESS NOTES
Name: Barbara Nettles      : 1960      MRN: [id number]  Encounter Provider: Miranda Montero MD  Encounter Date: 7/3/2023   Encounter department: [address] [place] [address]     1. Encounter for screening mammogram for malignant neoplasm of breast  -     Mammo screening bilateral w 3d & cad; Future; Expected date: 2023  -     T3; Future  -     T4, free; Future  -     TSH, 3rd generation; Future  -     CBC and differential; Future  -     Comprehensive metabolic panel; Future  -     Lipid panel; Future    2. Acquired hypothyroidism  Assessment & Plan:  Follow-up thyroid function studies in 6 months. No changes are needed to current medications    Orders:  -     levothyroxine 50 mcg tablet; Take 1 tablet (50 mcg total) by mouth daily  -     T3; Future  -     T4, free; Future  -     TSH, 3rd generation; Future  -     CBC and differential; Future  -     Comprehensive metabolic panel; Future  -     Lipid panel; Future    3. History of asthma  -     Fluticasone-Salmeterol (Advair Diskus) 100-50 mcg/dose inhaler; Inhale 1 puff daily  -     T3; Future  -     T4, free; Future  -     TSH, 3rd generation; Future  -     CBC and differential; Future  -     Comprehensive metabolic panel; Future  -     Lipid panel; Future    4. Mild obesity  Assessment & Plan:  Continue with dietary modification and weight loss program    Orders:  -     T3; Future  -     T4, free; Future  -     TSH, 3rd generation; Future  -     CBC and differential; Future  -     Comprehensive metabolic panel; Future    5. Hypercholesteremia  Assessment & Plan: We will recheck a lipid panel in the future to evaluate if there is any need for statin therapy. Orders:  -     T3; Future  -     T4, free; Future  -     TSH, 3rd generation; Future  -     CBC and differential; Future  -     Comprehensive metabolic panel; Future    6. Hepatic steatosis  Assessment & Plan:  Being well.   Patient has been following a low-fat diet. Her fibrosis work-up was did not reveal any significant fibrosis. She will continue with dietary modification weight loss    Orders:  -     CBC and differential; Future  -     Comprehensive metabolic panel; Future    7. Hypertension, essential, benign  Assessment & Plan:  Blood pressure is nicely controlled on Diovan 160 mg daily. Subjective      Presents to the office for follow-up visit. She has no significant issues or complaints. No recent lab studies. Feels well. Review of Systems   Constitutional: Negative. Negative for activity change, appetite change, chills, diaphoresis, fatigue, fever and unexpected weight change. HENT: Negative. Eyes: Negative. Respiratory: Negative. Cardiovascular: Negative. Gastrointestinal: Negative. Endocrine: Negative. Genitourinary: Negative. Musculoskeletal: Negative. Skin: Negative. Neurological: Negative. Hematological: Negative. Psychiatric/Behavioral: The patient is not nervous/anxious.         Current Outpatient Medications on File Prior to Visit   Medication Sig   • Acetaminophen (TYLENOL PO) Take by mouth as needed   • amLODIPine (NORVASC) 5 mg tablet Take 1 tablet (5 mg total) by mouth daily   • amphetamine-dextroamphetamine (ADDERALL) 5 MG tablet take 1 and 1/2 tablets by mouth every morning and 1 tablet every evening (Patient taking differently: take 1 and 1/2 tablets by mouth every morning)   • cetirizine (ZyrTEC) 10 mg tablet Take 10 mg by mouth 2 (two) times a day    • Cholecalciferol (VITAMIN D3) 1000 units CAPS Take 1 capsule by mouth Daily     • EPINEPHrine (EPIPEN JR) 0.15 mg/0.3 mL SOAJ Inject 0.3 mL (0.15 mg total) into a muscle once for 1 dose   • EPINEPHrine (PRIMATENE MIST IN) Inhale if needed OTC   • FLUoxetine (PROzac) 20 mg capsule Take 1 capsule (20 mg total) by mouth daily   • Melatonin 5 MG TABS Take 1 tablet by mouth daily at bedtime   • multivitamin (THERAGRAN) TABS Take 1 tablet by mouth daily     • Triamcinolone Acetonide (Nasacort Allergy) 55 MCG/ACT nasal spray 1 Act into each nostril Daily 2 puffs in each nostril   • valsartan (DIOVAN) 160 mg tablet Take 1 tablet (160 mg total) by mouth daily   • venlafaxine (EFFEXOR-XR) 150 mg 24 hr capsule Take 1 capsule (150 mg total) by mouth daily   • [DISCONTINUED] Fluticasone-Salmeterol (Advair Diskus) 100-50 mcg/dose inhaler Inhale 1 puff daily   • [DISCONTINUED] levothyroxine 50 mcg tablet Take 1 tablet (50 mcg total) by mouth daily   • [DISCONTINUED] albuterol (ProAir HFA) 90 mcg/act inhaler Inhale 2 puffs every 6 (six) hours as needed for wheezing or shortness of breath       Objective     /74 (BP Location: Left arm, Patient Position: Sitting, Cuff Size: Standard)   Pulse 87   Temp 98.5 °F (36.9 °C) (Tympanic)   Ht 5' 1" (1.549 m)   Wt 75.3 kg (166 lb)   LMP  (LMP Unknown)   SpO2 97%   BMI 31.37 kg/m²     Physical Exam  Vitals reviewed. Constitutional:       General: She is not in acute distress. Appearance: She is obese. She is not ill-appearing, toxic-appearing or diaphoretic. HENT:      Head: Normocephalic and atraumatic. Right Ear: External ear normal.      Left Ear: External ear normal.   Eyes:      Conjunctiva/sclera: Conjunctivae normal.      Pupils: Pupils are equal, round, and reactive to light. Cardiovascular:      Rate and Rhythm: Normal rate and regular rhythm. Heart sounds: Normal heart sounds. No murmur heard. Pulmonary:      Effort: Pulmonary effort is normal. No respiratory distress. Breath sounds: Normal breath sounds. Abdominal:      General: Abdomen is flat. There is no distension. Musculoskeletal:         General: No swelling. Cervical back: Neck supple. No rigidity. Right lower leg: No edema. Left lower leg: No edema. Skin:     Coloration: Skin is not jaundiced. Findings: No bruising, erythema or rash.    Neurological:      General: No focal deficit present. Mental Status: She is alert and oriented to person, place, and time. Mental status is at baseline.    Psychiatric:         Mood and Affect: Mood normal.         Behavior: Behavior normal.       Srinivas Beach MD

## 2023-07-03 NOTE — ASSESSMENT & PLAN NOTE
Being well. Patient has been following a low-fat diet. Her fibrosis work-up was did not reveal any significant fibrosis.   She will continue with dietary modification weight loss

## 2023-07-21 ENCOUNTER — TELEPHONE (OUTPATIENT)
Dept: OBGYN CLINIC | Facility: CLINIC | Age: 63
End: 2023-07-21

## 2023-08-04 DIAGNOSIS — I10 HYPERTENSION, ESSENTIAL, BENIGN: ICD-10-CM

## 2023-08-04 RX ORDER — VALSARTAN 160 MG/1
160 TABLET ORAL DAILY
Qty: 90 TABLET | Refills: 1 | Status: SHIPPED | OUTPATIENT
Start: 2023-08-04

## 2023-08-10 ENCOUNTER — VBI (OUTPATIENT)
Dept: ADMINISTRATIVE | Facility: OTHER | Age: 63
End: 2023-08-10

## 2023-08-18 DIAGNOSIS — F90.0 ADHD, PREDOMINANTLY INATTENTIVE TYPE: ICD-10-CM

## 2023-08-18 NOTE — TELEPHONE ENCOUNTER
Hi, this is [facility] ([facility]). And I'm returning your call about my Adderall. I was originally taking 1 1/2 tablets in the morning and one tablet at night, but I really don't take one tablet at night. So right now, I just do 1 1/2 tablets in the morning. That's it. OK. If you have any questions, you can call me at [phone].  Thank you Radha.

## 2023-08-19 RX ORDER — DEXTROAMPHETAMINE SACCHARATE, AMPHETAMINE ASPARTATE, DEXTROAMPHETAMINE SULFATE AND AMPHETAMINE SULFATE 1.25; 1.25; 1.25; 1.25 MG/1; MG/1; MG/1; MG/1
TABLET ORAL
Qty: 75 TABLET | Refills: 0 | Status: SHIPPED | OUTPATIENT
Start: 2023-08-19

## 2023-09-05 DIAGNOSIS — I10 HYPERTENSION, ESSENTIAL, BENIGN: ICD-10-CM

## 2023-09-05 RX ORDER — AMLODIPINE BESYLATE 5 MG/1
5 TABLET ORAL DAILY
Qty: 90 TABLET | Refills: 1 | Status: SHIPPED | OUTPATIENT
Start: 2023-09-05

## 2023-09-08 ENCOUNTER — OFFICE VISIT (OUTPATIENT)
Dept: URGENT CARE | Age: 63
End: 2023-09-08
Payer: COMMERCIAL

## 2023-09-08 VITALS
WEIGHT: 163 LBS | BODY MASS INDEX: 30.78 KG/M2 | TEMPERATURE: 97.2 F | RESPIRATION RATE: 15 BRPM | SYSTOLIC BLOOD PRESSURE: 144 MMHG | HEIGHT: 61 IN | HEART RATE: 97 BPM | DIASTOLIC BLOOD PRESSURE: 73 MMHG | OXYGEN SATURATION: 97 %

## 2023-09-08 DIAGNOSIS — S05.12XA CONTUSION OF LEFT ORBITAL TISSUES, INITIAL ENCOUNTER: Primary | ICD-10-CM

## 2023-09-08 PROCEDURE — G0382 LEV 3 HOSP TYPE B ED VISIT: HCPCS

## 2023-09-08 PROCEDURE — S9083 URGENT CARE CENTER GLOBAL: HCPCS

## 2023-09-09 NOTE — PROGRESS NOTES
[facility]        NAME: Mitzi Hughes is a 66 y.o. female  : 1960    MRN: [id number]  DATE: 2023  TIME: 9:16 PM    Assessment and Plan   Contusion of left orbital tissues, initial encounter [J25.81RS]  1. Contusion of left orbital tissues, initial encounter              Patient Instructions     Apply ice the affected area. Alternate ibuprofen and Tylenol as needed for pain. Monitor your symptoms, and present to the ER if you develop any new, concerning or worsening symptoms including increased dizziness and lightheadedness, intractable headache, speech difficulty or facial asymmetry. Follow up with PCP in 3-5 days. Chief Complaint     Chief Complaint   Patient presents with   • Head Injury     Reports power was out at house. Was trying to bring garage door down and lever to release garage door hit head. No N/ V reports slight dizziness. Small laceration/ " scratch " on left eye brown . Small hematoma over eye lid         History of Present Illness       Resenting for evaluation of head injury. Patient states that she is currently without power, and was pulling the garage lever when the garage door hit her in her head. Patient denies losing consciousness during the injury, and denies any use of blood thinning medications. She states that since the injury she has been having mild dizziness and mild headache. She denies any visual disturbances, nausea, vomiting, photophobia, phonophobia or lightheadedness. Patient states that she was concerned after the injury because she noticed bleeding from the area, but was unable to inspect the wound as she is currently without power. She denies any current treatment for her symptoms. Patient is up-to-date on her tetanus shot. Review of Systems   Review of Systems   Eyes: Negative for photophobia and visual disturbance. Skin: Positive for wound. Neurological: Positive for dizziness and headaches.  Negative for tremors, seizures, syncope, facial asymmetry, speech difficulty, weakness, light-headedness and numbness. All other systems reviewed and are negative.         Current Medications       Current Outpatient Medications:   •  Acetaminophen (TYLENOL PO), Take by mouth as needed, Disp: , Rfl:   •  amLODIPine (NORVASC) 5 mg tablet, Take 1 tablet (5 mg total) by mouth daily, Disp: 90 tablet, Rfl: 1  •  amphetamine-dextroamphetamine (ADDERALL) 5 MG tablet, take 1 and 1/2 tablets by mouth every morning, Disp: 75 tablet, Rfl: 0  •  cetirizine (ZyrTEC) 10 mg tablet, Take 10 mg by mouth 2 (two) times a day , Disp: , Rfl:   •  Cholecalciferol (VITAMIN D3) 1000 units CAPS, Take 1 capsule by mouth Daily  , Disp: , Rfl:   •  EPINEPHrine (PRIMATENE MIST IN), Inhale if needed OTC, Disp: , Rfl:   •  FLUoxetine (PROzac) 20 mg capsule, Take 1 capsule (20 mg total) by mouth daily, Disp: 90 capsule, Rfl: 1  •  Fluticasone-Salmeterol (Advair Diskus) 100-50 mcg/dose inhaler, Inhale 1 puff daily, Disp: 180 blister, Rfl: 1  •  levothyroxine 50 mcg tablet, Take 1 tablet (50 mcg total) by mouth daily, Disp: 90 tablet, Rfl: 1  •  Melatonin 5 MG TABS, Take 1 tablet by mouth daily at bedtime, Disp: , Rfl:   •  multivitamin (THERAGRAN) TABS, Take 1 tablet by mouth daily  , Disp: , Rfl:   •  Triamcinolone Acetonide (Nasacort Allergy) 55 MCG/ACT nasal spray, 1 Act into each nostril Daily 2 puffs in each nostril, Disp: , Rfl:   •  valsartan (DIOVAN) 160 mg tablet, Take 1 tablet (160 mg total) by mouth daily, Disp: 90 tablet, Rfl: 1  •  venlafaxine (EFFEXOR-XR) 150 mg 24 hr capsule, Take 1 capsule (150 mg total) by mouth daily, Disp: 90 capsule, Rfl: 1  •  EPINEPHrine (EPIPEN JR) 0.15 mg/0.3 mL SOAJ, Inject 0.3 mL (0.15 mg total) into a muscle once for 1 dose, Disp: 0.3 mL, Rfl: 0    Current Allergies     Allergies as of 09/08/2023 - Reviewed 09/08/2023   Allergen Reaction Noted   • Compazine [prochlorperazine] Anaphylaxis 10/22/2013   • Bee venom Itching and Swelling 07/03/2023   • Eggs or egg-derived products - food allergy Abdominal Pain and Diarrhea 03/29/2022   • Erythromycin GI Intolerance 10/22/2013   • Lactose intolerance (gi) - food allergy Abdominal Pain and Diarrhea 03/29/2022   • Sulfa antibiotics GI Intolerance 03/08/2016   • Sulfamethoxazole-trimethoprim Nausea Only and Dizziness 10/22/2013   • Poison ivy extract Hives, Itching, Other (See Comments), and Rash 07/03/2023            The following portions of the patient's history were reviewed and updated as appropriate: allergies, current medications, past family history, past medical history, past social history, past surgical history and problem list.     Past Medical History:   Diagnosis Date   • Abrasion of axilla     LAST ASSESSED: 11/21/14   • Acute cystitis with hematuria 08/15/2019   • ADHD    • Allergic    • Allergic rhinitis     LAST ASSESSED: 5/15/15   • Anxiety 2000   • Asthma     TRANSITIONED FROM: ASTHMA; RESOLVED: 11/9/16   • Bee sting reaction 08/01/2019   • Bilateral nephrolithiasis 07/25/2018   • Depression    • Disease of thyroid gland     Hypothyroidism   • Fatty liver    • Fracture of plateau of left tibia     RESOLVED: 4/14/15   • Headache    • Hematuria    • Hyperparathyroidism ([address])    • Hypertension    • Hypothyroidism    • Kidney stone 2018   • Left ankle sprain     LAST ASSESSED: 10/21/14   • No known health problems     DENIED MENTAL STATUS CHANGE AS PER [facility]; CONFLICTED WITH ADHD AND DEPRESSION IN MED HX SO IT COULD NOT BE ADDED IN PERT NEGS   • Obesity 2020   • Obstructive sleep apnea on CPAP    • Pneumonia Twice in [place]   • Poison ivy dermatitis 08/08/2022   • Scoliosis    • Thyroid nodule    • Urinary frequency     RESOLVED: 10/27/16   • Vaginal bleeding, abnormal     LAST ASSESSED: 6/6/16   • Viral URI with cough 12/30/2019   • Visual impairment        Past Surgical History:   Procedure Laterality Date   • EYE SURGERY  2010    Lasik   • FOOT SURGERY Right     "Nerve removal"   • FRACTURE SURGERY      left shoulder and right arm when I was a kid   • PARATHYROID GLAND SURGERY  05/17/2018   • SD COLONOSCOPY FLX DX W/COLLJ SPEC WHEN PFRMD N/A 06/21/2018    Procedure: COLONOSCOPY;  Surgeon: Seamus Brown MD;  Location: [facility]; Service: Gastroenterology   • SD PARATHYROIDECTOMY/EXPLORATION PARATHYROIDS Right 05/17/2018    Procedure: MINIMALLY INVASIVE PARATHYROIDECTOMY;   PTH MONITORING;  Surgeon: Eun Chapman MD;  Location:  MAIN OR;  Service: Surgical Oncology   • TONSILLECTOMY     • TONSILLECTOMY AND ADENOIDECTOMY         Family History   Problem Relation Age of Onset   • Prostate cancer Father    • Anemia Father    • Neuropathy Father    • Arthritis Father    • Cancer Father         PROSTATE   • Prostate cancer Brother    • Paget's disease of bone Brother    • Arthritis Brother    • Cancer Brother         PROSTATE   • Hearing loss Brother         Tinitus   • Heart Valve Disease Mother         s/p MVR   • Glaucoma Mother    • Rickets Mother    • Stroke Mother    • Hypertension Family         BENIGN ESSENTIAL   • Heart disease Family         CARDIAC DISORDER   • No Known Problems Maternal Grandmother    • No Known Problems Maternal Grandfather    • No Known Problems Paternal Grandmother    • No Known Problems Paternal Grandfather    • No Known Problems Paternal Aunt          Medications have been verified. Objective   /73   Pulse 97   Temp (!) 97.2 °F (36.2 °C) (Temporal)   Resp 15   Ht 5' 1" (1.549 m)   Wt 73.9 kg (163 lb)   LMP  (LMP Unknown)   SpO2 97%   BMI 30.80 kg/m²        Physical Exam     Physical Exam  Vitals and nursing note reviewed. Constitutional:       General: She is not in acute distress. Appearance: Normal appearance. She is normal weight. She is not ill-appearing, toxic-appearing or diaphoretic. HENT:      Head: Normocephalic. Contusion present. Eyes:      Extraocular Movements: Extraocular movements intact. Pupils: Pupils are equal, round, and reactive to light. Cardiovascular:      Rate and Rhythm: Normal rate. Pulmonary:      Effort: Pulmonary effort is normal.   Skin:     General: Skin is warm and dry. Findings: Bruising present. Neurological:      General: No focal deficit present. Mental Status: She is alert and oriented to person, place, and time. Cranial Nerves: No cranial nerve deficit. Motor: No weakness.       Coordination: Coordination normal.      Gait: Gait normal.   Psychiatric:         Mood and Affect: Mood normal.

## 2023-09-09 NOTE — PATIENT INSTRUCTIONS
Apply ice the affected area. Alternate ibuprofen and Tylenol as needed for pain. Monitor your symptoms, and present to the ER if you develop any new, concerning or worsening symptoms including increased dizziness and lightheadedness, intractable headache, speech difficulty or facial asymmetry.

## 2023-09-12 DIAGNOSIS — F32.A DEPRESSION, UNSPECIFIED DEPRESSION TYPE: ICD-10-CM

## 2023-09-12 RX ORDER — VENLAFAXINE HYDROCHLORIDE 150 MG/1
150 CAPSULE, EXTENDED RELEASE ORAL DAILY
Qty: 90 CAPSULE | Refills: 1 | Status: SHIPPED | OUTPATIENT
Start: 2023-09-12

## 2023-09-27 ENCOUNTER — OFFICE VISIT (OUTPATIENT)
Dept: OBGYN CLINIC | Facility: CLINIC | Age: 63
End: 2023-09-27
Payer: COMMERCIAL

## 2023-09-27 VITALS
SYSTOLIC BLOOD PRESSURE: 124 MMHG | HEIGHT: 61 IN | WEIGHT: 167.2 LBS | BODY MASS INDEX: 31.57 KG/M2 | DIASTOLIC BLOOD PRESSURE: 72 MMHG

## 2023-09-27 DIAGNOSIS — Z11.51 SCREENING FOR HPV (HUMAN PAPILLOMAVIRUS): ICD-10-CM

## 2023-09-27 DIAGNOSIS — Z12.4 ENCOUNTER FOR PAPANICOLAOU SMEAR FOR CERVICAL CANCER SCREENING: ICD-10-CM

## 2023-09-27 DIAGNOSIS — Z01.419 ENCOUNTER FOR GYNECOLOGICAL EXAMINATION WITHOUT ABNORMAL FINDING: Primary | ICD-10-CM

## 2023-09-27 DIAGNOSIS — Z12.31 ENCOUNTER FOR SCREENING MAMMOGRAM FOR BREAST CANCER: ICD-10-CM

## 2023-09-27 PROCEDURE — G0476 HPV COMBO ASSAY CA SCREEN: HCPCS | Performed by: NURSE PRACTITIONER

## 2023-09-27 PROCEDURE — S0610 ANNUAL GYNECOLOGICAL EXAMINA: HCPCS | Performed by: NURSE PRACTITIONER

## 2023-09-27 PROCEDURE — G0145 SCR C/V CYTO,THINLAYER,RESCR: HCPCS | Performed by: NURSE PRACTITIONER

## 2023-09-27 NOTE — PATIENT INSTRUCTIONS
Calcium and Osteoporosis   AMBULATORY CARE:   Why calcium is important for osteoporosis:  Calcium is important for osteoporosis because calcium helps build bone mass. Osteoporosis is a long-term medical condition that causes your body to break down more bone than it makes. Your bones become weak, brittle, and more likely to fracture. Your calcium needs:   Women:      19 to 50 years: 1,000 mg    Over 50: 1,200 mg    Pregnant or breastfeeding, 19 years to 50 years: 1,000 mg    Men:      19 to 70: 1,000 mg    Over 70: 1,200 mg    Foods that are high in calcium: The following list shows the number of calcium milligrams (mg) per serving. Your dietitian or healthcare provider can help you create a balanced meal plan for your calcium needs. Dairy:      1 cup of low-fat plain yogurt (415 mg) or low-fat fruit yogurt (245 to 384 mg)    1½ ounces of shredded cheddar cheese (306 mg) or part skim mozzarella cheese (275 mg)    1 cup of skim, 2%, or whole milk (300 mg)    1 cup of cottage cheese made with 2% milk fat (138 mg)    ½ cup of frozen yogurt (103 mg)    Other foods:      1 cup of calcium-fortified orange juice (300 mg)    ½ cup of cooked karthik greens (220 mg)    4 canned sardines, with bones (242 mg)    ½ cup of tofu (with added calcium) (204 mg)       How to get extra calcium:   Add powdered milk to puddings, cocoa, custard, or hot cereal.    Sift powdered milk into flour when you make cakes, cookies, or breads. Use low-fat or fat-free milk instead of water in pancake mix, mashed potatoes, pudding, or hot breakfast cereal.    Add low-fat or fat-free cheese to salad, soup, or pasta. Add tofu (with added calcium) to vegetable stir-castro. Take calcium supplements if you cannot get enough calcium from the foods you eat. Your body can absorb the most calcium from supplements when you take 500 mg or less at one time. Do not take more than 2,500 mg of calcium supplements each day.     Follow up with your doctor or dietitian as directed:  Write down your questions so you remember to ask them during your visits. © Copyright Kellee Talbot 2023 Information is for End User's use only and may not be sold, redistributed or otherwise used for commercial purposes. The above information is an  only. It is not intended as medical advice for individual conditions or treatments. Talk to your doctor, nurse or pharmacist before following any medical regimen to see if it is safe and effective for you. EXERCISE RECOMMENDATIONS:  Recommend regular exercise, 30 minutes of cardiovascular, 4-5 times a week (brisk walking, jogging, biking, elliptical).

## 2023-09-27 NOTE — PROGRESS NOTES
Assessment / Plan    1. Encounter for gynecological examination without abnormal finding  Well woman exam  Pap with hpv updated  Up to date on colonoscopy  DEXA order for update per PCP    2. Encounter for Papanicolaou smear for cervical cancer screening    - Liquid-based pap, screening    3. Screening for HPV (human papillomavirus)    - Liquid-based pap, screening    4. Encounter for screening mammogram for breast cancer  Order placed    - Mammo screening bilateral w 3d & cad; Future        Subjective      Jamila Jones is a 66 y.o. female who presents for her annual gynecologic exam.  NEW PATIENT  67 yo G0 PMF    2018  Pap: unable to render accurate dx, insufficient cells. Pt was advised to repeat however not completed. 2022 DX Mammo birad 4 on right; rt dx mammo benign, may resume yearly mammo  2018 Colonoscopy, 10 yrs  Parathyroidectomy  DEXA, 2018, mild osteopenia LS; new order placed by PCP    Pap Hx: NL  STD hx: none  Sexually active: not currently  Calcium intake: inadequate; given guidelines  Exercise: no; given guidelines  Safety: feels safe    Periods are absent  Current contraception: post menopausal status  History of abnormal Pap smear: no  Family history of breast,uterine, ovarian or colon cancer: no    Menstrual History:  OB History        0    Para   0    Term   0       0    AB   0    Living   0       SAB   0    IAB   0    Ectopic   0    Multiple   0    Live Births   0                  No LMP recorded (lmp unknown). Patient is postmenopausal.       The following portions of the patient's history were reviewed and updated as appropriate: allergies, current medications, past family history, past medical history, past social history, past surgical history and problem list.    Review of Systems      Review of Systems   Constitutional: Negative for chills and fever. Respiratory: Negative for cough and shortness of breath.     Gastrointestinal: Negative for abdominal distention, abdominal pain, blood in stool, constipation, diarrhea, nausea and vomiting. Genitourinary: Negative for difficulty urinating, dysuria, frequency, genital sores, hematuria, menstrual problem, pelvic pain, urgency, vaginal bleeding and vaginal discharge. Musculoskeletal: Negative for arthralgias and myalgias. Breasts:  Negative for skin changes, dimpling, asymmetry, nipple discharge, redness, tenderness or palpable masses      Objective      /72 (BP Location: Right arm, Patient Position: Sitting, Cuff Size: Standard)   Ht 5' 1" (1.549 m)   Wt 75.8 kg (167 lb 3.2 oz)   LMP  (LMP Unknown)   BMI 31.59 kg/m²   Physical Exam  Constitutional:       General: She is not in acute distress. Appearance: Normal appearance. She is well-developed. She is not ill-appearing or diaphoretic. Comments: bmi 31.6   HENT:      Head: Normocephalic and atraumatic. Eyes:      Pupils: Pupils are equal, round, and reactive to light. Neck:      Thyroid: No thyromegaly. Pulmonary:      Effort: Pulmonary effort is normal.   Chest:   Breasts:     Breasts are symmetrical.      Right: No inverted nipple, mass, nipple discharge, skin change or tenderness. Left: No inverted nipple, mass, nipple discharge, skin change or tenderness. Abdominal:      General: There is no distension. Palpations: Abdomen is soft. There is no mass. Tenderness: There is no abdominal tenderness. There is no guarding or rebound. Genitourinary:     General: Normal vulva. Exam position: Lithotomy position. Labia:         Right: No rash, tenderness, lesion or injury. Left: No rash, tenderness, lesion or injury. Vagina: No signs of injury and foreign body. No vaginal discharge, erythema, tenderness or bleeding. Cervix: No cervical motion tenderness, discharge or friability. Uterus: Not enlarged and not tender. Adnexa:         Right: No mass or tenderness.           Left: No mass or tenderness. Musculoskeletal:      Cervical back: Neck supple. Lymphadenopathy:      Cervical: No cervical adenopathy. Upper Body:      Right upper body: No supraclavicular adenopathy. Left upper body: No supraclavicular adenopathy. Skin:     General: Skin is warm and dry. Neurological:      General: No focal deficit present. Mental Status: She is alert and oriented to person, place, and time. Psychiatric:         Mood and Affect: Mood normal.         Behavior: Behavior normal.         Thought Content:  Thought content normal.         Judgment: Judgment normal.

## 2023-09-28 LAB
HPV HR 12 DNA CVX QL NAA+PROBE: NEGATIVE
HPV16 DNA CVX QL NAA+PROBE: NEGATIVE
HPV18 DNA CVX QL NAA+PROBE: NEGATIVE

## 2023-10-04 LAB
LAB AP GYN PRIMARY INTERPRETATION: NORMAL
Lab: NORMAL

## 2023-10-12 DIAGNOSIS — F90.0 ADHD, PREDOMINANTLY INATTENTIVE TYPE: ICD-10-CM

## 2023-10-12 RX ORDER — DEXTROAMPHETAMINE SACCHARATE, AMPHETAMINE ASPARTATE, DEXTROAMPHETAMINE SULFATE AND AMPHETAMINE SULFATE 1.25; 1.25; 1.25; 1.25 MG/1; MG/1; MG/1; MG/1
TABLET ORAL
Qty: 75 TABLET | Refills: 0 | Status: SHIPPED | OUTPATIENT
Start: 2023-10-12

## 2023-10-25 DIAGNOSIS — F33.0 MILD EPISODE OF RECURRENT MAJOR DEPRESSIVE DISORDER (HCC): ICD-10-CM

## 2023-10-25 DIAGNOSIS — F32.A DEPRESSION, UNSPECIFIED DEPRESSION TYPE: ICD-10-CM

## 2023-10-25 RX ORDER — VENLAFAXINE HYDROCHLORIDE 150 MG/1
150 CAPSULE, EXTENDED RELEASE ORAL DAILY
Qty: 90 CAPSULE | Refills: 1 | Status: SHIPPED | OUTPATIENT
Start: 2023-10-25

## 2023-10-25 RX ORDER — FLUOXETINE HYDROCHLORIDE 20 MG/1
20 CAPSULE ORAL DAILY
Qty: 90 CAPSULE | Refills: 1 | Status: SHIPPED | OUTPATIENT
Start: 2023-10-25

## 2023-11-15 DIAGNOSIS — F90.0 ADHD, PREDOMINANTLY INATTENTIVE TYPE: ICD-10-CM

## 2023-11-15 RX ORDER — DEXTROAMPHETAMINE SACCHARATE, AMPHETAMINE ASPARTATE, DEXTROAMPHETAMINE SULFATE AND AMPHETAMINE SULFATE 1.25; 1.25; 1.25; 1.25 MG/1; MG/1; MG/1; MG/1
TABLET ORAL
Qty: 75 TABLET | Refills: 0 | Status: SHIPPED | OUTPATIENT
Start: 2023-11-15

## 2023-12-22 DIAGNOSIS — F90.0 ADHD, PREDOMINANTLY INATTENTIVE TYPE: ICD-10-CM

## 2023-12-22 RX ORDER — DEXTROAMPHETAMINE SACCHARATE, AMPHETAMINE ASPARTATE, DEXTROAMPHETAMINE SULFATE AND AMPHETAMINE SULFATE 1.25; 1.25; 1.25; 1.25 MG/1; MG/1; MG/1; MG/1
TABLET ORAL
Qty: 75 TABLET | Refills: 0 | Status: SHIPPED | OUTPATIENT
Start: 2023-12-22

## 2023-12-27 ENCOUNTER — PROCEDURE VISIT (OUTPATIENT)
Dept: OBGYN CLINIC | Facility: CLINIC | Age: 63
End: 2023-12-27

## 2023-12-27 VITALS
BODY MASS INDEX: 32.21 KG/M2 | HEIGHT: 61 IN | DIASTOLIC BLOOD PRESSURE: 72 MMHG | SYSTOLIC BLOOD PRESSURE: 118 MMHG | WEIGHT: 170.6 LBS

## 2023-12-27 DIAGNOSIS — N84.1 CERVICAL POLYP: Primary | ICD-10-CM

## 2023-12-27 PROCEDURE — 88305 TISSUE EXAM BY PATHOLOGIST: CPT | Performed by: STUDENT IN AN ORGANIZED HEALTH CARE EDUCATION/TRAINING PROGRAM

## 2023-12-27 NOTE — PROGRESS NOTES
"Biopsy    Date/Time: 12/27/2023 3:40 PM    Performed by: CHEYANNE Garcia  Authorized by: CHEYANNE Garcia  Universal Protocol:  Consent: Written consent obtained.  Risks and benefits: risks, benefits and alternatives were discussed  Consent given by: patient  Time out: Immediately prior to procedure a \"time out\" was called to verify the correct patient, procedure, equipment, support staff and site/side marked as required.  Patient understanding: patient states understanding of the procedure being performed  Procedure consent: procedure consent matches procedure scheduled  Site marked: the operative site was marked  Patient identity confirmed: verbally with patient    Procedure Details - Lesion Biopsy:     Body area:  Anogenital    Vaginal Lesion: Yes (endocervix)      Biopsy tissue type: mucous membrane    Initial size (mm):  8    Final defect size (mm):  0    Malignancy: malignancy unknown      Destruction method comment:  Ring forceps      "

## 2023-12-28 ENCOUNTER — RA CDI HCC (OUTPATIENT)
Dept: OTHER | Facility: HOSPITAL | Age: 63
End: 2023-12-28

## 2023-12-28 NOTE — PROGRESS NOTES
HCC coding opportunities          Chart Reviewed number of suggestions sent to Provider: 2   F33.0  J45.909    This is a reminder to address (resolve/update/assess) ALL HCC (risk adjustment) codes as found on active problem list for 2024 as patient scores reset to zero [facility].  Also, just a reminder to please review and assess all other chronic conditions for 2024  Patients Insurance        Commercial Insurance: [employer]

## 2024-01-02 DIAGNOSIS — E03.9 ACQUIRED HYPOTHYROIDISM: ICD-10-CM

## 2024-01-02 DIAGNOSIS — I10 HYPERTENSION, ESSENTIAL, BENIGN: ICD-10-CM

## 2024-01-02 DIAGNOSIS — F90.0 ADHD, PREDOMINANTLY INATTENTIVE TYPE: ICD-10-CM

## 2024-01-02 PROCEDURE — 88305 TISSUE EXAM BY PATHOLOGIST: CPT | Performed by: STUDENT IN AN ORGANIZED HEALTH CARE EDUCATION/TRAINING PROGRAM

## 2024-01-02 RX ORDER — DEXTROAMPHETAMINE SACCHARATE, AMPHETAMINE ASPARTATE, DEXTROAMPHETAMINE SULFATE AND AMPHETAMINE SULFATE 1.25; 1.25; 1.25; 1.25 MG/1; MG/1; MG/1; MG/1
TABLET ORAL
Qty: 75 TABLET | Refills: 0 | Status: CANCELLED | OUTPATIENT
Start: 2024-01-02

## 2024-01-03 DIAGNOSIS — F90.0 ADHD, PREDOMINANTLY INATTENTIVE TYPE: ICD-10-CM

## 2024-01-03 RX ORDER — AMLODIPINE BESYLATE 5 MG/1
5 TABLET ORAL DAILY
Qty: 90 TABLET | Refills: 1 | Status: SHIPPED | OUTPATIENT
Start: 2024-01-03

## 2024-01-03 RX ORDER — LEVOTHYROXINE SODIUM 0.05 MG/1
50 TABLET ORAL DAILY
Qty: 90 TABLET | Refills: 1 | Status: SHIPPED | OUTPATIENT
Start: 2024-01-03

## 2024-01-03 RX ORDER — DEXTROAMPHETAMINE SACCHARATE, AMPHETAMINE ASPARTATE, DEXTROAMPHETAMINE SULFATE AND AMPHETAMINE SULFATE 1.25; 1.25; 1.25; 1.25 MG/1; MG/1; MG/1; MG/1
TABLET ORAL
Qty: 75 TABLET | Refills: 0 | OUTPATIENT
Start: 2024-01-03

## 2024-01-04 ENCOUNTER — TELEPHONE (OUTPATIENT)
Dept: INTERNAL MEDICINE CLINIC | Facility: OTHER | Age: 64
End: 2024-01-04

## 2024-01-04 ENCOUNTER — OFFICE VISIT (OUTPATIENT)
Age: 64
End: 2024-01-04
Payer: COMMERCIAL

## 2024-01-04 VITALS
HEIGHT: 61 IN | WEIGHT: 170 LBS | HEART RATE: 81 BPM | TEMPERATURE: 98.1 F | SYSTOLIC BLOOD PRESSURE: 124 MMHG | BODY MASS INDEX: 32.1 KG/M2 | DIASTOLIC BLOOD PRESSURE: 82 MMHG | OXYGEN SATURATION: 98 %

## 2024-01-04 DIAGNOSIS — Z63.4 BEREAVEMENT: ICD-10-CM

## 2024-01-04 DIAGNOSIS — J20.9 ACUTE BRONCHITIS, UNSPECIFIED ORGANISM: Primary | ICD-10-CM

## 2024-01-04 DIAGNOSIS — I10 HYPERTENSION, ESSENTIAL, BENIGN: ICD-10-CM

## 2024-01-04 DIAGNOSIS — J45.20 MILD INTERMITTENT ASTHMATIC BRONCHITIS WITHOUT COMPLICATION: ICD-10-CM

## 2024-01-04 DIAGNOSIS — Z87.09 HISTORY OF ASTHMA: ICD-10-CM

## 2024-01-04 DIAGNOSIS — Z23 ENCOUNTER FOR IMMUNIZATION: ICD-10-CM

## 2024-01-04 DIAGNOSIS — K29.00 ACUTE GASTRITIS WITHOUT HEMORRHAGE, UNSPECIFIED GASTRITIS TYPE: ICD-10-CM

## 2024-01-04 PROCEDURE — 90471 IMMUNIZATION ADMIN: CPT

## 2024-01-04 PROCEDURE — 99213 OFFICE O/P EST LOW 20 MIN: CPT | Performed by: INTERNAL MEDICINE

## 2024-01-04 PROCEDURE — 90677 PCV20 VACCINE IM: CPT

## 2024-01-04 RX ORDER — FLUTICASONE PROPIONATE AND SALMETEROL 100; 50 UG/1; UG/1
1 POWDER RESPIRATORY (INHALATION) DAILY
Qty: 180 BLISTER | Refills: 1 | Status: SHIPPED | OUTPATIENT
Start: 2024-01-04

## 2024-01-04 RX ORDER — CODEINE PHOSPHATE/GUAIFENESIN 10-100MG/5
10 LIQUID (ML) ORAL 4 TIMES DAILY PRN
Qty: 237 ML | Refills: 1 | Status: SHIPPED | OUTPATIENT
Start: 2024-01-04

## 2024-01-04 SDOH — SOCIAL STABILITY - SOCIAL INSECURITY: DISSAPEARANCE AND DEATH OF FAMILY MEMBER: Z63.4

## 2024-01-04 NOTE — PROGRESS NOTES
Depression Screening Follow-up Plan: Patient's depression screening was positive with a PHQ-2 score of . Their PHQ-9 score was 11. {Depression screen follow-up plan:[id number]}

## 2024-01-04 NOTE — PROGRESS NOTES
Name: Armond Foster      : 1960      MRN: [id number]  Encounter Provider: Donell Tse MD  Encounter Date: 2024   Encounter department: [facility] [place]    Assessment & Plan     1. Acute bronchitis, unspecified organism  -     guaifenesin-codeine (GUAIFENESIN AC) 100-10 MG/5ML liquid; Take 10 mL by mouth 4 (four) times a day as needed for cough    2. History of asthma  -     Fluticasone-Salmeterol (Advair Diskus) 100-50 mcg/dose inhaler; Inhale 1 puff daily    3. Acute gastritis without hemorrhage, unspecified gastritis type    4. Hypertension, essential, benign  Assessment & Plan:  Stable on combination of valsartan and amlodipine.      5. Mild intermittent asthmatic bronchitis without complication  Assessment & Plan:  Continue Advair Diskus. Will provide PRN Guiatuss AC      6. Encounter for immunization  -     Pneumococcal Conjugate Vaccine 20-valent (Pcv20)    7. Bereavement  Assessment & Plan:  Recent loss of younger brother to illness. Patient will  set up some bereavement counseling on her own. Continue venlafaxine and fluoxetine.             Subjective      Presents to the office for a follow up after being seen in the ED at [facility] for acute viral gastritis. She was given antiemetic therapy and IV fluids with improvement. She complains of some mild abdominal discomfort from all the vomiting. She is no longer nauseous. She has not experienced any diarrhea. She is and has been afebrile.      Review of Systems   Constitutional:  Negative for chills, diaphoresis and fever.   HENT: Negative.     Eyes: Negative.    Respiratory:  Positive for cough.    Gastrointestinal:  Positive for abdominal pain (from vomiting). Negative for diarrhea and vomiting (resolved).   Endocrine: Negative.    Genitourinary: Negative.    Musculoskeletal: Negative.    Skin: Negative.    Allergic/Immunologic: Negative.    Neurological: Negative.    Hematological: Negative.   "  Psychiatric/Behavioral:          Bereaved     Depression Screening Follow-up Plan: Patient's depression screening was positive with a PHQ-2 score of . Their PHQ-9 score was 11. Symptoms are more related to bereavement than depression. Her younger brother recently passed away after and acute viral illness superimposed on a chronic medical condition. Clinically patient does not have depression. No treatment is required. This is a reactive depression due to loss.  Current Outpatient Medications on File Prior to Visit   Medication Sig    Acetaminophen (TYLENOL PO) Take by mouth as needed    amLODIPine (NORVASC) 5 mg tablet Take 1 tablet (5 mg total) by mouth daily    amphetamine-dextroamphetamine (ADDERALL) 5 MG tablet take 1 and 1/2 tablets by mouth every morning    cetirizine (ZyrTEC) 10 mg tablet Take 10 mg by mouth 2 (two) times a day     Cholecalciferol (VITAMIN D3) 1000 units CAPS Take 1 capsule by mouth Daily      EPINEPHrine (EPIPEN JR) 0.15 mg/0.3 mL SOAJ Inject 0.3 mL (0.15 mg total) into a muscle once for 1 dose    EPINEPHrine (PRIMATENE MIST IN) Inhale if needed OTC    FLUoxetine (PROzac) 20 mg capsule Take 1 capsule (20 mg total) by mouth daily    levothyroxine 50 mcg tablet Take 1 tablet (50 mcg total) by mouth daily    Melatonin 5 MG TABS Take 1 tablet by mouth daily at bedtime    multivitamin (THERAGRAN) TABS Take 1 tablet by mouth daily      Triamcinolone Acetonide (Nasacort Allergy) 55 MCG/ACT nasal spray 1 Act into each nostril Daily 2 puffs in each nostril    valsartan (DIOVAN) 160 mg tablet Take 1 tablet (160 mg total) by mouth daily    venlafaxine (EFFEXOR-XR) 150 mg 24 hr capsule Take 1 capsule (150 mg total) by mouth daily    [DISCONTINUED] Fluticasone-Salmeterol (Advair Diskus) 100-50 mcg/dose inhaler Inhale 1 puff daily       Objective     /82 (BP Location: Left arm, Patient Position: Sitting, Cuff Size: Standard)   Pulse 81   Temp 98.1 °F (36.7 °C) (Temporal)   Ht 5' 1.3\" (1.557 m)  "  Wt 77.1 kg (170 lb)   LMP  (LMP Unknown)   SpO2 98%   BMI 31.81 kg/m²     Physical Exam  Vitals reviewed.   Constitutional:       General: She is not in acute distress.     Appearance: Normal appearance. She is not ill-appearing, toxic-appearing or diaphoretic.   HENT:      Head: Normocephalic and atraumatic.      Right Ear: External ear normal.      Left Ear: External ear normal.      Nose: Nose normal.      Mouth/Throat:      Mouth: Mucous membranes are moist.   Eyes:      General: No scleral icterus.     Conjunctiva/sclera: Conjunctivae normal.      Pupils: Pupils are equal, round, and reactive to light.   Cardiovascular:      Rate and Rhythm: Normal rate and regular rhythm.      Heart sounds: Normal heart sounds.   Pulmonary:      Effort: Pulmonary effort is normal. No respiratory distress.      Breath sounds: Normal breath sounds.   Abdominal:      General: Abdomen is flat. There is no distension.   Musculoskeletal:      Right lower leg: No edema.      Left lower leg: No edema.   Skin:     General: Skin is warm.      Coloration: Skin is not jaundiced.      Findings: No bruising, erythema or rash.   Neurological:      General: No focal deficit present.      Mental Status: She is alert and oriented to person, place, and time. Mental status is at baseline.   Psychiatric:         Mood and Affect: Mood normal.         Behavior: Behavior normal.         Thought Content: Thought content normal.         Judgment: Judgment normal.       Donell Tse MD

## 2024-02-08 DIAGNOSIS — I10 HYPERTENSION, ESSENTIAL, BENIGN: ICD-10-CM

## 2024-02-08 RX ORDER — VALSARTAN 160 MG/1
160 TABLET ORAL DAILY
Qty: 90 TABLET | Refills: 1 | Status: SHIPPED | OUTPATIENT
Start: 2024-02-08

## 2024-02-15 DIAGNOSIS — F90.0 ADHD, PREDOMINANTLY INATTENTIVE TYPE: ICD-10-CM

## 2024-02-15 RX ORDER — DEXTROAMPHETAMINE SACCHARATE, AMPHETAMINE ASPARTATE, DEXTROAMPHETAMINE SULFATE AND AMPHETAMINE SULFATE 1.25; 1.25; 1.25; 1.25 MG/1; MG/1; MG/1; MG/1
TABLET ORAL
Qty: 75 TABLET | Refills: 0 | Status: SHIPPED | OUTPATIENT
Start: 2024-02-15

## 2024-02-21 PROBLEM — Z12.11 SCREENING FOR COLON CANCER: Status: RESOLVED | Noted: 2018-02-13 | Resolved: 2024-02-21

## 2024-03-05 ENCOUNTER — TELEPHONE (OUTPATIENT)
Dept: SLEEP CENTER | Facility: CLINIC | Age: 64
End: 2024-03-05

## 2024-03-05 NOTE — TELEPHONE ENCOUNTER
LM FOR PT THAT THE [facility] ID: [id number]  THAT SHE PHONED IN IS THE INSUR THAT HAS TERMED 2/29/24.  REQUESTED ANOTHER CALL BACK W/ UPDATED INSUR INFO

## 2024-03-05 NOTE — TELEPHONE ENCOUNTER
New Insurance  [employer]  Member ID- [id number]  Group- [id number]    Pt said she does not think she needs PA.

## 2024-03-07 ENCOUNTER — HOSPITAL ENCOUNTER (OUTPATIENT)
Dept: RADIOLOGY | Facility: IMAGING CENTER | Age: 64
End: 2024-03-07
Payer: COMMERCIAL

## 2024-03-07 VITALS — HEIGHT: 61 IN | BODY MASS INDEX: 32.1 KG/M2 | WEIGHT: 170 LBS

## 2024-03-07 DIAGNOSIS — Z12.31 ENCOUNTER FOR SCREENING MAMMOGRAM FOR BREAST CANCER: ICD-10-CM

## 2024-03-07 PROCEDURE — 77067 SCR MAMMO BI INCL CAD: CPT

## 2024-03-07 PROCEDURE — 77063 BREAST TOMOSYNTHESIS BI: CPT

## 2024-03-11 ENCOUNTER — OFFICE VISIT (OUTPATIENT)
Dept: SLEEP CENTER | Facility: CLINIC | Age: 64
End: 2024-03-11
Payer: COMMERCIAL

## 2024-03-11 VITALS
HEIGHT: 61 IN | HEART RATE: 82 BPM | RESPIRATION RATE: 18 BRPM | SYSTOLIC BLOOD PRESSURE: 122 MMHG | BODY MASS INDEX: 32.1 KG/M2 | OXYGEN SATURATION: 98 % | WEIGHT: 170 LBS | DIASTOLIC BLOOD PRESSURE: 74 MMHG

## 2024-03-11 DIAGNOSIS — E66.9 OBESITY (BMI 30-39.9): ICD-10-CM

## 2024-03-11 DIAGNOSIS — G47.8 SLEEP TALKING: ICD-10-CM

## 2024-03-11 DIAGNOSIS — F51.12 INSUFFICIENT SLEEP SYNDROME: ICD-10-CM

## 2024-03-11 DIAGNOSIS — J31.0 CHRONIC RHINITIS: ICD-10-CM

## 2024-03-11 DIAGNOSIS — G47.33 OSA (OBSTRUCTIVE SLEEP APNEA): Primary | ICD-10-CM

## 2024-03-11 DIAGNOSIS — J45.20 MILD INTERMITTENT ASTHMATIC BRONCHITIS WITHOUT COMPLICATION: ICD-10-CM

## 2024-03-11 DIAGNOSIS — R40.0 DAYTIME SLEEPINESS: ICD-10-CM

## 2024-03-11 DIAGNOSIS — F43.9 STRESS: ICD-10-CM

## 2024-03-11 DIAGNOSIS — F32.0 CURRENT MILD EPISODE OF MAJOR DEPRESSIVE DISORDER WITHOUT PRIOR EPISODE (HCC): ICD-10-CM

## 2024-03-11 DIAGNOSIS — F90.0 ADHD, PREDOMINANTLY INATTENTIVE TYPE: ICD-10-CM

## 2024-03-11 DIAGNOSIS — I10 HYPERTENSION, ESSENTIAL, BENIGN: ICD-10-CM

## 2024-03-11 PROCEDURE — 99214 OFFICE O/P EST MOD 30 MIN: CPT | Performed by: INTERNAL MEDICINE

## 2024-03-11 NOTE — PATIENT INSTRUCTIONS

## 2024-03-11 NOTE — PROGRESS NOTES
Follow-Up Note - Sleep Center   Armond Foster  63 y.o. female  :1960  MRN:[id number]  DOS:3/11/2024    CC: I saw this patient for follow-up in clinic today for Sleep disordered breathing, Coexisting Sleep and Medical Problems.  Patient received ot a  Dream Station Version 2 machine from [employer] over a year ago.  Interval changes: None reported.    Results of prior studies in 2017:  The diagnostic study demonstrated an AHI of 5.4 per hour.  Minimum oxygen saturation was 85% and she spent 72.2% of time asleep with saturations less than 90%.  Mild-to-moderate intensity snoring was noted. There were also periodic limb movements of sleep for an index of 14 per hour and frequent spontaneous arousals for an index of 47 per hour.   The titration study demonstrated sleep disordered breathing was successfully remediated with PAP at 9 cm H2O. The periodic limb movements of sleep were virtually eliminated and she had much less sleep fragmentation.     PFSH, Problem List, Medications & Allergies were reviewed in EMR.   She  has a past medical history of Abrasion of axilla, Acute cystitis with hematuria (08/15/2019), ADHD, Allergic, Allergic rhinitis, Anxiety (), Asthma, Bee sting reaction (2019), Bilateral nephrolithiasis (2018), Depression, Disease of thyroid gland, Fatty liver, Fracture of plateau of left tibia, Headache, Hematuria, Hyperparathyroidism (), Hypertension, Hypothyroidism, Kidney stone (), Left ankle sprain, No known health problems, Obesity (), Obstructive sleep apnea on CPAP, Pneumonia (Twice in [place]), Poison ivy dermatitis (2022), Scoliosis, Thyroid nodule, Urinary frequency, Vaginal bleeding, abnormal, Varicella (), Viral URI with cough (2019), and Visual impairment.    She has a current medication list which includes the following prescription(s): acetaminophen, amlodipine, amphetamine-dextroamphetamine, cetirizine, vitamin d3, epinephrine,  "fluoxetine, fluticasone-salmeterol, levothyroxine, melatonin, multivitamin, triamcinolone acetonide, valsartan, venlafaxine, epinephrine, and guaifenesin-codeine.    PHYSIOLOGICAL DATA REVIEW :  No data was available, but she reports regular use of the device for > 4 hours/night. ; patient has not been using non FDA approved devices to sanitize the machine.  INTERPRETATION: Compliance is Very good; Pressure setting is:; ;   SUBJECTIVE: With respect to use of PAP, Armond  is experiencing no adverse effects:.She derives benefit.  Is satisfied with sleep and daytime function.   Sleep Routine: Armond reports getting 6 hrs sleep on work nights and up to 11 hours on days off.; she has no difficulty initiating or maintaining sleep . She arises needing multiple alarms  and rarely feels refreshed .Armond denies daytime sleepiness, she naps when she has the opportunity but infrequently.  She rated herself at Total score: 8 /24 on the Mingus Sleepiness Scale.   Other issues: She reports sleep talking but not acting out dream content or sleepwalking.     Habits: Reports that she has never smoked. She has never used smokeless tobacco.,  Reports current alcohol use.,  Reports no history of drug use., Caffeine use:excessive until around 8 PM; Exercise routine: none.      ROS: Significant for weight gain since her last visit.  Allergies and asthma control.  She is reporting no cardiac symptoms.  Mood is stable on venlafaxine.  She is also on Adderall for ADHD..    EXAM: /74   Pulse 82   Resp 18   Ht 5' 1\" (1.549 m)   Wt 77.1 kg (170 lb)   LMP  (LMP Unknown)   SpO2 98%   BMI 32.12 kg/m²     Wt Readings from Last 3 Encounters:   03/11/24 77.1 kg (170 lb)   03/07/24 77.1 kg (170 lb)   01/04/24 77.1 kg (170 lb)      Patient is well groomed; well appearing.   CNS: Alert, orientated, speech clear & coherent  Psych: cooperative and in no distress. Mental state: Appears normal.  H&N: EOMI; NC/AT: No facial pressure marks, " "no rashes.    Skin/Extrem: col & hydration normal; no edema  Resp: Respiratory effort is normal  Physical findings otherwise essentially unchanged from previous.    IMPRESSION: Problem List Items & Comorbidities Addressed this Visit    1. ERICA (obstructive sleep apnea)        2. Chronic rhinitis        3. Mild intermittent asthmatic bronchitis without complication        4. Hypertension, essential, benign        5. ADHD, predominantly inattentive type        6. Current mild episode of major depressive disorder without prior episode (HCC)        7. Stress        8. Obesity (BMI 30-39.9)            PLAN:  I reviewed results of prior studies and previous physiologic data with the patient.   I discussed treatment options with risks and benefits.  Treatment with  PAP is medically necessary and Armond is agreable to continue use.   Care of equipment, methods to improve comfort using PAP and importance of compliance with therapy were discussed.  Pressure setting: continue 10-12 cmH2O. She will need to have data downloaded and sent to facilitate any pressure adjustments.  Rx provided to replace supplies and Care coordinated with DME provider for refitting of her interface..   Discussed strategies for weight reduction.    Consideration to be given to alternatives to Adderall and venlafaxine that can cause increase in blood pressure.   I also advised allowing sufficient opportunity for sleep that may improve ADHD symptoms and obviate need for Adderall.  Follow-up is advised in 1 year or sooner if needed to monitor progress, compliance and to adjust therapy.     Thank you for allowing me to participate in the care of this patient.    Sincerely,     Authenticated electronically on 03/11/24   Board Certified Specialist     Portions of the record may have been created with voice recognition software. Occasional wrong word or \"sound a like\" substitutions may have occurred due to the inherent limitations of voice recognition " software. There may also be notations and random deletions of words or characters from malfunctioning software. Read the chart carefully and recognize, using context, where substitutions/deletions have occurred.

## 2024-03-12 ENCOUNTER — TELEPHONE (OUTPATIENT)
Dept: SLEEP CENTER | Facility: CLINIC | Age: 64
End: 2024-03-12

## 2024-03-12 DIAGNOSIS — F90.0 ADHD, PREDOMINANTLY INATTENTIVE TYPE: ICD-10-CM

## 2024-03-12 DIAGNOSIS — F32.A DEPRESSION, UNSPECIFIED DEPRESSION TYPE: ICD-10-CM

## 2024-03-12 DIAGNOSIS — I10 HYPERTENSION, ESSENTIAL, BENIGN: ICD-10-CM

## 2024-03-12 RX ORDER — VENLAFAXINE HYDROCHLORIDE 150 MG/1
150 CAPSULE, EXTENDED RELEASE ORAL DAILY
Qty: 90 CAPSULE | Refills: 1 | Status: SHIPPED | OUTPATIENT
Start: 2024-03-12

## 2024-03-12 RX ORDER — DEXTROAMPHETAMINE SACCHARATE, AMPHETAMINE ASPARTATE, DEXTROAMPHETAMINE SULFATE AND AMPHETAMINE SULFATE 1.25; 1.25; 1.25; 1.25 MG/1; MG/1; MG/1; MG/1
TABLET ORAL
Qty: 75 TABLET | Refills: 0 | Status: CANCELLED | OUTPATIENT
Start: 2024-03-12

## 2024-03-12 RX ORDER — AMLODIPINE BESYLATE 5 MG/1
5 TABLET ORAL DAILY
Qty: 90 TABLET | Refills: 1 | Status: SHIPPED | OUTPATIENT
Start: 2024-03-12

## 2024-03-13 LAB

## 2024-03-13 RX ORDER — DEXTROAMPHETAMINE SACCHARATE, AMPHETAMINE ASPARTATE, DEXTROAMPHETAMINE SULFATE AND AMPHETAMINE SULFATE 1.25; 1.25; 1.25; 1.25 MG/1; MG/1; MG/1; MG/1
TABLET ORAL
Qty: 45 TABLET | Refills: 0 | Status: SHIPPED | OUTPATIENT
Start: 2024-03-13 | End: 2024-03-18 | Stop reason: SDUPTHER

## 2024-03-18 DIAGNOSIS — F90.0 ADHD, PREDOMINANTLY INATTENTIVE TYPE: ICD-10-CM

## 2024-03-19 RX ORDER — DEXTROAMPHETAMINE SACCHARATE, AMPHETAMINE ASPARTATE, DEXTROAMPHETAMINE SULFATE AND AMPHETAMINE SULFATE 1.25; 1.25; 1.25; 1.25 MG/1; MG/1; MG/1; MG/1
TABLET ORAL
Qty: 45 TABLET | Refills: 0 | Status: SHIPPED | OUTPATIENT
Start: 2024-03-19

## 2024-04-08 ENCOUNTER — HOSPITAL ENCOUNTER (OUTPATIENT)
Dept: RADIOLOGY | Age: 64
Discharge: HOME | End: 2024-04-08
Payer: COMMERCIAL

## 2024-04-08 DIAGNOSIS — K76.0 NAFLD (NONALCOHOLIC FATTY LIVER DISEASE): ICD-10-CM

## 2024-04-08 PROCEDURE — 76981 USE PARENCHYMA: CPT

## 2024-04-17 DIAGNOSIS — F90.0 ADHD, PREDOMINANTLY INATTENTIVE TYPE: ICD-10-CM

## 2024-04-17 RX ORDER — DEXTROAMPHETAMINE SACCHARATE, AMPHETAMINE ASPARTATE, DEXTROAMPHETAMINE SULFATE AND AMPHETAMINE SULFATE 1.25; 1.25; 1.25; 1.25 MG/1; MG/1; MG/1; MG/1
TABLET ORAL
Qty: 45 TABLET | Refills: 0 | Status: SHIPPED | OUTPATIENT
Start: 2024-04-17

## 2024-04-25 DIAGNOSIS — F33.0 MILD EPISODE OF RECURRENT MAJOR DEPRESSIVE DISORDER (HCC): ICD-10-CM

## 2024-04-25 DIAGNOSIS — F32.A DEPRESSION, UNSPECIFIED DEPRESSION TYPE: ICD-10-CM

## 2024-04-26 RX ORDER — VENLAFAXINE HYDROCHLORIDE 150 MG/1
150 CAPSULE, EXTENDED RELEASE ORAL DAILY
Qty: 90 CAPSULE | Refills: 1 | Status: SHIPPED | OUTPATIENT
Start: 2024-04-26

## 2024-04-26 RX ORDER — FLUOXETINE HYDROCHLORIDE 20 MG/1
20 CAPSULE ORAL DAILY
Qty: 90 CAPSULE | Refills: 1 | Status: SHIPPED | OUTPATIENT
Start: 2024-04-26

## 2024-05-27 DIAGNOSIS — F90.0 ADHD, PREDOMINANTLY INATTENTIVE TYPE: ICD-10-CM

## 2024-05-28 RX ORDER — DEXTROAMPHETAMINE SACCHARATE, AMPHETAMINE ASPARTATE, DEXTROAMPHETAMINE SULFATE AND AMPHETAMINE SULFATE 1.25; 1.25; 1.25; 1.25 MG/1; MG/1; MG/1; MG/1
TABLET ORAL
Qty: 45 TABLET | Refills: 0 | Status: SHIPPED | OUTPATIENT
Start: 2024-05-28

## 2024-06-17 ENCOUNTER — OFFICE VISIT (OUTPATIENT)
Dept: URGENT CARE | Age: 64
End: 2024-06-17
Payer: COMMERCIAL

## 2024-06-17 VITALS
HEART RATE: 93 BPM | DIASTOLIC BLOOD PRESSURE: 89 MMHG | TEMPERATURE: 98 F | OXYGEN SATURATION: 96 % | RESPIRATION RATE: 18 BRPM | SYSTOLIC BLOOD PRESSURE: 142 MMHG

## 2024-06-17 DIAGNOSIS — J02.9 SORE THROAT: ICD-10-CM

## 2024-06-17 DIAGNOSIS — J06.9 VIRAL URI: Primary | ICD-10-CM

## 2024-06-17 LAB — S PYO AG THROAT QL: NEGATIVE

## 2024-06-17 PROCEDURE — G0382 LEV 3 HOSP TYPE B ED VISIT: HCPCS | Performed by: STUDENT IN AN ORGANIZED HEALTH CARE EDUCATION/TRAINING PROGRAM

## 2024-06-17 PROCEDURE — 87880 STREP A ASSAY W/OPTIC: CPT | Performed by: STUDENT IN AN ORGANIZED HEALTH CARE EDUCATION/TRAINING PROGRAM

## 2024-06-17 PROCEDURE — 87070 CULTURE OTHR SPECIMN AEROBIC: CPT | Performed by: STUDENT IN AN ORGANIZED HEALTH CARE EDUCATION/TRAINING PROGRAM

## 2024-06-17 PROCEDURE — S9083 URGENT CARE CENTER GLOBAL: HCPCS | Performed by: STUDENT IN AN ORGANIZED HEALTH CARE EDUCATION/TRAINING PROGRAM

## 2024-06-17 RX ORDER — METHYLPREDNISOLONE 4 MG/1
TABLET ORAL
Qty: 21 EACH | Refills: 0 | Status: SHIPPED | OUTPATIENT
Start: 2024-06-17

## 2024-06-17 NOTE — PATIENT INSTRUCTIONS
Your rapid strep test was negative.  We will send out your swab for throat culture, we will call you if is positive for group A strep.  You can also poly results in your MyChart, please give us a call with any questions.  I recommend that you continue with the allergy medications you have been taking.  I also recommend using ear nasal saline rinse with distilled or boiled water to help clear out the mucus.  I believe that a lot of your symptoms are coming from postnasal drip and reducing this will help reduce your sore throat.  You can use the saline rinse before using your Nasacort to maximize the effect.  I am prescribing a steroid Dosepak.  Please take as prescribed.  If your symptoms are not improving the above, please follow-up with your PCP.

## 2024-06-17 NOTE — PROGRESS NOTES
[facility]        NAME: Armond Foster is a 64 y.o. female  : 1960    MRN: [id number]  DATE: 2024  TIME: 3:25 PM    Assessment and Plan   Viral URI [J06.9]  1. Viral URI  methylPREDNISolone 4 MG tablet therapy pack      2. Sore throat  POCT rapid ANTIGEN strepA    Throat culture    methylPREDNISolone 4 MG tablet therapy pack            Patient Instructions   Your rapid strep test was negative.  We will send out your swab for throat culture, we will call you if is positive for group A strep.  You can also poly results in your MyChart, please give us a call with any questions.  I recommend that you continue with the allergy medications you have been taking.  I also recommend using ear nasal saline rinse with distilled or boiled water to help clear out the mucus.  I believe that a lot of your symptoms are coming from postnasal drip and reducing this will help reduce your sore throat.  You can use the saline rinse before using your Nasacort to maximize the effect.  I am prescribing a steroid Dosepak.  Please take as prescribed.  If your symptoms are not improving the above, please follow-up with your PCP.    Follow up with PCP in 3-5 days.  Proceed to  ER if symptoms worsen.    If tests have been performed at [facility], our office will contact you with results if changes need to be made to the care plan discussed with you at the visit.  [facility].    Chief Complaint     Chief Complaint   Patient presents with    Cough     Cough, sore throat since Saturday night         History of Present Illness       Patient presents for sore throat that started 2 to 3 days ago.  Her main symptom is her sore throat, she also does feel some postnasal drip, very minimal cough.  No fevers, chills.  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  Unsure of sick contacts.  She is status post tonsillectomy.        Review of Systems   Review of Systems   All  other systems reviewed and are negative.        Current Medications       Current Outpatient Medications:     Acetaminophen (TYLENOL PO), Take by mouth as needed, Disp: , Rfl:     amLODIPine (NORVASC) 5 mg tablet, Take 1 tablet (5 mg total) by mouth daily, Disp: 90 tablet, Rfl: 1    amphetamine-dextroamphetamine (ADDERALL) 5 MG tablet, take 1 and 1/2 tablets by mouth every morning, Disp: 45 tablet, Rfl: 0    cetirizine (ZyrTEC) 10 mg tablet, Take 10 mg by mouth 2 (two) times a day , Disp: , Rfl:     Cholecalciferol (VITAMIN D3) 1000 units CAPS, Take 1 capsule by mouth Daily  , Disp: , Rfl:     EPINEPHrine (PRIMATENE MIST IN), Inhale if needed OTC, Disp: , Rfl:     FLUoxetine (PROzac) 20 mg capsule, Take 1 capsule (20 mg total) by mouth daily, Disp: 90 capsule, Rfl: 1    Fluticasone-Salmeterol (Advair Diskus) 100-50 mcg/dose inhaler, Inhale 1 puff daily, Disp: 180 blister, Rfl: 1    levothyroxine 50 mcg tablet, Take 1 tablet (50 mcg total) by mouth daily, Disp: 90 tablet, Rfl: 1    Melatonin 5 MG TABS, Take 1 tablet by mouth daily at bedtime, Disp: , Rfl:     methylPREDNISolone 4 MG tablet therapy pack, Use as directed on package, Disp: 21 each, Rfl: 0    multivitamin (THERAGRAN) TABS, Take 1 tablet by mouth daily  , Disp: , Rfl:     Triamcinolone Acetonide (Nasacort Allergy) 55 MCG/ACT nasal spray, 1 Act into each nostril Daily 2 puffs in each nostril, Disp: , Rfl:     valsartan (DIOVAN) 160 mg tablet, Take 1 tablet (160 mg total) by mouth daily, Disp: 90 tablet, Rfl: 1    venlafaxine (EFFEXOR-XR) 150 mg 24 hr capsule, Take 1 capsule (150 mg total) by mouth daily, Disp: 90 capsule, Rfl: 1    EPINEPHrine (EPIPEN JR) 0.15 mg/0.3 mL SOAJ, Inject 0.3 mL (0.15 mg total) into a muscle once for 1 dose, Disp: 0.3 mL, Rfl: 0    guaifenesin-codeine (GUAIFENESIN AC) 100-10 MG/5ML liquid, Take 10 mL by mouth 4 (four) times a day as needed for cough, Disp: 237 mL, Rfl: 1    Current Allergies     Allergies as of 06/17/2024 -  Reviewed 06/17/2024   Allergen Reaction Noted    Compazine [prochlorperazine] Anaphylaxis 10/22/2013    Bee venom Itching and Swelling 07/03/2023    Eggs or egg-derived products - food allergy Abdominal Pain and Diarrhea 03/29/2022    Erythromycin GI Intolerance 10/22/2013    Lactose intolerance (gi) - food allergy Abdominal Pain and Diarrhea 03/29/2022    Sulfa antibiotics GI Intolerance 03/08/2016    Sulfamethoxazole-trimethoprim Nausea Only and Dizziness 10/22/2013    Poison ivy extract Hives, Itching, Other (See Comments), and Rash 07/03/2023            The following portions of the patient's history were reviewed and updated as appropriate: allergies, current medications, past family history, past medical history, past social history, past surgical history and problem list.     Past Medical History:   Diagnosis Date    Abrasion of axilla     LAST ASSESSED: 11/21/14    Acute cystitis with hematuria 08/15/2019    ADHD     Allergic     Allergic rhinitis     LAST ASSESSED: 5/15/15    Anxiety 2000    Asthma     TRANSITIONED FROM: ASTHMA; RESOLVED: 11/9/16    Bee sting reaction 08/01/2019    Bilateral nephrolithiasis 07/25/2018    Depression     Disease of thyroid gland     Hypothyroidism    Fatty liver     Fracture of plateau of left tibia     RESOLVED: 4/14/15    Headache     Hematuria     Hyperparathyroidism (HCC)     Hypertension     Hypothyroidism     Kidney stone 2018    Left ankle sprain     LAST ASSESSED: 10/21/14    No known health problems     DENIED MENTAL STATUS CHANGE AS PER [facility]; CONFLICTED WITH ADHD AND DEPRESSION IN MED HX SO IT COULD NOT BE ADDED IN PERT NEGS    Obesity 2020    Obstructive sleep apnea on CPAP     Pneumonia Twice in [place]    Poison ivy dermatitis 08/08/2022    Scoliosis     Thyroid nodule     Urinary frequency     RESOLVED: 10/27/16    Vaginal bleeding, abnormal     LAST ASSESSED: 6/6/16    Varicella 1967    Viral URI with cough 12/30/2019    Visual impairment        Past  "Surgical History:   Procedure Laterality Date    EYE SURGERY  2010    Lasik    FOOT SURGERY Right     \"Nerve removal\"    FRACTURE SURGERY      left shoulder and right arm when I was a kid    PARATHYROID GLAND SURGERY  05/17/2018    HI COLONOSCOPY FLX DX W/COLLJ SPEC WHEN PFRMD N/A 06/21/2018    Procedure: COLONOSCOPY;  Surgeon: Jefferson Gamble MD;  Location: [facility];  Service: Gastroenterology    HI PARATHYROIDECTOMY/EXPLORATION PARATHYROIDS Right 05/17/2018    Procedure: MINIMALLY INVASIVE PARATHYROIDECTOMY;   PTH MONITORING;  Surgeon: Milton Aldana MD;  Location: [facility];  Service: Surgical Oncology    TONSILLECTOMY      TONSILLECTOMY AND ADENOIDECTOMY         Family History   Problem Relation Age of Onset    Heart Valve Disease Mother         s/p MVR    Glaucoma Mother     Rickets Mother     Stroke Mother     Prostate cancer Father     Anemia Father     Neuropathy Father     Arthritis Father     Cancer Father         PROSTATE    Prostate cancer Brother     Paget's disease of bone Brother     Arthritis Brother     Cancer Brother         PROSTATE    Hearing loss Brother         Tinitus    No Known Problems Maternal Grandmother     No Known Problems Maternal Grandfather     No Known Problems Paternal Grandmother     No Known Problems Paternal Grandfather     No Known Problems Paternal Aunt     Hypertension Family         BENIGN ESSENTIAL    Heart disease Family         CARDIAC DISORDER    Breast cancer Neg Hx     Colon cancer Neg Hx     Ovarian cancer Neg Hx     Uterine cancer Neg Hx          Medications have been verified.        Objective   /89   Pulse 93   Temp 98 °F (36.7 °C) (Temporal)   Resp 18   LMP  (LMP Unknown)   SpO2 96%   No LMP recorded (lmp unknown). Patient is postmenopausal.       Physical Exam     Physical Exam  Vitals and nursing note reviewed.   Constitutional:       General: She is not in acute distress.     Appearance: Normal appearance. She is not ill-appearing or " toxic-appearing.   HENT:      Head: Normocephalic and atraumatic.      Right Ear: External ear normal.      Left Ear: External ear normal.      Nose: Nose normal.      Mouth/Throat:      Mouth: Mucous membranes are moist.      Pharynx: Posterior oropharyngeal erythema present. No oropharyngeal exudate.      Comments: PND and erythema  Eyes:      Extraocular Movements: Extraocular movements intact.   Cardiovascular:      Rate and Rhythm: Normal rate and regular rhythm.      Heart sounds: Normal heart sounds.   Pulmonary:      Effort: Pulmonary effort is normal. No respiratory distress.      Breath sounds: Normal breath sounds. No stridor. No wheezing, rhonchi or rales.   Skin:     General: Skin is warm and dry.      Capillary Refill: Capillary refill takes less than 2 seconds.      Findings: No rash.   Neurological:      Mental Status: She is alert.      Gait: Gait normal.   Psychiatric:         Behavior: Behavior normal.

## 2024-06-19 LAB — BACTERIA THROAT CULT: NORMAL

## 2024-06-21 ENCOUNTER — OFFICE VISIT (OUTPATIENT)
Age: 64
End: 2024-06-21
Payer: COMMERCIAL

## 2024-06-21 VITALS
SYSTOLIC BLOOD PRESSURE: 126 MMHG | TEMPERATURE: 98.2 F | BODY MASS INDEX: 31.91 KG/M2 | HEART RATE: 72 BPM | DIASTOLIC BLOOD PRESSURE: 84 MMHG | WEIGHT: 169 LBS | HEIGHT: 61 IN | OXYGEN SATURATION: 99 %

## 2024-06-21 DIAGNOSIS — J06.9 ACUTE URI: Primary | ICD-10-CM

## 2024-06-21 PROCEDURE — 99213 OFFICE O/P EST LOW 20 MIN: CPT

## 2024-06-21 RX ORDER — BENZONATATE 200 MG/1
200 CAPSULE ORAL 3 TIMES DAILY PRN
Qty: 20 CAPSULE | Refills: 0 | Status: SHIPPED | OUTPATIENT
Start: 2024-06-21

## 2024-06-21 RX ORDER — AMOXICILLIN AND CLAVULANATE POTASSIUM 875; 125 MG/1; MG/1
1 TABLET, FILM COATED ORAL EVERY 12 HOURS SCHEDULED
Qty: 14 TABLET | Refills: 0 | Status: SHIPPED | OUTPATIENT
Start: 2024-06-21 | End: 2024-06-28

## 2024-06-21 NOTE — ASSESSMENT & PLAN NOTE
URI symptoms x 1 week  Worsening despite OTC therapy   Will treat with Augmentin twice daily x 7 days  Advised Flonase twice daily, second-generation antihistamine such as Zyrtec daily, decongestant daily  Advised increased rest, fluid intake  Discussed symptomatic care including salt water gargles for sore throat, steam showers for congestion, warm liquids   Patient can take Tylenol/ibuprofen for fevers and body aches  Discussed red flag symptoms including going to the ER with chest pain or shortness of breath  Discussed course of illness could be 1 to 2 weeks.   Return to the office for reevaluation if symptoms do not improve in 1-2 weeks

## 2024-06-21 NOTE — PROGRESS NOTES
Assessment/Plan:    1. Acute URI  Assessment & Plan:  URI symptoms x 1 week  Worsening despite OTC therapy   Will treat with Augmentin twice daily x 7 days  Advised Flonase twice daily, second-generation antihistamine such as Zyrtec daily, decongestant daily  Advised increased rest, fluid intake  Discussed symptomatic care including salt water gargles for sore throat, steam showers for congestion, warm liquids   Patient can take Tylenol/ibuprofen for fevers and body aches  Discussed red flag symptoms including going to the ER with chest pain or shortness of breath  Discussed course of illness could be 1 to 2 weeks.   Return to the office for reevaluation if symptoms do not improve in 1-2 weeks     Orders:  -     amoxicillin-clavulanate (AUGMENTIN) 875-125 mg per tablet; Take 1 tablet by mouth every 12 (twelve) hours for 7 days  -     benzonatate (TESSALON) 200 MG capsule; Take 1 capsule (200 mg total) by mouth 3 (three) times a day as needed for cough            M*Modal software was used to dictate this note.  It may contain errors with dictating incorrect words or incorrect spelling. Please contact the provider directly with any questions.    Subjective:      Patient ID: Armond Foster is a 64 y.o. female.    Patient is a 64-year-old female presenting to the office for sore throat x 1 week  She was seen in urgent care on 6/17/2024  She was given prednisone taper as well as recommended to continue with allergy medication  Throat culture was negative for strep  Notes that her symptoms are worsening today, now endorsing productive cough, postnasal drip, congestion    Tx: Mucinex, tylenol, throat lozenges, nasal rinses      Sore Throat   This is a new problem. The current episode started 1 to 4 weeks ago. The problem has been gradually worsening. There has been no fever. Associated symptoms include congestion, coughing (productive) and shortness of breath. Pertinent negatives include no abdominal pain, diarrhea, ear  discharge, ear pain, headaches, plugged ear sensation or vomiting. She has tried acetaminophen and gargles for the symptoms. The treatment provided mild relief.           The following portions of the patient's history were reviewed and updated as appropriate: allergies, current medications, past family history, past medical history, past social history, past surgical history, and problem list.    Review of Systems   Constitutional:  Negative for chills, fatigue and fever.   HENT:  Positive for congestion, postnasal drip, rhinorrhea and sore throat. Negative for ear discharge, ear pain, sinus pressure and sinus pain.    Eyes:  Positive for discharge (mild).   Respiratory:  Positive for cough (productive) and shortness of breath. Negative for chest tightness and wheezing.    Gastrointestinal:  Negative for abdominal pain, diarrhea, nausea and vomiting.   Neurological:  Positive for light-headedness. Negative for dizziness and headaches.         Past Medical History:   Diagnosis Date    Abrasion of axilla     LAST ASSESSED: 11/21/14    Acute cystitis with hematuria 08/15/2019    ADHD     Allergic     Allergic rhinitis     LAST ASSESSED: 5/15/15    Anxiety 2000    Asthma     TRANSITIONED FROM: ASTHMA; RESOLVED: 11/9/16    Bee sting reaction 08/01/2019    Bilateral nephrolithiasis 07/25/2018    Depression     Disease of thyroid gland     Hypothyroidism    Fatty liver     Fracture of plateau of left tibia     RESOLVED: 4/14/15    Headache     Hematuria     Hyperparathyroidism (HCC)     Hypertension     Hypothyroidism     Kidney stone 2018    Left ankle sprain     LAST ASSESSED: 10/21/14    No known health problems     DENIED MENTAL STATUS CHANGE AS PER [facility]; CONFLICTED WITH ADHD AND DEPRESSION IN MED HX SO IT COULD NOT BE ADDED IN PERT NEGS    Obesity 2020    Obstructive sleep apnea on CPAP     Pneumonia Twice in [place]    Poison ivy dermatitis 08/08/2022    Scoliosis     Thyroid nodule     Urinary frequency      RESOLVED: 10/27/16    Vaginal bleeding, abnormal     LAST ASSESSED: 6/6/16    Varicella 1967    Viral URI with cough 12/30/2019    Visual impairment          Current Outpatient Medications:     Acetaminophen (TYLENOL PO), Take by mouth as needed, Disp: , Rfl:     amLODIPine (NORVASC) 5 mg tablet, Take 1 tablet (5 mg total) by mouth daily, Disp: 90 tablet, Rfl: 1    amoxicillin-clavulanate (AUGMENTIN) 875-125 mg per tablet, Take 1 tablet by mouth every 12 (twelve) hours for 7 days, Disp: 14 tablet, Rfl: 0    amphetamine-dextroamphetamine (ADDERALL) 5 MG tablet, take 1 and 1/2 tablets by mouth every morning, Disp: 45 tablet, Rfl: 0    benzonatate (TESSALON) 200 MG capsule, Take 1 capsule (200 mg total) by mouth 3 (three) times a day as needed for cough, Disp: 20 capsule, Rfl: 0    cetirizine (ZyrTEC) 10 mg tablet, Take 10 mg by mouth 2 (two) times a day , Disp: , Rfl:     Cholecalciferol (VITAMIN D3) 1000 units CAPS, Take 1 capsule by mouth Daily  , Disp: , Rfl:     EPINEPHrine (PRIMATENE MIST IN), Inhale if needed OTC, Disp: , Rfl:     FLUoxetine (PROzac) 20 mg capsule, Take 1 capsule (20 mg total) by mouth daily, Disp: 90 capsule, Rfl: 1    Fluticasone-Salmeterol (Advair Diskus) 100-50 mcg/dose inhaler, Inhale 1 puff daily, Disp: 180 blister, Rfl: 1    levothyroxine 50 mcg tablet, Take 1 tablet (50 mcg total) by mouth daily, Disp: 90 tablet, Rfl: 1    Melatonin 5 MG TABS, Take 1 tablet by mouth daily at bedtime, Disp: , Rfl:     methylPREDNISolone 4 MG tablet therapy pack, Use as directed on package, Disp: 21 each, Rfl: 0    multivitamin (THERAGRAN) TABS, Take 1 tablet by mouth daily  , Disp: , Rfl:     Triamcinolone Acetonide (Nasacort Allergy) 55 MCG/ACT nasal spray, 1 Act into each nostril Daily 2 puffs in each nostril, Disp: , Rfl:     valsartan (DIOVAN) 160 mg tablet, Take 1 tablet (160 mg total) by mouth daily, Disp: 90 tablet, Rfl: 1    venlafaxine (EFFEXOR-XR) 150 mg 24 hr capsule, Take 1 capsule (150 mg  "total) by mouth daily, Disp: 90 capsule, Rfl: 1    EPINEPHrine (EPIPEN JR) 0.15 mg/0.3 mL SOAJ, Inject 0.3 mL (0.15 mg total) into a muscle once for 1 dose, Disp: 0.3 mL, Rfl: 0    Allergies   Allergen Reactions    Compazine [Prochlorperazine] Anaphylaxis     Category: Allergy; Annotation - [id number]: ALL FORMS    Bee Venom Itching and Swelling    Eggs Or Egg-Derived Products - Food Allergy Abdominal Pain and Diarrhea    Erythromycin GI Intolerance     Category: Allergy; Annotation - [id number]: ALL FORMS    Lactose Intolerance (Gi) - Food Allergy Abdominal Pain and Diarrhea    Sulfa Antibiotics GI Intolerance     Patient states that it is like when you are drunk, dizziness and confusion  Bactrim    Sulfamethoxazole-Trimethoprim Nausea Only and Dizziness     Category: Allergy; Annotation - [id number]: ALL FORMS    Poison Ivy Extract Hives, Itching, Other (See Comments) and Rash       Social History   Past Surgical History:   Procedure Laterality Date    EYE SURGERY  2010    Lasik    FOOT SURGERY Right     \"Nerve removal\"    FRACTURE SURGERY      left shoulder and right arm when I was a kid    PARATHYROID GLAND SURGERY  05/17/2018    NM COLONOSCOPY FLX DX W/COLLJ SPEC WHEN PFRMD N/A 06/21/2018    Procedure: COLONOSCOPY;  Surgeon: Jefferson Gamble MD;  Location: [facility];  Service: Gastroenterology    NM PARATHYROIDECTOMY/EXPLORATION PARATHYROIDS Right 05/17/2018    Procedure: MINIMALLY INVASIVE PARATHYROIDECTOMY;   PTH MONITORING;  Surgeon: Milton Aldana MD;  Location: [facility];  Service: Surgical Oncology    TONSILLECTOMY      TONSILLECTOMY AND ADENOIDECTOMY       Family History   Problem Relation Age of Onset    Heart Valve Disease Mother         s/p MVR    Glaucoma Mother     Rickets Mother     Stroke Mother     Prostate cancer Father     Anemia Father     Neuropathy Father     Arthritis Father     Cancer Father         PROSTATE    Prostate cancer Brother     Paget's disease of bone Brother     Arthritis " "Brother     Cancer Brother         PROSTATE    Hearing loss Brother         Tinitus    No Known Problems Maternal Grandmother     No Known Problems Maternal Grandfather     No Known Problems Paternal Grandmother     No Known Problems Paternal Grandfather     No Known Problems Paternal Aunt     Hypertension Family         BENIGN ESSENTIAL    Heart disease Family         CARDIAC DISORDER    Breast cancer Neg Hx     Colon cancer Neg Hx     Ovarian cancer Neg Hx     Uterine cancer Neg Hx        Objective:  /84 (BP Location: Left arm, Patient Position: Sitting, Cuff Size: Adult)   Pulse 72   Temp 98.2 °F (36.8 °C) (Temporal)   Ht 5' 1\" (1.549 m)   Wt 76.7 kg (169 lb)   LMP  (LMP Unknown)   SpO2 99%   BMI 31.93 kg/m²      Physical Exam  Vitals and nursing note reviewed.   Constitutional:       General: She is not in acute distress.     Appearance: Normal appearance. She is not ill-appearing.   HENT:      Head: Normocephalic and atraumatic.      Right Ear: Ear canal and external ear normal. No tenderness. No middle ear effusion.      Left Ear: Ear canal and external ear normal. No tenderness.  No middle ear effusion.      Nose: Congestion present.      Mouth/Throat:      Mouth: Mucous membranes are moist.      Pharynx: Oropharynx is clear. Uvula midline. Posterior oropharyngeal erythema present. No pharyngeal swelling or oropharyngeal exudate.      Tonsils: No tonsillar exudate.   Eyes:      General:         Right eye: No discharge.         Left eye: No discharge.      Extraocular Movements: Extraocular movements intact.      Conjunctiva/sclera: Conjunctivae normal.      Pupils: Pupils are equal, round, and reactive to light.   Cardiovascular:      Rate and Rhythm: Normal rate and regular rhythm.      Pulses: Normal pulses.      Heart sounds: Normal heart sounds. No murmur heard.     No friction rub. No gallop.   Pulmonary:      Effort: Pulmonary effort is normal. No respiratory distress.      Breath sounds: " Normal breath sounds. No stridor. No wheezing, rhonchi or rales.   Chest:      Chest wall: No tenderness.   Musculoskeletal:      Cervical back: Normal range of motion.      Right lower leg: No edema.      Left lower leg: No edema.   Lymphadenopathy:      Cervical: No cervical adenopathy.   Skin:     General: Skin is warm and dry.   Neurological:      General: No focal deficit present.      Mental Status: She is alert and oriented to person, place, and time. Mental status is at baseline.   Psychiatric:         Mood and Affect: Mood normal.         Behavior: Behavior normal.           Disclaimer: This note was generated with voice recognition software.  Phonetic, grammatical, and spelling errors may be present as a result.  Please contact provider with any concerns or questions

## 2024-07-02 DIAGNOSIS — F90.0 ADHD, PREDOMINANTLY INATTENTIVE TYPE: ICD-10-CM

## 2024-07-02 DIAGNOSIS — E03.9 ACQUIRED HYPOTHYROIDISM: ICD-10-CM

## 2024-07-03 RX ORDER — LEVOTHYROXINE SODIUM 0.05 MG/1
50 TABLET ORAL DAILY
Qty: 30 TABLET | Refills: 0 | Status: SHIPPED | OUTPATIENT
Start: 2024-07-03

## 2024-07-04 ENCOUNTER — OFFICE VISIT (OUTPATIENT)
Dept: URGENT CARE | Age: 64
End: 2024-07-04
Payer: COMMERCIAL

## 2024-07-04 VITALS
DIASTOLIC BLOOD PRESSURE: 60 MMHG | HEART RATE: 50 BPM | OXYGEN SATURATION: 98 % | TEMPERATURE: 98.8 F | WEIGHT: 167.8 LBS | SYSTOLIC BLOOD PRESSURE: 110 MMHG | BODY MASS INDEX: 31.71 KG/M2 | RESPIRATION RATE: 20 BRPM

## 2024-07-04 DIAGNOSIS — U07.1 COVID: Primary | ICD-10-CM

## 2024-07-04 PROCEDURE — G0383 LEV 4 HOSP TYPE B ED VISIT: HCPCS | Performed by: PHYSICIAN ASSISTANT

## 2024-07-04 PROCEDURE — S9083 URGENT CARE CENTER GLOBAL: HCPCS | Performed by: PHYSICIAN ASSISTANT

## 2024-07-04 RX ORDER — ALBUTEROL SULFATE 90 UG/1
2 AEROSOL, METERED RESPIRATORY (INHALATION) EVERY 6 HOURS PRN
Qty: 8.5 G | Refills: 0 | Status: SHIPPED | OUTPATIENT
Start: 2024-07-04

## 2024-07-04 RX ORDER — NIRMATRELVIR AND RITONAVIR 300-100 MG
3 KIT ORAL 2 TIMES DAILY
Qty: 30 TABLET | Refills: 0 | Status: SHIPPED | OUTPATIENT
Start: 2024-07-04 | End: 2024-07-09

## 2024-07-04 NOTE — PATIENT INSTRUCTIONS
Take Paxlovid as prescribed.  Albuterol inhaler as needed for shortness of breath.  May use over-the-counter throat lozenges like Chloraseptic or Cepacol as needed for sore throat pain.  Continue over-the-counter ibuprofen and Tylenol as needed for any pain and fever.  If you develop fever (AKA temp greater than 100.4) you must quarantine until fever free and off fever lowering medications for 24 hours.  If no fever develops recommend masking for 10 full days from symptom onset.  If symptoms or not improved in 3 to 5 days follow-up with PCP.  If symptoms worsen or new symptoms develop report to the emergency room immediately.

## 2024-07-04 NOTE — PROGRESS NOTES
[facility]        NAME: Armond Foster is a 64 y.o. female  : 1960    MRN: [id number]  DATE: 2024  TIME: 12:02 PM    Assessment and Plan   COVID [U07.1]  1. COVID  nirmatrelvir & ritonavir (Paxlovid, 300/100,) tablet therapy pack    albuterol (ProAir HFA) 90 mcg/act inhaler      Presents with COVID-19.  She has a history of hypothyroidism, sleep apnea, asthma,  And hypertension which put her at risk for severe disease.  Recommend Paxlovid.  We discussed side effects and patient has elected to move forward.  I have also provided her with an albuterol inhaler to use as rescue if her Advair is not helping with her shortness of breath.  She will report to the emergency room if she develops any fever, chest pain or worsening shortness of breath.    Patient Instructions     Patient Instructions   Take Paxlovid as prescribed.  Albuterol inhaler as needed for shortness of breath.  May use over-the-counter throat lozenges like Chloraseptic or Cepacol as needed for sore throat pain.  Continue over-the-counter ibuprofen and Tylenol as needed for any pain and fever.  If you develop fever (AKA temp greater than 100.4) you must quarantine until fever free and off fever lowering medications for 24 hours.  If no fever develops recommend masking for 10 full days from symptom onset.  If symptoms or not improved in 3 to 5 days follow-up with PCP.  If symptoms worsen or new symptoms develop report to the emergency room immediately.    Follow up with PCP in 3-5 days.  Proceed to  ER if symptoms worsen.    If tests have been performed at [facility], our office will contact you with results if changes need to be made to the care plan discussed with you at the visit. You can review your full results on [facility].     Chief Complaint     Chief Complaint   Patient presents with    Headache     Patient was seen her on  and treated for sore throat completed course of prescribed prednisone. Did not feel  better and was seen by pcp who prescribe antibotic Augmentin and tessalon pearls . Patient took to completion and was feeling better. Started with current symptoms of sore throat, fatigue , dizziness, cough , sob Monday . Reports friends were just diagnose with covid. Took home Covid test this morning, reported by patient as positive. Denies fevers .     Sore Throat    Fatigue    Cough    Shortness of Breath         History of Present Illness       64-year-old female presents with complaint of headache, runny nose, sinus congestion, sore throat, cough, fatigue, myalgias, and shortness of breath.  Patient denies fever, chills, chest pain.  She states that shortness of breath is mostly with activity.  She does have a history of asthma.  Patient notes that she had a URI symptoms for over 2 weeks was seen here initially and prescribed prednisone.  She did not improve with the prednisone and saw her family doctor and was provided with Augmentin and Tessalon Perles and reports that her symptoms did start to improve and almost resolved until symptoms came back on Monday.  She states that prior to onset of her symptoms she had been hanging out with friends who have informed her that she they tested positive for COVID and she had a positive COVID test this morning.    Headache  Sore Throat   Associated symptoms include congestion, coughing, headaches and shortness of breath.   Fatigue  Associated symptoms include congestion, coughing, fatigue, headaches and a sore throat. Pertinent negatives include no chest pain, chills or fever.   Cough  Associated symptoms include headaches, postnasal drip, rhinorrhea, a sore throat and shortness of breath. Pertinent negatives include no chest pain, chills, fever or wheezing.   Shortness of Breath  Associated symptoms include coughing, fatigue, rhinorrhea and a sore throat. Pertinent negatives include no chest pain, dizziness or wheezing.   Dizziness  Associated symptoms include  congestion, coughing, fatigue, headaches and a sore throat. Pertinent negatives include no chest pain, chills or fever.       Review of Systems   Review of Systems   Constitutional:  Positive for fatigue. Negative for chills and fever.   HENT:  Positive for congestion, postnasal drip, rhinorrhea and sore throat.    Respiratory:  Positive for cough and shortness of breath. Negative for wheezing.    Cardiovascular:  Negative for chest pain.   Neurological:  Positive for headaches. Negative for dizziness.         Current Medications       Current Outpatient Medications:     Acetaminophen (TYLENOL PO), Take by mouth as needed, Disp: , Rfl:     albuterol (ProAir HFA) 90 mcg/act inhaler, Inhale 2 puffs every 6 (six) hours as needed for wheezing, Disp: 8.5 g, Rfl: 0    amLODIPine (NORVASC) 5 mg tablet, Take 1 tablet (5 mg total) by mouth daily, Disp: 90 tablet, Rfl: 1    amphetamine-dextroamphetamine (ADDERALL) 5 MG tablet, take 1 and 1/2 tablets by mouth every morning, Disp: 45 tablet, Rfl: 0    cetirizine (ZyrTEC) 10 mg tablet, Take 10 mg by mouth 2 (two) times a day , Disp: , Rfl:     Cholecalciferol (VITAMIN D3) 1000 units CAPS, Take 1 capsule by mouth Daily  , Disp: , Rfl:     EPINEPHrine (PRIMATENE MIST IN), Inhale if needed OTC, Disp: , Rfl:     FLUoxetine (PROzac) 20 mg capsule, Take 1 capsule (20 mg total) by mouth daily, Disp: 90 capsule, Rfl: 1    Fluticasone-Salmeterol (Advair Diskus) 100-50 mcg/dose inhaler, Inhale 1 puff daily, Disp: 180 blister, Rfl: 1    levothyroxine 50 mcg tablet, Take 1 tablet (50 mcg total) by mouth daily, Disp: 30 tablet, Rfl: 0    Melatonin 5 MG TABS, Take 1 tablet by mouth daily at bedtime, Disp: , Rfl:     multivitamin (THERAGRAN) TABS, Take 1 tablet by mouth daily  , Disp: , Rfl:     nirmatrelvir & ritonavir (Paxlovid, 300/100,) tablet therapy pack, Take 3 tablets by mouth 2 (two) times a day for 5 days Take 2 nirmatrelvir tablets + 1 ritonavir tablet together per dose, Disp: 30  tablet, Rfl: 0    Triamcinolone Acetonide (Nasacort Allergy) 55 MCG/ACT nasal spray, 1 Act into each nostril Daily 2 puffs in each nostril, Disp: , Rfl:     valsartan (DIOVAN) 160 mg tablet, Take 1 tablet (160 mg total) by mouth daily, Disp: 90 tablet, Rfl: 1    venlafaxine (EFFEXOR-XR) 150 mg 24 hr capsule, Take 1 capsule (150 mg total) by mouth daily, Disp: 90 capsule, Rfl: 1    benzonatate (TESSALON) 200 MG capsule, Take 1 capsule (200 mg total) by mouth 3 (three) times a day as needed for cough, Disp: 20 capsule, Rfl: 0    EPINEPHrine (EPIPEN JR) 0.15 mg/0.3 mL SOAJ, Inject 0.3 mL (0.15 mg total) into a muscle once for 1 dose, Disp: 0.3 mL, Rfl: 0    methylPREDNISolone 4 MG tablet therapy pack, Use as directed on package, Disp: 21 each, Rfl: 0    Current Allergies     Allergies as of 07/04/2024 - Reviewed 07/04/2024   Allergen Reaction Noted    Compazine [prochlorperazine] Anaphylaxis 10/22/2013    Bee venom Itching and Swelling 07/03/2023    Eggs or egg-derived products - food allergy Abdominal Pain and Diarrhea 03/29/2022    Erythromycin GI Intolerance 10/22/2013    Lactose intolerance (gi) - food allergy Abdominal Pain and Diarrhea 03/29/2022    Sulfa antibiotics GI Intolerance 03/08/2016    Sulfamethoxazole-trimethoprim Nausea Only and Dizziness 10/22/2013    Poison ivy extract Hives, Itching, Other (See Comments), and Rash 07/03/2023            The following portions of the patient's history were reviewed and updated as appropriate: allergies, current medications, past family history, past medical history, past social history, past surgical history and problem list.     Past Medical History:   Diagnosis Date    Abrasion of axilla     LAST ASSESSED: 11/21/14    Acute cystitis with hematuria 08/15/2019    ADHD     Allergic     Allergic rhinitis     LAST ASSESSED: 5/15/15    Anxiety 2000    Asthma     TRANSITIONED FROM: ASTHMA; RESOLVED: 11/9/16    Bee sting reaction 08/01/2019    Bilateral nephrolithiasis  "07/25/2018    Depression     Disease of thyroid gland     Hypothyroidism    Fatty liver     Fracture of plateau of left tibia     RESOLVED: 4/14/15    Headache     Hematuria     Hyperparathyroidism (HCC)     Hypertension     Hypothyroidism     Kidney stone 2018    Left ankle sprain     LAST ASSESSED: 10/21/14    No known health problems     DENIED MENTAL STATUS CHANGE AS PER ALLSCRIPTS; CONFLICTED WITH ADHD AND DEPRESSION IN MED HX SO IT COULD NOT BE ADDED IN PERT NEGS    Obesity 2020    Obstructive sleep apnea on CPAP     Pneumonia Twice in [place]    Poison ivy dermatitis 08/08/2022    Scoliosis     Thyroid nodule     Urinary frequency     RESOLVED: 10/27/16    Vaginal bleeding, abnormal     LAST ASSESSED: 6/6/16    Varicella 1967    Viral URI with cough 12/30/2019    Visual impairment        Past Surgical History:   Procedure Laterality Date    EYE SURGERY  2010    Lasik    FOOT SURGERY Right     \"Nerve removal\"    FRACTURE SURGERY      left shoulder and right arm when I was a kid    PARATHYROID GLAND SURGERY  05/17/2018    MI COLONOSCOPY FLX DX W/COLLJ SPEC WHEN PFRMD N/A 06/21/2018    Procedure: COLONOSCOPY;  Surgeon: Jefferson Gamble MD;  Location: [facility];  Service: Gastroenterology    MI PARATHYROIDECTOMY/EXPLORATION PARATHYROIDS Right 05/17/2018    Procedure: MINIMALLY INVASIVE PARATHYROIDECTOMY;   PTH MONITORING;  Surgeon: Milton Aldana MD;  Location: [facility];  Service: Surgical Oncology    TONSILLECTOMY      TONSILLECTOMY AND ADENOIDECTOMY         Family History   Problem Relation Age of Onset    Heart Valve Disease Mother         s/p MVR    Glaucoma Mother     Rickets Mother     Stroke Mother     Prostate cancer Father     Anemia Father     Neuropathy Father     Arthritis Father     Cancer Father         PROSTATE    Prostate cancer Brother     Paget's disease of bone Brother     Arthritis Brother     Cancer Brother         PROSTATE    Hearing loss Brother         Tinitus    No Known Problems " Maternal Grandmother     No Known Problems Maternal Grandfather     No Known Problems Paternal Grandmother     No Known Problems Paternal Grandfather     No Known Problems Paternal Aunt     Hypertension Family         BENIGN ESSENTIAL    Heart disease Family         CARDIAC DISORDER    Breast cancer Neg Hx     Colon cancer Neg Hx     Ovarian cancer Neg Hx     Uterine cancer Neg Hx          Medications have been verified.        Objective   /60   Pulse (!) 50   Temp 98.8 °F (37.1 °C) (Tympanic)   Resp 20   Wt 76.1 kg (167 lb 12.8 oz)   LMP  (LMP Unknown)   SpO2 98%   BMI 31.71 kg/m²   No LMP recorded (lmp unknown). Patient is postmenopausal.       Physical Exam     Physical Exam  Vitals and nursing note reviewed.   Constitutional:       General: She is not in acute distress.     Appearance: Normal appearance. She is not ill-appearing or toxic-appearing.   HENT:      Head: Normocephalic and atraumatic.      Jaw: No trismus.      Right Ear: Tympanic membrane, ear canal and external ear normal. There is no impacted cerumen. No foreign body.      Left Ear: Tympanic membrane, ear canal and external ear normal. There is no impacted cerumen. No foreign body.      Nose: Mucosal edema, congestion and rhinorrhea present. No nasal deformity. Rhinorrhea is clear.      Right Nostril: No foreign body, epistaxis or occlusion.      Left Nostril: No foreign body, epistaxis or occlusion.      Right Turbinates: Not enlarged, swollen or pale.      Left Turbinates: Not enlarged, swollen or pale.      Mouth/Throat:      Lips: Pink. No lesions.      Mouth: Mucous membranes are moist. No injury, oral lesions or angioedema.      Dentition: Normal dentition.      Tongue: No lesions. Tongue does not deviate from midline.      Palate: No mass and lesions.      Pharynx: Uvula midline. Pharyngeal swelling and posterior oropharyngeal erythema present. No oropharyngeal exudate or uvula swelling.      Tonsils: No tonsillar exudate or  "tonsillar abscesses.      Comments: Mild to moderate swelling and erythema posterior pharynx with mucopurulent postnasal drip present.  Eyes:      General: Lids are normal. Gaze aligned appropriately. No allergic shiner.     Extraocular Movements: Extraocular movements intact.   Cardiovascular:      Rate and Rhythm: Normal rate and regular rhythm.      Heart sounds: Normal heart sounds, S1 normal and S2 normal. Heart sounds not distant. No murmur heard.     No friction rub. No gallop.   Pulmonary:      Effort: Pulmonary effort is normal.      Breath sounds: Normal breath sounds.      Comments: Patient speaking in full sentences with no increased respiratory effort.   No audible wheezing or stridor.   Lymphadenopathy:      Cervical: No cervical adenopathy.   Skin:     General: Skin is warm and dry.   Neurological:      Mental Status: She is alert and oriented to person, place, and time.      Coordination: Coordination is intact.      Gait: Gait is intact.   Psychiatric:         Attention and Perception: Attention and perception normal.         Mood and Affect: Mood and affect normal.         Speech: Speech normal.         Behavior: Behavior is cooperative.               Note: Portions of this record may have been created with voice recognition software. Occasional wrong word or \"sound a like\" substitutions may have occurred due to the inherent limitations of voice recognition software. Please read the chart carefully and recognize, using context, where substitutions have occurred.*      "

## 2024-07-07 RX ORDER — DEXTROAMPHETAMINE SACCHARATE, AMPHETAMINE ASPARTATE, DEXTROAMPHETAMINE SULFATE AND AMPHETAMINE SULFATE 1.25; 1.25; 1.25; 1.25 MG/1; MG/1; MG/1; MG/1
TABLET ORAL
Qty: 45 TABLET | Refills: 0 | Status: SHIPPED | OUTPATIENT
Start: 2024-07-07

## 2024-07-09 ENCOUNTER — OFFICE VISIT (OUTPATIENT)
Age: 64
End: 2024-07-09
Payer: COMMERCIAL

## 2024-07-09 VITALS
OXYGEN SATURATION: 97 % | DIASTOLIC BLOOD PRESSURE: 76 MMHG | HEART RATE: 74 BPM | TEMPERATURE: 98.4 F | HEIGHT: 61 IN | WEIGHT: 168.6 LBS | SYSTOLIC BLOOD PRESSURE: 120 MMHG | BODY MASS INDEX: 31.83 KG/M2

## 2024-07-09 DIAGNOSIS — E03.9 HYPOTHYROIDISM, UNSPECIFIED TYPE: ICD-10-CM

## 2024-07-09 DIAGNOSIS — F90.0 ADHD, PREDOMINANTLY INATTENTIVE TYPE: ICD-10-CM

## 2024-07-09 DIAGNOSIS — J06.9 VIRAL URI: Primary | ICD-10-CM

## 2024-07-09 DIAGNOSIS — I10 HYPERTENSION, ESSENTIAL, BENIGN: ICD-10-CM

## 2024-07-09 PROCEDURE — 99213 OFFICE O/P EST LOW 20 MIN: CPT | Performed by: INTERNAL MEDICINE

## 2024-07-09 RX ORDER — VALSARTAN 160 MG/1
160 TABLET ORAL DAILY
Qty: 90 TABLET | Refills: 1 | Status: SHIPPED | OUTPATIENT
Start: 2024-07-09

## 2024-07-09 NOTE — PROGRESS NOTES
Ambulatory Visit  Name: Armond Foster      : 1960      MRN: [id number]  Encounter Provider: Donell Tse MD  Encounter Date: 2024   Encounter department: [facility] [place]    Assessment & Plan   1. Viral URI  Assessment & Plan:  Patient self diagnosed with COVID home test on   Orders:  -     CBC and differential  -     Comprehensive metabolic panel  2. Hypertension, essential, benign  Assessment & Plan:  Blood pressure is normal.  Controlled with amlodipine 5 mg  Orders:  -     valsartan (DIOVAN) 160 mg tablet; Take 1 tablet (160 mg total) by mouth daily  3. Hypothyroidism, unspecified type  -     CBC and differential  -     Comprehensive metabolic panel  -     Lipid panel; Future  -     T4, free; Future  -     TSH, 3rd generation; Future  4. ADHD, predominantly inattentive type  Assessment & Plan:  Continues on Adderall with good response       History of Present Illness     Patient with a history of adult ADHD mild hypertension and hypothyroidism been dealing with a viral upper respiratory infection since .  Seen at urgent care where strep test was negative and treatment for viral upper respiratory infection was initiated.  Continues to feel badly and with a cough and was seen at an [facility] on  was given some cough medication and she continued with her upper respiratory symptoms and on  did a home COVID test which was positive so she went back to urgent care and was given a prescription for Paxlovid.  Currently she is feeling much better but still with some residual fatigue and mild cough.  She has not had any of her previously prescribed blood work done so we will reorder those tests        Review of Systems   Constitutional:  Positive for fatigue.   HENT: Negative.     Eyes: Negative.    Respiratory:  Positive for cough. Negative for shortness of breath and wheezing.    Cardiovascular: Negative.    Gastrointestinal: Negative.   "  Endocrine: Negative.    Genitourinary: Negative.    Musculoskeletal: Negative.    Skin: Negative.    Allergic/Immunologic: Negative.    Neurological: Negative.    Hematological: Negative.    Psychiatric/Behavioral: Negative.         Objective     /76 (BP Location: Left arm, Patient Position: Sitting, Cuff Size: Standard)   Pulse 74   Temp 98.4 °F (36.9 °C) (Temporal)   Ht 5' 0.83\" (1.545 m)   Wt 76.5 kg (168 lb 9.6 oz)   LMP  (LMP Unknown)   SpO2 97%   BMI 32.04 kg/m²     Physical Exam  Vitals reviewed.   Constitutional:       General: She is not in acute distress.     Appearance: Normal appearance. She is not ill-appearing, toxic-appearing or diaphoretic.   HENT:      Head: Normocephalic and atraumatic.      Right Ear: External ear normal.      Left Ear: External ear normal.      Nose: Nose normal.      Mouth/Throat:      Mouth: Mucous membranes are moist.   Eyes:      General: No scleral icterus.     Conjunctiva/sclera: Conjunctivae normal.      Pupils: Pupils are equal, round, and reactive to light.   Neck:      Vascular: No JVD.      Trachea: No tracheal deviation.   Cardiovascular:      Rate and Rhythm: Normal rate and regular rhythm.      Heart sounds: Normal heart sounds. No murmur heard.  Pulmonary:      Effort: Pulmonary effort is normal. No respiratory distress.      Breath sounds: Normal breath sounds.   Abdominal:      General: Abdomen is flat. There is no distension.   Musculoskeletal:         General: No swelling.      Cervical back: Neck supple.      Right lower leg: No edema.      Left lower leg: No edema.   Skin:     Coloration: Skin is not jaundiced.      Findings: No bruising, erythema or rash.   Neurological:      General: No focal deficit present.      Mental Status: She is alert and oriented to person, place, and time. Mental status is at baseline.   Psychiatric:         Mood and Affect: Mood normal.       Administrative Statements           "

## 2024-07-18 ENCOUNTER — APPOINTMENT (OUTPATIENT)
Dept: LAB | Age: 64
End: 2024-07-18
Payer: COMMERCIAL

## 2024-07-18 DIAGNOSIS — E03.9 HYPOTHYROIDISM, UNSPECIFIED TYPE: ICD-10-CM

## 2024-07-18 LAB
ALBUMIN SERPL BCG-MCNC: 4.4 G/DL (ref 3.5–5)
ALP SERPL-CCNC: 59 U/L (ref 34–104)
ALT SERPL W P-5'-P-CCNC: 32 U/L (ref 7–52)
ANION GAP SERPL CALCULATED.3IONS-SCNC: 11 MMOL/L (ref 4–13)
AST SERPL W P-5'-P-CCNC: 24 U/L (ref 13–39)
BASOPHILS # BLD AUTO: 0.05 THOUSANDS/ÂΜL (ref 0–0.1)
BASOPHILS NFR BLD AUTO: 1 % (ref 0–1)
BILIRUB SERPL-MCNC: 0.49 MG/DL (ref 0.2–1)
BUN SERPL-MCNC: 18 MG/DL (ref 5–25)
CALCIUM SERPL-MCNC: 10 MG/DL (ref 8.4–10.2)
CHLORIDE SERPL-SCNC: 104 MMOL/L (ref 96–108)
CHOLEST SERPL-MCNC: 235 MG/DL
CO2 SERPL-SCNC: 28 MMOL/L (ref 21–32)
CREAT SERPL-MCNC: 0.85 MG/DL (ref 0.6–1.3)
EOSINOPHIL # BLD AUTO: 0.13 THOUSAND/ÂΜL (ref 0–0.61)
EOSINOPHIL NFR BLD AUTO: 2 % (ref 0–6)
ERYTHROCYTE [DISTWIDTH] IN BLOOD BY AUTOMATED COUNT: 13.8 % (ref 11.6–15.1)
GFR SERPL CREATININE-BSD FRML MDRD: 73 ML/MIN/1.73SQ M
GLUCOSE P FAST SERPL-MCNC: 92 MG/DL (ref 65–99)
HCT VFR BLD AUTO: 38.4 % (ref 34.8–46.1)
HDLC SERPL-MCNC: 65 MG/DL
HGB BLD-MCNC: 12.9 G/DL (ref 11.5–15.4)
IMM GRANULOCYTES # BLD AUTO: 0.01 THOUSAND/UL (ref 0–0.2)
IMM GRANULOCYTES NFR BLD AUTO: 0 % (ref 0–2)
LDLC SERPL CALC-MCNC: 139 MG/DL (ref 0–100)
LYMPHOCYTES # BLD AUTO: 1.89 THOUSANDS/ÂΜL (ref 0.6–4.47)
LYMPHOCYTES NFR BLD AUTO: 32 % (ref 14–44)
MCH RBC QN AUTO: 30.6 PG (ref 26.8–34.3)
MCHC RBC AUTO-ENTMCNC: 33.6 G/DL (ref 31.4–37.4)
MCV RBC AUTO: 91 FL (ref 82–98)
MONOCYTES # BLD AUTO: 0.46 THOUSAND/ÂΜL (ref 0.17–1.22)
MONOCYTES NFR BLD AUTO: 8 % (ref 4–12)
NEUTROPHILS # BLD AUTO: 3.39 THOUSANDS/ÂΜL (ref 1.85–7.62)
NEUTS SEG NFR BLD AUTO: 57 % (ref 43–75)
NONHDLC SERPL-MCNC: 170 MG/DL
NRBC BLD AUTO-RTO: 0 /100 WBCS
PLATELET # BLD AUTO: 304 THOUSANDS/UL (ref 149–390)
PMV BLD AUTO: 11 FL (ref 8.9–12.7)
POTASSIUM SERPL-SCNC: 4.6 MMOL/L (ref 3.5–5.3)
PROT SERPL-MCNC: 7.1 G/DL (ref 6.4–8.4)
RBC # BLD AUTO: 4.21 MILLION/UL (ref 3.81–5.12)
SODIUM SERPL-SCNC: 143 MMOL/L (ref 135–147)
T4 FREE SERPL-MCNC: 0.71 NG/DL (ref 0.61–1.12)
TRIGL SERPL-MCNC: 156 MG/DL
TSH SERPL DL<=0.05 MIU/L-ACNC: 5.22 UIU/ML (ref 0.45–4.5)
WBC # BLD AUTO: 5.93 THOUSAND/UL (ref 4.31–10.16)

## 2024-07-18 PROCEDURE — 80061 LIPID PANEL: CPT

## 2024-07-18 PROCEDURE — 85025 COMPLETE CBC W/AUTO DIFF WBC: CPT | Performed by: INTERNAL MEDICINE

## 2024-07-18 PROCEDURE — 84443 ASSAY THYROID STIM HORMONE: CPT

## 2024-07-18 PROCEDURE — 80053 COMPREHEN METABOLIC PANEL: CPT | Performed by: INTERNAL MEDICINE

## 2024-07-18 PROCEDURE — 84439 ASSAY OF FREE THYROXINE: CPT

## 2024-07-18 PROCEDURE — 36415 COLL VENOUS BLD VENIPUNCTURE: CPT | Performed by: INTERNAL MEDICINE

## 2024-07-26 DIAGNOSIS — E03.9 ACQUIRED HYPOTHYROIDISM: ICD-10-CM

## 2024-07-26 RX ORDER — LEVOTHYROXINE SODIUM 0.05 MG/1
50 TABLET ORAL DAILY
Qty: 100 TABLET | Refills: 1 | Status: SHIPPED | OUTPATIENT
Start: 2024-07-26

## 2024-08-08 PROBLEM — J06.9 VIRAL URI: Status: RESOLVED | Noted: 2019-12-30 | Resolved: 2024-08-08

## 2024-08-13 ENCOUNTER — OFFICE VISIT (OUTPATIENT)
Age: 64
End: 2024-08-13

## 2024-08-13 VITALS
OXYGEN SATURATION: 97 % | HEART RATE: 88 BPM | DIASTOLIC BLOOD PRESSURE: 70 MMHG | TEMPERATURE: 98.4 F | BODY MASS INDEX: 33.1 KG/M2 | SYSTOLIC BLOOD PRESSURE: 116 MMHG | HEIGHT: 60 IN | WEIGHT: 168.6 LBS

## 2024-08-13 DIAGNOSIS — Z00.00 WELL ADULT EXAM: Primary | ICD-10-CM

## 2024-08-13 DIAGNOSIS — Z12.31 ENCOUNTER FOR SCREENING MAMMOGRAM FOR BREAST CANCER: ICD-10-CM

## 2024-08-13 DIAGNOSIS — Z11.1 SCREENING FOR TUBERCULOSIS: ICD-10-CM

## 2024-08-13 DIAGNOSIS — F90.0 ADHD, PREDOMINANTLY INATTENTIVE TYPE: ICD-10-CM

## 2024-08-13 PROCEDURE — 86580 TB INTRADERMAL TEST: CPT

## 2024-08-13 PROCEDURE — 99396 PREV VISIT EST AGE 40-64: CPT | Performed by: INTERNAL MEDICINE

## 2024-08-13 RX ORDER — DEXTROAMPHETAMINE SACCHARATE, AMPHETAMINE ASPARTATE, DEXTROAMPHETAMINE SULFATE AND AMPHETAMINE SULFATE 1.25; 1.25; 1.25; 1.25 MG/1; MG/1; MG/1; MG/1
TABLET ORAL
Qty: 45 TABLET | Refills: 0 | Status: SHIPPED | OUTPATIENT
Start: 2024-08-13

## 2024-08-13 NOTE — PROGRESS NOTES
"Ambulatory Visit  Name: Armond Foster      : 1960      MRN: [id number]  Encounter Provider: Donell Tse MD  Encounter Date: 2024   Encounter department: [facility] [place]    Assessment & Plan   1. Well adult exam  Assessment & Plan:  Healthy adult with stable medical issues.  2. Encounter for screening mammogram for breast cancer  Assessment & Plan:  Healthy adult with stable medical issues.  Orders:  -     Mammo screening bilateral w 3d & cad; Future; Expected date: 2025  3. ADHD, predominantly inattentive type  Assessment & Plan:  Medications renewed.  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  She has been working with the [employer] for children recently but found that to be a very challenging and unrewarding work environment as she was disrespected by the kids and undermined by her associates.      Review of Systems   Constitutional: Negative.  Negative for activity change, appetite change, chills, diaphoresis, fatigue, fever and unexpected weight change.   HENT: Negative.     Eyes: Negative.    Respiratory: Negative.     Cardiovascular: Negative.    Gastrointestinal: Negative.    Endocrine: Negative.    Genitourinary: Negative.    Musculoskeletal: Negative.    Skin: Negative.    Neurological: Negative.    Hematological: Negative.    Psychiatric/Behavioral:  The patient is not nervous/anxious.        Objective     /70 (BP Location: Left arm, Patient Position: Sitting, Cuff Size: Standard)   Pulse 88   Temp 98.4 °F (36.9 °C) (Temporal)   Ht 5' 0.32\" (1.532 m)   Wt 76.5 kg (168 lb 9.6 oz)   LMP  (LMP Unknown)   SpO2 97%   BMI 32.58 kg/m²     Physical Exam  Vitals reviewed. "   Constitutional:       General: She is not in acute distress.     Appearance: She is obese. She is not ill-appearing, toxic-appearing or diaphoretic.   HENT:      Head: Normocephalic and atraumatic.      Right Ear: External ear normal.      Left Ear: External ear normal.      Mouth/Throat:      Mouth: Mucous membranes are moist.   Eyes:      General: No scleral icterus.     Conjunctiva/sclera: Conjunctivae normal.      Pupils: Pupils are equal, round, and reactive to light.   Neck:      Vascular: No carotid bruit.   Cardiovascular:      Rate and Rhythm: Normal rate and regular rhythm.      Heart sounds: Normal heart sounds. No murmur heard.  Pulmonary:      Effort: Pulmonary effort is normal. No respiratory distress.      Breath sounds: Normal breath sounds.   Abdominal:      General: Abdomen is flat. There is no distension.      Tenderness: There is no abdominal tenderness.   Musculoskeletal:         General: No swelling.      Cervical back: Normal range of motion and neck supple. No rigidity or tenderness.      Right lower leg: No edema.      Left lower leg: No edema.   Lymphadenopathy:      Cervical: No cervical adenopathy.   Skin:     General: Skin is warm.      Coloration: Skin is not jaundiced.      Findings: No bruising, erythema or rash.   Neurological:      General: No focal deficit present.      Mental Status: She is alert and oriented to person, place, and time. Mental status is at baseline.   Psychiatric:         Mood and Affect: Mood normal.         Behavior: Behavior normal.         Thought Content: Thought content normal.         Judgment: Judgment normal.     Administrative Statements

## 2024-08-15 ENCOUNTER — CLINICAL SUPPORT (OUTPATIENT)
Age: 64
End: 2024-08-15

## 2024-08-15 DIAGNOSIS — Z11.1 SCREENING FOR TUBERCULOSIS: Primary | ICD-10-CM

## 2024-08-15 LAB
INDURATION: 0 MM
TB SKIN TEST: NEGATIVE

## 2024-09-12 PROBLEM — Z00.00 WELL ADULT EXAM: Status: RESOLVED | Noted: 2024-08-13 | Resolved: 2024-09-12

## 2024-10-01 DIAGNOSIS — F90.0 ADHD, PREDOMINANTLY INATTENTIVE TYPE: ICD-10-CM

## 2024-10-01 DIAGNOSIS — I10 HYPERTENSION, ESSENTIAL, BENIGN: ICD-10-CM

## 2024-10-01 RX ORDER — AMLODIPINE BESYLATE 5 MG/1
5 TABLET ORAL DAILY
Qty: 90 TABLET | Refills: 1 | Status: SHIPPED | OUTPATIENT
Start: 2024-10-01

## 2024-10-01 NOTE — TELEPHONE ENCOUNTER
Last office visit 8/13  Next Office Visit not schedule sent patient my chart message to call the office to schedule a follow up appointment

## 2024-10-03 NOTE — TELEPHONE ENCOUNTER
Called patient and left a message to schedule a 6 month follow up visit in February 2026 for continued medication refills.

## 2024-10-04 RX ORDER — DEXTROAMPHETAMINE SACCHARATE, AMPHETAMINE ASPARTATE, DEXTROAMPHETAMINE SULFATE AND AMPHETAMINE SULFATE 1.25; 1.25; 1.25; 1.25 MG/1; MG/1; MG/1; MG/1
TABLET ORAL
Qty: 45 TABLET | Refills: 0 | Status: SHIPPED | OUTPATIENT
Start: 2024-10-04

## 2024-10-09 ENCOUNTER — ANNUAL EXAM (OUTPATIENT)
Dept: OBGYN CLINIC | Facility: CLINIC | Age: 64
End: 2024-10-09
Payer: COMMERCIAL

## 2024-10-09 VITALS
WEIGHT: 169 LBS | BODY MASS INDEX: 33.18 KG/M2 | DIASTOLIC BLOOD PRESSURE: 66 MMHG | SYSTOLIC BLOOD PRESSURE: 118 MMHG | HEIGHT: 60 IN

## 2024-10-09 DIAGNOSIS — Z12.31 ENCOUNTER FOR SCREENING MAMMOGRAM FOR BREAST CANCER: ICD-10-CM

## 2024-10-09 DIAGNOSIS — Z01.419 ENCOUNTER FOR GYNECOLOGICAL EXAMINATION WITHOUT ABNORMAL FINDING: Primary | ICD-10-CM

## 2024-10-09 PROCEDURE — 99396 PREV VISIT EST AGE 40-64: CPT | Performed by: NURSE PRACTITIONER

## 2024-10-09 NOTE — PROGRESS NOTES
Assessment / Plan    1. Encounter for gynecological examination without abnormal finding  Well woman exam.  9/2023 pap/hpv negative.  Up to date on colonoscopy  To discuss DXA update with PCP    2. Encounter for screening mammogram for breast cancer  Order provided for next year.    - Mammo screening bilateral w 3d and cad; Future      Subjective      Armond Foster is a 64 y.o. female who presents for her annual gynecologic exam.    63 yo G0 PMF     Notes a spot on her genital area that is black.  Advised hot compresses w/ topical ab ointment.      9/2023 pap/hpv negative  3/2024 Mammo negative  6/2018 Colonoscopy, 10 yrs  Parathyroidectomy  DEXA, 1/2018, mild osteopenia LS; new order placed by PCP     Pap Hx: NL  STD hx: none  Sexually active: not currently  Body mass index is 32.66 kg/m².  Calcium intake: inadequate; given guidelines  Exercise: no; given guidelines  Safety: feels safe     Periods are absent  Current contraception: post menopausal status  History of abnormal Pap smear: no  Family history of breast,uterine, ovarian or colon cancer: no       The following portions of the patient's history were reviewed and updated as appropriate: allergies, current medications, past family history, past medical history, past social history, past surgical history, and problem list.    Review of Systems      Review of Systems   Constitutional:  Negative for chills and fever.   Respiratory:  Negative for cough and shortness of breath.    Gastrointestinal:  Negative for abdominal distention, abdominal pain, blood in stool, constipation, diarrhea, nausea and vomiting.   Genitourinary:  Negative for difficulty urinating, dysuria, frequency, genital sores, hematuria, menstrual problem, pelvic pain, urgency, vaginal bleeding and vaginal discharge.   Musculoskeletal:  Negative for arthralgias and myalgias.     Breasts:  Negative for skin changes, dimpling, asymmetry, nipple discharge, redness, tenderness or palpable  "masses      Objective     *patient agrees to exam without a chaperone present.       /66 (BP Location: Left arm, Patient Position: Sitting, Cuff Size: Standard)   Ht 5' 0.32\" (1.532 m)   Wt 76.7 kg (169 lb)   LMP  (LMP Unknown)   BMI 32.66 kg/m²   Physical Exam  Constitutional:       General: She is not in acute distress.     Appearance: Normal appearance. She is well-developed. She is not ill-appearing or diaphoretic.      Comments: Body mass index is 32.66 kg/m².     HENT:      Head: Normocephalic and atraumatic.   Eyes:      Pupils: Pupils are equal, round, and reactive to light.   Neck:      Thyroid: No thyromegaly.   Pulmonary:      Effort: Pulmonary effort is normal.   Chest:   Breasts:     Breasts are symmetrical.      Right: No inverted nipple, mass, nipple discharge, skin change or tenderness.      Left: No inverted nipple, mass, nipple discharge, skin change or tenderness.   Abdominal:      General: There is no distension.      Palpations: Abdomen is soft. There is no mass.      Tenderness: There is no abdominal tenderness. There is no guarding or rebound.   Genitourinary:     General: Normal vulva.      Exam position: Lithotomy position.      Labia:         Right: No rash, tenderness, lesion or injury.         Left: No rash, tenderness, lesion or injury.       Vagina: No signs of injury and foreign body. No vaginal discharge, erythema, tenderness or bleeding.      Cervix: No cervical motion tenderness, discharge or friability.      Uterus: Not enlarged and not tender.       Adnexa:         Right: No mass or tenderness.          Left: No mass or tenderness.            Comments: Open comedo on mons pubis  Musculoskeletal:      Cervical back: Neck supple.   Lymphadenopathy:      Cervical: No cervical adenopathy.      Upper Body:      Right upper body: No supraclavicular adenopathy.      Left upper body: No supraclavicular adenopathy.   Skin:     General: Skin is warm and dry.   Neurological:      " General: No focal deficit present.      Mental Status: She is alert and oriented to person, place, and time.   Psychiatric:         Mood and Affect: Mood normal.         Behavior: Behavior normal.         Thought Content: Thought content normal.         Judgment: Judgment normal.

## 2024-10-09 NOTE — PATIENT INSTRUCTIONS
"Patient Education     Diet and health   The Basics   Written by the doctors and editors at [facility]   Why is it important to eat a healthy diet? -- It's important to eat a healthy diet because eating the right foods can keep you healthy now and later in life. It can lower the risk of problems like heart disease, diabetes, high blood pressure, and some types of cancer. It can also help you live longer and improve your quality of life.  What kind of diet is best? -- There is no 1 specific diet that experts recommend for everyone. People choose what foods to eat for many different reasons. These include personal preference, culture, Buddhism, allergies or intolerances, and nutritional goals. People also need to consider the cost and availability of different foods.  In general, experts recommend a diet that:   Includes lots of vegetables, fruits, beans, nuts, and whole grains   Limits red and processed meats, unhealthy fats, sugar, salt, and alcohol  What are dietary patterns? -- A dietary \"pattern\" means generally eating certain types of foods while limiting others. Some people need to follow a specific dietary pattern because of their health needs. For example, if you have high blood pressure, your doctor might recommend a diet low in salt.  If you are trying to improve your health in general, choosing a healthy dietary pattern can help. This does not have to mean being very strict about what you eat or avoid. The goal is to think about getting plenty of healthy foods while limiting less healthy foods.  Examples of dietary patterns include:   Mediterranean diet - This involves eating a lot of fruits, vegetables, nuts, and whole grains, and uses olive oil instead of other fats. It also includes some fish, poultry, and dairy products, but not a lot of red meat. Following this diet can help your overall health, and might even lower your risk of having a stroke.   Plant-based diets - These patterns focus on vegetables, " "fruits, grains, beans, and nuts. They limit or avoid food that comes from animals, such as meat and dairy. There are different types of plant-based diets, including vegetarian and vegan.   Low-fat diet - A low-fat diet involves limiting calories from fat. This might help some people keep weight off if that is their goal, but it does not have many other health benefits. If you choose to follow a low-fat diet, it is also important to focus on getting lots of whole grains, legumes, fruits, and vegetables. Limit refined grains and sugar.   Low-cholesterol diet - Cholesterol is found in foods with a lot of saturated fat, like red meat, butter, and cheese. A low-cholesterol diet focuses on limiting the amount of cholesterol that you eat. Limiting the cholesterol in your diet can also help lower the amount of unhealthy fats that you eat.  Which foods are especially healthy? -- Foods that are especially healthy include:   Fruits and vegetables - Eating a diet with lots of fruits and vegetables can help prevent heart disease and stroke. It might also help prevent certain types of cancer. Try to eat fruits and vegetables at each meal and also for snacks. If you don't have fresh fruits and vegetables available, you can eat frozen or canned ones instead. Doctors recommend eating at least 5 servings of fruits or vegetables each day.   Whole grains - Whole-grain foods include 100 percent whole-wheat bread, steel cut oats, and whole-grain pasta. These are healthier than foods made with \"refined\" grains, like white bread and white rice. Eating lots of whole grains instead of refined grains has been shown to help with weight control. It can also lower the risk of several health problems, including colon cancer, heart disease, and diabetes. Doctors recommend that most people try to eat 5 to 8 servings of whole-grain, high-fiber foods each day.   Foods with fiber - Eating foods with a lot of fiber can help prevent heart disease and " "stroke. If you have type 2 diabetes, it can also help control your blood sugar. Foods that have a lot of fiber include vegetables, fruits, beans, nuts, oatmeal, and whole-grain breads and cereals. You can tell how much fiber is in a food by reading the nutrition label (figure 1). Doctors recommend that most people eat about 25 to 34 grams of fiber each day.   Foods with calcium and vitamin D - Babies, children, and adults need calcium and vitamin D to help keep their bones strong. Adults also need calcium and vitamin D to help prevent osteoporosis. Osteoporosis is a condition that causes bones to get \"thin\" and break more easily than usual. Different foods and drinks have calcium and vitamin D in them (figure 2). People who don't get enough calcium and vitamin D in their diet might need to take a supplement. Doctors recommend that most people have 2 to 3 servings of foods with calcium and vitamin D each day.   Foods with protein - Protein helps your muscles and bones stay strong. Healthy foods with a lot of protein include chicken, fish, eggs, beans, nuts, and soy products. Red meat also has a lot of protein, but it also contains fats, which can be unhealthy. Doctors recommend that most people try to eat about 5 servings of protein each day.   Healthy fats - There are different types of fats. Some types of fats are better for your body than others. Healthy fats are \"monounsaturated\" or \"polyunsaturated\" fats. These are found in fatty fish, nuts and nut butters, and avocados. Use plant-based oils when cooking. Examples of these oils include olive, canola, safflower, sunflower, and corn oil. Eating foods with healthy fats, while avoiding or limiting foods with unhealthy fats, might lower the risk of heart disease.   Foods with folate - Folate is a vitamin that is important for pregnant people, since it helps prevent certain birth defects. It is also called \"folic acid.\" Anyone who could get pregnant should get at " "least 400 micrograms of folic acid daily, whether or not they are actively trying to get pregnant. Folate is found in many breakfast cereals, oranges, orange juice, and green leafy vegetables.  What foods should I avoid or limit? -- To eat a healthy diet, there are some things that you should avoid or limit. They include:   Unhealthy fats - \"Trans\" fats are especially unhealthy. They are found in margarines, many fast foods, and some store-bought baked goods. \"Saturated\" fats are found in animal products like meats, egg yolks, butter, cheese, and full-fat milk products. Unhealthy fats can raise your cholesterol level and increase your chance of getting heart disease.   Sugar - To have a healthy diet, it's important to limit or avoid added sugar, sweets, and refined grains. Refined grains are found in white bread, white rice, most pastas, and most packaged \"snack\" foods.  Avoiding sugar-sweetened beverages, like soda and sports drinks, can also help improve your health.  Avoid canned fruits in \"heavy\" syrup.   Red and processed meats - Studies have shown that eating a lot of red meat can increase your risk of certain health problems, including heart disease and cancer. You should limit the amount of red meat that you eat. This is also true for processed meats like sausage, hot dogs, and seals.  Can I drink alcohol as part of a healthy diet? -- Not drinking alcohol at all is the healthiest choice. Regular drinking can raise a person's chances of getting liver disease and certain types of cancers. In females, even 1 drink a day can increase the risk of getting breast cancer.  If you do choose to drink, most doctors recommend limiting alcohol to no more than:   1 drink a day for females   2 drinks a day for males  The limits are different because, generally, the female body takes longer to break down alcohol.  How many calories do I need each day? -- Calories give your body energy. The number of calories that you need " "each day depends on your weight, height, age, sex, and how active you are.  Your doctor or nurse can tell you about how many calories you should eat each day. You can also work with a dietitian (nutrition expert) to learn more about your dietary needs and options.  What if I am having trouble improving my diet? -- It can be hard to change the way that you eat. Remember that even small changes can improve your health.  Here are some tips that might help:   Try to make fruits and vegetables part of every meal. If you don't have fresh fruits and vegetables, frozen or canned are good options. Look for products without added salt or sugar.   Keep a bowl of fruit out for snacking.   When you can, choose whole grains instead of refined grains. Choose chicken, fish, and beans instead of red meat and cheese.   Try to eat prepared and processed foods less often.   Try flavored seltzer or water instead of soda or juice.   When eating at fast food restaurants, look for healthier items, like broiled chicken or salad.  If you have questions about which foods you should or should not eat, ask your doctor, nurse, or dietitian. The right diet for you will depend, in part, on your health and any medical conditions you have.  All topics are updated as new evidence becomes available and our peer review process is complete.  This topic retrieved from [employer] on: Feb 28, 2024.  Topic 28454 Version 28.0  Release: 32.2.4 - C32.58  © 2024 UpToDate, Inc. and/or its affiliates. All rights reserved.  figure 1: Nutrition label - Fiber     This is an example of a nutrition label. To figure out how much fiber is in a food, look for the line that says \"Dietary Fiber.\" It's also important to look at the serving size. This food has 7 grams of fiber in each serving, and each serving is 1 cup.  Graphic 02854 Version 8.0  figure 2: Foods and drinks with calcium and vitamin D     Foods rich in calcium include ice cream, soy milk, breads, kale, " "broccoli, milk, cheese, cottage cheese, almonds, yogurt, ready-to-eat cereals, beans, and tofu. Foods rich in vitamin D include milk, fortified plant-based \"milks\" (soy, almond), canned tuna fish, cod liver oil, yogurt, ready-to-eat-cereals, cooked salmon, canned sardines, mackerel, and eggs. Some of these foods are rich in both.  Graphic 07563 Version 4.0  Consumer Information Use and Disclaimer   Disclaimer: This generalized information is a limited summary of diagnosis, treatment, and/or medication information. It is not meant to be comprehensive and should be used as a tool to help the user understand and/or assess potential diagnostic and treatment options. It does NOT include all information about conditions, treatments, medications, side effects, or risks that may apply to a specific patient. It is not intended to be medical advice or a substitute for the medical advice, diagnosis, or treatment of a health care provider based on the health care provider's examination and assessment of a patient's specific and unique circumstances. Patients must speak with a health care provider for complete information about their health, medical questions, and treatment options, including any risks or benefits regarding use of medications. This information does not endorse any treatments or medications as safe, effective, or approved for treating a specific patient. UpToDate, Inc. and its affiliates disclaim any warranty or liability relating to this information or the use thereof.The use of this information is governed by the Terms of Use, available at https://www.wolterskluwer.com/en/know/clinical-effectiveness-terms. 2024© UpToDate, Inc. and its affiliates and/or licensors. All rights reserved.  Copyright   © 2024 UpToDate, Inc. and/or its affiliates. All rights reserved.  Patient Education     Calcium Rich Diet   About this topic   Calcium is one of the most important minerals and is found in almost all parts of your " body. It makes your teeth and bones strong and healthy. Your body does not make calcium. It is important for you to eat calcium rich foods to get enough calcium. It also helps your body:  Make blood clots  Keep your heartbeat normal  Helps blood move throughout the body  Release hormones  Release enzymes  Send and get signals between your nerves and brain  Make muscles work well         What will the results be?   It is important to have a good amount of calcium in your food. Calcium helps your body work well. It is very important during every life stage. Calcium may also keep you from losing bone mass. This is osteopenia. It may help keep you from having weak bones. This is osteoporosis.  What drugs may be needed?   The doctor may order calcium and vitamin D supplements. You need vitamin D to help your body take in the calcium. Your calcium supplement may have vitamin D in it.  Talk to your doctor about:  If it is OK to take your calcium with food.  How often to take your calcium supplement throughout the day.  All the drugs you are taking. Be sure to include all prescription and over-the-counter (OTC) drugs, and herbal supplements. Tell the doctor about any drug allergy. Bring a list of drugs you take with you. Some drugs may cause problems with how your body takes in calcium.  Will physical activity be limited?   No, physical activities will not be limited. It is good for you to do light exercises and to stay active.  What changes to diet are needed?   You will need to watch how much calcium is in the foods you eat. Your doctor will talk to you about the right amount of calcium for you. How much calcium you need is based on your age and life stage.  When is this diet used?   This diet is used when your normal diet is low in calcium. It is needed to raise the amount of calcium in your diet. Our bodies do not take in calcium well as we get older.  Who should not use this diet?   People with too much calcium in  their blood should not use this diet. This is called hypercalcemia.  What foods are good to eat?   Milk, yogurt, and cheese products are naturally high in calcium.  These foods have smaller amounts of calcium. If they are eaten often and in large amounts they can be good sources of calcium:  Oysters; dried fruit like figs and raisins; green leafy vegetables like broccoli, collards, kale, mustard greens, bok jeff; soy beans; oranges  [place] and sardines. Be sure to eat the soft bones.  Nuts like almonds and [place] nuts, sunflower seeds, tahini, dried beans  Food products with calcium added to them like orange juice, tofu, cereal, bread; almond milk, rice milk, soy milk  What foods should be limited or avoided?   People who eat many kinds of foods do not have to be worried about limiting or avoiding certain foods.   What problems could happen?   Some people may get kidney stones. This may happen after taking high amounts of calcium over a long time. Calcium from food does not seem to cause kidney stones.  Hard stools.  Too much calcium may interfere with your body’s ability to absorb iron and zinc.  When do I need to call the doctor?   Call your doctor if you have concerns about your diet. Tell your doctor if you are allergic to any of the foods in your diet.  Helpful tips   If you have problems digesting milk, try lactose-free milk. You may also use a product to help you take in lactose.  Talk with your doctor if you are a vegetarian and do not eat dairy products. Your doctor can help you make sure you get the amount of calcium your body needs.  Last Reviewed Date   2021-03-12  Consumer Information Use and Disclaimer   This generalized information is a limited summary of diagnosis, treatment, and/or medication information. It is not meant to be comprehensive and should be used as a tool to help the user understand and/or assess potential diagnostic and treatment options. It does NOT include all information about  conditions, treatments, medications, side effects, or risks that may apply to a specific patient. It is not intended to be medical advice or a substitute for the medical advice, diagnosis, or treatment of a health care provider based on the health care provider's examination and assessment of a patient’s specific and unique circumstances. Patients must speak with a health care provider for complete information about their health, medical questions, and treatment options, including any risks or benefits regarding use of medications. This information does not endorse any treatments or medications as safe, effective, or approved for treating a specific patient. UpToDate, Inc. and its affiliates disclaim any warranty or liability relating to this information or the use thereof. The use of this information is governed by the Terms of Use, available at https://www.SiBEAM.com/en/know/clinical-effectiveness-terms   Copyright   Copyright © 2024 UpToDate, Inc. and its affiliates and/or licensors. All rights reserved.  Patient Education     Exercise and movement   The Basics   Written by the doctors and editors at [employer]   What are the benefits of movement? -- Moving your body has many benefits. It can:   Burn calories, which helps people manage their weight   Help control blood sugar levels in people with diabetes   Lower blood pressure, especially in people with high blood pressure   Lower stress, and help with depression and anxiety   Keep bones strong, so they don't get thin and break easily   Lower the chance of dying from heart disease  Adding even small amounts of physical activity to your daily routine can improve your health.  What are the main types of exercise? -- There are 3 main types of exercise:   Aerobic exercise - This raises your heart rate. Examples include walking, running, dancing, riding a bike, and swimming.   Muscle strengthening - This helps make your muscles stronger. You can do it using  weights, exercise bands, or weight machines. You can also use your own body weight, as with push-ups, or by lifting items in your home, like jugs of water.   Stretching - These help your muscles and joints move more easily.  It's important to have all 3 types in your exercise program. That way, your body, muscles, and joints can be as healthy as possible.  Should I talk to my doctor or nurse before I start exercising? -- If you have not exercised before or have not exercised in a long time, talk with your doctor or nurse before you start a very active exercise program.  If you have heart disease or risk factors for heart disease (like high blood pressure or diabetes), your doctor or nurse might recommend that you have an exercise test before starting an exercise program.  When you begin an exercise program, start slowly. For example, do the exercise at a slow pace or for a few minutes only. Over time, you can exercise faster and for longer periods of time.  What should I do when I exercise? -- Each time you exercise, you should:   Warm up - This can help keep you from hurting your muscles when you exercise. To warm up, do a light aerobic exercise (such as walking slowly) or stretch for 5 to 10 minutes.   Work out - Try to get a mix of aerobic exercise, muscle strengthening, and stretching. During an aerobic workout, you can walk fast, swim, run, or use an exercise machine, for example. Other activities, like dancing or playing tennis, are also forms of aerobic exercise. You should also take time to stretch all of your joints, including your neck, shoulders, back, hips, and knees. At least 2 times a week, you can do muscle strengthening exercises as part of your workout.   Cool down - This helps keep you from feeling dizzy after you exercise and helps prevent muscle cramps. To cool down, you can stretch or do a light aerobic exercise for 5 minutes.  Some people go to a gym or do group exercise classes. But you can  exercise even without these things. Some exercises can be done even in a small space. You can also try online videos or smartphone apps to get ideas for different types of exercise.  How often should I exercise? -- Doctors recommend that people exercise at least 30 minutes a day, on 5 or more days of the week.  If you can't exercise for 30 minutes straight, try to exercise for 10 minutes at a time, 3 or 4 times a day. Even exercising for shorter amounts of time is good for you, especially if it means spending less time sitting.  When should I call my doctor or nurse? -- If you have any of the following symptoms when you exercise, stop exercising and call your doctor or nurse right away:   Pain or pressure in your chest, arms, throat, jaw, or back   Nausea or vomiting   Feeling like your heart is fluttering or racing very fast   Feeling dizzy or faint  What if I don't have time to exercise? -- Many people have very busy lives and might not think that they have time to exercise. But it's important to try to find time, even if you are tired or work a lot. Exercise can increase your energy level, which can make you feel better and might even help you get more work done.  Even if it's hard to set aside a lot of time to exercise, you can still improve your health by moving your body more. There are many ways that you can be more active. For example, you can:   Take the stairs instead of the elevator.   Park in a parking space that is farther away from the door.   Take a longer route when you walk from one place to another.  Spending a lot of time sitting still (for example, watching TV or working on the computer) is bad for your health. Try to get up and move around whenever you can. Even small amounts of movement, like taking short walks, doing household chores, or gardening, can improve your health. Finding activities that you enjoy, or doing them with other people, can help you add more movement into your daily  life.  What else should I do when I exercise? -- To exercise safely and avoid problems, it's important to:   Drink fluids during and after exercising (but avoid drinks with a lot of caffeine or sugar).   Avoid exercising outside if it is too hot or cold.   Wear layers of clothes, so that you can take them off if you get too hot.   Wear shoes that fit well and support your feet.   Be aware of your surroundings if you exercise outside.  All topics are updated as new evidence becomes available and our peer review process is complete.  This topic retrieved from [employer] on: May 18, 2024.  Topic 30740 Version 31.0  Release: 32.4.3 - C32.137  © 2024 UpToDate, Inc. and/or its affiliates. All rights reserved.  Consumer Information Use and Disclaimer   Disclaimer: This generalized information is a limited summary of diagnosis, treatment, and/or medication information. It is not meant to be comprehensive and should be used as a tool to help the user understand and/or assess potential diagnostic and treatment options. It does NOT include all information about conditions, treatments, medications, side effects, or risks that may apply to a specific patient. It is not intended to be medical advice or a substitute for the medical advice, diagnosis, or treatment of a health care provider based on the health care provider's examination and assessment of a patient's specific and unique circumstances. Patients must speak with a health care provider for complete information about their health, medical questions, and treatment options, including any risks or benefits regarding use of medications. This information does not endorse any treatments or medications as safe, effective, or approved for treating a specific patient. UpToDate, Inc. and its affiliates disclaim any warranty or liability relating to this information or the use thereof.The use of this information is governed by the Terms of Use, available at  https://www.woltersBISSELL Pet Foundationuwer.com/en/know/clinical-effectiveness-terms. 2024© [employer] and its affiliates and/or licensors. All rights reserved.  Copyright   © 2024 [employer] and/or its affiliates. All rights reserved.

## 2024-10-15 DIAGNOSIS — F33.0 MILD EPISODE OF RECURRENT MAJOR DEPRESSIVE DISORDER (HCC): ICD-10-CM

## 2024-10-22 ENCOUNTER — OFFICE VISIT (OUTPATIENT)
Age: 64
End: 2024-10-22
Payer: COMMERCIAL

## 2024-10-22 VITALS
WEIGHT: 168 LBS | OXYGEN SATURATION: 97 % | DIASTOLIC BLOOD PRESSURE: 64 MMHG | BODY MASS INDEX: 31.72 KG/M2 | TEMPERATURE: 98.2 F | HEIGHT: 61 IN | SYSTOLIC BLOOD PRESSURE: 110 MMHG | HEART RATE: 76 BPM

## 2024-10-22 DIAGNOSIS — I10 HYPERTENSION, ESSENTIAL, BENIGN: ICD-10-CM

## 2024-10-22 DIAGNOSIS — Z23 ENCOUNTER FOR IMMUNIZATION: ICD-10-CM

## 2024-10-22 DIAGNOSIS — E03.9 HYPOTHYROIDISM, UNSPECIFIED TYPE: ICD-10-CM

## 2024-10-22 DIAGNOSIS — I49.3 ASYMPTOMATIC PVCS: ICD-10-CM

## 2024-10-22 DIAGNOSIS — F90.0 ADHD, PREDOMINANTLY INATTENTIVE TYPE: ICD-10-CM

## 2024-10-22 DIAGNOSIS — I49.9 CARDIAC ARRHYTHMIA, UNSPECIFIED CARDIAC ARRHYTHMIA TYPE: Primary | ICD-10-CM

## 2024-10-22 DIAGNOSIS — F32.A DEPRESSION, UNSPECIFIED DEPRESSION TYPE: ICD-10-CM

## 2024-10-22 PROCEDURE — 99214 OFFICE O/P EST MOD 30 MIN: CPT | Performed by: INTERNAL MEDICINE

## 2024-10-22 PROCEDURE — 90471 IMMUNIZATION ADMIN: CPT

## 2024-10-22 PROCEDURE — 90673 RIV3 VACCINE NO PRESERV IM: CPT

## 2024-10-22 PROCEDURE — 93000 ELECTROCARDIOGRAM COMPLETE: CPT | Performed by: INTERNAL MEDICINE

## 2024-10-22 RX ORDER — VENLAFAXINE HYDROCHLORIDE 150 MG/1
150 CAPSULE, EXTENDED RELEASE ORAL DAILY
Qty: 100 CAPSULE | Refills: 1 | Status: SHIPPED | OUTPATIENT
Start: 2024-10-22

## 2024-10-22 NOTE — ASSESSMENT & PLAN NOTE
Will check serum chemistries, potassium, magnesium and serum electrolytes.  Patient is asymptomatic.  Recommended reducing the usage of cetirizine and also to decrease or eliminate intake of caffeine.

## 2024-10-22 NOTE — ASSESSMENT & PLAN NOTE
TSH is mildly elevated free T4 is normal.  No adjustment in thyroid replacement dosage is necessary

## 2024-10-22 NOTE — ASSESSMENT & PLAN NOTE
Continues on fluoxetine 20 mg daily    Orders:    venlafaxine (EFFEXOR-XR) 150 mg 24 hr capsule; Take 1 capsule (150 mg total) by mouth daily

## 2024-11-15 DIAGNOSIS — F33.0 MILD EPISODE OF RECURRENT MAJOR DEPRESSIVE DISORDER (HCC): ICD-10-CM

## 2024-11-15 DIAGNOSIS — I10 HYPERTENSION, ESSENTIAL, BENIGN: ICD-10-CM

## 2024-11-15 DIAGNOSIS — F90.0 ADHD, PREDOMINANTLY INATTENTIVE TYPE: ICD-10-CM

## 2024-11-15 RX ORDER — VALSARTAN 160 MG/1
160 TABLET ORAL DAILY
Qty: 90 TABLET | Refills: 1 | Status: SHIPPED | OUTPATIENT
Start: 2024-11-15

## 2024-11-15 RX ORDER — DEXTROAMPHETAMINE SACCHARATE, AMPHETAMINE ASPARTATE, DEXTROAMPHETAMINE SULFATE AND AMPHETAMINE SULFATE 1.25; 1.25; 1.25; 1.25 MG/1; MG/1; MG/1; MG/1
TABLET ORAL
Qty: 45 TABLET | Refills: 0 | Status: SHIPPED | OUTPATIENT
Start: 2024-11-15

## 2024-12-09 DIAGNOSIS — F32.A DEPRESSION, UNSPECIFIED DEPRESSION TYPE: ICD-10-CM

## 2024-12-09 DIAGNOSIS — E03.9 ACQUIRED HYPOTHYROIDISM: ICD-10-CM

## 2024-12-09 RX ORDER — VENLAFAXINE HYDROCHLORIDE 150 MG/1
150 CAPSULE, EXTENDED RELEASE ORAL DAILY
Qty: 90 CAPSULE | Refills: 1 | Status: SHIPPED | OUTPATIENT
Start: 2024-12-09

## 2024-12-09 RX ORDER — LEVOTHYROXINE SODIUM 50 UG/1
50 TABLET ORAL DAILY
Qty: 90 TABLET | Refills: 1 | Status: SHIPPED | OUTPATIENT
Start: 2024-12-09

## 2024-12-23 DIAGNOSIS — F90.0 ADHD, PREDOMINANTLY INATTENTIVE TYPE: ICD-10-CM

## 2024-12-24 RX ORDER — DEXTROAMPHETAMINE SACCHARATE, AMPHETAMINE ASPARTATE, DEXTROAMPHETAMINE SULFATE AND AMPHETAMINE SULFATE 1.25; 1.25; 1.25; 1.25 MG/1; MG/1; MG/1; MG/1
TABLET ORAL
Qty: 45 TABLET | Refills: 0 | Status: SHIPPED | OUTPATIENT
Start: 2024-12-24

## 2024-12-24 NOTE — TELEPHONE ENCOUNTER
Last office visit 10/22   Next Office Visit not scheduled  sent patient my chart message to schedule a follow up appointment.

## 2024-12-24 NOTE — TELEPHONE ENCOUNTER
Patient scheduled first available with PCP 1/3 and would like to know if medication can be refilled prior.  Please advise.

## 2024-12-24 NOTE — TELEPHONE ENCOUNTER
Left message on machine for patient to call office to schedule a follow up appointment for continued medication refills.

## 2025-01-03 ENCOUNTER — OFFICE VISIT (OUTPATIENT)
Age: 65
End: 2025-01-03
Payer: COMMERCIAL

## 2025-01-03 VITALS
DIASTOLIC BLOOD PRESSURE: 70 MMHG | WEIGHT: 171.4 LBS | OXYGEN SATURATION: 97 % | TEMPERATURE: 98.6 F | BODY MASS INDEX: 31.54 KG/M2 | SYSTOLIC BLOOD PRESSURE: 130 MMHG | HEIGHT: 62 IN | HEART RATE: 81 BPM

## 2025-01-03 DIAGNOSIS — E21.3 HYPERPARATHYROIDISM (HCC): ICD-10-CM

## 2025-01-03 DIAGNOSIS — I49.3 ASYMPTOMATIC PVCS: ICD-10-CM

## 2025-01-03 DIAGNOSIS — E04.1 THYROID NODULE: ICD-10-CM

## 2025-01-03 DIAGNOSIS — E83.52 HYPERCALCEMIA: ICD-10-CM

## 2025-01-03 DIAGNOSIS — E03.9 ACQUIRED HYPOTHYROIDISM: ICD-10-CM

## 2025-01-03 DIAGNOSIS — I10 HYPERTENSION, ESSENTIAL, BENIGN: Primary | ICD-10-CM

## 2025-01-03 PROCEDURE — 99214 OFFICE O/P EST MOD 30 MIN: CPT | Performed by: INTERNAL MEDICINE

## 2025-01-03 PROCEDURE — 93000 ELECTROCARDIOGRAM COMPLETE: CPT | Performed by: INTERNAL MEDICINE

## 2025-01-03 NOTE — ASSESSMENT & PLAN NOTE
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  Will recheck a PTH level

## 2025-01-03 NOTE — ASSESSMENT & PLAN NOTE
History of elevated TSH with normal T3-T4 levels.  Will recheck labs  Orders:    T3; Future    T4, free; Future    TSH, 3rd generation; Future     Please see the attached refill request.

## 2025-01-03 NOTE — PROGRESS NOTES
Name: Armond Foster      : 1960      MRN: [id number]  Encounter Provider: Donell Tse MD  Encounter Date: 1/3/2025   Encounter department: [facility]  :  Assessment & Plan  Hypertension, essential, benign  Blood pressure was mildly elevated on initial presentation.  On recheck 130/70  Orders:    T3; Future    POCT ECG    Acquired hypothyroidism  History of elevated TSH with normal T3-T4 levels.  Will recheck labs  Orders:    T3; Future    T4, free; Future    TSH, 3rd generation; Future    Asymptomatic PVCs  EKG today shows normal sinus rhythm with no PVCs noted and no PVCs noted on examination.  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  Will recheck a PTH level                History of Present Illness     Patient presents to the office for follow-up visit.  She was actually told by nursing that she needed to come in for a visit in order to have her medications renewed.  The patient was actually seen in October so there was no reason for the patient to be told this.  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  She had an EKG which showed a significant number of PVCs although she did variance any clinically significant cardiac symptomatology.  Presently she feels well she has no particular complaints at this time.  She would like to have a follow-up EKG and she is due for some labs.  Her blood pressure was initially elevated when presenting to the office but on recheck during examination it was stable at 130/70.  She does make note of a brief episode where she  "noticed gold glitter in the air but this has only occurred once.  She has not seen an ophthalmologist.  She also complains of some intermittent left knee pain and notices a mild click in her knee at times.  The pain in her knee is in the medial compartment of her left knee.  She does not notice any swelling of her knee or erythema.  She has applied diclofenac gel intermittently and taking Aleve intermittently with relief of her symptoms.      Review of Systems   Constitutional: Negative.    HENT: Negative.     Eyes:  Positive for visual disturbance (1 episode of seeing gold glitter in her visual fields).   Respiratory: Negative.     Cardiovascular:  Negative for palpitations.   Gastrointestinal: Negative.    Endocrine: Negative.    Genitourinary: Negative.    Musculoskeletal:  Positive for arthralgias (Medial left knee pain).   Skin: Negative.    Allergic/Immunologic: Negative.    Neurological: Negative.    Hematological: Negative.    Psychiatric/Behavioral: Negative.       EKG: normal EKG, normal sinus rhythm, PVCs are no longer present.     Objective   /86 (BP Location: Left arm, Patient Position: Sitting, Cuff Size: Standard)   Pulse 81   Temp 98.6 °F (37 °C) (Temporal)   Ht 5' 1.61\" (1.565 m)   Wt 77.7 kg (171 lb 6.4 oz)   LMP  (LMP Unknown)   SpO2 97%   BMI 31.74 kg/m²      Physical Exam  Vitals reviewed.   Constitutional:       General: She is not in acute distress.     Appearance: She is not ill-appearing, toxic-appearing or diaphoretic.   HENT:      Head: Normocephalic and atraumatic.      Right Ear: External ear normal.      Left Ear: External ear normal.      Nose: Nose normal.      Mouth/Throat:      Mouth: Mucous membranes are moist.      Pharynx: Oropharynx is clear.   Eyes:      General: No scleral icterus.     Conjunctiva/sclera: Conjunctivae normal.      Pupils: Pupils are equal, round, and reactive to light.   Cardiovascular:      Rate and Rhythm: Normal rate and regular rhythm.      " Pulses: Normal pulses.      Heart sounds: Normal heart sounds.   Pulmonary:      Effort: Pulmonary effort is normal. No respiratory distress.      Breath sounds: Normal breath sounds.   Abdominal:      General: Abdomen is flat. There is no distension.   Musculoskeletal:      Cervical back: Neck supple. No tenderness.      Right lower leg: No edema.      Left lower leg: No edema.   Skin:     General: Skin is warm.      Coloration: Skin is not jaundiced.      Findings: No bruising, erythema or rash.   Neurological:      General: No focal deficit present.      Mental Status: She is alert and oriented to person, place, and time. Mental status is at baseline.   Psychiatric:         Mood and Affect: Mood normal.         Behavior: Behavior normal.

## 2025-01-03 NOTE — ASSESSMENT & PLAN NOTE
EKG today shows normal sinus rhythm with no PVCs noted and no PVCs noted on examination.  Orders:    POCT ECG

## 2025-01-03 NOTE — ASSESSMENT & PLAN NOTE
Blood pressure was mildly elevated on initial presentation.  On recheck 130/70  Orders:    T3; Future    POCT ECG

## 2025-01-31 DIAGNOSIS — F90.0 ADHD, PREDOMINANTLY INATTENTIVE TYPE: ICD-10-CM

## 2025-01-31 NOTE — TELEPHONE ENCOUNTER
Last office visit 1/3  Next Office Visit not schedule sent patient my chart message to schedule a appointment for 3 months

## 2025-02-03 RX ORDER — DEXTROAMPHETAMINE SACCHARATE, AMPHETAMINE ASPARTATE, DEXTROAMPHETAMINE SULFATE AND AMPHETAMINE SULFATE 1.25; 1.25; 1.25; 1.25 MG/1; MG/1; MG/1; MG/1
TABLET ORAL
Qty: 45 TABLET | Refills: 0 | Status: SHIPPED | OUTPATIENT
Start: 2025-02-03

## 2025-02-09 DIAGNOSIS — F33.0 MILD EPISODE OF RECURRENT MAJOR DEPRESSIVE DISORDER (HCC): ICD-10-CM

## 2025-02-16 ENCOUNTER — APPOINTMENT (OUTPATIENT)
Dept: RADIOLOGY | Facility: MEDICAL CENTER | Age: 65
End: 2025-02-16
Payer: COMMERCIAL

## 2025-02-16 ENCOUNTER — OFFICE VISIT (OUTPATIENT)
Dept: URGENT CARE | Facility: MEDICAL CENTER | Age: 65
End: 2025-02-16
Payer: COMMERCIAL

## 2025-02-16 VITALS
HEART RATE: 88 BPM | RESPIRATION RATE: 18 BRPM | SYSTOLIC BLOOD PRESSURE: 132 MMHG | OXYGEN SATURATION: 96 % | DIASTOLIC BLOOD PRESSURE: 87 MMHG | TEMPERATURE: 97.8 F | WEIGHT: 170 LBS | BODY MASS INDEX: 32.1 KG/M2 | HEIGHT: 61 IN

## 2025-02-16 DIAGNOSIS — R05.1 ACUTE COUGH: Primary | ICD-10-CM

## 2025-02-16 DIAGNOSIS — R05.1 ACUTE COUGH: ICD-10-CM

## 2025-02-16 PROCEDURE — 99214 OFFICE O/P EST MOD 30 MIN: CPT | Performed by: FAMILY MEDICINE

## 2025-02-16 PROCEDURE — 71046 X-RAY EXAM CHEST 2 VIEWS: CPT

## 2025-02-16 RX ORDER — AZITHROMYCIN 250 MG/1
TABLET, FILM COATED ORAL
Qty: 6 TABLET | Refills: 0 | Status: SHIPPED | OUTPATIENT
Start: 2025-02-16 | End: 2025-02-20

## 2025-02-16 RX ORDER — PREDNISONE 20 MG/1
40 TABLET ORAL DAILY
Qty: 10 TABLET | Refills: 0 | Status: SHIPPED | OUTPATIENT
Start: 2025-02-16 | End: 2025-02-21

## 2025-02-16 NOTE — PROGRESS NOTES
[facility]        NAME: Armond Foster is a 64 y.o. female  : 1960    MRN: [id number]  DATE: 2025  TIME: 2:00 PM    Assessment and Plan   Acute cough [R05.1]  1. Acute cough  XR chest pa and lateral    predniSONE 20 mg tablet    azithromycin (ZITHROMAX) 250 mg tablet        X-rays ordered today and reviewed by me showed no pneumonia. Awaiting final read from radiology and will adjust plan if needed based on final results.        Patient Instructions       Follow up with PCP in 3-5 days.  Proceed to  ER if symptoms worsen.    If tests have been performed at [facility], our office will contact you with results if changes need to be made to the care plan discussed with you at the visit.  [facility]    Chief Complaint     Chief Complaint   Patient presents with   • Cough     Pt has a productive cough, chest congestion and discomfort, headache, some lightheadedness when having a really strong cough x3 days. Pt has asthma and no relief from inhaler.          History of Present Illness       Cough  This is a new problem. The current episode started in the past 7 days. The problem has been gradually worsening. The problem occurs constantly. The cough is Productive of sputum. Associated symptoms include myalgias, nasal congestion, postnasal drip, rhinorrhea, a sore throat, shortness of breath and wheezing. Pertinent negatives include no chest pain, chills, ear congestion, ear pain, fever, headaches, heartburn, hemoptysis, rash, sweats or weight loss. The symptoms are aggravated by exercise. She has tried a beta-agonist inhaler for the symptoms. The treatment provided no relief. Her past medical history is significant for asthma.       Review of Systems   Review of Systems   Constitutional:  Negative for chills, fever and weight loss.   HENT:  Positive for postnasal drip, rhinorrhea and sore throat. Negative for ear pain.    Respiratory:  Positive for  cough, shortness of breath and wheezing. Negative for hemoptysis.    Cardiovascular:  Negative for chest pain.   Gastrointestinal:  Negative for heartburn.   Musculoskeletal:  Positive for myalgias.   Skin:  Negative for rash.   Neurological:  Negative for headaches.         Current Medications       Current Outpatient Medications:   •  Acetaminophen (TYLENOL PO), Take by mouth as needed, Disp: , Rfl:   •  albuterol (ProAir HFA) 90 mcg/act inhaler, Inhale 2 puffs every 6 (six) hours as needed for wheezing, Disp: 8.5 g, Rfl: 0  •  amLODIPine (NORVASC) 5 mg tablet, Take 1 tablet (5 mg total) by mouth daily, Disp: 90 tablet, Rfl: 1  •  amphetamine-dextroamphetamine (ADDERALL) 5 MG tablet, take 1 and 1/2 tablets by mouth every morning, Disp: 45 tablet, Rfl: 0  •  azithromycin (ZITHROMAX) 250 mg tablet, Take 2 tablets today then 1 tablet daily x 4 days, Disp: 6 tablet, Rfl: 0  •  cetirizine (ZyrTEC) 10 mg tablet, Take 10 mg by mouth 2 (two) times a day , Disp: , Rfl:   •  Cholecalciferol (VITAMIN D3) 1000 units CAPS, Take 1 capsule by mouth Daily  , Disp: , Rfl:   •  EPINEPHrine (PRIMATENE MIST IN), Inhale if needed OTC, Disp: , Rfl:   •  FLUoxetine (PROzac) 20 mg capsule, take 1 capsule by mouth once daily, Disp: 90 capsule, Rfl: 1  •  Fluticasone-Salmeterol (Advair Diskus) 100-50 mcg/dose inhaler, Inhale 1 puff daily, Disp: 180 blister, Rfl: 1  •  levothyroxine 50 mcg tablet, Take 1 tablet (50 mcg total) by mouth daily, Disp: 90 tablet, Rfl: 1  •  multivitamin (THERAGRAN) TABS, Take 1 tablet by mouth daily  , Disp: , Rfl:   •  predniSONE 20 mg tablet, Take 2 tablets (40 mg total) by mouth daily for 5 days, Disp: 10 tablet, Rfl: 0  •  Triamcinolone Acetonide (Nasacort Allergy) 55 MCG/ACT nasal spray, 1 Act into each nostril Daily 2 puffs in each nostril, Disp: , Rfl:   •  valsartan (DIOVAN) 160 mg tablet, Take 1 tablet (160 mg total) by mouth daily, Disp: 90 tablet, Rfl: 1  •  venlafaxine (EFFEXOR-XR) 150 mg 24 hr  capsule, Take 1 capsule (150 mg total) by mouth daily, Disp: 90 capsule, Rfl: 1  •  EPINEPHrine (EPIPEN JR) 0.15 mg/0.3 mL SOAJ, Inject 0.3 mL (0.15 mg total) into a muscle once for 1 dose, Disp: 0.3 mL, Rfl: 0    Current Allergies     Allergies as of 02/16/2025 - Reviewed 02/16/2025   Allergen Reaction Noted   • Compazine [prochlorperazine] Anaphylaxis 10/22/2013   • Bee venom Itching and Swelling 07/03/2023   • Eggs or egg-derived products - food allergy Abdominal Pain and Diarrhea 03/29/2022   • Erythromycin GI Intolerance 10/22/2013   • Lactose intolerance (gi) - food allergy Abdominal Pain and Diarrhea 03/29/2022   • Sulfa antibiotics GI Intolerance 03/08/2016   • Sulfamethoxazole-trimethoprim Nausea Only and Dizziness 10/22/2013            The following portions of the patient's history were reviewed and updated as appropriate: allergies, current medications, past family history, past medical history, past social history, past surgical history and problem list.     Past Medical History:   Diagnosis Date   • Abrasion of axilla     LAST ASSESSED: 11/21/14   • Acute cystitis with hematuria 08/15/2019   • ADHD    • Allergic    • Allergic rhinitis     LAST ASSESSED: 5/15/15   • Anxiety 2000   • Asthma     TRANSITIONED FROM: ASTHMA; RESOLVED: 11/9/16   • Bee sting reaction 08/01/2019   • Bilateral nephrolithiasis 07/25/2018   • COVID 07/04/2024   • Depression    • Disease of thyroid gland     Hypothyroidism   • Fatty liver    • Fracture of plateau of left tibia     RESOLVED: 4/14/15   • Headache    • Hematuria    • Hyperparathyroidism (HCC)    • Hypertension    • Hypothyroidism    • Kidney stone 2018   • Left ankle sprain     LAST ASSESSED: 10/21/14   • No known health problems     DENIED MENTAL STATUS CHANGE AS PER [facility]; CONFLICTED WITH ADHD AND DEPRESSION IN MED HX SO IT COULD NOT BE ADDED IN PERT NEGS   • Obesity 2020   • Obstructive sleep apnea on CPAP    • Pneumonia Twice in [place]   • Poison ivy  "dermatitis 08/08/2022   • Scoliosis    • Thyroid nodule    • Urinary frequency     RESOLVED: 10/27/16   • Vaginal bleeding, abnormal     LAST ASSESSED: 6/6/16   • Varicella 1967   • Viral URI with cough 12/30/2019   • Visual impairment        Past Surgical History:   Procedure Laterality Date   • EYE SURGERY  2010    Lasik   • FOOT SURGERY Right     \"Nerve removal\"   • FRACTURE SURGERY      left shoulder and right arm when I was a kid   • PARATHYROID GLAND SURGERY  05/17/2018   • NY COLONOSCOPY FLX DX W/COLLJ SPEC WHEN PFRMD N/A 06/21/2018    Procedure: COLONOSCOPY;  Surgeon: Jefferson Gamble MD;  Location: [facility];  Service: Gastroenterology   • NY PARATHYROIDECTOMY/EXPLORATION PARATHYROIDS Right 05/17/2018    Procedure: MINIMALLY INVASIVE PARATHYROIDECTOMY;   PTH MONITORING;  Surgeon: Milton Aldana MD;  Location: [facility];  Service: Surgical Oncology   • TONSILLECTOMY     • TONSILLECTOMY AND ADENOIDECTOMY         Family History   Problem Relation Age of Onset   • Heart Valve Disease Mother         s/p MVR   • Glaucoma Mother    • Rickets Mother    • Stroke Mother    • Prostate cancer Father    • Anemia Father    • Neuropathy Father    • Arthritis Father    • Cancer Father         PROSTATE   • Prostate cancer Brother    • Paget's disease of bone Brother    • Arthritis Brother    • Cancer Brother         PROSTATE   • Hearing loss Brother         Tinitus   • No Known Problems Maternal Grandmother    • No Known Problems Maternal Grandfather    • No Known Problems Paternal Grandmother    • No Known Problems Paternal Grandfather    • No Known Problems Paternal Aunt    • Hypertension Family         BENIGN ESSENTIAL   • Heart disease Family         CARDIAC DISORDER   • Breast cancer Neg Hx    • Colon cancer Neg Hx    • Ovarian cancer Neg Hx    • Uterine cancer Neg Hx          Medications have been verified.        Objective   /87   Pulse 88   Temp 97.8 °F (36.6 °C) (Temporal)   Resp 18   Ht 5' 1\" (1.549 " m)   Wt 77.1 kg (170 lb)   LMP  (LMP Unknown)   SpO2 96%   BMI 32.12 kg/m²   No LMP recorded (lmp unknown). Patient is postmenopausal.       Physical Exam     Physical Exam  Vitals reviewed.   Constitutional:       General: She is not in acute distress.     Appearance: Normal appearance. She is not ill-appearing, toxic-appearing or diaphoretic.   HENT:      Head: Normocephalic and atraumatic.      Right Ear: Tympanic membrane normal.      Left Ear: Tympanic membrane normal.      Nose: Congestion and rhinorrhea present.      Mouth/Throat:      Mouth: Mucous membranes are moist.      Pharynx: Posterior oropharyngeal erythema present. No oropharyngeal exudate.   Eyes:      Pupils: Pupils are equal, round, and reactive to light.   Cardiovascular:      Rate and Rhythm: Normal rate and regular rhythm.      Heart sounds: No murmur heard.  Pulmonary:      Effort: Pulmonary effort is normal. No respiratory distress.      Breath sounds: Wheezing present.      Comments: End exp wheeze  Abdominal:      General: Abdomen is flat.      Palpations: Abdomen is soft.      Tenderness: There is no abdominal tenderness.   Musculoskeletal:         General: Normal range of motion.      Cervical back: Normal range of motion. No rigidity or tenderness.   Lymphadenopathy:      Cervical: No cervical adenopathy.   Skin:     General: Skin is warm and dry.      Capillary Refill: Capillary refill takes less than 2 seconds.   Neurological:      General: No focal deficit present.      Mental Status: She is alert and oriented to person, place, and time. Mental status is at baseline.   Psychiatric:         Mood and Affect: Mood normal.         Behavior: Behavior normal.         Thought Content: Thought content normal.

## 2025-02-17 ENCOUNTER — RESULTS FOLLOW-UP (OUTPATIENT)
Dept: URGENT CARE | Facility: MEDICAL CENTER | Age: 65
End: 2025-02-17

## 2025-03-21 DIAGNOSIS — F90.0 ADHD, PREDOMINANTLY INATTENTIVE TYPE: ICD-10-CM

## 2025-03-21 RX ORDER — DEXTROAMPHETAMINE SACCHARATE, AMPHETAMINE ASPARTATE, DEXTROAMPHETAMINE SULFATE AND AMPHETAMINE SULFATE 1.25; 1.25; 1.25; 1.25 MG/1; MG/1; MG/1; MG/1
TABLET ORAL
Qty: 45 TABLET | Refills: 0 | Status: SHIPPED | OUTPATIENT
Start: 2025-03-21

## 2025-03-23 DIAGNOSIS — I10 HYPERTENSION, ESSENTIAL, BENIGN: ICD-10-CM

## 2025-03-24 RX ORDER — AMLODIPINE BESYLATE 5 MG/1
5 TABLET ORAL DAILY
Qty: 90 TABLET | Refills: 1 | Status: SHIPPED | OUTPATIENT
Start: 2025-03-24

## 2025-05-01 DIAGNOSIS — F90.0 ADHD, PREDOMINANTLY INATTENTIVE TYPE: ICD-10-CM

## 2025-05-02 RX ORDER — DEXTROAMPHETAMINE SACCHARATE, AMPHETAMINE ASPARTATE, DEXTROAMPHETAMINE SULFATE AND AMPHETAMINE SULFATE 1.25; 1.25; 1.25; 1.25 MG/1; MG/1; MG/1; MG/1
TABLET ORAL
Qty: 45 TABLET | Refills: 0 | Status: SHIPPED | OUTPATIENT
Start: 2025-05-02

## 2025-05-07 ENCOUNTER — OFFICE VISIT (OUTPATIENT)
Age: 65
End: 2025-05-07
Payer: COMMERCIAL

## 2025-05-07 ENCOUNTER — APPOINTMENT (OUTPATIENT)
Dept: LAB | Age: 65
End: 2025-05-07
Payer: COMMERCIAL

## 2025-05-07 VITALS
DIASTOLIC BLOOD PRESSURE: 82 MMHG | TEMPERATURE: 98.5 F | BODY MASS INDEX: 32.66 KG/M2 | HEART RATE: 86 BPM | HEIGHT: 61 IN | OXYGEN SATURATION: 97 % | SYSTOLIC BLOOD PRESSURE: 126 MMHG | WEIGHT: 173 LBS

## 2025-05-07 DIAGNOSIS — R60.9 EDEMA, UNSPECIFIED TYPE: Primary | ICD-10-CM

## 2025-05-07 DIAGNOSIS — E03.9 ACQUIRED HYPOTHYROIDISM: ICD-10-CM

## 2025-05-07 DIAGNOSIS — I49.9 CARDIAC ARRHYTHMIA, UNSPECIFIED CARDIAC ARRHYTHMIA TYPE: ICD-10-CM

## 2025-05-07 DIAGNOSIS — Z12.31 ENCOUNTER FOR SCREENING MAMMOGRAM FOR BREAST CANCER: ICD-10-CM

## 2025-05-07 DIAGNOSIS — K76.0 FATTY LIVER: ICD-10-CM

## 2025-05-07 DIAGNOSIS — I10 HYPERTENSION, ESSENTIAL, BENIGN: ICD-10-CM

## 2025-05-07 DIAGNOSIS — E21.3 HYPERPARATHYROIDISM (HCC): ICD-10-CM

## 2025-05-07 LAB
ALBUMIN SERPL BCG-MCNC: 4.7 G/DL (ref 3.5–5)
ALP SERPL-CCNC: 68 U/L (ref 34–104)
ALT SERPL W P-5'-P-CCNC: 30 U/L (ref 7–52)
ANION GAP SERPL CALCULATED.3IONS-SCNC: 9 MMOL/L (ref 4–13)
AST SERPL W P-5'-P-CCNC: 23 U/L (ref 13–39)
BASOPHILS # BLD AUTO: 0.05 THOUSANDS/ÂΜL (ref 0–0.1)
BASOPHILS NFR BLD AUTO: 1 % (ref 0–1)
BILIRUB SERPL-MCNC: 0.56 MG/DL (ref 0.2–1)
BUN SERPL-MCNC: 24 MG/DL (ref 5–25)
CALCIUM SERPL-MCNC: 9.9 MG/DL (ref 8.4–10.2)
CHLORIDE SERPL-SCNC: 102 MMOL/L (ref 96–108)
CO2 SERPL-SCNC: 30 MMOL/L (ref 21–32)
CREAT SERPL-MCNC: 0.82 MG/DL (ref 0.6–1.3)
EOSINOPHIL # BLD AUTO: 0.17 THOUSAND/ÂΜL (ref 0–0.61)
EOSINOPHIL NFR BLD AUTO: 2 % (ref 0–6)
ERYTHROCYTE [DISTWIDTH] IN BLOOD BY AUTOMATED COUNT: 13.4 % (ref 11.6–15.1)
GFR SERPL CREATININE-BSD FRML MDRD: 76 ML/MIN/1.73SQ M
GLUCOSE SERPL-MCNC: 92 MG/DL (ref 65–140)
HCT VFR BLD AUTO: 38.6 % (ref 34.8–46.1)
HGB BLD-MCNC: 13 G/DL (ref 11.5–15.4)
IMM GRANULOCYTES # BLD AUTO: 0.03 THOUSAND/UL (ref 0–0.2)
IMM GRANULOCYTES NFR BLD AUTO: 0 % (ref 0–2)
LYMPHOCYTES # BLD AUTO: 2.23 THOUSANDS/ÂΜL (ref 0.6–4.47)
LYMPHOCYTES NFR BLD AUTO: 32 % (ref 14–44)
MAGNESIUM SERPL-MCNC: 2.2 MG/DL (ref 1.9–2.7)
MCH RBC QN AUTO: 30.7 PG (ref 26.8–34.3)
MCHC RBC AUTO-ENTMCNC: 33.7 G/DL (ref 31.4–37.4)
MCV RBC AUTO: 91 FL (ref 82–98)
MONOCYTES # BLD AUTO: 0.57 THOUSAND/ÂΜL (ref 0.17–1.22)
MONOCYTES NFR BLD AUTO: 8 % (ref 4–12)
NEUTROPHILS # BLD AUTO: 4.03 THOUSANDS/ÂΜL (ref 1.85–7.62)
NEUTS SEG NFR BLD AUTO: 57 % (ref 43–75)
NRBC BLD AUTO-RTO: 0 /100 WBCS
PLATELET # BLD AUTO: 303 THOUSANDS/UL (ref 149–390)
PMV BLD AUTO: 11.3 FL (ref 8.9–12.7)
POTASSIUM SERPL-SCNC: 4.1 MMOL/L (ref 3.5–5.3)
PROT SERPL-MCNC: 7.3 G/DL (ref 6.4–8.4)
PTH-INTACT SERPL-MCNC: 56.8 PG/ML (ref 12–88)
RBC # BLD AUTO: 4.24 MILLION/UL (ref 3.81–5.12)
SODIUM SERPL-SCNC: 141 MMOL/L (ref 135–147)
T3 SERPL-MCNC: 0.9 NG/ML (ref 0.9–1.8)
T4 FREE SERPL-MCNC: 0.83 NG/DL (ref 0.61–1.12)
TSH SERPL DL<=0.05 MIU/L-ACNC: 3.35 UIU/ML (ref 0.45–4.5)
WBC # BLD AUTO: 7.08 THOUSAND/UL (ref 4.31–10.16)

## 2025-05-07 PROCEDURE — 84443 ASSAY THYROID STIM HORMONE: CPT

## 2025-05-07 PROCEDURE — 83970 ASSAY OF PARATHORMONE: CPT

## 2025-05-07 PROCEDURE — 84439 ASSAY OF FREE THYROXINE: CPT

## 2025-05-07 PROCEDURE — 83735 ASSAY OF MAGNESIUM: CPT

## 2025-05-07 PROCEDURE — 99214 OFFICE O/P EST MOD 30 MIN: CPT | Performed by: INTERNAL MEDICINE

## 2025-05-07 PROCEDURE — 84480 ASSAY TRIIODOTHYRONINE (T3): CPT

## 2025-05-07 PROCEDURE — 36415 COLL VENOUS BLD VENIPUNCTURE: CPT

## 2025-05-07 RX ORDER — SPIRONOLACTONE 25 MG/1
25 TABLET ORAL DAILY
Qty: 30 TABLET | Refills: 0 | Status: SHIPPED | OUTPATIENT
Start: 2025-05-07

## 2025-05-07 NOTE — PROGRESS NOTES
Name: Armond Foster      : 1960      MRN: [id number]  Encounter Provider: Geetha Carballo MD  Encounter Date: 2025   Encounter department: [facility] [place]    :  Assessment & Plan      Assessment & Plan  1. Edema.  - Blood pressure readings are within normal limits.  - Edema could be attributed to increased salt intake or prolonged standing.  - Prescription for spironolactone provided, to be taken as needed for a duration of 3 to 5 days  - Advised to discontinue the medication once the edema subsides and to increase water intake.  Lightweight compression stockings is another option    2. Nonalcoholic fatty liver disease.  - Counseled on the importance of weight loss and adherence to a Mediterranean diet.  - Referral to a nutritionist made to assist with dietary planning and weight management.  - Previous elastography showed very mild disease progression.  - Encouraged to monitor diet and weight closely.  Recommend -500 amadou daily to add up to negative 3500 amadou every week which will result in 1 pound weight loss.  Take a list of desired foods that you normally like to eat and portions that you eat every day.  Take the help of your dietitian to reduce calorie intake by 500 amadou daily  Continue adequate fluid intake daily  3. Hypertension.  - Currently on losartan and amlodipine 5 mg.  - Blood pressure is stable.  - Advised to continue current antihypertensive regimen.  - No new medications needed at this time.    4. Elevated cholesterol.  - Cholesterol levels are not well-controlled, heart risk has increased slightly.  - Advised to discuss starting cholesterol-lowering medication with her doctor during upcoming appointment in 2025.  - Emphasized the importance of weight loss for cholesterol management.  - Recommended dietary modifications and regular monitoring of cholesterol levels.         Chief Complaint   Patient presents with    Edema     Since the last 2 wks,  swollen ankles, aching back, extremely tired, stiff joints, blurry vision in left eye; I think this is connected to my liver disease.      History of Present Illness     History of Present Illness  The patient presents for evaluation of leg swelling, nonalcoholic fatty liver disease, hypertension, and elevated cholesterol.    She reports experiencing edema in her legs and ankles, which she attributes to prolonged standing last Thursday. She also mentions a weight gain of 3 pounds since her last visit, despite not consuming excessive salt. She acknowledges the need for weight loss but admits to struggling with meal planning. She has previously participated in Weight Watchers and lost weight but reverted to old eating habits. She did not have weight issues in her youth but began gaining weight in her 40s. She is considering consulting a nutritionist for dietary guidance. She typically eats lunch and snacks after school. She owns a 64-ounce water bottle and drinks two of them daily. She is overweight and needs to lose 40 pounds.    She has nonalcoholic fatty liver disease and is aware that some of her symptoms may be related to this condition. She is interested in understanding what else might be contributing to her symptoms and what she can do about them. She is aware that the Mediterranean diet is beneficial for her condition but struggles with planning her meals accordingly.    She has hypertension, which she believes is a side effect of her ADHD medication. She is currently on losartan and amlodipine 5 mg, the latter of which was prescribed a few years ago.    She is not currently on any medication for her elevated cholesterol levels. She believes that weight loss could potentially improve her cholesterol levels.    She reports no swelling in her fingers but notes tremors in her hands. Additionally, she experiences joint pain, backache, and fatigue. She has noticed recent blurriness in one eye.  Review of Systems  "  Cardiovascular:  Positive for leg swelling.   All other systems reviewed and are negative.    Past Medical History:   Diagnosis Date    Abrasion of axilla     LAST ASSESSED: 11/21/14    Acute cystitis with hematuria 08/15/2019    ADHD     Allergic     Allergic rhinitis     LAST ASSESSED: 5/15/15    Anxiety 2000    Asthma     TRANSITIONED FROM: ASTHMA; RESOLVED: 11/9/16    Bee sting reaction 08/01/2019    Bilateral nephrolithiasis 07/25/2018    COVID 07/04/2024    Depression     Disease of thyroid gland     Hypothyroidism    Fatty liver     Fracture of plateau of left tibia     RESOLVED: 4/14/15    Headache     Hematuria     Hyperparathyroidism (HCC)     Hypertension     Hypothyroidism     Kidney stone 2018    Left ankle sprain     LAST ASSESSED: 10/21/14    No known health problems     DENIED MENTAL STATUS CHANGE AS PER ALLSCRIPTS; CONFLICTED WITH ADHD AND DEPRESSION IN MED HX SO IT COULD NOT BE ADDED IN PERT NEGS    Obesity 2020    Obstructive sleep apnea on CPAP     Pneumonia Twice in [place]    Poison ivy dermatitis 08/08/2022    Scoliosis     Thyroid nodule     Urinary frequency     RESOLVED: 10/27/16    Vaginal bleeding, abnormal     LAST ASSESSED: 6/6/16    Varicella 1967    Viral URI with cough 12/30/2019    Visual impairment      Past Surgical History:   Procedure Laterality Date    EYE SURGERY  2010    Lasik    FOOT SURGERY Right     \"Nerve removal\"    FRACTURE SURGERY      left shoulder and right arm when I was a kid    PARATHYROID GLAND SURGERY  05/17/2018    UT COLONOSCOPY FLX DX W/COLLJ SPEC WHEN PFRMD N/A 06/21/2018    Procedure: COLONOSCOPY;  Surgeon: Jefferson Gamble MD;  Location: [facility];  Service: Gastroenterology    UT PARATHYROIDECTOMY/EXPLORATION PARATHYROIDS Right 05/17/2018    Procedure: MINIMALLY INVASIVE PARATHYROIDECTOMY;   PTH MONITORING;  Surgeon: Milton Aldana MD;  Location: [facility];  Service: Surgical Oncology    TONSILLECTOMY      TONSILLECTOMY AND ADENOIDECTOMY   "     Family History   Problem Relation Age of Onset    Heart Valve Disease Mother         s/p MVR    Glaucoma Mother     Rickets Mother     Stroke Mother     Prostate cancer Father     Anemia Father     Neuropathy Father     Arthritis Father     Cancer Father         PROSTATE    Prostate cancer Brother     Paget's disease of bone Brother     Arthritis Brother     Cancer Brother         PROSTATE    Hearing loss Brother         Tinitus    No Known Problems Maternal Grandmother     No Known Problems Maternal Grandfather     No Known Problems Paternal Grandmother     No Known Problems Paternal Grandfather     No Known Problems Paternal Aunt     Hypertension Family         BENIGN ESSENTIAL    Heart disease Family         CARDIAC DISORDER    Breast cancer Neg Hx     Colon cancer Neg Hx     Ovarian cancer Neg Hx     Uterine cancer Neg Hx      Social History     Tobacco Use    Smoking status: Never    Smokeless tobacco: Never   Vaping Use    Vaping status: Never Used   Substance and Sexual Activity    Alcohol use: Yes     Comment: I have a drink or two a couple times a month.    Drug use: No    Sexual activity: Not Currently     Partners: Male     Birth control/protection: None     Comment: last SA years ago     Current Outpatient Medications on File Prior to Visit   Medication Sig    Acetaminophen (TYLENOL PO) Take by mouth as needed    albuterol (ProAir HFA) 90 mcg/act inhaler Inhale 2 puffs every 6 (six) hours as needed for wheezing    amLODIPine (NORVASC) 5 mg tablet take 1 tablet by mouth once daily    amphetamine-dextroamphetamine (ADDERALL) 5 MG tablet take 1 and 1/2 tablets by mouth every morning    cetirizine (ZyrTEC) 10 mg tablet Take 10 mg by mouth 2 (two) times a day     Cholecalciferol (VITAMIN D3) 1000 units CAPS Take 1 capsule by mouth Daily      EPINEPHrine (EPIPEN JR) 0.15 mg/0.3 mL SOAJ Inject 0.3 mL (0.15 mg total) into a muscle once for 1 dose    EPINEPHrine (PRIMATENE MIST IN) Inhale if needed OTC     FLUoxetine (PROzac) 20 mg capsule take 1 capsule by mouth once daily    Fluticasone-Salmeterol (Advair Diskus) 100-50 mcg/dose inhaler Inhale 1 puff daily    levothyroxine 50 mcg tablet Take 1 tablet (50 mcg total) by mouth daily    multivitamin (THERAGRAN) TABS Take 1 tablet by mouth daily      Triamcinolone Acetonide (Nasacort Allergy) 55 MCG/ACT nasal spray 1 Act into each nostril Daily 2 puffs in each nostril    valsartan (DIOVAN) 160 mg tablet Take 1 tablet (160 mg total) by mouth daily    venlafaxine (EFFEXOR-XR) 150 mg 24 hr capsule Take 1 capsule (150 mg total) by mouth daily     Allergies   Allergen Reactions    Compazine [Prochlorperazine] Anaphylaxis     Category: Allergy; Annotation - [id number]: ALL FORMS    Bee Venom Itching and Swelling    Eggs Or Egg-Derived Products - Food Allergy Abdominal Pain and Diarrhea    Erythromycin GI Intolerance     Category: Allergy; Annotation - [id number]: ALL FORMS    Lactose Intolerance (Gi) - Food Allergy Abdominal Pain and Diarrhea    Sulfa Antibiotics GI Intolerance     Patient states that it is like when you are drunk, dizziness and confusion  Bactrim    Sulfamethoxazole-Trimethoprim Nausea Only and Dizziness     Category: Allergy; Annotation - [id number]: ALL FORMS     Immunization History   Administered Date(s) Administered    COVID-19 J&J (Livevol) vaccine 0.5 mL 03/13/2021    COVID-19 MODERNA VACC 0.5 ML IM 01/21/2022    COVID-19 Moderna Vac BIVALENT 12 Yr+ IM 0.5 ML 12/09/2022    COVID-19 Moderna mRNA Vaccine 12 Yr+ 50 mcg/0.5 mL (Spikevax) 03/25/2025    INFLUENZA 12/12/2016, 12/02/2017, 11/11/2018, 10/25/2019, 10/05/2020, 11/06/2021, 12/09/2022    Influenza Injectable, MDCK, Preservative Free, Quadrivalent, 0.5 mL 11/11/2018    Influenza Recombinant Preservative Free Im 10/22/2024    Influenza, injectable, quadrivalent, preservative free 0.5 mL 10/25/2019, 10/05/2020    Influenza, recombinant, quadrivalent,injectable, preservative free 01/03/2023    Influenza,  seasonal, injectable 10/22/2013, 10/14/2014    MMR 07/28/1995    Pneumococcal Conjugate Vaccine 20-valent (Pcv20), Polysace 01/04/2024    Pneumococcal Polysaccharide PPV23 01/07/2021    Td (adult), adsorbed 07/28/1995    Tdap 08/03/2021    Tuberculin Skin Test-PPD Intradermal 01/16/1998, 09/04/2012, 05/26/2015, 10/27/2017, 08/29/2018, 08/03/2021, 08/13/2024     Objective   LMP  (LMP Unknown)     Physical Exam  Gen: NAD  Heent: atraumatic, normocephalic  Mouth: is moist  Neck: is supple, no JVD, no carotid bruits.   Heart: is regular Normal s1, s2,  Lungs: are clear to auscultation  Abd: soft, non tender, NABS  Skin: no rashes, ulcers  Mood: normal affect  Neuro: AAOx3   Extremities: Bilateral ankle edema noted, nontender no erythema  Physical Exam

## 2025-05-09 DIAGNOSIS — I10 HYPERTENSION, ESSENTIAL, BENIGN: ICD-10-CM

## 2025-05-09 RX ORDER — VALSARTAN 160 MG/1
160 TABLET ORAL DAILY
Qty: 90 TABLET | Refills: 1 | Status: SHIPPED | OUTPATIENT
Start: 2025-05-09

## 2025-05-28 DIAGNOSIS — E03.9 ACQUIRED HYPOTHYROIDISM: ICD-10-CM

## 2025-05-28 RX ORDER — LEVOTHYROXINE SODIUM 50 UG/1
50 TABLET ORAL DAILY
Qty: 90 TABLET | Refills: 1 | Status: SHIPPED | OUTPATIENT
Start: 2025-05-28

## 2025-05-30 DIAGNOSIS — R60.9 EDEMA, UNSPECIFIED TYPE: ICD-10-CM

## 2025-05-30 RX ORDER — SPIRONOLACTONE 25 MG/1
25 TABLET ORAL DAILY
Qty: 90 TABLET | Refills: 1 | Status: SHIPPED | OUTPATIENT
Start: 2025-05-30

## 2025-06-03 DIAGNOSIS — F90.0 ADHD, PREDOMINANTLY INATTENTIVE TYPE: ICD-10-CM

## 2025-06-03 DIAGNOSIS — E03.9 ACQUIRED HYPOTHYROIDISM: ICD-10-CM

## 2025-06-04 RX ORDER — DEXTROAMPHETAMINE SACCHARATE, AMPHETAMINE ASPARTATE, DEXTROAMPHETAMINE SULFATE AND AMPHETAMINE SULFATE 1.25; 1.25; 1.25; 1.25 MG/1; MG/1; MG/1; MG/1
TABLET ORAL
Qty: 45 TABLET | Refills: 0 | Status: SHIPPED | OUTPATIENT
Start: 2025-06-04

## 2025-06-04 RX ORDER — LEVOTHYROXINE SODIUM 50 UG/1
50 TABLET ORAL DAILY
Qty: 90 TABLET | Refills: 0 | OUTPATIENT
Start: 2025-06-04

## 2025-06-05 DIAGNOSIS — Z87.09 HISTORY OF ASTHMA: ICD-10-CM

## 2025-06-06 RX ORDER — FLUTICASONE PROPIONATE AND SALMETEROL 100; 50 UG/1; UG/1
1 POWDER RESPIRATORY (INHALATION) DAILY
Qty: 180 BLISTER | Refills: 1 | Status: SHIPPED | OUTPATIENT
Start: 2025-06-06

## 2025-07-01 ENCOUNTER — OFFICE VISIT (OUTPATIENT)
Age: 65
End: 2025-07-01
Payer: COMMERCIAL

## 2025-07-01 VITALS
OXYGEN SATURATION: 94 % | TEMPERATURE: 98.9 F | WEIGHT: 172 LBS | DIASTOLIC BLOOD PRESSURE: 76 MMHG | SYSTOLIC BLOOD PRESSURE: 112 MMHG | HEIGHT: 61 IN | BODY MASS INDEX: 32.47 KG/M2 | HEART RATE: 80 BPM

## 2025-07-01 DIAGNOSIS — E03.9 ACQUIRED HYPOTHYROIDISM: ICD-10-CM

## 2025-07-01 DIAGNOSIS — H53.9 VISION DISTURBANCE: Primary | ICD-10-CM

## 2025-07-01 DIAGNOSIS — F90.0 ADHD, PREDOMINANTLY INATTENTIVE TYPE: ICD-10-CM

## 2025-07-01 DIAGNOSIS — I10 HYPERTENSION, ESSENTIAL, BENIGN: ICD-10-CM

## 2025-07-01 DIAGNOSIS — Z23 ENCOUNTER FOR IMMUNIZATION: ICD-10-CM

## 2025-07-01 PROBLEM — J45.909 ASTHMATIC BRONCHITIS WITHOUT COMPLICATION: Status: RESOLVED | Noted: 2018-12-28 | Resolved: 2025-07-01

## 2025-07-01 PROCEDURE — 99214 OFFICE O/P EST MOD 30 MIN: CPT | Performed by: INTERNAL MEDICINE

## 2025-07-01 PROCEDURE — 90750 HZV VACC RECOMBINANT IM: CPT

## 2025-07-01 PROCEDURE — 90471 IMMUNIZATION ADMIN: CPT

## 2025-07-01 RX ORDER — DEXTROAMPHETAMINE SACCHARATE, AMPHETAMINE ASPARTATE, DEXTROAMPHETAMINE SULFATE AND AMPHETAMINE SULFATE 1.25; 1.25; 1.25; 1.25 MG/1; MG/1; MG/1; MG/1
TABLET ORAL
Qty: 45 TABLET | Refills: 0 | Status: SHIPPED | OUTPATIENT
Start: 2025-07-01

## 2025-07-01 NOTE — PROGRESS NOTES
Name: Armond Foster      : 1960      MRN: [id number]  Encounter Provider: Donell Tse MD  Encounter Date: 2025   Encounter department: [facility] [place]  :  Assessment & Plan  ADHD, predominantly inattentive type  Adderall renewed  Orders:    amphetamine-dextroamphetamine (ADDERALL) 5 MG tablet; take 1 and 1/2 tablets by mouth every morning    Vision disturbance  Has a follow-up ophthalmologic examination scheduled with [facility] ophthalmology       Hypertension, essential, benign  Blood pressure is nicely controlled on valsartan 160 mg  Acquired hypothyroidism  Continues levothyroxine 50 mcg daily.       Answers submitted by the patient for this visit:  Annual Physical (Submitted on 2025)  Diet/Nutrition choices: no special diet  Exercise choices: no formal exercise  Sleep choices: 4-6 hours of sleep on average, uses CPAP machine, witnessed apnea  Sleep additional comments: I sleep more in the summertime.  Hearing choices: normal hearing right ear, normal hearing left ear  Vision choices: vision problems, most recent eye exam < 1 year ago, wears glasses, previous LASIK surgery  Vision additional comments: Difficulties focusing/seeing clearly with left eye  Dental choices: regular dental visits, brushes teeth once daily, floss regularly  Do you currently have an OB/GYN provider that you routinely follow with?: Yes  Menopausal status: postmenopausal  Any history of sexual transmitted disease/infection?: No  Do you have an advance directive/living will?: No  Do you have a durable power of  (POA)?: No         History of Present Illness   Patient presents to the office for follow-up visit for hypertension, ADHD, hypothyroidism and has been having some issues with her vision.  She was seen by an ophthalmologist at [facility] for sight and they could not detect any abnormality so she is being referred to another ophthalmologic specialist.   "She describes her vision symptoms as kind of a cloudiness of her vision where she cannot see things clearly in her left eye she has a previous history of LASEK surgery in both eyes.  She also complains about a nonproductive cough not associated with any fevers or chills not associated with wheezing or shortness of breath cough is intermittent and does not awaken her at night recent labs are reviewed and are all essentially normal.  CBC, CMP, magnesium level, TSH, free T4, total T3 and PTH levels are all within normal limits.    , Hypothyroidism  Review of Systems   Constitutional: Negative.    HENT: Negative.     Eyes:  Positive for visual disturbance (Cloudiness in the visual field of the left eye).   Respiratory:  Positive for cough (Nonproductive).    Cardiovascular: Negative.    Gastrointestinal: Negative.    Endocrine: Negative.    Genitourinary: Negative.    Musculoskeletal: Negative.    Skin: Negative.    Allergic/Immunologic: Negative.    Neurological: Negative.    Hematological: Negative.    Psychiatric/Behavioral: Negative.         Objective   /76 (BP Location: Left arm, Patient Position: Sitting, Cuff Size: Standard)   Pulse 80   Temp 98.9 °F (37.2 °C) (Temporal)   Ht 5' 0.51\" (1.537 m)   Wt 78 kg (172 lb)   LMP  (LMP Unknown)   SpO2 94%   BMI 33.03 kg/m²      Physical Exam  Vitals reviewed.   Constitutional:       General: She is not in acute distress.     Appearance: Normal appearance. She is obese. She is not ill-appearing, toxic-appearing or diaphoretic.   HENT:      Head: Normocephalic and atraumatic.      Right Ear: External ear normal.      Left Ear: External ear normal.      Nose: Nose normal.      Mouth/Throat:      Mouth: Mucous membranes are moist.      Pharynx: Oropharynx is clear. Posterior oropharyngeal erythema (Mild erythema of the posterior pharynx) present.     Eyes:      General: No scleral icterus.        Left eye: No discharge.      Conjunctiva/sclera: Conjunctivae " normal.      Pupils: Pupils are equal, round, and reactive to light.      Comments: On ophthalmoscopic exam there appeared to be small pinhole defect in the iris at the 4 o'clock position and some type of streaky artifact at the 11 to 12 o'clock position pupil is reactive and regular.  What could be visualized of the retina appeared normal.  Disc was clear.  Blood vessels appeared normal.     Cardiovascular:      Rate and Rhythm: Normal rate and regular rhythm.      Heart sounds: Normal heart sounds. No murmur heard.  Pulmonary:      Effort: Pulmonary effort is normal. No respiratory distress.      Breath sounds: Normal breath sounds.   Abdominal:      General: Abdomen is flat. There is no distension.     Musculoskeletal:      Cervical back: Neck supple.      Right lower leg: Edema present.      Left lower leg: No edema.   Lymphadenopathy:      Cervical: No cervical adenopathy.     Skin:     General: Skin is warm and dry.      Coloration: Skin is not jaundiced.      Findings: No bruising, erythema or rash.     Neurological:      General: No focal deficit present.      Mental Status: She is alert and oriented to person, place, and time. Mental status is at baseline.     Psychiatric:         Mood and Affect: Mood normal.         Behavior: Behavior normal.         Thought Content: Thought content normal.         Judgment: Judgment normal.

## 2025-07-01 NOTE — ASSESSMENT & PLAN NOTE
Adderall renewed  Orders:    amphetamine-dextroamphetamine (ADDERALL) 5 MG tablet; take 1 and 1/2 tablets by mouth every morning

## 2025-07-15 DIAGNOSIS — F32.A DEPRESSION, UNSPECIFIED DEPRESSION TYPE: ICD-10-CM

## 2025-07-16 RX ORDER — VENLAFAXINE HYDROCHLORIDE 150 MG/1
150 CAPSULE, EXTENDED RELEASE ORAL DAILY
Qty: 90 CAPSULE | Refills: 1 | Status: SHIPPED | OUTPATIENT
Start: 2025-07-16

## (undated) DEVICE — CHLORAPREP HI-LITE 26ML ORANGE

## (undated) DEVICE — INTENDED FOR TISSUE SEPARATION, AND OTHER PROCEDURES THAT REQUIRE A SHARP SURGICAL BLADE TO PUNCTURE OR CUT.: Brand: BARD-PARKER ® CARBON RIB-BACK BLADES

## (undated) DEVICE — MINOR PROCEDURE DRAPE: Brand: CONVERTORS

## (undated) DEVICE — 3M™ STERI-STRIP™ COMPOUND BENZOIN TINCTURE 40 BAGS/CARTON 4 CARTONS/CASE C1544: Brand: 3M™ STERI-STRIP™

## (undated) DEVICE — GLOVE SRG BIOGEL ECLIPSE 7

## (undated) DEVICE — SUT MONOCRYL 5-0 P-3 18 IN Y493G

## (undated) DEVICE — PLUMEPEN PRO 10FT

## (undated) DEVICE — GLOVE INDICATOR PI UNDERGLOVE SZ 7 BLUE

## (undated) DEVICE — SCD SEQUENTIAL COMPRESSION COMFORT SLEEVE MEDIUM KNEE LENGTH: Brand: KENDALL SCD

## (undated) DEVICE — PACK UNIVERSAL NECK

## (undated) DEVICE — SUT VICRYL 3-0 SH 27 IN J416H

## (undated) DEVICE — 3M™ STERI-STRIP™ REINFORCED ADHESIVE SKIN CLOSURES, R1547, 1/2 IN X 4 IN (12 MM X 100 MM), 6 STRIPS/ENVELOPE: Brand: 3M™ STERI-STRIP™

## (undated) DEVICE — INTENDED FOR TISSUE SEPARATION, AND OTHER PROCEDURES THAT REQUIRE A SHARP SURGICAL BLADE TO PUNCTURE OR CUT.: Brand: BARD-PARKER SAFETY BLADES SIZE 15, STERILE

## (undated) DEVICE — SURGICEL 4 X 8

## (undated) DEVICE — DRAPE SURGIKIT SADDLE BAG

## (undated) DEVICE — CONMED ACCESSORY ELECTRODE, FLAT BLADE WITH EXTENDED INSULATION: Brand: CONMED

## (undated) DEVICE — 3000CC GUARDIAN II: Brand: GUARDIAN

## (undated) DEVICE — SUT VICRYL 4-0 PS-2 27 IN J426H